# Patient Record
Sex: MALE | Race: BLACK OR AFRICAN AMERICAN | HISPANIC OR LATINO | URBAN - METROPOLITAN AREA
[De-identification: names, ages, dates, MRNs, and addresses within clinical notes are randomized per-mention and may not be internally consistent; named-entity substitution may affect disease eponyms.]

---

## 2023-02-28 ENCOUNTER — INPATIENT (INPATIENT)
Facility: HOSPITAL | Age: 48
LOS: 17 days | Discharge: HOME | DRG: 270 | End: 2023-03-18
Attending: THORACIC SURGERY (CARDIOTHORACIC VASCULAR SURGERY) | Admitting: THORACIC SURGERY (CARDIOTHORACIC VASCULAR SURGERY)
Payer: COMMERCIAL

## 2023-02-28 VITALS
TEMPERATURE: 98 F | RESPIRATION RATE: 18 BRPM | SYSTOLIC BLOOD PRESSURE: 155 MMHG | OXYGEN SATURATION: 96 % | HEIGHT: 64 IN | DIASTOLIC BLOOD PRESSURE: 111 MMHG | WEIGHT: 191.8 LBS | HEART RATE: 83 BPM

## 2023-02-28 DIAGNOSIS — I71.1 THORACIC AORTIC ANEURYSM, RUPTURED: ICD-10-CM

## 2023-02-28 LAB
A1C WITH ESTIMATED AVERAGE GLUCOSE RESULT: 6 % — HIGH (ref 4–5.6)
ABO RH CONFIRMATION: SIGNIFICANT CHANGE UP
ALBUMIN SERPL ELPH-MCNC: 4.1 G/DL — SIGNIFICANT CHANGE UP (ref 3.3–5.2)
ALP SERPL-CCNC: 116 U/L — SIGNIFICANT CHANGE UP (ref 40–120)
ALT FLD-CCNC: 17 U/L — SIGNIFICANT CHANGE UP
ANION GAP SERPL CALC-SCNC: 14 MMOL/L — SIGNIFICANT CHANGE UP (ref 5–17)
APTT BLD: 30 SEC — SIGNIFICANT CHANGE UP (ref 27.5–35.5)
AST SERPL-CCNC: 22 U/L — SIGNIFICANT CHANGE UP
BASOPHILS # BLD AUTO: 0.02 K/UL — SIGNIFICANT CHANGE UP (ref 0–0.2)
BASOPHILS NFR BLD AUTO: 0.4 % — SIGNIFICANT CHANGE UP (ref 0–2)
BILIRUB SERPL-MCNC: 0.4 MG/DL — SIGNIFICANT CHANGE UP (ref 0.4–2)
BLD GP AB SCN SERPL QL: SIGNIFICANT CHANGE UP
BUN SERPL-MCNC: 22.9 MG/DL — HIGH (ref 8–20)
CALCIUM SERPL-MCNC: 9.6 MG/DL — SIGNIFICANT CHANGE UP (ref 8.4–10.5)
CHLORIDE SERPL-SCNC: 101 MMOL/L — SIGNIFICANT CHANGE UP (ref 96–108)
CK SERPL-CCNC: 148 U/L — SIGNIFICANT CHANGE UP (ref 30–200)
CO2 SERPL-SCNC: 22 MMOL/L — SIGNIFICANT CHANGE UP (ref 22–29)
CREAT SERPL-MCNC: 1.36 MG/DL — HIGH (ref 0.5–1.3)
EGFR: 65 ML/MIN/1.73M2 — SIGNIFICANT CHANGE UP
EOSINOPHIL # BLD AUTO: 0.06 K/UL — SIGNIFICANT CHANGE UP (ref 0–0.5)
EOSINOPHIL NFR BLD AUTO: 1.1 % — SIGNIFICANT CHANGE UP (ref 0–6)
ESTIMATED AVERAGE GLUCOSE: 126 MG/DL — HIGH (ref 68–114)
GAS PNL BLDA: SIGNIFICANT CHANGE UP
GLUCOSE SERPL-MCNC: 114 MG/DL — HIGH (ref 70–99)
HCT VFR BLD CALC: 45.3 % — SIGNIFICANT CHANGE UP (ref 39–50)
HGB BLD-MCNC: 13.9 G/DL — SIGNIFICANT CHANGE UP (ref 13–17)
IMM GRANULOCYTES NFR BLD AUTO: 0.4 % — SIGNIFICANT CHANGE UP (ref 0–0.9)
INR BLD: 1.08 RATIO — SIGNIFICANT CHANGE UP (ref 0.88–1.16)
LYMPHOCYTES # BLD AUTO: 1.44 K/UL — SIGNIFICANT CHANGE UP (ref 1–3.3)
LYMPHOCYTES # BLD AUTO: 25.5 % — SIGNIFICANT CHANGE UP (ref 13–44)
MCHC RBC-ENTMCNC: 25.3 PG — LOW (ref 27–34)
MCHC RBC-ENTMCNC: 30.7 GM/DL — LOW (ref 32–36)
MCV RBC AUTO: 82.5 FL — SIGNIFICANT CHANGE UP (ref 80–100)
MONOCYTES # BLD AUTO: 0.6 K/UL — SIGNIFICANT CHANGE UP (ref 0–0.9)
MONOCYTES NFR BLD AUTO: 10.6 % — SIGNIFICANT CHANGE UP (ref 2–14)
NEUTROPHILS # BLD AUTO: 3.51 K/UL — SIGNIFICANT CHANGE UP (ref 1.8–7.4)
NEUTROPHILS NFR BLD AUTO: 62 % — SIGNIFICANT CHANGE UP (ref 43–77)
NT-PROBNP SERPL-SCNC: 625 PG/ML — HIGH (ref 0–300)
PLATELET # BLD AUTO: 216 K/UL — SIGNIFICANT CHANGE UP (ref 150–400)
POTASSIUM SERPL-MCNC: 4.2 MMOL/L — SIGNIFICANT CHANGE UP (ref 3.5–5.3)
POTASSIUM SERPL-SCNC: 4.2 MMOL/L — SIGNIFICANT CHANGE UP (ref 3.5–5.3)
PREALB SERPL-MCNC: 21 MG/DL — SIGNIFICANT CHANGE UP (ref 18–38)
PROT SERPL-MCNC: 8.1 G/DL — SIGNIFICANT CHANGE UP (ref 6.6–8.7)
PROTHROM AB SERPL-ACNC: 12.5 SEC — SIGNIFICANT CHANGE UP (ref 10.5–13.4)
RBC # BLD: 5.49 M/UL — SIGNIFICANT CHANGE UP (ref 4.2–5.8)
RBC # FLD: 14.7 % — HIGH (ref 10.3–14.5)
SARS-COV-2 RNA SPEC QL NAA+PROBE: SIGNIFICANT CHANGE UP
SODIUM SERPL-SCNC: 137 MMOL/L — SIGNIFICANT CHANGE UP (ref 135–145)
TROPONIN T SERPL-MCNC: <0.01 NG/ML — SIGNIFICANT CHANGE UP (ref 0–0.06)
TSH SERPL-MCNC: 1.61 UIU/ML — SIGNIFICANT CHANGE UP (ref 0.27–4.2)
WBC # BLD: 5.65 K/UL — SIGNIFICANT CHANGE UP (ref 3.8–10.5)
WBC # FLD AUTO: 5.65 K/UL — SIGNIFICANT CHANGE UP (ref 3.8–10.5)

## 2023-02-28 PROCEDURE — 93880 EXTRACRANIAL BILAT STUDY: CPT | Mod: 26

## 2023-02-28 PROCEDURE — 93306 TTE W/DOPPLER COMPLETE: CPT | Mod: 26

## 2023-02-28 RX ORDER — ESMOLOL HCL 100MG/10ML
50 VIAL (ML) INTRAVENOUS
Qty: 2500 | Refills: 0 | Status: DISCONTINUED | OUTPATIENT
Start: 2023-02-28 | End: 2023-03-02

## 2023-02-28 RX ORDER — NICARDIPINE HYDROCHLORIDE 30 MG/1
5 CAPSULE, EXTENDED RELEASE ORAL
Qty: 40 | Refills: 0 | Status: DISCONTINUED | OUTPATIENT
Start: 2023-02-28 | End: 2023-03-02

## 2023-02-28 RX ORDER — LABETALOL HCL 100 MG
10 TABLET ORAL ONCE
Refills: 0 | Status: COMPLETED | OUTPATIENT
Start: 2023-02-28 | End: 2023-02-28

## 2023-02-28 RX ORDER — PANTOPRAZOLE SODIUM 20 MG/1
40 TABLET, DELAYED RELEASE ORAL
Refills: 0 | Status: DISCONTINUED | OUTPATIENT
Start: 2023-02-28 | End: 2023-03-09

## 2023-02-28 RX ORDER — SODIUM CHLORIDE 9 MG/ML
3 INJECTION INTRAMUSCULAR; INTRAVENOUS; SUBCUTANEOUS EVERY 8 HOURS
Refills: 0 | Status: DISCONTINUED | OUTPATIENT
Start: 2023-02-28 | End: 2023-03-09

## 2023-02-28 RX ORDER — MUPIROCIN 20 MG/G
1 OINTMENT TOPICAL EVERY 12 HOURS
Refills: 0 | Status: DISCONTINUED | OUTPATIENT
Start: 2023-02-28 | End: 2023-03-01

## 2023-02-28 RX ADMIN — Medication 10 MILLIGRAM(S): at 21:39

## 2023-02-28 RX ADMIN — SODIUM CHLORIDE 3 MILLILITER(S): 9 INJECTION INTRAMUSCULAR; INTRAVENOUS; SUBCUTANEOUS at 21:34

## 2023-02-28 RX ADMIN — Medication 26.1 MICROGRAM(S)/KG/MIN: at 21:40

## 2023-03-01 DIAGNOSIS — I50.21 ACUTE SYSTOLIC (CONGESTIVE) HEART FAILURE: ICD-10-CM

## 2023-03-01 DIAGNOSIS — I71.00 DISSECTION OF UNSPECIFIED SITE OF AORTA: ICD-10-CM

## 2023-03-01 DIAGNOSIS — N18.9 CHRONIC KIDNEY DISEASE, UNSPECIFIED: ICD-10-CM

## 2023-03-01 DIAGNOSIS — Z29.9 ENCOUNTER FOR PROPHYLACTIC MEASURES, UNSPECIFIED: ICD-10-CM

## 2023-03-01 DIAGNOSIS — I16.0 HYPERTENSIVE URGENCY: ICD-10-CM

## 2023-03-01 DIAGNOSIS — I50.42 CHRONIC COMBINED SYSTOLIC (CONGESTIVE) AND DIASTOLIC (CONGESTIVE) HEART FAILURE: ICD-10-CM

## 2023-03-01 DIAGNOSIS — I42.0 DILATED CARDIOMYOPATHY: ICD-10-CM

## 2023-03-01 LAB
ALBUMIN SERPL ELPH-MCNC: 3.8 G/DL — SIGNIFICANT CHANGE UP (ref 3.3–5.2)
ALP SERPL-CCNC: 104 U/L — SIGNIFICANT CHANGE UP (ref 40–120)
ALT FLD-CCNC: 16 U/L — SIGNIFICANT CHANGE UP
AMPHET UR-MCNC: NEGATIVE — SIGNIFICANT CHANGE UP
ANION GAP SERPL CALC-SCNC: 13 MMOL/L — SIGNIFICANT CHANGE UP (ref 5–17)
APPEARANCE UR: CLEAR — SIGNIFICANT CHANGE UP
AST SERPL-CCNC: 15 U/L — SIGNIFICANT CHANGE UP
BARBITURATES UR SCN-MCNC: NEGATIVE — SIGNIFICANT CHANGE UP
BASOPHILS # BLD AUTO: 0.02 K/UL — SIGNIFICANT CHANGE UP (ref 0–0.2)
BASOPHILS NFR BLD AUTO: 0.4 % — SIGNIFICANT CHANGE UP (ref 0–2)
BENZODIAZ UR-MCNC: NEGATIVE — SIGNIFICANT CHANGE UP
BILIRUB SERPL-MCNC: 0.5 MG/DL — SIGNIFICANT CHANGE UP (ref 0.4–2)
BILIRUB UR-MCNC: NEGATIVE — SIGNIFICANT CHANGE UP
BUN SERPL-MCNC: 25.7 MG/DL — HIGH (ref 8–20)
CALCIUM SERPL-MCNC: 9 MG/DL — SIGNIFICANT CHANGE UP (ref 8.4–10.5)
CHLORIDE SERPL-SCNC: 104 MMOL/L — SIGNIFICANT CHANGE UP (ref 96–108)
CO2 SERPL-SCNC: 24 MMOL/L — SIGNIFICANT CHANGE UP (ref 22–29)
COCAINE METAB.OTHER UR-MCNC: NEGATIVE — SIGNIFICANT CHANGE UP
COLOR SPEC: YELLOW — SIGNIFICANT CHANGE UP
CREAT SERPL-MCNC: 1.69 MG/DL — HIGH (ref 0.5–1.3)
DIFF PNL FLD: ABNORMAL
EGFR: 50 ML/MIN/1.73M2 — LOW
EOSINOPHIL # BLD AUTO: 0.06 K/UL — SIGNIFICANT CHANGE UP (ref 0–0.5)
EOSINOPHIL NFR BLD AUTO: 1.2 % — SIGNIFICANT CHANGE UP (ref 0–6)
EPI CELLS # UR: SIGNIFICANT CHANGE UP
GLUCOSE SERPL-MCNC: 106 MG/DL — HIGH (ref 70–99)
GLUCOSE UR QL: NEGATIVE MG/DL — SIGNIFICANT CHANGE UP
HCT VFR BLD CALC: 41.1 % — SIGNIFICANT CHANGE UP (ref 39–50)
HGB BLD-MCNC: 12.7 G/DL — LOW (ref 13–17)
IMM GRANULOCYTES NFR BLD AUTO: 0.2 % — SIGNIFICANT CHANGE UP (ref 0–0.9)
KETONES UR-MCNC: NEGATIVE — SIGNIFICANT CHANGE UP
LEUKOCYTE ESTERASE UR-ACNC: NEGATIVE — SIGNIFICANT CHANGE UP
LYMPHOCYTES # BLD AUTO: 1.52 K/UL — SIGNIFICANT CHANGE UP (ref 1–3.3)
LYMPHOCYTES # BLD AUTO: 30.4 % — SIGNIFICANT CHANGE UP (ref 13–44)
MAGNESIUM SERPL-MCNC: 2 MG/DL — SIGNIFICANT CHANGE UP (ref 1.6–2.6)
MCHC RBC-ENTMCNC: 25.3 PG — LOW (ref 27–34)
MCHC RBC-ENTMCNC: 30.9 GM/DL — LOW (ref 32–36)
MCV RBC AUTO: 82 FL — SIGNIFICANT CHANGE UP (ref 80–100)
METHADONE UR-MCNC: NEGATIVE — SIGNIFICANT CHANGE UP
MONOCYTES # BLD AUTO: 0.68 K/UL — SIGNIFICANT CHANGE UP (ref 0–0.9)
MONOCYTES NFR BLD AUTO: 13.6 % — SIGNIFICANT CHANGE UP (ref 2–14)
MRSA PCR RESULT.: SIGNIFICANT CHANGE UP
NEUTROPHILS # BLD AUTO: 2.71 K/UL — SIGNIFICANT CHANGE UP (ref 1.8–7.4)
NEUTROPHILS NFR BLD AUTO: 54.2 % — SIGNIFICANT CHANGE UP (ref 43–77)
NITRITE UR-MCNC: NEGATIVE — SIGNIFICANT CHANGE UP
OPIATES UR-MCNC: NEGATIVE — SIGNIFICANT CHANGE UP
PCP SPEC-MCNC: SIGNIFICANT CHANGE UP
PCP UR-MCNC: NEGATIVE — SIGNIFICANT CHANGE UP
PH UR: 6 — SIGNIFICANT CHANGE UP (ref 5–8)
PLATELET # BLD AUTO: 202 K/UL — SIGNIFICANT CHANGE UP (ref 150–400)
POTASSIUM SERPL-MCNC: 4 MMOL/L — SIGNIFICANT CHANGE UP (ref 3.5–5.3)
POTASSIUM SERPL-SCNC: 4 MMOL/L — SIGNIFICANT CHANGE UP (ref 3.5–5.3)
PROT SERPL-MCNC: 7 G/DL — SIGNIFICANT CHANGE UP (ref 6.6–8.7)
PROT UR-MCNC: 100 MG/DL
RBC # BLD: 5.01 M/UL — SIGNIFICANT CHANGE UP (ref 4.2–5.8)
RBC # FLD: 14.9 % — HIGH (ref 10.3–14.5)
RBC CASTS # UR COMP ASSIST: SIGNIFICANT CHANGE UP /HPF (ref 0–4)
S AUREUS DNA NOSE QL NAA+PROBE: SIGNIFICANT CHANGE UP
SODIUM SERPL-SCNC: 141 MMOL/L — SIGNIFICANT CHANGE UP (ref 135–145)
SP GR SPEC: 1.02 — SIGNIFICANT CHANGE UP (ref 1.01–1.02)
THC UR QL: NEGATIVE — SIGNIFICANT CHANGE UP
UROBILINOGEN FLD QL: NEGATIVE MG/DL — SIGNIFICANT CHANGE UP
WBC # BLD: 5 K/UL — SIGNIFICANT CHANGE UP (ref 3.8–10.5)
WBC # FLD AUTO: 5 K/UL — SIGNIFICANT CHANGE UP (ref 3.8–10.5)
WBC UR QL: NEGATIVE /HPF — SIGNIFICANT CHANGE UP (ref 0–5)

## 2023-03-01 PROCEDURE — 99223 1ST HOSP IP/OBS HIGH 75: CPT

## 2023-03-01 PROCEDURE — 99291 CRITICAL CARE FIRST HOUR: CPT

## 2023-03-01 PROCEDURE — 93010 ELECTROCARDIOGRAM REPORT: CPT

## 2023-03-01 PROCEDURE — 71045 X-RAY EXAM CHEST 1 VIEW: CPT | Mod: 26

## 2023-03-01 RX ORDER — SENNA PLUS 8.6 MG/1
2 TABLET ORAL AT BEDTIME
Refills: 0 | Status: DISCONTINUED | OUTPATIENT
Start: 2023-03-01 | End: 2023-03-09

## 2023-03-01 RX ORDER — METOPROLOL TARTRATE 50 MG
25 TABLET ORAL EVERY 12 HOURS
Refills: 0 | Status: DISCONTINUED | OUTPATIENT
Start: 2023-03-01 | End: 2023-03-01

## 2023-03-01 RX ORDER — METOPROLOL TARTRATE 50 MG
50 TABLET ORAL EVERY 8 HOURS
Refills: 0 | Status: DISCONTINUED | OUTPATIENT
Start: 2023-03-01 | End: 2023-03-01

## 2023-03-01 RX ORDER — AMLODIPINE BESYLATE 2.5 MG/1
10 TABLET ORAL DAILY
Refills: 0 | Status: DISCONTINUED | OUTPATIENT
Start: 2023-03-01 | End: 2023-03-02

## 2023-03-01 RX ORDER — FUROSEMIDE 40 MG
40 TABLET ORAL ONCE
Refills: 0 | Status: COMPLETED | OUTPATIENT
Start: 2023-03-01 | End: 2023-03-01

## 2023-03-01 RX ORDER — METOPROLOL TARTRATE 50 MG
25 TABLET ORAL ONCE
Refills: 0 | Status: COMPLETED | OUTPATIENT
Start: 2023-03-01 | End: 2023-03-01

## 2023-03-01 RX ORDER — MORPHINE SULFATE 50 MG/1
4 CAPSULE, EXTENDED RELEASE ORAL ONCE
Refills: 0 | Status: DISCONTINUED | OUTPATIENT
Start: 2023-03-01 | End: 2023-03-01

## 2023-03-01 RX ORDER — METOPROLOL TARTRATE 50 MG
75 TABLET ORAL EVERY 8 HOURS
Refills: 0 | Status: DISCONTINUED | OUTPATIENT
Start: 2023-03-01 | End: 2023-03-02

## 2023-03-01 RX ORDER — INFLUENZA VIRUS VACCINE 15; 15; 15; 15 UG/.5ML; UG/.5ML; UG/.5ML; UG/.5ML
0.5 SUSPENSION INTRAMUSCULAR ONCE
Refills: 0 | Status: DISCONTINUED | OUTPATIENT
Start: 2023-03-01 | End: 2023-03-06

## 2023-03-01 RX ADMIN — NICARDIPINE HYDROCHLORIDE 25 MG/HR: 30 CAPSULE, EXTENDED RELEASE ORAL at 18:51

## 2023-03-01 RX ADMIN — SODIUM CHLORIDE 3 MILLILITER(S): 9 INJECTION INTRAMUSCULAR; INTRAVENOUS; SUBCUTANEOUS at 21:25

## 2023-03-01 RX ADMIN — SODIUM CHLORIDE 3 MILLILITER(S): 9 INJECTION INTRAMUSCULAR; INTRAVENOUS; SUBCUTANEOUS at 14:09

## 2023-03-01 RX ADMIN — Medication 40 MILLIGRAM(S): at 04:57

## 2023-03-01 RX ADMIN — Medication 25 MILLIGRAM(S): at 18:51

## 2023-03-01 RX ADMIN — Medication 26.1 MICROGRAM(S)/KG/MIN: at 18:51

## 2023-03-01 RX ADMIN — Medication 25 MILLIGRAM(S): at 10:27

## 2023-03-01 RX ADMIN — SENNA PLUS 2 TABLET(S): 8.6 TABLET ORAL at 21:28

## 2023-03-01 RX ADMIN — SODIUM CHLORIDE 3 MILLILITER(S): 9 INJECTION INTRAMUSCULAR; INTRAVENOUS; SUBCUTANEOUS at 05:01

## 2023-03-01 RX ADMIN — PANTOPRAZOLE SODIUM 40 MILLIGRAM(S): 20 TABLET, DELAYED RELEASE ORAL at 09:01

## 2023-03-01 RX ADMIN — AMLODIPINE BESYLATE 10 MILLIGRAM(S): 2.5 TABLET ORAL at 10:28

## 2023-03-01 RX ADMIN — MORPHINE SULFATE 4 MILLIGRAM(S): 50 CAPSULE, EXTENDED RELEASE ORAL at 12:45

## 2023-03-01 RX ADMIN — Medication 50 MILLIGRAM(S): at 13:39

## 2023-03-01 RX ADMIN — MORPHINE SULFATE 4 MILLIGRAM(S): 50 CAPSULE, EXTENDED RELEASE ORAL at 12:30

## 2023-03-01 RX ADMIN — NICARDIPINE HYDROCHLORIDE 25 MG/HR: 30 CAPSULE, EXTENDED RELEASE ORAL at 13:36

## 2023-03-01 RX ADMIN — Medication 75 MILLIGRAM(S): at 21:29

## 2023-03-01 NOTE — CONSULT NOTE ADULT - ASSESSMENT
41 yo M, originally from the  em[place] ~ [place], with a history of HTN (uncontrolled diagnosed 5 years ago), interstitial lung disease, CAD w/ stents (? for a leaking blood vessel/dissection), CKD (unknown baseline) initially presented to [facility] 2/28/23 from [facility] where he had gone for a routine physical exam and to get his medications filled.  He had previously not seen a physician for over 3 years.  He was last seen in [facility] ED in 2018 for hypertensive urgency requiring IV labetalol and transition to lisinopril/ HCTZ/ amlodipine.  However, he has been continuously filling his home meds from the  including losartan 320mg daily, nifedipine 60mg ER daily, and rosuvastatin 40mg daily.  Due to inability to obtain all his medications, he has been unable to obtain recent doses so he has been taking half his medication daily.  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  On presentation to [facility] he presented with SBP > 200 and subsequent CTA C/A/P showed Type B dissection. He was started on labetalol and nifedipine drips and transferred to [facility] CTICU for further management. A line is now in place with all drips off this AM with -150s.     Cardiac Testing:  Echo 2/28/23: LVEF 20-25%, LVEDD 6./23cm, LVIDs 5.59cm, TAPSE 2.54cm, mild to mod MR, trivial TR, RV normal function.

## 2023-03-01 NOTE — PROGRESS NOTE ADULT - SUBJECTIVE AND OBJECTIVE BOX
FABIANA CALIXTO  MRN#: [id number]  Subjective: The patient was in the CTICU in critical condition at risk for imminent decompensation and was seen and evaluate on AM rounds with the entire multidisciplinary team.     OBJECTIVE:  ICU Vital Signs Last 24 Hrs  T(C): 36.8 (01 Mar 2023 04:51), Max: 36.8 (01 Mar 2023 04:51)  T(F): 98.3 (01 Mar 2023 04:51), Max: 98.3 (01 Mar 2023 04:51)  HR: 67 (01 Mar 2023 08:30) (60 - 90)  BP: 114/71 (01 Mar 2023 08:30) (98/76 - 160/110)  BP(mean): 86 (01 Mar 2023 08:30) (80 - 113)  ABP: 88/76 (01 Mar 2023 08:30) (85/72 - 149/91)  ABP(mean): 82 (01 Mar 2023 08:30) (79 - 114)  RR: 26 (01 Mar 2023 08:30) (14 - 30)  SpO2: 100% (01 Mar 2023 08:30) (93% - 100%)    O2 Parameters below as of 01 Mar 2023 08:00  Patient On (Oxygen Delivery Method): nasal cannula  O2 Flow (L/min): 2    02-28 @ 07:01  -  03-01 @ 07:00  --------------------------------------------------------  IN: 533 mL / OUT: 500 mL / NET: 33 mL      PHYSICAL EXAM:Daily Height in cm: 162.56 (28 Feb 2023 21:17)    Daily   General: WN/WD NAD    HEENT:     + NCAT  + EOMI  - Conjuctival edema   - Icterus   - Thrush   - ETT  - NGT/OGT  Neck:         + FROM    + JVD     - Nodes     - Masses    + Mid-line trachea   - Tracheostomy  Chest:         - Sternal click  - Sternal drainage  - Pacing wires  - Chest tubes  - SubQ emphysema  Lungs:          + CTA   - Rhonchi    - Rales    - Wheezing     - Decreased BS   - Dullness R L  Cardiac:       + S1 + S2    + RRR   - Irregular   - S3  - S4    - Murmurs   - Rub   - Hamman’s sign   Abdomen:    + BS     + Soft    + Non-tender     - Distended    - Organomegaly  - PEG  Extremities:   - Cyanosis U/L   - Clubbing  U/L  - LE/UE Edema   + Capillary refill    + Pulses   Neuro:        + Awake   +  Alert   - Confused   - Lethargic   - Sedated   - Generalized Weakness  Skin:        - Rashes    - Erythema   + Normal incisions   + IV sites intact  - Sacral decubitus    HOSPITAL MEDICATIONS: All mediciations reviewed and analyzed  MEDICATIONS  (STANDING):  esmolol  Infusion 50 MICROgram(s)/kG/Min (26.1 mL/Hr) IV Continuous <Continuous>  influenza   Vaccine 0.5 milliLiter(s) IntraMuscular once  niCARdipine Infusion 5 mG/Hr (25 mL/Hr) IV Continuous <Continuous>  pantoprazole    Tablet 40 milliGRAM(s) Oral before breakfast  senna 2 Tablet(s) Oral at bedtime  sodium chloride 0.9% lock flush 3 milliLiter(s) IV Push every 8 hours    LABS: All Lab data reviewed and analyzed                        12.7   5.00  )-----------( 202      ( 01 Mar 2023 02:15 )             41.1    03-01    141  |  104  |  25.7<H>  ----------------------------<  106<H>  4.0   |  24.0  |  1.69<H>    Ca    9.0      01 Mar 2023 02:15  Mg     2.0     03-01    TPro  7.0  /  Alb  3.8  /  TBili  0.5  /  DBili  x   /  AST  15  /  ALT  16  /  AlkPhos  104  03-01    PT/INR - ( 28 Feb 2023 21:25 )   PT: 12.5 sec;   INR: 1.08 ratio         PTT - ( 28 Feb 2023 21:25 )  PTT:30.0 sec   ABG - ( 28 Feb 2023 21:19 )  pH, Arterial: 7.490 pH, Blood: x     /  pCO2: 34    /  pO2: 88    / HCO3: 26    / Base Excess: 2.6   /  SaO2: 98.5      RADIOLOGY: - Reviewed and analyzed     Assessment: Acute aortic syndrome-Type B dissection    Plan:   - Respiratory status required the following of continuous pulse oximetry for support & to prevent decompensation  - Continue anti-impulse therapy pending cardiac workup for probable TEVAR  - VTE prophylaxis with Venodyne boots  - Protonix maintained for GI bleeding prophylaxis  - Heart failure consult for depressed EF    Patient required critical care management and is at risk for life threatening decompensation  I provided 40 minutes of non-continuous care to the patient.

## 2023-03-01 NOTE — CONSULT NOTE ADULT - PROBLEM SELECTOR RECOMMENDATION 3
- etiology: unclear.  Maybe 2/2 ischemic disease given history of stents (although patient reports this was a leaking blood vessel ? dissection) but also a component of hypertensive heart disease with increased wall thickness on echo.  [facility] request by CTICU team for pre op assessment.   - GDTM to be introduced once out of a hypertensive urgency state.  Avoiding ACEi/ARB in the setting of possible surgery.  Would add on carvedilol if needed to transition off of hypertensive drips.   - Appears euvolemic on exam. No diuretics required at this time.  [facility] will evaluate this further.  - overall, he has poor outpatient care.  He identifies that he has no insurance/financial barrier for this.  Will need to establish care with local cardiology and HF teams.

## 2023-03-01 NOTE — H&P ADULT - NSHPLABSRESULTS_GEN_ALL_CORE
CTA C/A/P at  [facility] 2/28/2023:    IMPRESSION:  Descending thoracic aortic aneurysm originating measuring up to 6.7 cm  greatest diameter.    Type B dissection flap originating at the distal aortic arch with the false  lumen supplying the patent celiac artery. The true lumen gives rise to  patent superior mesenteric artery.    Findings were discussed with, and acknowledged by, Dr. Paz at  2/28/2023  17:22  by Dr. Mishra.

## 2023-03-01 NOTE — CONSULT NOTE ADULT - SUBJECTIVE AND OBJECTIVE BOX
[facility] Advanced Heart Failure  Office: [address] [place] [address]  Telephone: [phone]/Fax: [phone]    Advanced Heart Failure - INITIAL CONSULT NOTE    43 [place], [place], with a history of HTN (uncontrolled diagnosed 5 years ago), interstitial lung disease, CAD w/ stents (? for a leaking blood vessel), CKD (unknown baseline) initially presented to [facility] 2/28/23 from [facility] where he had gone for a routine physical exam and to get his medications filled.  He had previously not seen a physician for over 3 years.  He was last seen in [facility] ED in 2018 for hypertensive urgency requiring IV labetalol and transition to lisinopril/ HCTZ/ amlodipine.  However, he has been continuously [place] losartan 320mg daily, nifedipine 60mg ER daily, and rosuvastatin 40mg daily.  Due to inability to obtain all his medications, he has been unable to obtain recent doses so he has been taking half his medication daily.  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  On presentation to [facility] he presented with SBP > 200 and subsequent CTA C/A/P showed Type B dissection. He was started on labetalol and nifedipine drips and transferred to [facility] CTICU for further management. A line is now in place with all drips off this AM with -150s.     PAST MEDICAL & SURGICAL HISTORY:  Uncontrolled hypertension  CKD (chronic kidney disease)  Interstitial lung disease    FAMILY HISTORY:  HTN - mother and father    Home Medications:  NIFEdipine 60 mg oral tablet, extended release: 1 tab(s) orally 2 times a day (01 Mar 2023 02:57)      Medications:  amLODIPine   Tablet 10 milliGRAM(s) Oral daily  esmolol  Infusion 50 MICROgram(s)/kG/Min IV Continuous <Continuous>  influenza   Vaccine 0.5 milliLiter(s) IntraMuscular once  metoprolol tartrate 25 milliGRAM(s) Oral every 12 hours  niCARdipine Infusion 5 mG/Hr IV Continuous <Continuous>  pantoprazole    Tablet 40 milliGRAM(s) Oral before breakfast  senna 2 Tablet(s) Oral at bedtime  sodium chloride 0.9% lock flush 3 milliLiter(s) IV Push every 8 hours      Review of systems:  10 point review of systems completed and are negative unless noted in HPI    Vitals:  T(C): 36.9 (03-01-23 @ 08:00), Max: 36.9 (03-01-23 @ 08:00)  HR: 69 (03-01-23 @ 11:00) (60 - 90)  BP: 126/80 (03-01-23 @ 11:00) (98/76 - 160/110)  BP(mean): 98 (03-01-23 @ 11:00) (80 - 113)  RR: 18 (03-01-23 @ 11:00) (14 - 30)  SpO2: 97% (03-01-23 @ 11:00) (93% - 100%)    Daily Height in cm: 162.56 (28 Feb 2023 21:17)    Daily     Weight (kg): 87 (02-28 @ 21:20)    I&O's Summary    28 Feb 2023 07:01  -  01 Mar 2023 07:00  --------------------------------------------------------  IN: 533 mL / OUT: 500 mL / NET: 33 mL      Physical Exam:  Appearance: No Acute Distress  Neck: JVP around 10-12cm.  Cardiovascular: Normal S1 S2, No murmurs/rubs/gallops  Respiratory: Clear to auscultation bilaterally  Gastrointestinal: Soft, Non-tender	  Skin: No cyanosis	  Neurologic: Non-focal  Extremities: No LE edema  Psychiatry: alert      Labs:                        12.7   5.00  )-----------( 202      ( 01 Mar 2023 02:15 )             41.1     03-01    141  |  104  |  25.7<H>  ----------------------------<  106<H>  4.0   |  24.0  |  1.69<H>    Ca    9.0      01 Mar 2023 02:15  Mg     2.0     03-01    TPro  7.0  /  Alb  3.8  /  TBili  0.5  /  DBili  x   /  AST  15  /  ALT  16  /  AlkPhos  104  03-01    PT/INR - ( 28 Feb 2023 21:25 )   PT: 12.5 sec;   INR: 1.08 ratio         PTT - ( 28 Feb 2023 21:25 )  PTT:30.0 sec  CARDIAC MARKERS ( 28 Feb 2023 21:25 )  x     / <0.01 ng/mL / 148 U/L / x     / x          TELEMETRY: no events.

## 2023-03-01 NOTE — H&P ADULT - PROBLEM SELECTOR PLAN 1
Type B dissection of CTA C/A/P  Strict blood pressure control for SBP < 140  Currently on Esmolol and Cardene gtt   Images to be reviewed with Dr. Heaton for surgical evaluation  Remains asymptomatic  Preop work up: carotid US, PFTs, lab work up  Urine tox negative

## 2023-03-01 NOTE — H&P ADULT - NSICDXPASTMEDICALHX_GEN_ALL_CORE_FT
PAST MEDICAL HISTORY:  CKD (chronic kidney disease)     Interstitial lung disease     Prophylactic measure     Uncontrolled hypertension

## 2023-03-01 NOTE — PATIENT PROFILE ADULT - FALL HARM RISK - HARM RISK INTERVENTIONS

## 2023-03-01 NOTE — H&P ADULT - PROBLEM SELECTOR PLAN 4
Unclear creatinine baseline  Currently ROSALINO on CKD  Likely 2/2 hypoperfusion from dissection vs STEVAN  Continue to trend BMP  Will discuss need for IV hydration

## 2023-03-01 NOTE — PATIENT PROFILE ADULT - FUNCTIONAL ASSESSMENT - BASIC MOBILITY 6.
3-calculated by average/Not able to assess (calculate score using [facility] averaging method)

## 2023-03-01 NOTE — H&P ADULT - PROBLEM SELECTOR PLAN 2
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

## 2023-03-01 NOTE — H&P ADULT - NSHPPHYSICALEXAM_GEN_ALL_CORE
General: Well appearing, NAD  Neuro: AxO x3, non-focal, MORRIS  Cardiac: S1S2, no murmurs  Pulm: CTA b/l, no wheezing or rales  Abdomen: Soft, NT, ND,  Peripheral: equal radial, femoral, and DP pulses. No peripheral edema

## 2023-03-01 NOTE — CONSULT NOTE ADULT - PROBLEM SELECTOR RECOMMENDATION 2
- aim to target normotension with BP < 120/80.  - drips for CTICU team.  Eventually will transition to oral anti-hypertensives.  If going for surgery, would keep drips in place until then.  Otherwise could transition to carvedilol.

## 2023-03-01 NOTE — H&P ADULT - HISTORY OF PRESENT ILLNESS
42M, Maori speaking poor historian, with PMH HTN (uncontrolled), Pulmonary Interstitial Disease, CAD w/ stents (2021 in [place]), CKD (unknown baseline), was sent to [facility] 2/28/23 from [facility] where he had gone for a physical exam and to get his medications filled. He obtains his Nifedipine from his home country of [place], however has been unable to obtain recent doses so he has been taking half his medication daily. Of note, HIE shows recent admission to [facility] for photophobia where he was also found to have . 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 He was transferred to [facility] CTICU for further management.     At time of admission, his SBP was 180s and he was started on Esmolol and Nicardipine. Patient denies acute pain with radiating or aggravating factors. He does admit to occasional headaches and dizziness.

## 2023-03-01 NOTE — H&P ADULT - ASSESSMENT
42M, Japanese speaking poor historian, with PMH HTN (uncontrolled), Pulmonary Interstitial Disease (per chart review), CAD w/ stents (2021 in [place]), CKD (unknown baseline), with recent admission for hypertensive urgency, was sent to [place] [facility] 2/28/23 from [facility] 2/2 HTN (). CTA C/A/P showed Type B dissection and he was transferred to [facility] CTICU for further management.

## 2023-03-02 LAB
ANION GAP SERPL CALC-SCNC: 13 MMOL/L — SIGNIFICANT CHANGE UP (ref 5–17)
BUN SERPL-MCNC: 40.2 MG/DL — HIGH (ref 8–20)
CALCIUM SERPL-MCNC: 8.9 MG/DL — SIGNIFICANT CHANGE UP (ref 8.4–10.5)
CHLORIDE SERPL-SCNC: 102 MMOL/L — SIGNIFICANT CHANGE UP (ref 96–108)
CO2 SERPL-SCNC: 22 MMOL/L — SIGNIFICANT CHANGE UP (ref 22–29)
CREAT ?TM UR-MCNC: 212 MG/DL — SIGNIFICANT CHANGE UP
CREAT SERPL-MCNC: 3.01 MG/DL — HIGH (ref 0.5–1.3)
EGFR: 25 ML/MIN/1.73M2 — LOW
GLUCOSE BLDC GLUCOMTR-MCNC: 100 MG/DL — HIGH (ref 70–99)
GLUCOSE BLDC GLUCOMTR-MCNC: 87 MG/DL — SIGNIFICANT CHANGE UP (ref 70–99)
GLUCOSE SERPL-MCNC: 94 MG/DL — SIGNIFICANT CHANGE UP (ref 70–99)
HCT VFR BLD CALC: 39.7 % — SIGNIFICANT CHANGE UP (ref 39–50)
HGB BLD-MCNC: 12.3 G/DL — LOW (ref 13–17)
MAGNESIUM SERPL-MCNC: 1.9 MG/DL — SIGNIFICANT CHANGE UP (ref 1.6–2.6)
MCHC RBC-ENTMCNC: 25.7 PG — LOW (ref 27–34)
MCHC RBC-ENTMCNC: 31 GM/DL — LOW (ref 32–36)
MCV RBC AUTO: 82.9 FL — SIGNIFICANT CHANGE UP (ref 80–100)
PLATELET # BLD AUTO: 185 K/UL — SIGNIFICANT CHANGE UP (ref 150–400)
POTASSIUM SERPL-MCNC: 4.2 MMOL/L — SIGNIFICANT CHANGE UP (ref 3.5–5.3)
POTASSIUM SERPL-SCNC: 4.2 MMOL/L — SIGNIFICANT CHANGE UP (ref 3.5–5.3)
PROT ?TM UR-MCNC: 39 MG/DL — HIGH (ref 0–12)
PROT/CREAT UR-RTO: 0.2 RATIO — SIGNIFICANT CHANGE UP
RBC # BLD: 4.79 M/UL — SIGNIFICANT CHANGE UP (ref 4.2–5.8)
RBC # FLD: 14.9 % — HIGH (ref 10.3–14.5)
SODIUM SERPL-SCNC: 137 MMOL/L — SIGNIFICANT CHANGE UP (ref 135–145)
WBC # BLD: 6.66 K/UL — SIGNIFICANT CHANGE UP (ref 3.8–10.5)
WBC # FLD AUTO: 6.66 K/UL — SIGNIFICANT CHANGE UP (ref 3.8–10.5)

## 2023-03-02 PROCEDURE — 99291 CRITICAL CARE FIRST HOUR: CPT

## 2023-03-02 PROCEDURE — 99255 IP/OBS CONSLTJ NEW/EST HI 80: CPT

## 2023-03-02 PROCEDURE — 99231 SBSQ HOSP IP/OBS SF/LOW 25: CPT

## 2023-03-02 PROCEDURE — 71045 X-RAY EXAM CHEST 1 VIEW: CPT | Mod: 26

## 2023-03-02 PROCEDURE — 99233 SBSQ HOSP IP/OBS HIGH 50: CPT

## 2023-03-02 RX ORDER — DOXAZOSIN MESYLATE 4 MG
2 TABLET ORAL
Refills: 0 | Status: DISCONTINUED | OUTPATIENT
Start: 2023-03-02 | End: 2023-03-05

## 2023-03-02 RX ORDER — METOPROLOL TARTRATE 50 MG
100 TABLET ORAL EVERY 12 HOURS
Refills: 0 | Status: DISCONTINUED | OUTPATIENT
Start: 2023-03-02 | End: 2023-03-06

## 2023-03-02 RX ORDER — ALBUMIN HUMAN 25 %
250 VIAL (ML) INTRAVENOUS ONCE
Refills: 0 | Status: COMPLETED | OUTPATIENT
Start: 2023-03-02 | End: 2023-03-02

## 2023-03-02 RX ORDER — AMLODIPINE BESYLATE 2.5 MG/1
5 TABLET ORAL DAILY
Refills: 0 | Status: DISCONTINUED | OUTPATIENT
Start: 2023-03-02 | End: 2023-03-03

## 2023-03-02 RX ORDER — DOXAZOSIN MESYLATE 4 MG
2 TABLET ORAL DAILY
Refills: 0 | Status: DISCONTINUED | OUTPATIENT
Start: 2023-03-02 | End: 2023-03-02

## 2023-03-02 RX ADMIN — Medication 100 MILLIGRAM(S): at 09:54

## 2023-03-02 RX ADMIN — Medication 125 MILLILITER(S): at 03:50

## 2023-03-02 RX ADMIN — PANTOPRAZOLE SODIUM 40 MILLIGRAM(S): 20 TABLET, DELAYED RELEASE ORAL at 08:48

## 2023-03-02 RX ADMIN — Medication 75 MILLIGRAM(S): at 05:42

## 2023-03-02 RX ADMIN — Medication 2 MILLIGRAM(S): at 09:03

## 2023-03-02 RX ADMIN — SENNA PLUS 2 TABLET(S): 8.6 TABLET ORAL at 21:52

## 2023-03-02 RX ADMIN — SODIUM CHLORIDE 3 MILLILITER(S): 9 INJECTION INTRAMUSCULAR; INTRAVENOUS; SUBCUTANEOUS at 14:00

## 2023-03-02 RX ADMIN — Medication 100 MILLIGRAM(S): at 18:24

## 2023-03-02 RX ADMIN — Medication 2 MILLIGRAM(S): at 21:52

## 2023-03-02 RX ADMIN — AMLODIPINE BESYLATE 10 MILLIGRAM(S): 2.5 TABLET ORAL at 05:42

## 2023-03-02 RX ADMIN — SODIUM CHLORIDE 3 MILLILITER(S): 9 INJECTION INTRAMUSCULAR; INTRAVENOUS; SUBCUTANEOUS at 21:06

## 2023-03-02 RX ADMIN — SODIUM CHLORIDE 3 MILLILITER(S): 9 INJECTION INTRAMUSCULAR; INTRAVENOUS; SUBCUTANEOUS at 07:09

## 2023-03-02 NOTE — DIETITIAN INITIAL EVALUATION ADULT - OTHER INFO
42 year old male PMH HTN (uncontrolled), pulmonary interstitial disease (per chart review), CAD w/ stents (2021 in [place]), CKD (unknown baseline), with recent admission for hypertensive urgency, was sent to [place] ED 2/28/23 from [facility] 2/2 HTN (). CTA C/A/P showed Type B dissection and he was transferred to [facility] CTICU for further management.     Spoke with pt via  [facility] ID #[id number]. Pt reports good appetite/po intake prior to admission. States he follows a regular diet- typical daily intake is as follows: breakfast- green juice; lunch- beef, rice, vegetables; dinner- tacos and fruit (pt states dinner is usually a light meal). Pt reports his UBW ~2 months ago was 205 lbs and has lost some weight intentionally by eating more balanced meals. Pt currently NPO for R/LHC today. Verbal diet education deferred at this time, provided with written literature on heart healthy diet. RD to follow up.

## 2023-03-02 NOTE — PROGRESS NOTE ADULT - SUBJECTIVE AND OBJECTIVE BOX
FABIANA CALIXTO  MRN-[id number]    HPI:  42M, Croatian speaking poor historian, with PMH HTN (uncontrolled), Pulmonary Interstitial Disease, CAD w/ stents ( in [place]), CKD (unknown baseline), was sent to [facility] 23 from [facility] where he had gone for a physical exam and to get his medications filled. He obtains his Nifedipine from his home country of [place], however has been unable to obtain recent doses so he has been taking half his medication daily. Of note, HIE shows recent admission to [place] for photophobia where he was also found to have . 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 He was transferred to [facility] CTICU for further management.   At time of admission, his SBP was 180s and he was started on Esmolol and Nicardipine. Patient denies acute pain with radiating or aggravating factors. He does admit to occasional headaches and dizziness.   (01 Mar 2023 02:45)      Hospital Course:  - HTN (uncontrolled), Pulmonary Interstitial Disease, CAD w/ stents ( in [place]), CKD (unknown baseline),  -Recent admission to [place] for photophobia where he was also found to have .   -On admission, he was hypertensive with SBP > 200 and subsequent CTA C/A/P showed Type B dissection.   -He was transferred to [facility] CTICU for further management. -  Today:  No acute events   DC A line  heart failure consult today  DC Esmolol  DC cardene  start Cardura  Monitor Creatinine          ICU Vital Signs Last 24 Hrs  T(C): 37.1 (02 Mar 2023 08:00), Max: 37.1 (02 Mar 2023 08:00)  T(F): 98.8 (02 Mar 2023 08:00), Max: 98.8 (02 Mar 2023 08:00)  HR: 68 (02 Mar 2023 08:45) (62 - 70)  BP: 123/69 (02 Mar 2023 08:45) (96/61 - 136/90)  BP(mean): 88 (02 Mar 2023 08:45) (74 - 109)  ABP: 94/57 (02 Mar 2023 08:45) (75/63 - 141/92)  ABP(mean): 67 (02 Mar 2023 08:45) (66 - 109)  RR: 22 (02 Mar 2023 08:45) (12 - 37)  SpO2: 97% (02 Mar 2023 08:45) (93% - 100%)    O2 Parameters below as of 02 Mar 2023 08:00  Patient On (Oxygen Delivery Method): nasal cannula  O2 Flow (L/min): 2          Physical Exam:  Gen: A&O   CNS: non focal 	  Neck: no JVD  RES : clear , no wheezing              CVS: Regular  rhythm. Normal S1/S2  Abd: Soft, non-distended. Bowel sounds present.  Skin: No rash.  Ext:  no edema    ============================I/O===========================   I&O's Detail    01 Mar 2023 07:01  -  02 Mar 2023 07:00  --------------------------------------------------------  IN:    Albumin 5%  - 250 mL: 250 mL    Esmolol: 1040.2 mL    NiCARdipine: 1050 mL    Oral Fluid: 360 mL  Total IN: 2700.2 mL    OUT:    Indwelling Catheter - Urethral (mL): 75 mL    Voided (mL): 500 mL  Total OUT: 575 mL    Total NET: 2125.2 mL      02 Mar 2023 07:01  -  02 Mar 2023 11:13  --------------------------------------------------------  IN:    Esmolol: 52 mL    NiCARdipine: 120 mL  Total IN: 172 mL    OUT:    Indwelling Catheter - Urethral (mL): 110 mL  Total OUT: 110 mL    Total NET: 62 mL        ============================ LABS =========================                        12.3   6.66  )-----------( 185      ( 02 Mar 2023 02:00 )             39.7     03-02    137  |  102  |  40.2<H>  ----------------------------<  94  4.2   |  22.0  |  3.01<H>    Ca    8.9      02 Mar 2023 02:00  Mg     1.9     03-    TPro  7.0  /  Alb  3.8  /  TBili  0.5  /  DBili  x   /  AST  15  /  ALT  16  /  AlkPhos  104  03-01    LIVER FUNCTIONS - ( 01 Mar 2023 02:15 )  Alb: 3.8 g/dL / Pro: 7.0 g/dL / ALK PHOS: 104 U/L / ALT: 16 U/L / AST: 15 U/L / GGT: x           PT/INR - ( 2023 21:25 )   PT: 12.5 sec;   INR: 1.08 ratio         PTT - ( 2023 21:25 )  PTT:30.0 sec  ABG - ( 2023 21:19 )  pH, Arterial: 7.490 pH, Blood: x     /  pCO2: 34    /  pO2: 88    / HCO3: 26    / Base Excess: 2.6   /  SaO2: 98.5              Urinalysis Basic - ( 01 Mar 2023 00:30 )    Color: Yellow / Appearance: Clear / S.020 / pH: x  Gluc: x / Ketone: Negative  / Bili: Negative / Urobili: Negative mg/dL   Blood: x / Protein: 100 mg/dL / Nitrite: Negative   Leuk Esterase: Negative / RBC: 0-2 /HPF / WBC Negative /HPF   Sq Epi: x / Non Sq Epi: Occasional / Bacteria: x      ======================Micro/Rad/Cardio=================  Culture: Reviewed   CXR: Reviewed  Echo:Reviewed  ======================================================  PAST MEDICAL & SURGICAL HISTORY:  Uncontrolled hypertension      CKD (chronic kidney disease)      Interstitial lung disease      Prophylactic measure        ====================ASSESSMENT ==============  42M, Croatian speaking poor historian, with PMH HTN (uncontrolled), Pulmonary Interstitial Disease (per chart review), CAD w/ stents ( in [place]), CKD (unknown baseline), with recent admission for hypertensive urgency, was sent to [place] [facility] 23 from [facility] / HTN (). CTA C/A/P showed Type B dissection and he was transferred to [facility] CTICU for further management.   ---Uncontrolled hypertension  ---CKD (chronic kidney disease)  ---Interstitial lung disease  --- Dissection of aorta.   --- Type B dissection of CTA C/A/P  ---Chronic combined systolic and diastolic heart failure.   --- Hypertensive urgency.   ---Chronic kidney disease (CKD).       Plan:  -Strict blood pressure control for -120  -DC  Esmolol and Cardene gtt  -Diuresis PRN.  -Continue to trend BMP.  - Continue GI ppx with Protonix and Senna  -DVT ppx with SCD boots-  ====================== NEUROLOGY=====================  intact  ==================== RESPIRATORY======================  RA    ====================CARDIOVASCULAR==================  amLODIPine   Tablet 10 milliGRAM(s) Oral daily  doxazosin 2 milliGRAM(s) Oral <User Schedule>  metoprolol tartrate 100 milliGRAM(s) Oral every 12 hours    ===================HEMATOLOGIC/ONC ===================  Monitor H&H/Plts      ===================== RENAL =========================  Continue monitoring urine output, I&OS, BUN/Cr     ==================== GASTROINTESTINAL===================  pantoprazole    Tablet 40 milliGRAM(s) Oral before breakfast  senna 2 Tablet(s) Oral at bedtime  sodium chloride 0.9% lock flush 3 milliLiter(s) IV Push every 8 hours    =======================    ENDOCRINE  =====================    ========================INFECTIOUS DISEASE================      -Monitor Neurologic status ,   -Head of the bed should remain elevated to 45 degrees,  -Monitor for arrhythmias and monitor parameters for organ perfusion,  -Glycemic control is satisfactory,  -Nutritional goals will be met using po eventually , insure adequate caloric intake and monitor the same ,  -Electrolytes have been repleted as necessary , pain control has been achieved  and wound care has been carried out ,  -Stress ulcer and VTE prophylaxis will be achieved,      I have spent 35 minutes providing acute care for this critically ill patient     Patient requires continuous monitoring with bedside rhythm monitoring, pulse ox monitoring, and intermittent blood gas analysis. Care plan discussed with ICU care team. Patient remained critical and at risk for life threatening decompensation.

## 2023-03-02 NOTE — PROGRESS NOTE ADULT - ASSESSMENT
42M, Maltese speaking poor historian, with PMH HTN (uncontrolled), Pulmonary Interstitial Disease (per chart review), CAD w/ stents (2021 in [place]), CKD (unknown baseline), with recent admission for hypertensive urgency, was sent to [place] [facility] 2/28/23 from [facility] 2/2 HTN (). CTA C/A/P showed Type B dissection and he was transferred to [facility] CTICU for further management.

## 2023-03-02 NOTE — PROGRESS NOTE ADULT - PROBLEM SELECTOR PLAN 4
Unclear creatinine baseline  Currently ROSALINO on CKD  Likely 2/2 hypoperfusion from dissection vs STEVAN  Continue to trend BMP

## 2023-03-02 NOTE — PROGRESS NOTE ADULT - SUBJECTIVE AND OBJECTIVE BOX
Brief Summary:   42M, Jordanian speaking poor historian, with PMH HTN (uncontrolled), Pulmonary Interstitial Disease, CAD w/ stents (2021 in [place]), CKD (unknown baseline), was sent to [facility] 2/28/23 from [facility] where he had gone for a physical exam and to get his medications filled. He obtains his Nifedipine from his home country of [place], however has been unable to obtain recent doses so he has been taking half his medication daily. Of note, HIE shows recent admission to [facility] for photophobia where he was also found to have . 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 He was transferred to [facility] CTICU for further management.     SUBJECTIVE:  Pt in bed alert and oriented, pt states, " after I had something to eat today my stomach hurt"  Patient denies acute pain with radiating or aggravating factors.  He denies chest pain, shortness of breath, palpitations, headache, dizziness, nausea, or vomiting.        Overnight events:  none    PAST MEDICAL & SURGICAL HISTORY:  Uncontrolled hypertension      CKD (chronic kidney disease)      Interstitial lung disease      Prophylactic measure          MEDICATIONS  amLODIPine   Tablet 10 milliGRAM(s) Oral daily  esmolol  Infusion 50 MICROgram(s)/kG/Min IV Continuous <Continuous>  influenza   Vaccine 0.5 milliLiter(s) IntraMuscular once  metoprolol tartrate 75 milliGRAM(s) Oral every 8 hours  niCARdipine Infusion 5 mG/Hr IV Continuous <Continuous>  pantoprazole    Tablet 40 milliGRAM(s) Oral before breakfast  senna 2 Tablet(s) Oral at bedtime  sodium chloride 0.9% lock flush 3 milliLiter(s) IV Push every 8 hours  MEDICATIONS  (PRN):      Daily           ABG - ( 28 Feb 2023 21:19 )  pH, Arterial: 7.490 pH, Blood: x     /  pCO2: 34    /  pO2: 88    / HCO3: 26    / Base Excess: 2.6   /  SaO2: 98.5                                    12.7   5.00  )-----------( 202      ( 01 Mar 2023 02:15 )             41.1   03-01    141  |  104  |  25.7<H>  ----------------------------<  106<H>  4.0   |  24.0  |  1.69<H>    Ca    9.0      01 Mar 2023 02:15  Mg     2.0     03-01    TPro  7.0  /  Alb  3.8  /  TBili  0.5  /  DBili  x   /  AST  15  /  ALT  16  /  AlkPhos  104  03-01    CARDIAC MARKERS ( 28 Feb 2023 21:25 )  x     / <0.01 ng/mL / 148 U/L / x     / x        PT/INR - ( 28 Feb 2023 21:25 )   PT: 12.5 sec;   INR: 1.08 ratio         PTT - ( 28 Feb 2023 21:25 )  PTT:30.0 sec      Objective:  T(C): 37 (03-02-23 @ 00:00), Max: 37 (03-02-23 @ 00:00)  HR: 63 (03-01-23 @ 23:15) (60 - 73)  BP: 108/65 (03-01-23 @ 23:15) (96/61 - 139/88)  RR: 17 (03-01-23 @ 23:15) (12 - 31)  SpO2: 96% (03-01-23 @ 23:15) (93% - 100%)  Wt(kg): --CAPILLARY BLOOD GLUCOSE      I&O's Summary    28 Feb 2023 07:01  -  01 Mar 2023 07:00  --------------------------------------------------------  IN: 533 mL / OUT: 500 mL / NET: 33 mL    01 Mar 2023 07:01  -  02 Mar 2023 00:56  --------------------------------------------------------  IN: 1554.2 mL / OUT: 400 mL / NET: 1154.2 mL        Physical Exam  Neuro: A+O x 3, non-focal, speech clear and intact  HEENT:  NCAT, PERRL, EOMI. No conjuctival edema or icterus, no thrush.  Neck: supple, trachea midline  Pulm: CTA, good air entry, equal bilaterally, no rales/rhonchi/wheezing, no accessory muscle use noted  CV: regular rate, regular rhythm, +S1S2, no murmur or rub noted  Abd: soft, NT, ND, + BS  Ext: MORRIS x 4, no edema, no cyanosis or clubbing,  2+ DP b/l, distal motor/neuro/circ intact.   Skin: warm, dry, well perfused  LINES: RRAL     Imaging:  < from: US Duplex Carotid Arteries Complete, Bilateral (02.28.23 @ 23:34) >        INTERPRETATION:  CLINICAL INFORMATION: Atherosclerosis of the carotid   arteries    COMPARISON: None available.    TECHNIQUE: Grayscale, color and spectral Doppler examination of both   carotid arteries was performed.    FINDINGS:    No elevated velocities or abnormal waveforms are encountered. No   significant plaque or stenosis in the areas visualized. No evidence of   dissection in the visualized segments.    Peak systolic velocities are as follows:    RIGHT:  PROX CCA = 73 cm/s  DIST CCA = 71 cm/s  PROX ICA = 64 cm/s  DIST ICA = not well seen  ECA = 55 cm/s    LEFT:  PROX CCA = 69 cm/s  DIST CCA = 57 cm/s  PROX ICA= not well seen  DIST ICA = not well seen  ECA = 56 cm/s    Antegrade flow is noted within both vertebral arteries.    IMPRESSION:  Limited examination.  No significant hemodynamic stenosis of either carotid artery.  No evidence of dissection in the visualized segments of the bilateral   common carotid arteries and right proximal internal carotid artery.    Measurement of carotid stenosis is based on velocity parameters that   correlate the residual internal carotid diameter with that of the more  distal vessel in accordance with a method such as the North American   Symptomatic Carotid Endarterectomy Trial (NASCET).    --- End of Report ---            LEIGHANN CHARLES MD; Attending Radiologist  This document has been electronically signed. Mar  1 2023 12:09AM    < end of copied text >            < from: TTE Echo Complete w/o Contrast w/ Doppler (02.28.23 @ 21:29) >    Summary:   1. Endocardial visualization was enhanced with intravenous echo   contrast. No LV thrombus.   2. Left ventricular ejection fraction, by visual estimation, is 20 to   25%.   3. Severely decreased global left ventricular systolic function.   4. There is moderate eccentric left ventricular hypertrophy.   5. Dilated cardiomyopathy.   6. Spectral Doppler shows pseudonormal pattern of left ventricular   myocardial filling (Grade II diastolic dysfunction).   7. Normal right ventricular size and function, inadequate estimation of   RVSP.   8. Mildly enlarged left atrium.   9. The mitral valve leaflets are tethered which is due to reduced   systolic function and elevated LVDP.  10. Mild to moderate mitral valve regurgitation.  11. Dilated descending aorta with echogenicity within the abdominal   aortic lumen with septation suggestive of known history of Type B aortic   dissection with possible intraluminal thrombi, recommend correlation with   CTA imaging.  12. There is no evidence of pericardial effusion.  13. No prior study is available for comparison. Findings reported to the   CTICU team.    Luan Babb DO Electronically signed on 2/28/2023 at 10:40:35 PM        *** Final ***    < end of copied text >

## 2023-03-02 NOTE — CONSULT NOTE ADULT - ASSESSMENT
The patient is a 42 year old male with PMH of HTN, CAD s/p stents, "big heart", and pulmonary interstitial disease, whom reports that he travels to his home country once annually during the month of July and has routine check up with physicians there as well as refill of prescription medications. He initially presented to [facility] for photophobia. Found to be severely hypertensive. Of note, the patient was not taking his prescribed dose of antihypertensives due to lack of pills / refills. CTA showed at the distal aortic arch, there is a dissection flap with the false lumen extending through the aneurysmal sac and giving rise to a patent celiac artery. The true lumen gives rise to a patent superior mesenteric artery. The bilateral renal arteries are patent. Transferred to [facility] for further surgical intervention. Nephrology is consulted for ROSALINO. Communicated with the patient through assistance of Language Line  # [id number].     -Baseline creatinine is unclear; denied ever being informed of any renal problems by his physicians at the [facility] [place]  -At [facility], on arrival creatinine was 1.6mg/dL   -While here, creatinine appeared to range 1.3-1.6mg/dL (? baseline creatinine); however peaked today to 3mg/dL  -ROSALINO (on suspected CKD) is multifactorial - suspecting to be hemodynamically driven (due to necessary BP management in setting of type B aortic dissection) superimposed by contrast associated nephropathy   -UA 30 protein, trace blood, 0-2 RBC  -Will check UPCR and UACR   -CTA a/p negative for hydronephrosis   -Hemodynamics - management as per primary team  -ECHO showed dilated cardiomyopathy, left ventricular ejection fraction 20-25%  -Awaiting LHR/RHC which is tentatively scheduled for tomorrow    Renata Leigh DO  Nephrology  [facility]  Office Number [phone]    The patient is a 42 year old male with PMH of HTN, CAD s/p stents, "big heart", and pulmonary interstitial disease, whom reports that he travels to his home country once annually during the month of July and has routine check up with physicians there as well as refill of prescription medications. He initially presented to [facility] for photophobia. Found to be severely hypertensive. Of note, the patient was not taking his prescribed dose of antihypertensives due to lack of pills / refills. CTA showed at the distal aortic arch, there is a dissection flap with the false lumen extending through the aneurysmal sac and giving rise to a patent celiac artery. The true lumen gives rise to a patent superior mesenteric artery. The bilateral renal arteries are patent. Transferred to [facility] for further surgical intervention. Nephrology is consulted for ROSALINO. Communicated with the patient through assistance of Language Line  # [id number].     -Baseline creatinine is unclear; denied ever being informed of any renal problems by his physicians at the [facility] [place]  -At [facility], on arrival creatinine was 1.6mg/dL   -While here, creatinine appeared to range 1.3-1.6mg/dL (? baseline creatinine); however peaked today to 3mg/dL  -ROSALINO (on suspected CKD) is multifactorial - suspecting to be hemodynamically driven (due to necessary BP management in setting of type B aortic dissection) superimposed by contrast associated nephropathy   -UA 30 protein, trace blood, 0-2 RBC  -Will check UPCR and UACR   -CTA a/p negative for hydronephrosis   -Hemodynamics / BP management - as per primary team  -Type B aortic dissection - management as per primary team  -ECHO showed dilated cardiomyopathy, left ventricular ejection fraction 20-25%  -Awaiting LHR/RHC which is tentatively scheduled for tomorrow     Renata Leigh DO  Nephkalyani  [facility]  Office Number [phone]

## 2023-03-02 NOTE — DIETITIAN INITIAL EVALUATION ADULT - PERTINENT LABORATORY DATA
03-02    137  |  102  |  40.2<H>  ----------------------------<  94  4.2   |  22.0  |  3.01<H>    Ca    8.9      02 Mar 2023 02:00  Mg     1.9     03-02    TPro  7.0  /  Alb  3.8  /  TBili  0.5  /  DBili  x   /  AST  15  /  ALT  16  /  AlkPhos  104  03-01  POCT Blood Glucose.: 100 mg/dL (03-02-23 @ 08:43)  A1C with Estimated Average Glucose Result: 6.0 % (02-28-23 @ 21:25)

## 2023-03-02 NOTE — DIETITIAN INITIAL EVALUATION ADULT - PERTINENT MEDS FT
MEDICATIONS  (STANDING):  amLODIPine   Tablet 10 milliGRAM(s) Oral daily  doxazosin 2 milliGRAM(s) Oral <User Schedule>  influenza   Vaccine 0.5 milliLiter(s) IntraMuscular once  metoprolol tartrate 100 milliGRAM(s) Oral every 12 hours  niCARdipine Infusion 5 mG/Hr (25 mL/Hr) IV Continuous <Continuous>  pantoprazole    Tablet 40 milliGRAM(s) Oral before breakfast  senna 2 Tablet(s) Oral at bedtime  sodium chloride 0.9% lock flush 3 milliLiter(s) IV Push every 8 hours    MEDICATIONS  (PRN):

## 2023-03-02 NOTE — PROGRESS NOTE ADULT - PROBLEM SELECTOR PLAN 3
- Etiology: unclear.  Maybe 2/2 ischemic disease given history of stents (although patient reports this was a leaking blood vessel ? dissection) but also a component of hypertensive heart disease with increased wall thickness on echo.  [facility] request by CTICU team for pre op assessment.   - GDMT to be introduced once out of a hypertensive urgency state. Avoiding ACEi/ARB in the setting of possible surgery. Would use carvedilol in place of lopressor.   - Appears mildly hypervolemic on exam today-JVP 9-10. Recommend Lasix 20mg IVP x 1.  - Overall, he has poor outpatient care.  He identifies that he has no insurance/financial barrier for this.  Will need to establish care with local cardiology and HF teams.

## 2023-03-02 NOTE — PROGRESS NOTE ADULT - ASSESSMENT
41 yo M, originally from the  emigrated ~ 2008, with a history of HTN (uncontrolled diagnosed 5 years ago), interstitial lung disease, CAD w/ stents (? for a leaking blood vessel/dissection), CKD (unknown baseline) initially presented to [facility] 2/28/23 from [facility] where he had gone for a routine physical exam and to get his medications filled.  He had previously not seen a physician for over 3 years.  He was last seen in [facility] ED in 2018 for hypertensive urgency requiring IV labetalol and transition to lisinopril/ HCTZ/ amlodipine.  However, he has been continuously filling his home meds from the  including losartan 320mg daily, nifedipine 60mg ER daily, and rosuvastatin 40mg daily.  Due to inability to obtain all his medications, he has been unable to obtain recent doses so he has been taking half his medication daily.  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  On presentation to [facility] he presented with SBP > 200 and subsequent CTA C/A/P showed Type B dissection. He was started on labetalol and nifedipine drips and transferred to [facility] CTICU for further management. A line is now in place with all drips off this AM with -150s.     Cardiac Testing:  Echo 2/28/23: LVEF 20-25%, LVEDD 6./23cm, LVIDs 5.59cm, TAPSE 2.54cm, mild to mod MR, trivial TR, RV normal function.

## 2023-03-02 NOTE — CONSULT NOTE ADULT - SUBJECTIVE AND OBJECTIVE BOX
The patient is a 42 year old male with PMH of HTN, CAD s/p stents, "big heart", and pulmonary interstitial disease, whom reports that he travels to his home country once annually during the month of July and has routine check up with physicians there as well as refill of prescription medications. He initially presented to [facility] for photophobia. Found to be severely hypertensive. Of note, the patient was not taking his prescribed dose of antihypertensives due to lack of pills / refills. CTA showed at the distal aortic arch, there is a dissection flap with the false lumen extending through the aneurysmal sac and giving rise to a patent celiac artery. The true lumen gives rise to a patent superior mesenteric artery. The bilateral renal arteries are patent. Transferred to [facility] for further surgical intervention. Nephrology is consulted for ROSALINO. Communicated with the patient through assistance of Language Line  # [id number].     PAST MEDICAL & SURGICAL HISTORY:  Uncontrolled hypertension  CKD (chronic kidney disease)  Interstitial lung disease  Prophylactic measure    Allergies  Allergy Status Unknown  Intolerances    Home Medications:  NIFEdipine 60 mg oral tablet, extended release: 1 tab(s) orally 2 times a day (01 Mar 2023 02:57)    Social History:  Works at a [employer].  Lives with wife and daughter in [place].  Performs own ADLs.  Denies alcohol use, drug use, or smoking history. (01 Mar 2023 02:45)    FAMILY HISTORY: Denied family hx of CKD / ESRD.     ROS: Denied SOB. Complained of intermittent abdominal pain.     Vital Signs Last 24 Hrs  T(C): 36.7 (02 Mar 2023 12:00), Max: 37.1 (02 Mar 2023 08:00)  T(F): 98 (02 Mar 2023 12:00), Max: 98.8 (02 Mar 2023 08:00)  HR: 66 (02 Mar 2023 12:30) (63 - 73)  BP: 127/70 (02 Mar 2023 12:30) (99/61 - 130/73)  BP(mean): 94 (02 Mar 2023 12:30) (74 - 95)  RR: 28 (02 Mar 2023 12:30) (16 - 43)  SpO2: 97% (02 Mar 2023 12:30) (93% - 100%)    Parameters below as of 02 Mar 2023 12:00  Patient On (Oxygen Delivery Method): nasal cannula  O2 Flow (L/min): 2    I&O's Summary    01 Mar 2023 07:01  -  02 Mar 2023 07:00  --------------------------------------------------------  IN: 2700.2 mL / OUT: 575 mL / NET: 2125.2 mL    02 Mar 2023 07:01  -  02 Mar 2023 14:02  --------------------------------------------------------  IN: 412 mL / OUT: 285 mL / NET: 127 mL    Physical Exam  General: WDWN male in NAD  Cardiac: S1S2 RRR  Respiratory: CTAB  Abdomen: Obese, non tender  Extremities: No appreciable edema  Neuro: AAOx3    03-02    137  |  102  |  40.2<H>  ----------------------------<  94  4.2   |  22.0  |  3.01<H>    Ca    8.9      02 Mar 2023 02:00  Mg     1.9     03-02    TPro  7.0  /  Alb  3.8  /  TBili  0.5  /  DBili  x   /  AST  15  /  ALT  16  /  AlkPhos  104  03-01               12.3   6.66  )-----------( 185      ( 02 Mar 2023 02:00 )             39.7     MEDICATIONS  (STANDING):  amLODIPine   Tablet 10 milliGRAM(s) Oral daily  doxazosin 2 milliGRAM(s) Oral <User Schedule>  influenza   Vaccine 0.5 milliLiter(s) IntraMuscular once  metoprolol tartrate 100 milliGRAM(s) Oral every 12 hours  pantoprazole    Tablet 40 milliGRAM(s) Oral before breakfast  senna 2 Tablet(s) Oral at bedtime  sodium chloride 0.9% lock flush 3 milliLiter(s) IV Push every 8 hours    MEDICATIONS  (PRN):

## 2023-03-02 NOTE — PROGRESS NOTE ADULT - SUBJECTIVE AND OBJECTIVE BOX
[facility]  Office: [address] [place] [address]  Service Phone [phone]  Office Phone: [phone]/Fax: [phone]    Subjective/Objective: NAEO. Patient denies overt symptoms, specifically denies CP, SOB, headache, dizziness, abdominal discomfort.     Medications:  amLODIPine   Tablet 10 milliGRAM(s) Oral daily  doxazosin 2 milliGRAM(s) Oral <User Schedule>  influenza   Vaccine 0.5 milliLiter(s) IntraMuscular once  metoprolol tartrate 100 milliGRAM(s) Oral every 12 hours  pantoprazole    Tablet 40 milliGRAM(s) Oral before breakfast  senna 2 Tablet(s) Oral at bedtime  sodium chloride 0.9% lock flush 3 milliLiter(s) IV Push every 8 hours    Vital Signs Last 24 Hours  T(C): 37.1 (23 @ 08:00), Max: 37.1 (23 @ 08:00)  HR: 68 (23 @ 11:45) (62 - 70)  BP: 124/71 (23 @ 11:45) (96/61 - 130/73)  RR: 34 (23 @ 11:45) (12 - 43)  SpO2: 99% (23 @ 11:45) (93% - 100%)    Weight in k.8 ( @ 04:38)    I&O's Summary    01 Mar 2023 07:  -  02 Mar 2023 07:00  --------------------------------------------------------  IN: 2700.2 mL / OUT: 575 mL / NET: 2125.2 mL    02 Mar 2023 07:  -  02 Mar 2023 12:04  --------------------------------------------------------  IN: 412 mL / OUT: 185 mL / NET: 227 mL    Tele: SR    Physical Exam:  General: No distress. Comfortable.  HEENT: EOMs intact.  Neck: Neck supple. JVP 9-10cm H20.  Chest: Slightly diminished at bases   CV: RRR. Normal S1 and S2. No murmurs, rub, or gallops. Warm peripherally.  PV: No edema.  Abdomen: Soft, non-distended, non-tender  Skin: warm, dry  Neurology: Alert and oriented times three. Sensation intact  Psych: Affect normal    Labs:                        12.3   6.66  )-----------( 185      ( 02 Mar 2023 02:00 )             39.7     03-02    137  |  102  |  40.2<H>  ----------------------------<  94  4.2   |  22.0  |  3.01<H>    Ca    8.9      02 Mar 2023 02:00  Mg     1.9     03-02    TPro  7.0  /  Alb  3.8  /  TBili  0.5  /  DBili  x   /  AST  15  /  ALT  16  /  AlkPhos  104  03-01    PT/INR - ( 2023 21:25 )   PT: 12.5 sec;   INR: 1.08 ratio         PTT - ( 2023 21:25 )  PTT:30.0 sec  CARDIAC MARKERS ( 2023 21:25 )  x     / <0.01 ng/mL / 148 U/L / x     / x          Serum Pro-Brain Natriuretic Peptide: 625 pg/mL ( @ 21:25)  Creatine Kinase, Serum: 148 U/L (23 @ 21:25)  Creatine Kinase, Serum: 148 U/L (23 @ 21:25)

## 2023-03-02 NOTE — PROGRESS NOTE ADULT - PROBLEM SELECTOR PLAN 1
Type B dissection of CTA C/A/P  Strict blood pressure control for -120  Currently on Esmolol and Cardene gtt  Images reviewed with Dr. Heaton   Remains asymptomatic  Urine tox negative  Pt NPOMN for R/LHC in AM

## 2023-03-02 NOTE — PROGRESS NOTE ADULT - PROBLEM SELECTOR PLAN 2
- Aim to target normotension with /80.  - Currently off nifedipine and esmolol gtts. as of this am.  - Can consider switching lopressor to carvedilol.

## 2023-03-02 NOTE — PROGRESS NOTE ADULT - PROBLEM SELECTOR PLAN 1
- Type B aortic dissection seen on CTA.  - CT surgery team considering surgical repair.  - In need of preop RHC/LHC. Deferred today due to renal dysfunction. Will aim for tomorrow if renal function improves. Will have interventional team review safety of this given type B dissection, likely will need radial/brachial approaches and not femoral.

## 2023-03-03 LAB
ALBUMIN, RANDOM URINE: 14.2 MG/DL — SIGNIFICANT CHANGE UP
ALBUMIN/CREATININE RATIO (ACR): 71 MG/G — HIGH (ref 0–30)
ANION GAP SERPL CALC-SCNC: 12 MMOL/L — SIGNIFICANT CHANGE UP (ref 5–17)
ANION GAP SERPL CALC-SCNC: 13 MMOL/L — SIGNIFICANT CHANGE UP (ref 5–17)
APTT BLD: 27.9 SEC — SIGNIFICANT CHANGE UP (ref 27.5–35.5)
BUN SERPL-MCNC: 24 MG/DL — HIGH (ref 8–20)
BUN SERPL-MCNC: 33.2 MG/DL — HIGH (ref 8–20)
CALCIUM SERPL-MCNC: 8.9 MG/DL — SIGNIFICANT CHANGE UP (ref 8.4–10.5)
CALCIUM SERPL-MCNC: 8.9 MG/DL — SIGNIFICANT CHANGE UP (ref 8.4–10.5)
CHLORIDE SERPL-SCNC: 102 MMOL/L — SIGNIFICANT CHANGE UP (ref 96–108)
CHLORIDE SERPL-SCNC: 103 MMOL/L — SIGNIFICANT CHANGE UP (ref 96–108)
CO2 SERPL-SCNC: 21 MMOL/L — LOW (ref 22–29)
CO2 SERPL-SCNC: 22 MMOL/L — SIGNIFICANT CHANGE UP (ref 22–29)
CREAT ?TM UR-MCNC: 199 MG/DL — SIGNIFICANT CHANGE UP
CREAT SERPL-MCNC: 1.38 MG/DL — HIGH (ref 0.5–1.3)
CREAT SERPL-MCNC: 1.77 MG/DL — HIGH (ref 0.5–1.3)
EGFR: 48 ML/MIN/1.73M2 — LOW
EGFR: 64 ML/MIN/1.73M2 — SIGNIFICANT CHANGE UP
GLUCOSE SERPL-MCNC: 105 MG/DL — HIGH (ref 70–99)
GLUCOSE SERPL-MCNC: 99 MG/DL — SIGNIFICANT CHANGE UP (ref 70–99)
HCT VFR BLD CALC: 38.1 % — LOW (ref 39–50)
HCT VFR BLD CALC: 39.2 % — SIGNIFICANT CHANGE UP (ref 39–50)
HGB BLD-MCNC: 12 G/DL — LOW (ref 13–17)
HGB BLD-MCNC: 12 G/DL — LOW (ref 13–17)
INR BLD: 1.17 RATIO — HIGH (ref 0.88–1.16)
MAGNESIUM SERPL-MCNC: 2 MG/DL — SIGNIFICANT CHANGE UP (ref 1.6–2.6)
MAGNESIUM SERPL-MCNC: 2.1 MG/DL — SIGNIFICANT CHANGE UP (ref 1.6–2.6)
MCHC RBC-ENTMCNC: 25.5 PG — LOW (ref 27–34)
MCHC RBC-ENTMCNC: 25.5 PG — LOW (ref 27–34)
MCHC RBC-ENTMCNC: 30.6 GM/DL — LOW (ref 32–36)
MCHC RBC-ENTMCNC: 31.5 GM/DL — LOW (ref 32–36)
MCV RBC AUTO: 80.9 FL — SIGNIFICANT CHANGE UP (ref 80–100)
MCV RBC AUTO: 83.4 FL — SIGNIFICANT CHANGE UP (ref 80–100)
PLATELET # BLD AUTO: 143 K/UL — LOW (ref 150–400)
PLATELET # BLD AUTO: 150 K/UL — SIGNIFICANT CHANGE UP (ref 150–400)
POTASSIUM SERPL-MCNC: 3.7 MMOL/L — SIGNIFICANT CHANGE UP (ref 3.5–5.3)
POTASSIUM SERPL-MCNC: 4 MMOL/L — SIGNIFICANT CHANGE UP (ref 3.5–5.3)
POTASSIUM SERPL-SCNC: 3.7 MMOL/L — SIGNIFICANT CHANGE UP (ref 3.5–5.3)
POTASSIUM SERPL-SCNC: 4 MMOL/L — SIGNIFICANT CHANGE UP (ref 3.5–5.3)
PROTHROM AB SERPL-ACNC: 13.6 SEC — HIGH (ref 10.5–13.4)
RBC # BLD: 4.7 M/UL — SIGNIFICANT CHANGE UP (ref 4.2–5.8)
RBC # BLD: 4.71 M/UL — SIGNIFICANT CHANGE UP (ref 4.2–5.8)
RBC # FLD: 14.6 % — HIGH (ref 10.3–14.5)
RBC # FLD: 14.7 % — HIGH (ref 10.3–14.5)
SODIUM SERPL-SCNC: 135 MMOL/L — SIGNIFICANT CHANGE UP (ref 135–145)
SODIUM SERPL-SCNC: 137 MMOL/L — SIGNIFICANT CHANGE UP (ref 135–145)
WBC # BLD: 5.36 K/UL — SIGNIFICANT CHANGE UP (ref 3.8–10.5)
WBC # BLD: 6.15 K/UL — SIGNIFICANT CHANGE UP (ref 3.8–10.5)
WBC # FLD AUTO: 5.36 K/UL — SIGNIFICANT CHANGE UP (ref 3.8–10.5)
WBC # FLD AUTO: 6.15 K/UL — SIGNIFICANT CHANGE UP (ref 3.8–10.5)

## 2023-03-03 PROCEDURE — 93975 VASCULAR STUDY: CPT | Mod: 26

## 2023-03-03 PROCEDURE — 99232 SBSQ HOSP IP/OBS MODERATE 35: CPT

## 2023-03-03 PROCEDURE — 93456 R HRT CORONARY ARTERY ANGIO: CPT | Mod: 26

## 2023-03-03 PROCEDURE — 93931 UPPER EXTREMITY STUDY: CPT | Mod: 26,RT

## 2023-03-03 PROCEDURE — 93971 EXTREMITY STUDY: CPT | Mod: 26,RT

## 2023-03-03 PROCEDURE — 99152 MOD SED SAME PHYS/QHP 5/>YRS: CPT

## 2023-03-03 RX ORDER — LABETALOL HCL 100 MG
10 TABLET ORAL ONCE
Refills: 0 | Status: COMPLETED | OUTPATIENT
Start: 2023-03-03 | End: 2023-03-03

## 2023-03-03 RX ORDER — ACETAMINOPHEN 500 MG
650 TABLET ORAL EVERY 6 HOURS
Refills: 0 | Status: DISCONTINUED | OUTPATIENT
Start: 2023-03-03 | End: 2023-03-09

## 2023-03-03 RX ORDER — CEFAZOLIN SODIUM 1 G
2000 VIAL (EA) INJECTION ONCE
Refills: 0 | Status: DISCONTINUED | OUTPATIENT
Start: 2023-03-03 | End: 2023-03-03

## 2023-03-03 RX ORDER — CEFAZOLIN SODIUM 1 G
1000 VIAL (EA) INJECTION EVERY 8 HOURS
Refills: 0 | Status: DISCONTINUED | OUTPATIENT
Start: 2023-03-03 | End: 2023-03-03

## 2023-03-03 RX ORDER — POTASSIUM CHLORIDE 20 MEQ
40 PACKET (EA) ORAL ONCE
Refills: 0 | Status: COMPLETED | OUTPATIENT
Start: 2023-03-03 | End: 2023-03-03

## 2023-03-03 RX ORDER — OXYCODONE HYDROCHLORIDE 5 MG/1
10 TABLET ORAL EVERY 4 HOURS
Refills: 0 | Status: DISCONTINUED | OUTPATIENT
Start: 2023-03-03 | End: 2023-03-09

## 2023-03-03 RX ORDER — CEFAZOLIN SODIUM 1 G
1000 VIAL (EA) INJECTION EVERY 8 HOURS
Refills: 0 | Status: COMPLETED | OUTPATIENT
Start: 2023-03-03 | End: 2023-03-04

## 2023-03-03 RX ORDER — FENTANYL CITRATE 50 UG/ML
25 INJECTION INTRAVENOUS ONCE
Refills: 0 | Status: DISCONTINUED | OUTPATIENT
Start: 2023-03-03 | End: 2023-03-03

## 2023-03-03 RX ORDER — OXYCODONE HYDROCHLORIDE 5 MG/1
5 TABLET ORAL EVERY 4 HOURS
Refills: 0 | Status: DISCONTINUED | OUTPATIENT
Start: 2023-03-03 | End: 2023-03-09

## 2023-03-03 RX ORDER — CEFAZOLIN SODIUM 1 G
2000 VIAL (EA) INJECTION ONCE
Refills: 0 | Status: DISCONTINUED | OUTPATIENT
Start: 2023-03-03 | End: 2023-03-06

## 2023-03-03 RX ORDER — AMLODIPINE BESYLATE 2.5 MG/1
10 TABLET ORAL DAILY
Refills: 0 | Status: DISCONTINUED | OUTPATIENT
Start: 2023-03-03 | End: 2023-03-09

## 2023-03-03 RX ADMIN — SODIUM CHLORIDE 3 MILLILITER(S): 9 INJECTION INTRAMUSCULAR; INTRAVENOUS; SUBCUTANEOUS at 04:53

## 2023-03-03 RX ADMIN — Medication 1000 MILLIGRAM(S): at 22:15

## 2023-03-03 RX ADMIN — Medication 2 MILLIGRAM(S): at 08:03

## 2023-03-03 RX ADMIN — Medication 2 MILLIGRAM(S): at 21:17

## 2023-03-03 RX ADMIN — Medication 10 MILLIGRAM(S): at 23:45

## 2023-03-03 RX ADMIN — SENNA PLUS 2 TABLET(S): 8.6 TABLET ORAL at 22:15

## 2023-03-03 RX ADMIN — FENTANYL CITRATE 25 MICROGRAM(S): 50 INJECTION INTRAVENOUS at 18:45

## 2023-03-03 RX ADMIN — AMLODIPINE BESYLATE 5 MILLIGRAM(S): 2.5 TABLET ORAL at 04:55

## 2023-03-03 RX ADMIN — Medication 100 MILLIGRAM(S): at 04:55

## 2023-03-03 RX ADMIN — SODIUM CHLORIDE 3 MILLILITER(S): 9 INJECTION INTRAMUSCULAR; INTRAVENOUS; SUBCUTANEOUS at 22:05

## 2023-03-03 RX ADMIN — PANTOPRAZOLE SODIUM 40 MILLIGRAM(S): 20 TABLET, DELAYED RELEASE ORAL at 04:55

## 2023-03-03 RX ADMIN — Medication 100 MILLIGRAM(S): at 21:01

## 2023-03-03 RX ADMIN — FENTANYL CITRATE 25 MICROGRAM(S): 50 INJECTION INTRAVENOUS at 18:34

## 2023-03-03 RX ADMIN — Medication 40 MILLIEQUIVALENT(S): at 10:04

## 2023-03-03 RX ADMIN — Medication 10 MILLIGRAM(S): at 22:38

## 2023-03-03 NOTE — CONSULT NOTE ADULT - ATTENDING COMMENTS
Patient admitted with type B dissection and cardiomyopathy (LVEF 20-25%); right heart cath via femoral vein  Left heart cath through right ulnar artery (after attempt through right radial artery), with pain and swelling in the right forearm and upper arm  Duplex demonstrates thrombosed right radial artery from wrist to mid-forearm and thrombosed cephalic and basilic veins; patent right ulnar artery  Tenderness over medial right forearm and upper arm over basilic vein with palpable cord; no erythema or fluctuance  Denies pain, numbness, tingling in hand or fingers   strength, apposition and opposition intact bilaterally    Rec  Continue compression and elevation  No vascular intervention indicated at this time  Rec anticoagulation for radial artery thrombosis and symptomatic, extensive SVT with large thrombus burden  Follow to monitor symptoms  Consult hand surgery ASAP to rule out and monitor for upper extremity compartment syndrome

## 2023-03-03 NOTE — PROGRESS NOTE ADULT - PROBLEM SELECTOR PLAN 3
Continue Strict blood pressure control for -120.  Continue Lopressor 100BID, Norvasc 5QD, and Cardura 2BID.

## 2023-03-03 NOTE — PROGRESS NOTE ADULT - PROBLEM SELECTOR PLAN 1
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  Continue Strict blood pressure control for -120.  Continue Lopressor 100BID, Norvasc 5QD, and Cardura 2BID.   Images previously reviewed with Dr. Heaton.  Patient remains asymptomatic.  Urine tox negative.  Pending R/LHC once renal function normalizes.   Nephrology following. Likely baseline SCr between 1.3-1.6.   Heart failure team following for chronic systolic heart failure.   Plan to be discussed / reviewed with CT Surgery attending / team during AM rounds.

## 2023-03-03 NOTE — CONSULT NOTE ADULT - ASSESSMENT
48 yo M s/p r and L heart cath complicated by R radial partial thrombosis and patient noted to have also cephalic and basilic vein thrombosis complaining of swollen forearm.    -No vascular intervention required  -Recommend anticoagulation for thrombosis of radial artery, cephalic, and basilic veins  -Recommend elevation and warm compress  -No evidence of ischemia  -If concerned for compartment syndrome, please consult Hand surgery on call.  46 yo M s/p r and L heart cath complicated by R radial partial thrombosis and patient noted to have also cephalic and basilic vein thrombosis complaining of swollen forearm.    -No vascular intervention required  -Recommend anticoagulation for thrombosis of radial artery, cephalic, and basilic veins  -Recommend elevation and warm compresses to medial forearm and medial upper arm  -No evidence of ischemia  -If concerned for compartment syndrome, please consult Hand surgery on call.

## 2023-03-03 NOTE — PROGRESS NOTE ADULT - PROBLEM SELECTOR PLAN 4
Currently ROSALINO on CKD  Likely secondary to hypoperfusion from dissection vs STEVAN.  Continue to trend BMP.  Nephrology following. Likely baseline SCr between 1.3-1.6.

## 2023-03-03 NOTE — PROGRESS NOTE ADULT - SUBJECTIVE AND OBJECTIVE BOX
Type B Dissection    HPI:  42M, Swedish speaking poor historian, with PMH HTN (uncontrolled), Pulmonary Interstitial Disease, CAD w/ stents ( in [place]), CKD (unknown baseline), was sent to [place] ED 23 from [facility] where he had gone for a physical exam and to get his medications filled. He obtains his Nifedipine from his home country of [place], however has been unable to obtain recent doses so he has been taking half his medication daily. Of note, HIE shows recent admission to [place] for photophobia where he was also found to have . 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 He was transferred to [facility] CTICU for further management.   At time of admission, his SBP was 180s and he was started on Esmolol and Nicardipine. Patient denies acute pain with radiating or aggravating factors. He does admit to occasional headaches and dizziness. (01 Mar 2023 02:45)    PAST MEDICAL & SURGICAL HISTORY:  Uncontrolled hypertension  CKD (chronic kidney disease)  Interstitial lung disease    Significant recent/past 24 hr events: No overnight events reported. Pending cardiac catheterization once renal function normalizes.     Subjective: Patient lying in bed in NAD. +Passing gas. No pain elicited at this time. Denies fevers, chills, lightheadedness, dizziness, HA, CP, palpitations, SOB, cough, abdominal pain, N/V, diarrhea, numbness/tingling in extremities, or any other acute complaints. ROS negative x 10 systems except as noted above.    MEDICATIONS  (STANDING):  amLODIPine   Tablet 5 milliGRAM(s) Oral daily  doxazosin 2 milliGRAM(s) Oral <User Schedule>  influenza   Vaccine 0.5 milliLiter(s) IntraMuscular once  metoprolol tartrate 100 milliGRAM(s) Oral every 12 hours  pantoprazole    Tablet 40 milliGRAM(s) Oral before breakfast  senna 2 Tablet(s) Oral at bedtime  sodium chloride 0.9% lock flush 3 milliLiter(s) IV Push every 8 hours    Allergies: Allergy Status Unknown    Vitals   T(C): 37.1 (03 Mar 2023 00:00), Max: 37.1 (02 Mar 2023 08:00)  T(F): 98.8 (03 Mar 2023 00:00), Max: 98.8 (02 Mar 2023 08:00)  HR: 82 (03 Mar 2023 00:43) (63 - 82)  BP: 123/77 (03 Mar 2023 00:43) (106/62 - 150/90)  BP(mean): 97 (02 Mar 2023 18:00) (79 - 103)  ABP: 75/67 (02 Mar 2023 13:45) (74/68 - 117/64)  ABP(mean): 72 (02 Mar 2023 13:45) (66 - 87)  RR: 20 (03 Mar 2023 00:43) (16 - 43)  SpO2: 95% (03 Mar 2023 00:43) (95% - 100%)  O2 Parameters below as of 03 Mar 2023 00:43  Patient On (Oxygen Delivery Method): room air    I&O's Detail    01 Mar 2023 07:01  -  02 Mar 2023 07:00  --------------------------------------------------------  IN:    Albumin 5%  - 250 mL: 250 mL    Esmolol: 1040.2 mL    NiCARdipine: 1050 mL    Oral Fluid: 360 mL  Total IN: 2700.2 mL    OUT:    Indwelling Catheter - Urethral (mL): 75 mL    Voided (mL): 500 mL  Total OUT: 575 mL    Total NET: 2125.2 mL      02 Mar 2023 07:01  -  03 Mar 2023 03:46  --------------------------------------------------------  IN:    Esmolol: 52 mL    NiCARdipine: 120 mL    Oral Fluid: 600 mL  Total IN: 772 mL    OUT:    Indwelling Catheter - Urethral (mL): 1045 mL  Total OUT: 1045 mL    Total NET: -273 mL    Physical Exam  Neuro: A+O x 3, non-focal, speech clear and intact  HEENT:  NCAT, No conjuctival edema or icterus, no thrush.    Neck:  Supple, trachea midline  Pulm: CTA bilaterally, no accessory muscle use noted  CV: regular rate, regular rhythm, +S1S2   Abd: soft, NT, ND, + BS  : thakkar in situ to bedside drainage  Ext: MORRIS x 4, no edema, no cyanosis, distal motor/neuro/circ intact  Skin: warm, dry, perfused    LABS                        12.3   6.66  )-----------( 185      ( 02 Mar 2023 02:00 )             39.7         137  |  102  |  40.2<H>  ----------------------------<  94  4.2   |  22.0  |  3.01<H>    Ca    8.9      02 Mar 2023 02:00  Mg     1.9         POCT Blood Glucose.: 87 mg/dL (23 @ 11:48)  POCT Blood Glucose.: 100 mg/dL (23 @ 08:43)      Last CXR:  < from: Xray Chest 1 View- PORTABLE-Routine (Xray Chest 1 View- PORTABLE-Routine in AM.) (23 @ 06:11) >  Frontal expiratory view of the chest shows the heart to be similarly enlarged in size. Aortic enlargement is similar.  The lungs are clear and there is no evidence of pneumothorax nor pleural effusion.  IMPRESSION:  Similar aortic enlargement.  < end of copied text >    Prior Imagin/28 TTE: EF 20-25%, grade II DD< mild-mod MR, intramural thrombi descending aorta   CTA Abd: Descending thoracic aorta aneurysm up to 6.7 cm; type B dissection flap originating at the distal aortic arch – false lumen supplying patent celiac artery, true lumen to superior mesenteric artery, b/l renal arteries are patent

## 2023-03-03 NOTE — PROGRESS NOTE ADULT - ASSESSMENT
42 year old male Samoan speaking patient, poor historian, with a medical history of HTN (uncontrolled), Pulmonary Interstitial Disease (per chart review), CAD w/ stents (2021 in [place]), CKD (unknown baseline SCr), with recent admission for hypertensive urgency, was sent to [place] ED 2/28/23 from [facility] secondary to HTN urgency (). CTA C/A/P showed Type B dissection and he was transferred to [facility] CTICU for further management. Patient pending RHC/LHC, cancelled 3/2 due to worsening renal function.

## 2023-03-03 NOTE — PROGRESS NOTE ADULT - PROBLEM SELECTOR PLAN 2
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  TTE on admission showing an EF of 20-25% with diastolic dysfunction, normal RV.  Continue to monitor need for Diuresis.

## 2023-03-03 NOTE — PROGRESS NOTE ADULT - SUBJECTIVE AND OBJECTIVE BOX
Department of Cardiology                                                  [facility]/[place] [id number]                                           Telephone: [phone]. Fax:[phone]                                                  Post Procedure  Cardiac Cath NP Note       Narrative:    42M, Slovak speaking poor historian, with PMH HTN (uncontrolled), Pulmonary Interstitial Disease, CAD w/ stents ( in [place]), CKD (unknown baseline), was sent to [place] ED 23 from [place] [facility] where he had gone for a physical exam and to get his medications filled. He obtains his Nifedipine from his home country of [place], however has been unable to obtain recent doses so he has been taking half his medication daily. Of note, HIE shows recent admission to [place] for photophobia where he was also found to have . 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 He was transferred to [facility] CTICU for further management.     left Heart Catheterization:   Patient now s/p LHC which showed normal coronaries; no evidence of previous stents  Via Right Ulnar artery, occlusive band to right wrist  Right femoral vein with AngioSeal closure   Total Heparin 4000 units   Total Omnipaque 36 ml    Right Heart Catheterization:     PA 40//31  PCW 18/16   Sat PA 72.1% / Sat AO 94.3%      PAST MEDICAL & SURGICAL HISTORY:  Uncontrolled hypertension  CKD (chronic kidney disease)  Interstitial lung disease  Prophylactic measure        FAMILY HISTORY:    Home Medications:  NIFEdipine 60 mg oral tablet, extended release: 1 tab(s) orally 2 times a day (01 Mar 2023 02:57)  Patient is a 47y old  Male who presents with a chief complaint of Type B Dissection (03 Mar 2023 12:45)    HEALTH ISSUES - PROBLEM Dx:  Dissection of aorta  Hypertensive urgency  Chronic kidney disease (CKD)  Prophylactic measure  Chronic combined systolic and diastolic heart failure  Dilated cardiomyopathy        General: No fatigue, no fevers/chills  Respiratory: No dyspnea, no cough, no wheeze  CV: No chest pain, no palpitations  Abd: No nausea  Neuro: No headache, no dizziness  Allergy Status Unknown      Objective:  Vital Signs Last 24 Hrs  T(C): 36.6 (03 Mar 2023 08:00), Max: 37.1 (03 Mar 2023 00:00)  T(F): 97.9 (03 Mar 2023 08:00), Max: 98.8 (03 Mar 2023 00:00)  HR: 84 (03 Mar 2023 15:15) (68 - 86)  BP: 166/92 (03 Mar 2023 15:15) (110/76 - 166/92)  BP(mean): 110 (03 Mar 2023 11:33) (97 - 110)  RR: 16 (03 Mar 2023 15:15) (13 - 22)  SpO2: 94% (03 Mar 2023 15:15) (93% - 100%)    Parameters below as of 03 Mar 2023 15:15  Patient On (Oxygen Delivery Method): room air        CM: SR  Neuro: A&OX3, CN 2-12 intact  HEENT: NC, AT  Lungs: CTA B/L  CV: S1, S2, no murmur, RRR  Abd: Soft  Right Groin: Soft, no bleeding, no hematoma  Extremity: + distal pulses  EK.0   5.36  )-----------( 150      ( 03 Mar 2023 05:35 )             39.2         137  |  103  |  33.2<H>  ----------------------------<  99  3.7   |  21.0<L>  |  1.77<H>    Ca    8.9      03 Mar 2023 05:35  Mg     2.0             ASSESSMENT AND PLAN:     43 yo male with PMHX of uncontrolled HTN, PID, stated history of CAD w/stents ( [place]), found with Type B dissection on CTA chest, transferred from [facility] for further management. Now s/p [facility].       Plan:  -Formal cath report pending  -Post procedure management/monitoring per protocol  -Access site precautions  -R Ulnar artery with palpable pulses, no ecchymosis or hematoma, no tingling or numbness  -R femoral vein with AngioSeal closure, palpable LE pulses  -Ulnar band to be removed is site stable; no evidence of bleeding or hematoma at 1830   -noted to have RUE swelling (pt states present for two days, previous IV sites x2)  -US Arterial/Venous ordered; r/o DVT, discussed with Angelique MACK CTS   -Bedrest x 3 hours post procedure  -Labs and EKG in am  -NO NS 0.9% 250ml/hr x 1 bolus: post procedure STEVAN ppx 2/2 low EF 20%  -Repeat ECG if any clinical indication or change on tele  -Continue current medical therapy  -Educated regarding post procedure management and care  -Discussed the importance of Risk Factor modification  -patient not a candidate for cardiac rehab secondary to:  found with normal coronaries   -F/U outpt in 1-2 weeks with Cardiologist  Dr. Ochoa  -rest of care per CTS, f/u US                                                                Department of Cardiology                                                  [facility]/[place] [address]                                           Telephone: [phone]. Fax:[phone]                                                  Post Procedure  Cardiac Cath NP Note       Narrative:    42M, Bengali speaking poor historian, with PMH HTN (uncontrolled), Pulmonary Interstitial Disease, CAD w/ stents ( in [place]), CKD (unknown baseline), was sent to [place] ED 23 from [facility] where he had gone for a physical exam and to get his medications filled. He obtains his Nifedipine from his home country of [place], however has been unable to obtain recent doses so he has been taking half his medication daily. Of note, HIE shows recent admission to [place] for photophobia where he was also found to have . 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 He was transferred to [facility] CTICU for further management.     left Heart Catheterization:   Patient now s/p LHC which showed normal coronaries; no evidence of previous stents  Via Right Ulnar artery, occlusive band to right wrist  Right femoral vein with AngioSeal closure   Total Heparin 4000 units   Total Omnipaque 36 ml    Right Heart Catheterization:     PA 40//31  PCW 18/16   Sat PA 72.1% / Sat AO 94.3%      PAST MEDICAL & SURGICAL HISTORY:  Uncontrolled hypertension  CKD (chronic kidney disease)  Interstitial lung disease  Prophylactic measure        FAMILY HISTORY:    Home Medications:  NIFEdipine 60 mg oral tablet, extended release: 1 tab(s) orally 2 times a day (01 Mar 2023 02:57)  Patient is a 47y old  Male who presents with a chief complaint of Type B Dissection (03 Mar 2023 12:45)    HEALTH ISSUES - PROBLEM Dx:  Dissection of aorta  Hypertensive urgency  Chronic kidney disease (CKD)  Prophylactic measure  Chronic combined systolic and diastolic heart failure  Dilated cardiomyopathy        General: No fatigue, no fevers/chills  Respiratory: No dyspnea, no cough, no wheeze  CV: No chest pain, no palpitations  Abd: No nausea  Neuro: No headache, no dizziness  Allergy Status Unknown      Objective:  Vital Signs Last 24 Hrs  T(C): 36.6 (03 Mar 2023 08:00), Max: 37.1 (03 Mar 2023 00:00)  T(F): 97.9 (03 Mar 2023 08:00), Max: 98.8 (03 Mar 2023 00:00)  HR: 84 (03 Mar 2023 15:15) (68 - 86)  BP: 166/92 (03 Mar 2023 15:15) (110/76 - 166/92)  BP(mean): 110 (03 Mar 2023 11:33) (97 - 110)  RR: 16 (03 Mar 2023 15:15) (13 - 22)  SpO2: 94% (03 Mar 2023 15:15) (93% - 100%)    Parameters below as of 03 Mar 2023 15:15  Patient On (Oxygen Delivery Method): room air        CM: SR  Neuro: A&OX3, CN 2-12 intact  HEENT: NC, AT  Lungs: CTA B/L  CV: S1, S2, no murmur, RRR  Abd: Soft  Right Groin: Soft, no bleeding, no hematoma  Extremity: + distal pulses bl  Right arm swollen (pt with previous IV access x2)   EK.0   5.36  )-----------( 150      ( 03 Mar 2023 05:35 )             39.2     03    137  |  103  |  33.2<H>  ----------------------------<  99  3.7   |  21.0<L>  |  1.77<H>    Ca    8.9      03 Mar 2023 05:35  Mg     2.0     03-03        ASSESSMENT AND PLAN:     41 yo male with PMHX of uncontrolled HTN, PID, stated history of CAD w/stents ( [place]), found with Type B dissection on CTA chest, transferred from [facility] for further management. Now s/p [facility].       Plan:  -Formal cath report pending  -Post procedure management/monitoring per protocol  -Access site precautions  -R Ulnar artery with palpable pulses, no ecchymosis or hematoma, no tingling or numbness  -R femoral vein with AngioSeal closure, palpable LE pulses  -Ulnar band to be removed is site stable; no evidence of bleeding or hematoma at 1830   -noted to have RUE swelling (pt states present for two days, previous IV sites x2)  -US Arterial/Venous ordered; r/o DVT, discussed with Angelique MACK CTS   -Bedrest x 3 hours post procedure  -Labs and EKG in am  -NO NS 0.9% 250ml/hr x 1 bolus: post procedure STEVAN ppx 2/2 low EF 20%  -Repeat ECG if any clinical indication or change on tele  -Continue current medical therapy  -Educated regarding post procedure management and care  -Discussed the importance of Risk Factor modification  -patient not a candidate for cardiac rehab secondary to:  found with normal coronaries   -F/U outpt in 1-2 weeks with Cardiologist  Dr. Ochoa  -rest of care per CTS, f/u US

## 2023-03-03 NOTE — CONSULT NOTE ADULT - SUBJECTIVE AND OBJECTIVE BOX
VASCULAR SURGERY CONSULT  HPI reviewed as below.  Patient is no s/p R and L heart cath that was negative. During intervention there was a failed access to the R radial artery and then access was changed to R ulnar artery. After the procedure patient complained of more R forearm pain and ants sensation in the hand that improved after the ulnar band was removed.   ==============================================================================================================  HPI: 47y Male  HPI:  42M, Slovak speaking poor historian, with PMH HTN (uncontrolled), Pulmonary Interstitial Disease, CAD w/ stents (2021 in [place]), CKD (unknown baseline), was sent to [place] ED 2/28/23 from [facility] where he had gone for a physical exam and to get his medications filled. He obtains his Nifedipine from his home country of [place], however has been unable to obtain recent doses so he has been taking half his medication daily. Of note, SALVADOR shows recent admission to [place] for photophobia where he was also found to have . 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 He was transferred to [facility] CTICU for further management.     At time of admission, his SBP was 180s and he was started on Esmolol and Nicardipine. Patient denies acute pain with radiating or aggravating factors. He does admit to occasional headaches and dizziness.    (01 Mar 2023 02:45)      PAST MEDICAL & SURGICAL HISTORY:  Uncontrolled hypertension      CKD (chronic kidney disease)      Interstitial lung disease      Prophylactic measure        Home Meds: Home Medications:  NIFEdipine 60 mg oral tablet, extended release: 1 tab(s) orally 2 times a day (01 Mar 2023 02:57)    Allergies: Allergies    Allergy Status Unknown    Intolerances      Soc:   Advanced Directives: Presumed Full Code     CURRENT MEDICATIONS:   --------------------------------------------------------------------------------------  Neurologic Medications    Respiratory Medications    Cardiovascular Medications  amLODIPine   Tablet 5 milliGRAM(s) Oral daily  doxazosin 2 milliGRAM(s) Oral <User Schedule>  metoprolol tartrate 100 milliGRAM(s) Oral every 12 hours    Gastrointestinal Medications  pantoprazole    Tablet 40 milliGRAM(s) Oral before breakfast  senna 2 Tablet(s) Oral at bedtime  sodium chloride 0.9% lock flush 3 milliLiter(s) IV Push every 8 hours    Genitourinary Medications    Hematologic/Oncologic Medications  influenza   Vaccine 0.5 milliLiter(s) IntraMuscular once    Antimicrobial/Immunologic Medications  ceFAZolin  Injectable. 2000 milliGRAM(s) IV Push once  ceFAZolin  Injectable. 1000 milliGRAM(s) IV Push every 8 hours    Endocrine/Metabolic Medications    Topical/Other Medications    --------------------------------------------------------------------------------------    VITAL SIGNS, INS/OUTS (last 24 hours):  --------------------------------------------------------------------------------------  ICU Vital Signs Last 24 Hrs  T(C): 36.6 (03 Mar 2023 08:00), Max: 37.1 (03 Mar 2023 00:00)  T(F): 97.9 (03 Mar 2023 08:00), Max: 98.8 (03 Mar 2023 00:00)  HR: 85 (03 Mar 2023 18:45) (69 - 90)  BP: 156/97 (03 Mar 2023 18:45) (110/76 - 168/96)  BP(mean): 110 (03 Mar 2023 11:33) (110 - 110)  ABP: --  ABP(mean): --  RR: 16 (03 Mar 2023 18:45) (13 - 20)  SpO2: 94% (03 Mar 2023 18:45) (93% - 100%)    O2 Parameters below as of 03 Mar 2023 18:45  Patient On (Oxygen Delivery Method): room air          I&O's Summary    02 Mar 2023 07:01  -  03 Mar 2023 07:00  --------------------------------------------------------  IN: 772 mL / OUT: 1470 mL / NET: -698 mL    03 Mar 2023 07:01  -  03 Mar 2023 20:34  --------------------------------------------------------  IN: 0 mL / OUT: 200 mL / NET: -200 mL      --------------------------------------------------------------------------------------    PHYSICAL EXAM:  GENERAL: NAD, well-groomed, well-developed  HEAD:  Atraumatic, Normocephalic  EYES: EOMI, PERRLA, conjunctiva and sclera clear  NECK: Supple, No JVD  CHEST/LUNG: non-labored breathing on room air.   HEART: Regular rate and rhythm; S1/S2  ABDOMEN: Soft, Nontender, Nondistended  VASCULAR: RUE: Swollen forearm with soft compartments, unable to palpate ulnar/radial pulses, doppler signals to radial, ulnar, and palmar arch noted.     LABS  --------------------------------------------------------------------------------------  Labs:  CAPILLARY BLOOD GLUCOSE                              12.0   5.36  )-----------( 150      ( 03 Mar 2023 05:35 )             39.2         03-03    137  |  103  |  33.2<H>  ----------------------------<  99  3.7   |  21.0<L>  |  1.77<H>      Magnesium, Serum: 2.0 mg/dL (03-03-23 @ 05:35)      LFTs:     Blood Gas Arterial, Lactate: 1.10 mmol/L (02-28-23 @ 21:19)    ABG - ( 28 Feb 2023 21:19 )  pH: 7.490 /  pCO2: 34    /  pO2: 88    / HCO3: 26    / Base Excess: 2.6   /  SaO2: 98.5              Coags:        Serum Pro-Brain Natriuretic Peptide: 625 pg/mL (02-28-23 @ 21:25)      --------------------------------------------------------------------------------------

## 2023-03-03 NOTE — CHART NOTE - NSCHARTNOTEFT_GEN_A_CORE
Late Entry:     42M, Mohawk speaking poor historian, with PMH HTN (uncontrolled), Pulmonary Interstitial Disease, CAD w/ stents (2021 in [place]), CKD (unknown baseline), was sent to [place] ED 2/28/23 from [facility] where he had gone for a physical exam and to get his medications filled. He obtains his Nifedipine from his home country of [place], however has been unable to obtain recent doses so he has been taking half his medication daily. Of note, HIE shows recent admission to [place] for photophobia where he was also found to have . 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 He was transferred to [facility] CTICU for further management.     left Heart Catheterization:   Patient now s/p LHC which showed normal coronaries; no evidence of previous stents  Via Right Ulnar artery, occlusive band to right wrist  Right femoral vein with AngioSeal closure   Total Heparin 4000 units   Total Omnipaque 36 ml    Right Heart Catheterization:     PA 40/24/31  PCW 18/19/16   Sat PA 72.1% / Sat AO 94.3%    Patient c/o right arm pain 8/10, swollen, warm to touch. Endorsed right arm pain for 2 days, tingling and numbness. Right arm assessed, tender to touch, warm, progressive swelling of right upper extremity. CTS called, US arterial and venous doppler ordered.       < from: US Duplex Arterial Upper Ext Ltd, Right (03.03.23 @ 19:48) >    IMPRESSION:    Partially occlusive thrombus in the mid and distal portions of the right   radial artery.    --- End of Report ---    IMPRESSION:  No evidence of right upper extremity deep venous thrombosis.    Superficial thrombophlebitis involving the right basilic and cephalic   veins.    --- End of Report ---      MACY SHAVER MD; Attending Radiologist  This document has been electronically signed. Mar  3 2023  8:14PM    < end of copied text >      PLAN:  -Ulnar band removed; site stable   -results of US arterial/venous as above  -Ancef 2g IVPB x1  -Ancef 1 g IVPB q8 x2  -results discussed with CTS and Vascular services  -f/u CTS reccs; pt transferred to CTICU bed ([id number])   -vascular recommendations noted   -course of events discussed with pt and family                    MACY SHAVER MD; Attending Radiologist  This document has been electronically signed. Mar  3 2023  8:21PM    < end of copied text >

## 2023-03-03 NOTE — PROGRESS NOTE ADULT - SUBJECTIVE AND OBJECTIVE BOX
[facility]                                              INTERVENTIONAL CARDIOLOGY AT [place] [address]                                             Telephone: [phone]. Fax:[phone]                                                       INTERVENTIONAL CARDIOLOGY CONSULTATION NOTE                                                                                             History obtained by: Patient and medical record  Reason for Consultation: Evaluation for cardiac catheterization  Available pt records reviewed: Yes [ x ] No [  ]    Chief complaint:    Patient is a 47y old  Male who presents with a chief complaint of Type B Dissection (03 Mar 2023 03:46)      HPI:  42M , Tanzanian speaking poor historian, works Maintenance at [employer] with PMH HTN (uncontrolled), Pulmonary Interstitial Disease, CAD w/ stents ( in [place]), CKD (unknown baseline), was sent to [facility] 23 from [facility] where he had gone for a physical exam and to get his medications filled. He obtains his Nifedipine from his home country of [place], however has been unable to obtain recent doses so he has been taking half his medication daily. Of note, HIE shows recent admission to [facility] for photophobia where he was also found to have . 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 He was transferred to [facility] CTICU for further management.  At time of admission, his SBP was 180s and he was started on Esmolol and Nicardipine. Patient denies acute pain with radiating or aggravating factors. He does admit to occasional headaches and dizziness. TTE performed revealing: No LV thrombus; EF is 20 to 25%;  Severely decreased global left ventricular systolic function; moderate eccentric left ventricular hypertrophy; Dilated cardiomyopathy. pseudomonal pattern of left ventricular myocardial filling (Grade II diastolic dysfunction).Normal right ventricular size and function, Mild to moderate mitral valve regurgitation; Dilated descending aorta with echogenicity within the abdominal aortic lumen with septation suggestive of known history of Type B aortic dissection with possible intraluminal thrombi. Heart Failure team was consulted for dilated cardiomyopathy. Nephrology following, improvement of Scr 3.0 -> 1.77 today. Patient presents to [facility] CCL for elective R/LHC.  (language line solutions [place] ID [id number] used for translation.       PAST MEDICAL HISTORY  Uncontrolled hypertension    CKD (chronic kidney disease)    Interstitial lung disease    Prophylactic measure      Associated Risk Factors:        Frailty Assessment: (none/mild/mod/severe): none       Cerebrovascular Disease: N/A       Chronic Lung Disease: N/A       Peripheral Arterial Disease: N/A       Chronic Kidney Disease (if yes, what is GFR): N/A       Uncontrolled Diabetes (if yes, what is HgbA1C or FBS): N/A       Poorly Controlled Hypertension (if yes, what is SBP): N/A       Morbid Obesity (if yes, what is BMI): N/A       History of Recent Ventricular Arrhythmia: N/A       Inability to Ambulate Safely: N/A       Need for Therapeutic Anticoagulation: N/A       Antiplatelet or Contrast Allergy: N/A      PAST SURGICAL HISTORY      SOCIAL HISTORY: Lives in a house with wife and 8 year old daughter, denies former/active smoking history, etoh or illicit drug use    FAMILY HISTORY:    Family History of Premature Cardiovascular Disease:  Yes [ X ] No [  ] - mother  from cardiac arrest    HOME MEDICATIONS:  NIFEdipine 60 mg oral tablet, extended release: 1 tab(s) orally 2 times a day (01 Mar 2023 02:57)      CURRENT CARDIAC MEDICATIONS:  amLODIPine   Tablet 5 milliGRAM(s) Oral daily  doxazosin 2 milliGRAM(s) Oral <User Schedule>  metoprolol tartrate 100 milliGRAM(s) Oral every 12 hours    ALLERGIES:   Allergy Status Unknown      REVIEW OF SYMPTOMS:   CONSTITUTIONAL: no fever, no chills, no weight loss, no weight gain, no fatigue   CARDIOVASCULAR: no active chest pain; chest pressure; shortness of breath; palpitations; dizziness  RESPIRATORY: no Shortness of breath, no cough, no wheezing  : No dysuria, no hematuria   GI: No dark color stool, no nausea, no diarrhea, no constipation, no abdominal pain   NEURO: No headache, no slurred speech   ALL OTHER REVIEW OF SYSTEMS ARE NEGATIVE.    VITAL SIGNS:  T(C): 36.6 (23 @ 08:00), Max: 37.1 (23 @ 00:00)  T(F): 97.9 (23 @ 08:00), Max: 98.8 (23 @ 00:00)  HR: 86 (23 @ 11:33) (63 - 86)  BP: 142/86 (23 @ 11:33) (110/76 - 150/90)  RR: 13 (23 @ 11:33) (13 - 35)  SpO2: 96% (23 @ 11:33) (95% - 100%)    INTAKE AND OUTPUT:      @ 07:  -   @ 07:00  --------------------------------------------------------  IN: 772 mL / OUT: 1470 mL / NET: -698 mL     @ 07:  -   @ 12:45  --------------------------------------------------------  IN: 0 mL / OUT: 200 mL / NET: -200 mL        PHYSICAL EXAM:  Constitutional: Comfortable . No acute distress.   HEENT: Atraumatic and normocephalic , neck is supple . no JVD. No carotid bruit.  CNS: A&Ox3. No focal deficits.   Respiratory: CTAB, unlabored   Cardiovascular: RRR normal s1 s2. No murmur. No rubs or gallop.  Gastrointestinal: Soft, non-tender. +Bowel sounds.   Extremities: 2+ Peripheral Pulses, No clubbing, cyanosis, or edema  Psychiatric: Calm . no agitation.   Skin: Warm and dry, no ulcers on extremities     LABS:  ( 2023 21:25 )  Troponin T  <0.01,  CPK  148  , CKMB  X    , <H>                              12.0   5.36  )-----------( 150      ( 03 Mar 2023 05:35 )             39.2         137  |  103  |  33.2<H>  ----------------------------<  99  3.7   |  21.0<L>  |  1.77<H>    Ca    8.9      03 Mar 2023 05:35  Mg     2.0     03-03                    ECG: SR with possible LVH    CARDIAC TESTING   ECHO: < from: TTE Echo Complete w/o Contrast w/ Doppler (23 @ 21:29) >  Summary:   1. Endocardial visualization was enhanced with intravenous echo   contrast. No LV thrombus.   2. Left ventricular ejection fraction, by visual estimation, is 20 to   25%.   3. Severely decreased global left ventricular systolic function.   4. There is moderate eccentric left ventricular hypertrophy.   5. Dilated cardiomyopathy.   6. Spectral Doppler shows pseudonormal pattern of left ventricular   myocardial filling (Grade II diastolic dysfunction).   7. Normal right ventricular size and function, inadequate estimation of   RVSP.   8. Mildly enlarged left atrium.   9. The mitral valve leaflets are tethered which is due to reduced   systolic function and elevated LVDP.  10. Mild to moderate mitral valve regurgitation.  11. Dilated descending aorta with echogenicity within the abdominal   aortic lumen with septation suggestive of known history of Type B aortic   dissection with possible intraluminal thrombi, recommend correlation with   CTA imaging.  12. There is no evidence of pericardial effusion.  13. No prior study is available for comparison. Findings reported to the   CTICU team.    < end of copied text >      Cardiac Cath Risk Assessments:  ASA: 3  Mallampati: 3  Bleeding Risk: 1.5%  Creatinine: 1.77  GFR: 48    PLAN:  -Consent obtained by IC  -NPO status confirmed with patient  -EKG and labs reviewed  -0.9% NS bolus held for EF 20-25%

## 2023-03-04 DIAGNOSIS — I80.8 PHLEBITIS AND THROMBOPHLEBITIS OF OTHER SITES: ICD-10-CM

## 2023-03-04 DIAGNOSIS — I74.2 EMBOLISM AND THROMBOSIS OF ARTERIES OF THE UPPER EXTREMITIES: ICD-10-CM

## 2023-03-04 LAB
ANION GAP SERPL CALC-SCNC: 14 MMOL/L — SIGNIFICANT CHANGE UP (ref 5–17)
APTT BLD: 25.4 SEC — LOW (ref 27.5–35.5)
APTT BLD: 28 SEC — SIGNIFICANT CHANGE UP (ref 27.5–35.5)
APTT BLD: 31.4 SEC — SIGNIFICANT CHANGE UP (ref 27.5–35.5)
BUN SERPL-MCNC: 22.5 MG/DL — HIGH (ref 8–20)
CALCIUM SERPL-MCNC: 9 MG/DL — SIGNIFICANT CHANGE UP (ref 8.4–10.5)
CHLORIDE SERPL-SCNC: 99 MMOL/L — SIGNIFICANT CHANGE UP (ref 96–108)
CO2 SERPL-SCNC: 19 MMOL/L — LOW (ref 22–29)
CREAT SERPL-MCNC: 1.31 MG/DL — HIGH (ref 0.5–1.3)
EGFR: 68 ML/MIN/1.73M2 — SIGNIFICANT CHANGE UP
GLUCOSE SERPL-MCNC: 93 MG/DL — SIGNIFICANT CHANGE UP (ref 70–99)
HCT VFR BLD CALC: 37.5 % — LOW (ref 39–50)
HCT VFR BLD CALC: 38.7 % — LOW (ref 39–50)
HGB BLD-MCNC: 11.6 G/DL — LOW (ref 13–17)
HGB BLD-MCNC: 12.2 G/DL — LOW (ref 13–17)
INR BLD: 1.2 RATIO — HIGH (ref 0.88–1.16)
MAGNESIUM SERPL-MCNC: 2.1 MG/DL — SIGNIFICANT CHANGE UP (ref 1.6–2.6)
MCHC RBC-ENTMCNC: 25.2 PG — LOW (ref 27–34)
MCHC RBC-ENTMCNC: 25.5 PG — LOW (ref 27–34)
MCHC RBC-ENTMCNC: 30.9 GM/DL — LOW (ref 32–36)
MCHC RBC-ENTMCNC: 31.5 GM/DL — LOW (ref 32–36)
MCV RBC AUTO: 80.8 FL — SIGNIFICANT CHANGE UP (ref 80–100)
MCV RBC AUTO: 81.5 FL — SIGNIFICANT CHANGE UP (ref 80–100)
PLATELET # BLD AUTO: 125 K/UL — LOW (ref 150–400)
PLATELET # BLD AUTO: 147 K/UL — LOW (ref 150–400)
POTASSIUM SERPL-MCNC: 4.3 MMOL/L — SIGNIFICANT CHANGE UP (ref 3.5–5.3)
POTASSIUM SERPL-SCNC: 4.3 MMOL/L — SIGNIFICANT CHANGE UP (ref 3.5–5.3)
PROTHROM AB SERPL-ACNC: 13.9 SEC — HIGH (ref 10.5–13.4)
RBC # BLD: 4.6 M/UL — SIGNIFICANT CHANGE UP (ref 4.2–5.8)
RBC # BLD: 4.79 M/UL — SIGNIFICANT CHANGE UP (ref 4.2–5.8)
RBC # FLD: 14.8 % — HIGH (ref 10.3–14.5)
RBC # FLD: 15 % — HIGH (ref 10.3–14.5)
SODIUM SERPL-SCNC: 132 MMOL/L — LOW (ref 135–145)
UFH PPP CHRO-ACNC: 0.06 IU/ML — LOW (ref 0.3–0.7)
WBC # BLD: 5.28 K/UL — SIGNIFICANT CHANGE UP (ref 3.8–10.5)
WBC # BLD: 5.56 K/UL — SIGNIFICANT CHANGE UP (ref 3.8–10.5)
WBC # FLD AUTO: 5.28 K/UL — SIGNIFICANT CHANGE UP (ref 3.8–10.5)
WBC # FLD AUTO: 5.56 K/UL — SIGNIFICANT CHANGE UP (ref 3.8–10.5)

## 2023-03-04 PROCEDURE — 99291 CRITICAL CARE FIRST HOUR: CPT

## 2023-03-04 PROCEDURE — 99223 1ST HOSP IP/OBS HIGH 75: CPT | Mod: GC

## 2023-03-04 PROCEDURE — 71045 X-RAY EXAM CHEST 1 VIEW: CPT | Mod: 26

## 2023-03-04 RX ORDER — HYDRALAZINE HCL 50 MG
10 TABLET ORAL EVERY 6 HOURS
Refills: 0 | Status: DISCONTINUED | OUTPATIENT
Start: 2023-03-04 | End: 2023-03-04

## 2023-03-04 RX ORDER — HEPARIN SODIUM 5000 [USP'U]/ML
700 INJECTION INTRAVENOUS; SUBCUTANEOUS
Qty: 25000 | Refills: 0 | Status: DISCONTINUED | OUTPATIENT
Start: 2023-03-04 | End: 2023-03-09

## 2023-03-04 RX ORDER — HYDRALAZINE HCL 50 MG
10 TABLET ORAL ONCE
Refills: 0 | Status: COMPLETED | OUTPATIENT
Start: 2023-03-04 | End: 2023-03-04

## 2023-03-04 RX ORDER — LABETALOL HCL 100 MG
10 TABLET ORAL ONCE
Refills: 0 | Status: COMPLETED | OUTPATIENT
Start: 2023-03-04 | End: 2023-03-04

## 2023-03-04 RX ADMIN — Medication 2 MILLIGRAM(S): at 08:12

## 2023-03-04 RX ADMIN — Medication 650 MILLIGRAM(S): at 20:16

## 2023-03-04 RX ADMIN — Medication 100 MILLIGRAM(S): at 05:37

## 2023-03-04 RX ADMIN — Medication 10 MILLIGRAM(S): at 04:21

## 2023-03-04 RX ADMIN — Medication 1000 MILLIGRAM(S): at 06:18

## 2023-03-04 RX ADMIN — Medication 10 MILLIGRAM(S): at 00:15

## 2023-03-04 RX ADMIN — Medication 10 MILLIGRAM(S): at 22:30

## 2023-03-04 RX ADMIN — AMLODIPINE BESYLATE 10 MILLIGRAM(S): 2.5 TABLET ORAL at 05:37

## 2023-03-04 RX ADMIN — SODIUM CHLORIDE 3 MILLILITER(S): 9 INJECTION INTRAMUSCULAR; INTRAVENOUS; SUBCUTANEOUS at 05:36

## 2023-03-04 RX ADMIN — PANTOPRAZOLE SODIUM 40 MILLIGRAM(S): 20 TABLET, DELAYED RELEASE ORAL at 06:54

## 2023-03-04 RX ADMIN — HEPARIN SODIUM 7 UNIT(S)/HR: 5000 INJECTION INTRAVENOUS; SUBCUTANEOUS at 03:23

## 2023-03-04 RX ADMIN — Medication 100 MILLIGRAM(S): at 17:57

## 2023-03-04 RX ADMIN — SODIUM CHLORIDE 3 MILLILITER(S): 9 INJECTION INTRAMUSCULAR; INTRAVENOUS; SUBCUTANEOUS at 14:00

## 2023-03-04 RX ADMIN — Medication 650 MILLIGRAM(S): at 21:16

## 2023-03-04 RX ADMIN — Medication 2 MILLIGRAM(S): at 20:16

## 2023-03-04 RX ADMIN — SODIUM CHLORIDE 3 MILLILITER(S): 9 INJECTION INTRAMUSCULAR; INTRAVENOUS; SUBCUTANEOUS at 22:00

## 2023-03-04 NOTE — PROGRESS NOTE ADULT - ASSESSMENT
41 yo M, originally from the  emigrated ~ [place], with a history of HTN (uncontrolled diagnosed 5 years ago), interstitial lung disease, CAD w/ stents (? for a leaking blood vessel/dissection), CKD (unknown baseline) initially presented to [facility] ED 2/28/23 from [facility] where he had gone for a routine physical exam and to get his medications filled.  He had previously not seen a physician for over 3 years.  He was last seen in [place] ED in 2018 for hypertensive urgency requiring IV labetalol and transition to lisinopril/ HCTZ/ amlodipine.  However, he has been continuously filling his home meds from the  including losartan 320mg daily, nifedipine 60mg ER daily, and rosuvastatin 40mg daily.  Due to inability to obtain all his medications, he has been unable to obtain recent doses so he has been taking half his medication daily.  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  On presentation to [facility] he presented with SBP > 200 and subsequent CTA C/A/P showed Type B dissection. He was started on labetalol and nifedipine drips and transferred to [facility] CTICU for further management. A line is now in place with all drips off this AM with -150s.

## 2023-03-04 NOTE — CHART NOTE - NSCHARTNOTEFT_GEN_A_CORE
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 He obtains his Nifedipine from his home country of [place], however has been unable to obtain recent doses so he has been taking half his medication daily. Of note, HIE shows recent admission to [facility] for photophobia where he was also found to have . 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 He was transferred to [facility] CTICU for further management.  At time of admission, his SBP was 180s and he was started on Esmolol and Nicardipine. Patient denies acute pain with radiating or aggravating factors. He does admit to occasional headaches and dizziness. TTE performed revealing: No LV thrombus; EF is 20 to 25%;  Severely decreased global left ventricular systolic function; moderate eccentric left ventricular hypertrophy; Dilated cardiomyopathy. pseudomonal pattern of left ventricular myocardial filling (Grade II diastolic dysfunction).Normal right ventricular size and function, Mild to moderate mitral valve regurgitation; Dilated descending aorta with echogenicity within the abdominal aortic lumen with septation suggestive of known history of Type B aortic dissection with possible intraluminal thrombi. Heart Failure team was consulted for dilated cardiomyopathy. Nephrology following, improvement of Scr 3.0 -> 1.31.     POD 1 R/LHC:  left Heart Catheterization via right ulnar artery (attempted and failed RRA); right fem vein with angioseal closure  Patient now s/p LHC which showed normal coronaries; no evidence of previous stents    Right Heart Catheterization:     PA 40/24/31  PCW 18/19/16   Sat PA 72.1% / Sat AO 94.3%    Post cath yesterday 3/3 patient with increase in right forearm pain and swelling. Venous duplex of right arm performed revealing superficial thrombophelibitis of right basilic and cephalic veins. Arterial Duplex revealed Partially occlusive thrombus in the mid and distal portions of the right radial artery. Vascular surgery was called and determined no vascular intervention and recommended AC for radial artery, basilic vein and cephalic vein thrombosis. Ortho surgery was also called for potential compartment syndrome and also determined no surgical intervention at this time. Patient was initiated on IV heparin gtt overnight and transferred to CTICU for further monitoring.     Patient seen and examined sitting oob to chair.  Nicole used ID [id number] for interpretation. Patient reports a lot of improvement of his right forearm pain compared to yesterday. He still reports it is tender and sore to touch and he believes that the swelling has improved as well. The swelling is mostly located to right forearm. The right forearm feels mildly tense.  The right arm; hand and fingertips remains warm. Motor/sensory function is intact. Patient denies paresthesias to right hand and fingers. Patient is able to close fist; lift arm; bend and extend elbow. Strong Doppler Radial/ Ulnar pulses doppler. Arm is ace wrapped from hand to below the shoulder and remains elevated on pillows.     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 He obtains his Nifedipine from his home country of [place], however has been unable to obtain recent doses so he has been taking half his medication daily. Of note, HIE shows recent admission to [facility] for photophobia where he was also found to have . 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 He was transferred to [facility] CTICU for further management.  At time of admission, his SBP was 180s and he was started on Esmolol and Nicardipine. Patient denies acute pain with radiating or aggravating factors. He does admit to occasional headaches and dizziness. TTE performed revealing: No LV thrombus; EF is 20 to 25%;  Severely decreased global left ventricular systolic function; moderate eccentric left ventricular hypertrophy; Dilated cardiomyopathy. pseudomonal pattern of left ventricular myocardial filling (Grade II diastolic dysfunction).Normal right ventricular size and function, Mild to moderate mitral valve regurgitation; Dilated descending aorta with echogenicity within the abdominal aortic lumen with septation suggestive of known history of Type B aortic dissection with possible intraluminal thrombi. Heart Failure team was consulted for dilated cardiomyopathy. Nephrology following, improvement of Scr 3.0 -> 1.31.     POD 1 R/LHC:  left Heart Catheterization via right ulnar artery (attempted and failed RRA); right fem vein with angioseal closure  Patient now s/p LHC which showed normal coronaries; no evidence of previous stents    Right Heart Catheterization:     PA 40/24/31  PCW 18/19/16   Sat PA 72.1% / Sat AO 94.3%    Post cath yesterday 3/3 patient with increase in right forearm pain and swelling. Venous duplex of right arm performed revealing superficial thrombophelibitis of right basilic and cephalic veins. Arterial Duplex revealed Partially occlusive thrombus in the mid and distal portions of the right radial artery. Vascular surgery was called and determined no vascular intervention and recommended AC for radial artery, basilic vein and cephalic vein thrombosis. Ortho surgery was also called for potential compartment syndrome and also determined no surgical intervention at this time. Patient was initiated on IV heparin gtt overnight and transferred to CTICU for further monitoring.     Patient seen and examined sitting oob to chair.  Nicole used ID [id number] for interpretation. Patient reports a lot of improvement of his right forearm pain compared to yesterday. He still reports it is tender and sore to touch and he believes that the swelling has improved as well. The swelling is mostly located to right forearm. The right forearm feels mildly tense.  The right arm; hand and fingertips remains warm. Motor/sensory function is intact. Patient denies paresthesias to right hand and fingers. Patient is able to close fist; lift arm; bend and extend elbow. Strong Doppler Radial/ Ulnar pulses doppler. Arm is ace wrapped from hand to below the shoulder and remains elevated on pillows. R groin site dressing removed. Groin site c/d/i without active bleeding or hematoma. Site soft. patient denies groin pain. RLE remains acyanotic; warm to touch; palpable 2+ DP pulse noted.     PLAN:  -Patient remains in CTICU for close neurovascular monitoring  -Patient remains on IV AC per Vascular surgery  -No vascular surgery/ ortho interventions at this time  -Ace wrap/ Elevation per vascular team  -PRN pain control  -Further care per CTICU team

## 2023-03-04 NOTE — CONSULT NOTE ADULT - SUBJECTIVE AND OBJECTIVE BOX
Pt Name: FABIANA CALIXTO    MRN: [id number]      Patient is a 47y Male admitted to CT ICU s/p cardiac catheterization on 3/3 for type B dissection. Patient ultrasound shows thrombus to right radial artery after cardiac cath, with subsequent forearm swelling and pain. Vascular team consulted for radial artery injury, recommended anticoagulation  with elevation and warm compress. Patient denies loss of motor to digits, or parasthesias.     PAST MEDICAL & SURGICAL HISTORY:  PAST MEDICAL & SURGICAL HISTORY:  Uncontrolled hypertension    CKD (chronic kidney disease)    Interstitial lung disease    Prophylactic measure    Allergies: Allergy Status Unknown    Medications: acetaminophen     Tablet .. 650 milliGRAM(s) Oral every 6 hours PRN  amLODIPine   Tablet 10 milliGRAM(s) Oral daily  ceFAZolin  Injectable. 2000 milliGRAM(s) IV Push once  doxazosin 2 milliGRAM(s) Oral <User Schedule>  heparin  Infusion 700 Unit(s)/Hr IV Continuous <Continuous>  influenza   Vaccine 0.5 milliLiter(s) IntraMuscular once  metoprolol tartrate 100 milliGRAM(s) Oral every 12 hours  oxyCODONE    IR 5 milliGRAM(s) Oral every 4 hours PRN  oxyCODONE    IR 10 milliGRAM(s) Oral every 4 hours PRN  pantoprazole    Tablet 40 milliGRAM(s) Oral before breakfast  senna 2 Tablet(s) Oral at bedtime  sodium chloride 0.9% lock flush 3 milliLiter(s) IV Push every 8 hours    FAMILY HISTORY:  : non-contributory    Social History:              12.2   5.28  )-----------( 125      ( 04 Mar 2023 09:45 )             38.7       03-04    132<L>  |  99  |  22.5<H>  ----------------------------<  93  4.3   |  19.0<L>  |  1.31<H>    Ca    9.0      04 Mar 2023 03:00  Mg     2.1     03-04        Vital Signs Last 24 Hrs  T(C): 36.7 (04 Mar 2023 07:00), Max: 36.7 (04 Mar 2023 00:00)  T(F): 98 (04 Mar 2023 07:00), Max: 98 (04 Mar 2023 00:00)  HR: 80 (04 Mar 2023 13:00) (70 - 94)  BP: 136/82 (04 Mar 2023 13:00) (112/65 - 168/96)  BP(mean): 103 (04 Mar 2023 13:00) (78 - 120)  RR: 29 (04 Mar 2023 13:00) (12 - 29)  SpO2: 96% (04 Mar 2023 13:00) (93% - 99%)    Parameters below as of 04 Mar 2023 05:00  Patient On (Oxygen Delivery Method): room air    Daily     Daily Weight in k.7 (04 Mar 2023 04:51)    PHYSICAL EXAM:    Appearance: Alert, responsive, in no acute distress.  RUE: ACE removed, right forearm swelling noted with most swelling to proximal forearm, mildly tense forearm but compressible, mildly tender to palpation to proximal forearm, skin intact, cath site to right radial wrist noted intact without any bleeding, RP 2+, ROM digits full and intact, ROM wrist full and intact, SILT, BCR, no cyanosis    Imaging Studies:    < from: US Duplex Arterial Upper Ext Ltd, Right (23 @ 19:48) >    ACC: [id number] EXAM:  US DPLX UPR EXT ARTS LTD RT   ORDERED BY: JEROMY RUBIN     PROCEDURE DATE:  2023        INTERPRETATION:  CLINICAL INFORMATION: Swollen right arm.    TECHNIQUE: Duplex of the right upper extremity arterial system was   performed.    COMPARISON: None    FINDINGS:  The right subclavian artery demonstrates a triphasic waveform with   velocity is 95 cm/s.    The right axillary artery demonstrates a triphasic waveform with velocity   of 76 cm/s.    The right brachialartery demonstrates a triphasic waveform with velocity   of 124 cm/s.    The right radial artery in the proximal portion demonstrates a triphasic   waveform with velocity of 32 cm/s. There is echogenic thrombus in the mid   and distal right radial artery demonstrating partial occlusion with   partial flow detected within these segments with a normal triphasic   waveform and velocity of 94 cm/s and 49 cm/s in the mid and distal   segments respectively.    The right ulnar artery demonstrates a normal triphasic waveform with   velocity of up to 122 cm/s.    IMPRESSION:    Partially occlusive thrombus in the mid and distal portions of the right   radial artery.    --- End of Report ---      MACY SHAVER MD; Attending Radiologist  This document has been electronically signed. Mar  3 2023  8:21PM    < end of copied text >    A/P:  Pt is a  47y Male with  found to have right forearm swelling s/p cardiac cath with right radial artery thrombus    PLAN:   * Vasc reccs noted  * cont. ACE, elevation  * Pain Control  * no orthopedic surgical intervention at this time  * ortho stable - cont. care per CT ICU Pt Name: FABIANA CALIXTO    MRN: [id number]      Patient is a 47y Male admitted to CT ICU s/p cardiac catheterization on 3/3 for type B dissection. Patient ultrasound shows thrombus to right radial artery after cardiac cath, with subsequent forearm swelling and pain. Vascular team consulted for radial artery injury, recommended anticoagulation with elevation and warm compress. Patient denies loss of motor to digits, or parasthesias. Pt reports pain was much worse yesterday and has been improving.     PAST MEDICAL & SURGICAL HISTORY:  PAST MEDICAL & SURGICAL HISTORY:  Uncontrolled hypertension    CKD (chronic kidney disease)    Interstitial lung disease    Prophylactic measure    Allergies: Allergy Status Unknown    Medications: acetaminophen     Tablet .. 650 milliGRAM(s) Oral every 6 hours PRN  amLODIPine   Tablet 10 milliGRAM(s) Oral daily  ceFAZolin  Injectable. 2000 milliGRAM(s) IV Push once  doxazosin 2 milliGRAM(s) Oral <User Schedule>  heparin  Infusion 700 Unit(s)/Hr IV Continuous <Continuous>  influenza   Vaccine 0.5 milliLiter(s) IntraMuscular once  metoprolol tartrate 100 milliGRAM(s) Oral every 12 hours  oxyCODONE    IR 5 milliGRAM(s) Oral every 4 hours PRN  oxyCODONE    IR 10 milliGRAM(s) Oral every 4 hours PRN  pantoprazole    Tablet 40 milliGRAM(s) Oral before breakfast  senna 2 Tablet(s) Oral at bedtime  sodium chloride 0.9% lock flush 3 milliLiter(s) IV Push every 8 hours    FAMILY HISTORY:  : non-contributory    Social History:              12.2   5.28  )-----------( 125      ( 04 Mar 2023 09:45 )             38.7       -    132<L>  |  99  |  22.5<H>  ----------------------------<  93  4.3   |  19.0<L>  |  1.31<H>    Ca    9.0      04 Mar 2023 03:00  Mg     2.1     03-04        Vital Signs Last 24 Hrs  T(C): 36.7 (04 Mar 2023 07:00), Max: 36.7 (04 Mar 2023 00:00)  T(F): 98 (04 Mar 2023 07:00), Max: 98 (04 Mar 2023 00:00)  HR: 80 (04 Mar 2023 13:00) (70 - 94)  BP: 136/82 (04 Mar 2023 13:00) (112/65 - 168/96)  BP(mean): 103 (04 Mar 2023 13:00) (78 - 120)  RR: 29 (04 Mar 2023 13:00) (12 - 29)  SpO2: 96% (04 Mar 2023 13:00) (93% - 99%)    Parameters below as of 04 Mar 2023 05:00  Patient On (Oxygen Delivery Method): room air    Daily     Daily Weight in k.7 (04 Mar 2023 04:51)    PHYSICAL EXAM:    Appearance: Alert, responsive, in no acute distress.  RUE: ACE removed, right forearm swelling noted with most swelling to proximal forearm, mildly tense forearm but compressible, mildly tender to palpation to proximal forearm, skin intact, cath site to right radial wrist noted intact without any bleeding, RP 2+, ROM digits full and intact, ROM wrist full and intact, SILT, BCR, no cyanosis    Imaging Studies:    < from: US Duplex Arterial Upper Ext Ltd, Right (23 @ 19:48) >    ACC: [id number] EXAM:  US DPLX UPR EXT ARTS LTD RT   ORDERED BY: JEROMY RUBIN     PROCEDURE DATE:  2023        INTERPRETATION:  CLINICAL INFORMATION: Swollen right arm.    TECHNIQUE: Duplex of the right upper extremity arterial system was   performed.    COMPARISON: None    FINDINGS:  The right subclavian artery demonstrates a triphasic waveform with   velocity is 95 cm/s.    The right axillary artery demonstrates a triphasic waveform with velocity   of 76 cm/s.    The right brachialartery demonstrates a triphasic waveform with velocity   of 124 cm/s.    The right radial artery in the proximal portion demonstrates a triphasic   waveform with velocity of 32 cm/s. There is echogenic thrombus in the mid   and distal right radial artery demonstrating partial occlusion with   partial flow detected within these segments with a normal triphasic   waveform and velocity of 94 cm/s and 49 cm/s in the mid and distal   segments respectively.    The right ulnar artery demonstrates a normal triphasic waveform with   velocity of up to 122 cm/s.    IMPRESSION:    Partially occlusive thrombus in the mid and distal portions of the right   radial artery.    --- End of Report ---      MACY SHAVER MD; Attending Radiologist  This document has been electronically signed. Mar  3 2023  8:21PM    < end of copied text >    A/P:  Pt is a  47y Male with  found to have right forearm swelling s/p cardiac cath with right radial artery thrombus    PLAN:   * Vasc reccs noted  * cont. ACE, elevation  * Pain Control  * no orthopedic surgical intervention at this time  * ortho stable - cont. care per CT ICU

## 2023-03-04 NOTE — PROGRESS NOTE ADULT - PROBLEM SELECTOR PLAN 1
Non ischemic cardiomyopathy unknown etiology likely due to hypertensive heart disease   S/P LHC which shows no evidence of CAD and no evidence of stents   - Patient appears euvolemic, based on LVEDP approx. 20 but overall appears euvolemic   -  on Lopressor 100mg po q 12hrs, in the setting of HF and systolic dysfunction, should be transitioned to Toprol XL   - on Norvasc, due to renal dysfunction not on ACEiARB or ARNI   - likely will hold on Lasix at this time or consider Lasix PRN    s/p cath R radial partial thrombosis and patient noted to have also cephalic and basilic vein thrombosis + pain to palpation with no numbness or tingling and able to move phalanges without any pain, Vascular surgery consulted

## 2023-03-04 NOTE — PROGRESS NOTE ADULT - SUBJECTIVE AND OBJECTIVE BOX
Subjective: no c/o incisional pain at this time. Denies CP, SOB, palpitations, N/V, other c/o.    all other ROS negative x10 except as noted above     T(C): 36.7 (03-04-23 @ 00:00), Max: 36.7 (03-03-23 @ 04:52)  HR: 76 (03-04-23 @ 01:00) (69 - 94)  BP: 126/79 (03-04-23 @ 01:00) (110/76 - 168/96)  ABP: --  ABP(mean): --  RR: 18 (03-04-23 @ 01:00) (12 - 29)  SpO2: 99% (03-04-23 @ 01:00) (93% - 99%)  Wt(kg): --  CVP(mm Hg): --  CO: --  CI: --  PA: --       I&O's Detail    02 Mar 2023 07:01  -  03 Mar 2023 07:00  --------------------------------------------------------  IN:    Esmolol: 52 mL    NiCARdipine: 120 mL    Oral Fluid: 600 mL  Total IN: 772 mL    OUT:    Indwelling Catheter - Urethral (mL): 1470 mL  Total OUT: 1470 mL    Total NET: -698 mL      03 Mar 2023 07:01  -  04 Mar 2023 02:38  --------------------------------------------------------  IN:  Total IN: 0 mL    OUT:    Indwelling Catheter - Urethral (mL): 500 mL  Total OUT: 500 mL    Total NET: -500 mL          LABS: All Lab data reviewed and analyzed                        12.0   6.15  )-----------( 143      ( 03 Mar 2023 22:26 )             38.1     03-03    135  |  102  |  24.0<H>  ----------------------------<  105<H>  4.0   |  22.0  |  1.38<H>    Ca    8.9      03 Mar 2023 22:26  Mg     2.1     03-03      PT/INR - ( 03 Mar 2023 22:26 )   PT: 13.6 sec;   INR: 1.17 ratio         PTT - ( 03 Mar 2023 22:26 )  PTT:27.9 sec        CAPILLARY BLOOD GLUCOSE               RADIOLOGY: - Reviewed and analyzed   CXR: pending    HOSPITAL MEDICATIONS: All medications reviewed and analyzed  MEDICATIONS  (STANDING):  amLODIPine   Tablet 10 milliGRAM(s) Oral daily  ceFAZolin  Injectable. 2000 milliGRAM(s) IV Push once  ceFAZolin  Injectable. 1000 milliGRAM(s) IV Push every 8 hours  doxazosin 2 milliGRAM(s) Oral <User Schedule>  heparin  Infusion 700 Unit(s)/Hr (7 mL/Hr) IV Continuous <Continuous>  influenza   Vaccine 0.5 milliLiter(s) IntraMuscular once  metoprolol tartrate 100 milliGRAM(s) Oral every 12 hours  pantoprazole    Tablet 40 milliGRAM(s) Oral before breakfast  senna 2 Tablet(s) Oral at bedtime  sodium chloride 0.9% lock flush 3 milliLiter(s) IV Push every 8 hours    MEDICATIONS  (PRN):  acetaminophen     Tablet .. 650 milliGRAM(s) Oral every 6 hours PRN Mild Pain (1 - 3)  oxyCODONE    IR 5 milliGRAM(s) Oral every 4 hours PRN Moderate Pain (4 - 6)  oxyCODONE    IR 10 milliGRAM(s) Oral every 4 hours PRN Severe Pain (7 - 10)          General: NAD  Neuro: A+O x 3, non-focal, speech clear and intact  HEENT: PERRL, EOMI, oral mucosa pink and moist  Neck: supple, no JVD  CV: regular rate, regular rhythm, +S1S2, no murmurs or rub  Pulm/chest: lung sounds CTA and equal bilaterally, no accessory muscle use noted  Abd: soft, NT, ND, +BS  : normal and skin intact   Ext: MORRIS x 4, no C/C/E  Skin: warm, well perfused                47yMale with PMH         PAST MEDICAL & SURGICAL HISTORY:  Uncontrolled hypertension      CKD (chronic kidney disease)      Interstitial lung disease      Prophylactic measure

## 2023-03-04 NOTE — PROGRESS NOTE ADULT - PROBLEM SELECTOR PLAN 2
: - Type B aortic dissection seen on CTA.  - CT surgery team considering surgical repair.  - blood pressure at goal

## 2023-03-04 NOTE — PROGRESS NOTE ADULT - PROBLEM SELECTOR PLAN 3
blood pressure controlled   - on Lopressor 100mg po q 12hrs Cardura 2mg po BID and Norvasc 10mg po q daily

## 2023-03-04 NOTE — PROGRESS NOTE ADULT - SUBJECTIVE AND OBJECTIVE BOX
FABIANA CALIXTO  MRN#: [id number]  Subjective: The patient was in the CTICU in critical condition at risk for imminent decompensation and was seen and evaluate on AM rounds with the entire multidisciplinary team.     OBJECTIVE:  ICU Vital Signs Last 24 Hrs  T(C): 36.7 (04 Mar 2023 07:00), Max: 36.7 (04 Mar 2023 00:00)  T(F): 98 (04 Mar 2023 07:00), Max: 98 (04 Mar 2023 00:00)  HR: 75 (04 Mar 2023 09:00) (70 - 94)  BP: 113/67 (04 Mar 2023 09:00) (112/69 - 168/96)  BP(mean): 84 (04 Mar 2023 09:00) (84 - 120)  ABP: --  ABP(mean): --  RR: 19 (04 Mar 2023 09:00) (12 - 29)  SpO2: 95% (04 Mar 2023 09:00) (93% - 99%)    O2 Parameters below as of 04 Mar 2023 05:00  Patient On (Oxygen Delivery Method): room air    I&O's Summary    03 Mar 2023 07:01  -  04 Mar 2023 07:00  --------------------------------------------------------  IN: 0 mL / OUT: 1100 mL / NET: -1100 mL    04 Mar 2023 07:01  -  04 Mar 2023 10:45  --------------------------------------------------------  IN: 0 mL / OUT: 175 mL / NET: -175 mL      PHYSICAL EXAM:Daily Height in cm: 162.56 (28 Feb 2023 21:17)    Daily   General: WN/WD NAD    HEENT:     + NCAT  + EOMI  - Conjuctival edema   - Icterus   - Thrush   - ETT  - NGT/OGT  Neck:         + FROM    + JVD     - Nodes     - Masses    + Mid-line trachea   - Tracheostomy  Chest:         - Sternal click  - Sternal drainage  - Pacing wires  - Chest tubes  - SubQ emphysema  Lungs:          + CTA   - Rhonchi    - Rales    - Wheezing     - Decreased BS   - Dullness R L  Cardiac:       + S1 + S2    + RRR   - Irregular   - S3  - S4    - Murmurs   - Rub   - Hamman’s sign   Abdomen:    + BS     + Soft    + Non-tender     - Distended    - Organomegaly  - PEG  Extremities:   - Cyanosis U/L   - Clubbing  U/L  - LE/UE Edema   + Capillary refill    + Pulses   Neuro:        + Awake   +  Alert   - Confused   - Lethargic   - Sedated   - Generalized Weakness  Skin:        - Rashes    - Erythema   + Normal incisions   + IV sites intact  - Sacral decubitus    HOSPITAL MEDICATIONS: All mediciations reviewed and analyzed  MEDICATIONS  (STANDING):  amLODIPine   Tablet 10 milliGRAM(s) Oral daily  ceFAZolin  Injectable. 2000 milliGRAM(s) IV Push once  doxazosin 2 milliGRAM(s) Oral <User Schedule>  heparin  Infusion 700 Unit(s)/Hr (7 mL/Hr) IV Continuous <Continuous>  influenza   Vaccine 0.5 milliLiter(s) IntraMuscular once  metoprolol tartrate 100 milliGRAM(s) Oral every 12 hours  pantoprazole    Tablet 40 milliGRAM(s) Oral before breakfast  senna 2 Tablet(s) Oral at bedtime  sodium chloride 0.9% lock flush 3 milliLiter(s) IV Push every 8 hours    MEDICATIONS  (PRN):  acetaminophen     Tablet .. 650 milliGRAM(s) Oral every 6 hours PRN Mild Pain (1 - 3)  oxyCODONE    IR 5 milliGRAM(s) Oral every 4 hours PRN Moderate Pain (4 - 6)  oxyCODONE    IR 10 milliGRAM(s) Oral every 4 hours PRN Severe Pain (7 - 10)      LABS: All Lab data reviewed and analyzed                        12.2   5.28  )-----------( 125      ( 04 Mar 2023 09:45 )             38.7   03-04    132<L>  |  99  |  22.5<H>  ----------------------------<  93  4.3   |  19.0<L>  |  1.31<H>    Ca    9.0      04 Mar 2023 03:00  Mg     2.1     03-04    PT/INR - ( 04 Mar 2023 03:00 )   PT: 13.9 sec;   INR: 1.20 ratio  PTT - ( 04 Mar 2023 09:45 )  PTT:28.0 sec    RADIOLOGY: - Reviewed and analyzed     Assessment: Acute aortic syndrome-Type B dissection    Plan:   - Respiratory status required the following of continuous pulse oximetry for support & to prevent decompensation  - Continue anti-impulse therapy pending TEVAR  - IV Heparin drip for right radial partial artery thrombosis   - Protonix maintained for GI bleeding prophylaxis  - Vascular surgery follow up for thrombosis     Patient required critical care management and is at risk for life threatening decompensation  I provided 40 minutes of non-continuous care to the patient.

## 2023-03-04 NOTE — PROGRESS NOTE ADULT - ASSESSMENT
48 yo M s/p r and L heart cath complicated by R radial partial thrombosis and patient noted to have also cephalic and basilic vein thrombosis complaining of swollen forearm.    -No vascular intervention required  -Recommend anticoagulation for thrombosis of radial artery, cephalic, and basilic veins  -Recommend elevation and warm compress  -No evidence of ischemia  -If concerned for compartment syndrome, please consult Hand surgery on call.

## 2023-03-04 NOTE — PROGRESS NOTE ADULT - SUBJECTIVE AND OBJECTIVE BOX
INTERVAL HPI/OVERNIGHT EVENTS:    Patient evaluated at bedside. Vascular surgery consulted in setting of possible hand syndrome, though patient subsequently found to have thrombus of R radial artery and    MEDICATIONS  (STANDING):  amLODIPine   Tablet 10 milliGRAM(s) Oral daily  ceFAZolin  Injectable. 2000 milliGRAM(s) IV Push once  ceFAZolin  Injectable. 1000 milliGRAM(s) IV Push every 8 hours  doxazosin 2 milliGRAM(s) Oral <User Schedule>  heparin  Infusion 700 Unit(s)/Hr (7 mL/Hr) IV Continuous <Continuous>  hydrALAZINE Injectable 10 milliGRAM(s) IV Push every 6 hours  influenza   Vaccine 0.5 milliLiter(s) IntraMuscular once  metoprolol tartrate 100 milliGRAM(s) Oral every 12 hours  pantoprazole    Tablet 40 milliGRAM(s) Oral before breakfast  senna 2 Tablet(s) Oral at bedtime  sodium chloride 0.9% lock flush 3 milliLiter(s) IV Push every 8 hours    MEDICATIONS  (PRN):  acetaminophen     Tablet .. 650 milliGRAM(s) Oral every 6 hours PRN Mild Pain (1 - 3)  oxyCODONE    IR 5 milliGRAM(s) Oral every 4 hours PRN Moderate Pain (4 - 6)  oxyCODONE    IR 10 milliGRAM(s) Oral every 4 hours PRN Severe Pain (7 - 10)      Vital Signs Last 24 Hrs  T(C): 36.7 (04 Mar 2023 00:00), Max: 36.7 (03 Mar 2023 04:52)  T(F): 98 (04 Mar 2023 00:00), Max: 98.1 (03 Mar 2023 04:52)  HR: 76 (04 Mar 2023 01:00) (69 - 94)  BP: 126/79 (04 Mar 2023 01:00) (110/76 - 168/96)  BP(mean): 99 (04 Mar 2023 01:00) (99 - 120)  RR: 18 (04 Mar 2023 01:00) (12 - 29)  SpO2: 99% (04 Mar 2023 01:00) (93% - 99%)    Parameters below as of 04 Mar 2023 01:00  Patient On (Oxygen Delivery Method): room air        PHYSICAL EXAM:  GENERAL: NAD, well-groomed, well-developed  HEAD:  Atraumatic, Normocephalic  EYES: EOMI, PERRLA, conjunctiva and sclera clear  NECK: Supple, No JVD  CHEST/LUNG: non-labored breathing on room air.   HEART: Regular rate and rhythm; S1/S2  ABDOMEN: Soft, Nontender, Nondistended  VASCULAR: RUE: Swollen forearm with soft compartments, unable to palpate ulnar/radial pulses, doppler signals to radial, ulnar, and palmar arch noted.      I&O's Detail    02 Mar 2023 07:01  -  03 Mar 2023 07:00  --------------------------------------------------------  IN:    Esmolol: 52 mL    NiCARdipine: 120 mL    Oral Fluid: 600 mL  Total IN: 772 mL    OUT:    Indwelling Catheter - Urethral (mL): 1470 mL  Total OUT: 1470 mL    Total NET: -698 mL      03 Mar 2023 07:01  -  04 Mar 2023 03:11  --------------------------------------------------------  IN:  Total IN: 0 mL    OUT:    Indwelling Catheter - Urethral (mL): 500 mL  Total OUT: 500 mL    Total NET: -500 mL          LABS:                        12.0   6.15  )-----------( 143      ( 03 Mar 2023 22:26 )             38.1     03-03    135  |  102  |  24.0<H>  ----------------------------<  105<H>  4.0   |  22.0  |  1.38<H>    Ca    8.9      03 Mar 2023 22:26  Mg     2.1     03-03      PT/INR - ( 03 Mar 2023 22:26 )   PT: 13.6 sec;   INR: 1.17 ratio         PTT - ( 03 Mar 2023 22:26 )  PTT:27.9 sec

## 2023-03-05 LAB
ANION GAP SERPL CALC-SCNC: 13 MMOL/L — SIGNIFICANT CHANGE UP (ref 5–17)
APTT BLD: 33.6 SEC — SIGNIFICANT CHANGE UP (ref 27.5–35.5)
APTT BLD: 36.2 SEC — HIGH (ref 27.5–35.5)
APTT BLD: 50.6 SEC — HIGH (ref 27.5–35.5)
BUN SERPL-MCNC: 23.2 MG/DL — HIGH (ref 8–20)
CALCIUM SERPL-MCNC: 9.3 MG/DL — SIGNIFICANT CHANGE UP (ref 8.4–10.5)
CHLORIDE SERPL-SCNC: 101 MMOL/L — SIGNIFICANT CHANGE UP (ref 96–108)
CO2 SERPL-SCNC: 21 MMOL/L — LOW (ref 22–29)
CREAT SERPL-MCNC: 1.49 MG/DL — HIGH (ref 0.5–1.3)
EGFR: 59 ML/MIN/1.73M2 — LOW
GLUCOSE SERPL-MCNC: 90 MG/DL — SIGNIFICANT CHANGE UP (ref 70–99)
HCT VFR BLD CALC: 37.6 % — LOW (ref 39–50)
HGB BLD-MCNC: 11.8 G/DL — LOW (ref 13–17)
MAGNESIUM SERPL-MCNC: 2 MG/DL — SIGNIFICANT CHANGE UP (ref 1.6–2.6)
MCHC RBC-ENTMCNC: 25.6 PG — LOW (ref 27–34)
MCHC RBC-ENTMCNC: 31.4 GM/DL — LOW (ref 32–36)
MCV RBC AUTO: 81.6 FL — SIGNIFICANT CHANGE UP (ref 80–100)
PLATELET # BLD AUTO: 135 K/UL — LOW (ref 150–400)
POTASSIUM SERPL-MCNC: 3.6 MMOL/L — SIGNIFICANT CHANGE UP (ref 3.5–5.3)
POTASSIUM SERPL-SCNC: 3.6 MMOL/L — SIGNIFICANT CHANGE UP (ref 3.5–5.3)
RBC # BLD: 4.61 M/UL — SIGNIFICANT CHANGE UP (ref 4.2–5.8)
RBC # FLD: 15.1 % — HIGH (ref 10.3–14.5)
SODIUM SERPL-SCNC: 135 MMOL/L — SIGNIFICANT CHANGE UP (ref 135–145)
WBC # BLD: 5.82 K/UL — SIGNIFICANT CHANGE UP (ref 3.8–10.5)
WBC # FLD AUTO: 5.82 K/UL — SIGNIFICANT CHANGE UP (ref 3.8–10.5)

## 2023-03-05 PROCEDURE — 99232 SBSQ HOSP IP/OBS MODERATE 35: CPT

## 2023-03-05 PROCEDURE — 93010 ELECTROCARDIOGRAM REPORT: CPT

## 2023-03-05 PROCEDURE — 71045 X-RAY EXAM CHEST 1 VIEW: CPT | Mod: 26

## 2023-03-05 RX ORDER — NICARDIPINE HYDROCHLORIDE 30 MG/1
10 CAPSULE, EXTENDED RELEASE ORAL
Qty: 40 | Refills: 0 | Status: DISCONTINUED | OUTPATIENT
Start: 2023-03-05 | End: 2023-03-06

## 2023-03-05 RX ORDER — DOXAZOSIN MESYLATE 4 MG
4 TABLET ORAL
Refills: 0 | Status: DISCONTINUED | OUTPATIENT
Start: 2023-03-05 | End: 2023-03-09

## 2023-03-05 RX ORDER — POTASSIUM CHLORIDE 20 MEQ
40 PACKET (EA) ORAL EVERY 4 HOURS
Refills: 0 | Status: COMPLETED | OUTPATIENT
Start: 2023-03-05 | End: 2023-03-05

## 2023-03-05 RX ORDER — HYDRALAZINE HCL 50 MG
10 TABLET ORAL ONCE
Refills: 0 | Status: COMPLETED | OUTPATIENT
Start: 2023-03-05 | End: 2023-03-05

## 2023-03-05 RX ORDER — NICARDIPINE HYDROCHLORIDE 30 MG/1
5 CAPSULE, EXTENDED RELEASE ORAL
Qty: 40 | Refills: 0 | Status: DISCONTINUED | OUTPATIENT
Start: 2023-03-05 | End: 2023-03-05

## 2023-03-05 RX ADMIN — SENNA PLUS 2 TABLET(S): 8.6 TABLET ORAL at 22:24

## 2023-03-05 RX ADMIN — Medication 650 MILLIGRAM(S): at 03:41

## 2023-03-05 RX ADMIN — Medication 2 MILLIGRAM(S): at 09:31

## 2023-03-05 RX ADMIN — Medication 40 MILLIEQUIVALENT(S): at 04:31

## 2023-03-05 RX ADMIN — SODIUM CHLORIDE 3 MILLILITER(S): 9 INJECTION INTRAMUSCULAR; INTRAVENOUS; SUBCUTANEOUS at 06:00

## 2023-03-05 RX ADMIN — SODIUM CHLORIDE 3 MILLILITER(S): 9 INJECTION INTRAMUSCULAR; INTRAVENOUS; SUBCUTANEOUS at 14:09

## 2023-03-05 RX ADMIN — Medication 4 MILLIGRAM(S): at 20:58

## 2023-03-05 RX ADMIN — HEPARIN SODIUM 12 UNIT(S)/HR: 5000 INJECTION INTRAVENOUS; SUBCUTANEOUS at 02:45

## 2023-03-05 RX ADMIN — Medication 100 MILLIGRAM(S): at 05:31

## 2023-03-05 RX ADMIN — PANTOPRAZOLE SODIUM 40 MILLIGRAM(S): 20 TABLET, DELAYED RELEASE ORAL at 05:31

## 2023-03-05 RX ADMIN — Medication 10 MILLIGRAM(S): at 01:05

## 2023-03-05 RX ADMIN — NICARDIPINE HYDROCHLORIDE 25 MG/HR: 30 CAPSULE, EXTENDED RELEASE ORAL at 03:41

## 2023-03-05 RX ADMIN — Medication 650 MILLIGRAM(S): at 04:41

## 2023-03-05 RX ADMIN — AMLODIPINE BESYLATE 10 MILLIGRAM(S): 2.5 TABLET ORAL at 05:31

## 2023-03-05 RX ADMIN — Medication 100 MILLIGRAM(S): at 17:27

## 2023-03-05 RX ADMIN — Medication 40 MILLIEQUIVALENT(S): at 11:46

## 2023-03-05 RX ADMIN — NICARDIPINE HYDROCHLORIDE 50 MG/HR: 30 CAPSULE, EXTENDED RELEASE ORAL at 22:55

## 2023-03-05 NOTE — PROGRESS NOTE ADULT - ASSESSMENT
Assessment: 46 yo M s/p r and L heart cath complicated by R radial partial thrombosis and patient noted to have also cephalic and basilic vein thrombosis complaining of swollen forearm.    Plan  -No vascular intervention required  -Recommend anticoagulation for thrombosis of radial artery, cephalic, and basilic veins  -Recommend elevation and warm compress  -No evidence of ischemia  -Cardiology on board for dilated cardiomyopathy. Transitioned patient to Toprol   -Ortho consulted. Recommended NV check be changed to q4.

## 2023-03-05 NOTE — PROGRESS NOTE ADULT - SUBJECTIVE AND OBJECTIVE BOX
INTERVAL HPI/OVERNIGHT EVENTS:    Patient evaluated at bedside. He is tender in the RUE in the medial aspect of the RUE. RUE was dressed with Kerlix and ACE wrap for compression. Otherwise tolerating oral. He was evaluated by ortho who r/o compartment syndrome.     MEDICATIONS  (STANDING):  amLODIPine   Tablet 10 milliGRAM(s) Oral daily  ceFAZolin  Injectable. 2000 milliGRAM(s) IV Push once  doxazosin 2 milliGRAM(s) Oral <User Schedule>  heparin  Infusion 700 Unit(s)/Hr (10 mL/Hr) IV Continuous <Continuous>  influenza   Vaccine 0.5 milliLiter(s) IntraMuscular once  metoprolol tartrate 100 milliGRAM(s) Oral every 12 hours  pantoprazole    Tablet 40 milliGRAM(s) Oral before breakfast  senna 2 Tablet(s) Oral at bedtime  sodium chloride 0.9% lock flush 3 milliLiter(s) IV Push every 8 hours    MEDICATIONS  (PRN):  acetaminophen     Tablet .. 650 milliGRAM(s) Oral every 6 hours PRN Mild Pain (1 - 3)  oxyCODONE    IR 5 milliGRAM(s) Oral every 4 hours PRN Moderate Pain (4 - 6)  oxyCODONE    IR 10 milliGRAM(s) Oral every 4 hours PRN Severe Pain (7 - 10)      Vital Signs Last 24 Hrs  T(C): 36.9 (05 Mar 2023 00:00), Max: 36.9 (05 Mar 2023 00:00)  T(F): 98.4 (05 Mar 2023 00:00), Max: 98.4 (05 Mar 2023 00:00)  HR: 70 (04 Mar 2023 23:00) (70 - 90)  BP: 124/73 (04 Mar 2023 23:00) (112/65 - 144/72)  BP(mean): 93 (04 Mar 2023 23:00) (78 - 115)  RR: 23 (04 Mar 2023 23:00) (18 - 39)  SpO2: 95% (04 Mar 2023 23:00) (94% - 98%)    Parameters below as of 04 Mar 2023 23:00  Patient On (Oxygen Delivery Method): room air          PE  GENERAL: NAD, well-groomed, well-developed  HEAD:  Atraumatic, Normocephalic  EYES: EOMI, PERRLA, conjunctiva and sclera clear  NECK: Supple, No JVD  CHEST/LUNG: non-labored breathing on room air.  HEART: Regular rate and rhythm; S1/S2  ABDOMEN: Soft, Nontender, Nondistended  VASCULAR: RUE: Swollen forearm with soft compartments, unable to palpate ulnar/radial pulses, doppler signals to radial, ulnar, and palmar arch noted.      I&O's Detail    03 Mar 2023 07:01  -  04 Mar 2023 07:00  --------------------------------------------------------  IN:  Total IN: 0 mL    OUT:    Indwelling Catheter - Urethral (mL): 1100 mL  Total OUT: 1100 mL    Total NET: -1100 mL      04 Mar 2023 07:01  -  05 Mar 2023 01:34  --------------------------------------------------------  IN:    Heparin: 129.5 mL    Oral Fluid: 120 mL  Total IN: 249.5 mL    OUT:    Indwelling Catheter - Urethral (mL): 250 mL    Voided (mL): 450 mL  Total OUT: 700 mL    Total NET: -450.5 mL          LABS:                        11.8   5.82  )-----------( 135      ( 05 Mar 2023 01:15 )             37.6     03-04    132<L>  |  99  |  22.5<H>  ----------------------------<  93  4.3   |  19.0<L>  |  1.31<H>    Ca    9.0      04 Mar 2023 03:00  Mg     2.1     03-04      PT/INR - ( 04 Mar 2023 03:00 )   PT: 13.9 sec;   INR: 1.20 ratio         PTT - ( 04 Mar 2023 16:30 )  PTT:31.4 sec      RADIOLOGY & ADDITIONAL STUDIES:

## 2023-03-05 NOTE — PROGRESS NOTE ADULT - SUBJECTIVE AND OBJECTIVE BOX
Patient seen and examined.  Denies CP, SOB, N/V.    T(C): 36.9 (03-05-23 @ 00:00)  T(F): 98.4 (03-05-23 @ 00:00)  HR: 70 (03-04-23 @ 23:00)  BP: 124/73 (03-04-23 @ 23:00)  BP(mean): 93 (03-04-23 @ 23:00)  ABP: --  ABP(mean): --  RR: 23 (03-04-23 @ 23:00)  SpO2: 95% (03-04-23 @ 23:00)  Wt(kg): --  CVP(mm Hg): --  CI: --  PA: --  PA(mean): --  PA(direct): --  SVRI: --      Physical Exam:  Gen: A&Ox3  Pulm:  CTA b/l, no r/r/w  CV:  S1S2, RRR, no m/r/g  Abd: +BS, soft, NT, ND  Ext:  +DP b/l, no c/c/e ace R with dopplerable pulses       I&O's Detail    03 Mar 2023 07:01  -  04 Mar 2023 07:00  --------------------------------------------------------  IN:  Total IN: 0 mL    OUT:    Indwelling Catheter - Urethral (mL): 1100 mL  Total OUT: 1100 mL    Total NET: -1100 mL      04 Mar 2023 07:01  -  05 Mar 2023 02:40  --------------------------------------------------------  IN:    Heparin: 129.5 mL    Oral Fluid: 120 mL  Total IN: 249.5 mL    OUT:    Indwelling Catheter - Urethral (mL): 250 mL    Voided (mL): 450 mL  Total OUT: 700 mL    Total NET: -450.5 mL                              11.8   5.82  )-----------( 135      ( 05 Mar 2023 01:15 )             37.6   03-05    135  |  101  |  23.2<H>  ----------------------------<  90  3.6   |  21.0<L>  |  1.49<H>    Ca    9.3      05 Mar 2023 01:15  Mg     2.0     03-05    aPTT: 36.2 sec; PT: x    ; INR: x      03-05-23 @ 01:15         CAPILLARY BLOOD GLUCOSE            Medications:  acetaminophen     Tablet .. 650 milliGRAM(s) Oral every 6 hours PRN  amLODIPine   Tablet 10 milliGRAM(s) Oral daily  ceFAZolin  Injectable. 2000 milliGRAM(s) IV Push once  doxazosin 2 milliGRAM(s) Oral <User Schedule>  heparin  Infusion 700 Unit(s)/Hr IV Continuous <Continuous>  influenza   Vaccine 0.5 milliLiter(s) IntraMuscular once  metoprolol tartrate 100 milliGRAM(s) Oral every 12 hours  oxyCODONE    IR 5 milliGRAM(s) Oral every 4 hours PRN  oxyCODONE    IR 10 milliGRAM(s) Oral every 4 hours PRN  pantoprazole    Tablet 40 milliGRAM(s) Oral before breakfast  senna 2 Tablet(s) Oral at bedtime  sodium chloride 0.9% lock flush 3 milliLiter(s) IV Push every 8 hours      CXR: pending

## 2023-03-05 NOTE — PROGRESS NOTE ADULT - PROBLEM SELECTOR PLAN 4
Currently ROSALINO on CKD  Likely secondary to hypoperfusion from dissection vs STEVAN.  Continue to trend BMP.  Nephrology following. Likely baseline SCr between 1.3-1.6. dane resolving   Likely secondary to hypoperfusion from dissection vs STEVAN.  Continue to trend BMP.  Nephrology following. Likely baseline SCr between 1.3-1.6.

## 2023-03-06 PROBLEM — I10 ESSENTIAL (PRIMARY) HYPERTENSION: Chronic | Status: ACTIVE | Noted: 2023-03-01

## 2023-03-06 PROBLEM — N18.9 CHRONIC KIDNEY DISEASE, UNSPECIFIED: Chronic | Status: ACTIVE | Noted: 2023-03-01

## 2023-03-06 PROBLEM — Z00.00 ENCOUNTER FOR PREVENTIVE HEALTH EXAMINATION: Status: ACTIVE | Noted: 2023-03-06

## 2023-03-06 LAB
ANION GAP SERPL CALC-SCNC: 13 MMOL/L — SIGNIFICANT CHANGE UP (ref 5–17)
APTT BLD: 51.2 SEC — HIGH (ref 27.5–35.5)
APTT BLD: 76.6 SEC — HIGH (ref 27.5–35.5)
BUN SERPL-MCNC: 21 MG/DL — HIGH (ref 8–20)
CALCIUM SERPL-MCNC: 9 MG/DL — SIGNIFICANT CHANGE UP (ref 8.4–10.5)
CHLORIDE SERPL-SCNC: 106 MMOL/L — SIGNIFICANT CHANGE UP (ref 96–108)
CO2 SERPL-SCNC: 21 MMOL/L — LOW (ref 22–29)
CREAT SERPL-MCNC: 1.38 MG/DL — HIGH (ref 0.5–1.3)
EGFR: 64 ML/MIN/1.73M2 — SIGNIFICANT CHANGE UP
GLUCOSE SERPL-MCNC: 102 MG/DL — HIGH (ref 70–99)
HCT VFR BLD CALC: 35.9 % — LOW (ref 39–50)
HGB BLD-MCNC: 11.3 G/DL — LOW (ref 13–17)
INR BLD: 1.11 RATIO — SIGNIFICANT CHANGE UP (ref 0.88–1.16)
MAGNESIUM SERPL-MCNC: 2 MG/DL — SIGNIFICANT CHANGE UP (ref 1.6–2.6)
MCHC RBC-ENTMCNC: 25.5 PG — LOW (ref 27–34)
MCHC RBC-ENTMCNC: 31.5 GM/DL — LOW (ref 32–36)
MCV RBC AUTO: 81 FL — SIGNIFICANT CHANGE UP (ref 80–100)
PLATELET # BLD AUTO: 153 K/UL — SIGNIFICANT CHANGE UP (ref 150–400)
POTASSIUM SERPL-MCNC: 3.6 MMOL/L — SIGNIFICANT CHANGE UP (ref 3.5–5.3)
POTASSIUM SERPL-SCNC: 3.6 MMOL/L — SIGNIFICANT CHANGE UP (ref 3.5–5.3)
PROTHROM AB SERPL-ACNC: 12.9 SEC — SIGNIFICANT CHANGE UP (ref 10.5–13.4)
RBC # BLD: 4.43 M/UL — SIGNIFICANT CHANGE UP (ref 4.2–5.8)
RBC # FLD: 14.7 % — HIGH (ref 10.3–14.5)
SODIUM SERPL-SCNC: 140 MMOL/L — SIGNIFICANT CHANGE UP (ref 135–145)
WBC # BLD: 5.57 K/UL — SIGNIFICANT CHANGE UP (ref 3.8–10.5)
WBC # FLD AUTO: 5.57 K/UL — SIGNIFICANT CHANGE UP (ref 3.8–10.5)

## 2023-03-06 PROCEDURE — 99024 POSTOP FOLLOW-UP VISIT: CPT

## 2023-03-06 PROCEDURE — 99232 SBSQ HOSP IP/OBS MODERATE 35: CPT

## 2023-03-06 PROCEDURE — 99291 CRITICAL CARE FIRST HOUR: CPT

## 2023-03-06 PROCEDURE — 71045 X-RAY EXAM CHEST 1 VIEW: CPT | Mod: 26

## 2023-03-06 PROCEDURE — 99232 SBSQ HOSP IP/OBS MODERATE 35: CPT | Mod: GC

## 2023-03-06 PROCEDURE — 99233 SBSQ HOSP IP/OBS HIGH 50: CPT

## 2023-03-06 RX ORDER — MAGNESIUM SULFATE 500 MG/ML
2 VIAL (ML) INJECTION ONCE
Refills: 0 | Status: COMPLETED | OUTPATIENT
Start: 2023-03-06 | End: 2023-03-06

## 2023-03-06 RX ORDER — METOPROLOL TARTRATE 50 MG
150 TABLET ORAL EVERY 12 HOURS
Refills: 0 | Status: DISCONTINUED | OUTPATIENT
Start: 2023-03-06 | End: 2023-03-06

## 2023-03-06 RX ORDER — FUROSEMIDE 40 MG
20 TABLET ORAL ONCE
Refills: 0 | Status: COMPLETED | OUTPATIENT
Start: 2023-03-06 | End: 2023-03-06

## 2023-03-06 RX ORDER — CARVEDILOL PHOSPHATE 80 MG/1
6.25 CAPSULE, EXTENDED RELEASE ORAL EVERY 12 HOURS
Refills: 0 | Status: DISCONTINUED | OUTPATIENT
Start: 2023-03-06 | End: 2023-03-07

## 2023-03-06 RX ORDER — POTASSIUM CHLORIDE 20 MEQ
20 PACKET (EA) ORAL ONCE
Refills: 0 | Status: COMPLETED | OUTPATIENT
Start: 2023-03-06 | End: 2023-03-06

## 2023-03-06 RX ORDER — METOPROLOL TARTRATE 50 MG
25 TABLET ORAL ONCE
Refills: 0 | Status: COMPLETED | OUTPATIENT
Start: 2023-03-06 | End: 2023-03-06

## 2023-03-06 RX ADMIN — AMLODIPINE BESYLATE 10 MILLIGRAM(S): 2.5 TABLET ORAL at 06:04

## 2023-03-06 RX ADMIN — HEPARIN SODIUM 11 UNIT(S)/HR: 5000 INJECTION INTRAVENOUS; SUBCUTANEOUS at 18:33

## 2023-03-06 RX ADMIN — Medication 650 MILLIGRAM(S): at 19:39

## 2023-03-06 RX ADMIN — Medication 20 MILLIGRAM(S): at 13:15

## 2023-03-06 RX ADMIN — Medication 20 MILLIEQUIVALENT(S): at 06:04

## 2023-03-06 RX ADMIN — OXYCODONE HYDROCHLORIDE 5 MILLIGRAM(S): 5 TABLET ORAL at 23:28

## 2023-03-06 RX ADMIN — NICARDIPINE HYDROCHLORIDE 50 MG/HR: 30 CAPSULE, EXTENDED RELEASE ORAL at 03:03

## 2023-03-06 RX ADMIN — OXYCODONE HYDROCHLORIDE 5 MILLIGRAM(S): 5 TABLET ORAL at 22:28

## 2023-03-06 RX ADMIN — Medication 100 MILLIGRAM(S): at 06:04

## 2023-03-06 RX ADMIN — OXYCODONE HYDROCHLORIDE 5 MILLIGRAM(S): 5 TABLET ORAL at 06:53

## 2023-03-06 RX ADMIN — Medication 25 MILLIGRAM(S): at 10:40

## 2023-03-06 RX ADMIN — SODIUM CHLORIDE 3 MILLILITER(S): 9 INJECTION INTRAMUSCULAR; INTRAVENOUS; SUBCUTANEOUS at 13:25

## 2023-03-06 RX ADMIN — PANTOPRAZOLE SODIUM 40 MILLIGRAM(S): 20 TABLET, DELAYED RELEASE ORAL at 06:05

## 2023-03-06 RX ADMIN — SODIUM CHLORIDE 3 MILLILITER(S): 9 INJECTION INTRAMUSCULAR; INTRAVENOUS; SUBCUTANEOUS at 22:14

## 2023-03-06 RX ADMIN — Medication 4 MILLIGRAM(S): at 22:14

## 2023-03-06 RX ADMIN — Medication 4 MILLIGRAM(S): at 07:54

## 2023-03-06 RX ADMIN — Medication 650 MILLIGRAM(S): at 20:39

## 2023-03-06 RX ADMIN — HEPARIN SODIUM 12 UNIT(S)/HR: 5000 INJECTION INTRAVENOUS; SUBCUTANEOUS at 03:03

## 2023-03-06 RX ADMIN — OXYCODONE HYDROCHLORIDE 5 MILLIGRAM(S): 5 TABLET ORAL at 06:07

## 2023-03-06 RX ADMIN — CARVEDILOL PHOSPHATE 6.25 MILLIGRAM(S): 80 CAPSULE, EXTENDED RELEASE ORAL at 17:16

## 2023-03-06 RX ADMIN — HEPARIN SODIUM 11 UNIT(S)/HR: 5000 INJECTION INTRAVENOUS; SUBCUTANEOUS at 19:23

## 2023-03-06 RX ADMIN — NICARDIPINE HYDROCHLORIDE 50 MG/HR: 30 CAPSULE, EXTENDED RELEASE ORAL at 06:07

## 2023-03-06 RX ADMIN — Medication 25 GRAM(S): at 13:15

## 2023-03-06 RX ADMIN — SENNA PLUS 2 TABLET(S): 8.6 TABLET ORAL at 22:15

## 2023-03-06 NOTE — PROGRESS NOTE ADULT - PROBLEM SELECTOR PLAN 2
- Aim to target normotension with /80.  - On Cardene gtt @ 4mg/hr.  - Recommend switching lopressor to carvedilol.  - Managed per CTS.

## 2023-03-06 NOTE — PROGRESS NOTE ADULT - PROBLEM SELECTOR PLAN 1
- Type B aortic dissection seen on CTA.  - CT surgery team considering surgical repair tentatively Thursday.  - Consider central line pre-op to assess RA and optimize to maintain <10. Recommend PA catheter intra-op and cardiac anesthesia.

## 2023-03-06 NOTE — PROGRESS NOTE ADULT - SUBJECTIVE AND OBJECTIVE BOX
FABIANA CALIXTO  MRN-[id number]    HPI:  42M, Dutch speaking poor historian, with PMH HTN (uncontrolled), Pulmonary Interstitial Disease, CAD w/ stents (2021 in [place]), CKD (unknown baseline), was sent to [place] ED 2/28/23 from [facility] where he had gone for a physical exam and to get his medications filled. He obtains his Nifedipine from his home country of [place], however has been unable to obtain recent doses so he has been taking half his medication daily. Of note, HIE shows recent admission to [place] for photophobia where he was also found to have . 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 He was transferred to [facility]ICU for further management.   At time of admission, his SBP was 180s and he was started on Esmolol and Nicardipine. Patient denies acute pain with radiating or aggravating factors. He does admit to occasional headaches and dizziness.    (01 Mar 2023 02:45)      Hospital Course:  - HTN (uncontrolled), Pulmonary Interstitial Disease, CAD w/ stents (2021 in [place]), CKD (unknown baseline),  -Recent admission to [place] for photophobia where he was also found to have .   -On admission, he was hypertensive with SBP > 200 and subsequent CTA C/A/P showed Type B dissection.   -He was transferred to [facility]ICU for further management. -  Today:  No acute events   heart failure consult appreciated  DC cardene  continue Cardura  Monitor Creatinine-  continue heparin gtt  SR , RA  had BM today    ICU Vital Signs Last 24 Hrs  T(C): 36.7 (06 Mar 2023 12:00), Max: 37.2 (05 Mar 2023 16:00)  T(F): 98 (06 Mar 2023 12:00), Max: 99 (05 Mar 2023 16:00)  HR: 61 (06 Mar 2023 12:00) (61 - 86)  BP: 119/73 (06 Mar 2023 12:00) (103/63 - 153/67)  BP(mean): 90 (06 Mar 2023 12:00) (77 - 109)  ABP: --  ABP(mean): --  RR: 18 (06 Mar 2023 12:00) (14 - 38)  SpO2: 98% (06 Mar 2023 12:00) (88% - 99%)    O2 Parameters below as of 06 Mar 2023 12:00  Patient On (Oxygen Delivery Method): room air            Physical Exam:  Gen: A&O   CNS: non focal 	  Neck: + JVD  RES : clear , no wheezing              CVS: Regular  rhythm. Normal S1/S2  Abd: Soft, non-distended. Bowel sounds present.  Skin: No rash.  Ext:  no edema    ============================I/O===========================   I&O's Detail    05 Mar 2023 07:01  -  06 Mar 2023 07:00  --------------------------------------------------------  IN:    Heparin: 276 mL    NiCARdipine: 611 mL    NiCARdipine: 461 mL    Oral Fluid: 500 mL  Total IN: 1848 mL    OUT:    Voided (mL): 1750 mL  Total OUT: 1750 mL    Total NET: 98 mL        ============================ LABS =========================                        11.3   5.57  )-----------( 153      ( 06 Mar 2023 02:58 )             35.9     03-06    140  |  106  |  21.0<H>  ----------------------------<  102<H>  3.6   |  21.0<L>  |  1.38<H>    Ca    9.0      06 Mar 2023 02:58  Mg     2.0     03-06        PTT - ( 06 Mar 2023 00:08 )  PTT:51.2 sec      ======================Micro/Rad/Cardio=================  Culture: Reviewed   CXR: Reviewed  Echo:Reviewed  ======================================================  PAST MEDICAL & SURGICAL HISTORY:  Uncontrolled hypertension      CKD (chronic kidney disease)      Interstitial lung disease      Prophylactic measure        ====================ASSESSMENT ==============  42M, Dutch speaking poor historian, with PMH HTN (uncontrolled), Pulmonary Interstitial Disease (per chart review), CAD w/ stents (2021 in [place]), CKD (unknown baseline), with recent admission for hypertensive urgency, was sent to [place] ED 2/28/23 from [facility] MD 2/2 HTN (). CTA C/A/P showed Type B dissection and he was transferred to [facility]ICU for further management.   ---Uncontrolled hypertension  ---CKD (chronic kidney disease)  ---Interstitial lung disease  --- Dissection of aorta.   --- Type B dissection of CTA C/A/P  ---Chronic combined systolic and diastolic heart failure.   --- Hypertensive urgency.   ---Chronic kidney disease (CKD).       Plan:  -Strict blood pressure control for -120  -DC  Cardene gtt  -Diuresis PRN.  -Continue to trend BMP.  - Continue GI ppx with Protonix and Senna  -DVT ppx with SCD boots--  ====================== NEUROLOGY=====================  acetaminophen     Tablet .. 650 milliGRAM(s) Oral every 6 hours PRN Mild Pain (1 - 3)  oxyCODONE    IR 5 milliGRAM(s) Oral every 4 hours PRN Moderate Pain (4 - 6)  oxyCODONE    IR 10 milliGRAM(s) Oral every 4 hours PRN Severe Pain (7 - 10)    ==================== RESPIRATORY======================  RA  ====================CARDIOVASCULAR==================  amLODIPine   Tablet 10 milliGRAM(s) Oral daily  doxazosin 4 milliGRAM(s) Oral <User Schedule>  metoprolol tartrate 150 milliGRAM(s) Oral every 12 hours  niCARdipine Infusion 10 mG/Hr (50 mL/Hr) IV Continuous <Continuous>    ===================HEMATOLOGIC/ONC ===================  Monitor H&H/Plts    heparin  Infusion 700 Unit(s)/Hr (12 mL/Hr) IV Continuous <Continuous>    ===================== RENAL =========================  Continue monitoring urine output, I&OS, BUN/Cr     ==================== GASTROINTESTINAL===================  magnesium sulfate  IVPB 2 Gram(s) IV Intermittent once  pantoprazole    Tablet 40 milliGRAM(s) Oral before breakfast  senna 2 Tablet(s) Oral at bedtime  sodium chloride 0.9% lock flush 3 milliLiter(s) IV Push every 8 hours    =======================    ENDOCRINE  =====================    ========================INFECTIOUS DISEASE================      -Monitor Neurologic status ,   -Head of the bed should remain elevated to 45 degrees,  -Monitor for arrhythmias and monitor parameters for organ perfusion,  -Glycemic control is satisfactory,  -Nutritional goals will be met using po eventually , insure adequate caloric intake and monitor the same ,  -Electrolytes have been repleted as necessary , pain control has been achieved  and wound care has been carried out ,  -Stress ulcer and VTE prophylaxis will be achieved,    I have spent 35 minutes providing acute care for this critically ill patient     Patient requires continuous monitoring with bedside rhythm monitoring, pulse ox monitoring, and intermittent blood gas analysis. Care plan discussed with ICU care team. Patient remained critical and at risk for life threatening decompensation.

## 2023-03-06 NOTE — PROGRESS NOTE ADULT - PROBLEM SELECTOR PLAN 1
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  Continue Strict blood pressure control for -120.  Continue Lopressor 100BID, Norvasc 10QD, and Cardura 4BID. on Cardene, Increase PO meds as tolerated  Images previously reviewed with Dr. Heaton.  Patient remains asymptomatic.  Urine tox negative.  Nephrology following. Likely baseline SCr between 1.3-1.6.   Heart failure team following for chronic systolic heart failure.   Plan to be discussed / reviewed with CT Surgery attending / team during AM rounds.

## 2023-03-06 NOTE — PROGRESS NOTE ADULT - ASSESSMENT
43 yo M, originally from the  emigrated ~ 2008, with a history of HTN (uncontrolled diagnosed 5 years ago), interstitial lung disease, CAD w/ stents (? for a leaking blood vessel/dissection), CKD (unknown baseline) initially presented to [place] ED 2/28/23 from [facility] where he had gone for a routine physical exam and to get his medications filled.  He had previously not seen a physician for over 3 years.  He was last seen in [place] ED in 2018 for hypertensive urgency requiring IV labetalol and transition to lisinopril/ HCTZ/ amlodipine.  However, he has been continuously filling his home meds from the  including losartan 320mg daily, nifedipine 60mg ER daily, and rosuvastatin 40mg daily.  Due to inability to obtain all his medications, he has been unable to obtain recent doses so he has been taking half his medication daily.  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  On presentation to [facility] he presented with SBP > 200 and subsequent CTA C/A/P showed Type B dissection. He was started on labetalol and nifedipine drips and transferred to [facility] CTICU for further management. He was initially treated with esmolol and nicardipine gtts. Underwent R/LHC 3/3 which revealed normal coronaries and elevated LVEDP 19. Subsequently developed right radial, cephalic and basilic vein thrombi for which he is on heparin gtt. Vascular consulted and no intervention required-clinically improving.     Cardiac Testing:  Echo 2/28/23: LVEF 20-25%, LVEDD 6./23cm, LVIDs 5.59cm, TAPSE 2.54cm, mild to mod MR, trivial TR, RV normal function.

## 2023-03-06 NOTE — PROGRESS NOTE ADULT - PROBLEM SELECTOR PLAN 3
- Etiology: NICMP (C 3/3 revealed normal coronaries and no prior stents). Likely due to hypertensive heart disease with increased wall thickness on echo.  - RHC 3/3 showed LVEDP 19, PAP 40/24/31.   - Appears mildly hypervolemic based on JVP ~10 today. Recommend Lasix 20mg IVP x 1.  - GDMT: ACEi/ARB deferred for now in view of possible upcoming surgery. Would use carvedilol in place of lopressor.   - Overall, he has poor outpatient care and unsure if he has insurance.  Will need to establish care with local cardiology and HF teams.

## 2023-03-06 NOTE — PROGRESS NOTE ADULT - PROBLEM SELECTOR PLAN 3
Continue Strict blood pressure control for -120.  Continue Lopressor 100BID, Norvasc 10QD, and Cardura 4BID. Cardene drip

## 2023-03-06 NOTE — PROGRESS NOTE ADULT - ASSESSMENT
42 year old male Serbian speaking patient, poor historian, with a medical history of HTN (uncontrolled), Pulmonary Interstitial Disease (per chart review), CAD w/ stents (2021 in [place]), CKD (unknown baseline SCr), with recent admission for hypertensive urgency, was sent to [place] ED 2/28/23 from [facility] secondary to HTN urgency (). CTA C/A/P showed Type B dissection and he was transferred to [facility] CTICU for further management. Patient pending RHC/LHC, cancelled 3/2 due to worsening renal function.   3/3  r and L heart cath complicated by R radial partial thrombosis and patient noted to have also cephalic and basilic vein thrombosis complaining of swollen forearm.   LHC: Nml coronaries   RHC: PA 40/24/31, PCW 18/19/16 , Sat PA 72.1% / Sat AO 94.3%  - ON pt HTN requiring IVP cardene, cardura, lopressor, norvasc        < from: US Duplex Arterial Upper Ext Ltd, Right (03.03.23 @ 19:48) >  IMPRESSION:    Partially occlusive thrombus in the mid and distal portions of the right   radial artery.    --- End of Report ---            MACY SHAVER MD; Attending Radiologist  This document has been electronically signed. Mar  3 2023  8:21PM    < end of copied text >  < from: US Duplex Venous Upper Ext Ltd, Right (03.03.23 @ 19:41) >          IMPRESSION:  No evidence of right upper extremity deep venous thrombosis.    Superficial thrombophlebitis involving the right basilic and cephalic   veins.    --- End of Report ---            MACY SHAVER MD; Attending Radiologist  This document has been electronically signed. Mar  3 2023  8:14PM    < end of copied text >

## 2023-03-06 NOTE — PROGRESS NOTE ADULT - SUBJECTIVE AND OBJECTIVE BOX
Patient seen and examined.  Denies CP, SOB, N/V. Right arm swelling "improving"     T(C): 36.9 (03-06-23 @ 00:00)  T(F): 98.4 (03-06-23 @ 00:00)  HR: 72 (03-06-23 @ 01:00)  BP: 135/73 (03-06-23 @ 01:00)  BP(mean): 98 (03-06-23 @ 01:00)    RR: 23 (03-06-23 @ 01:00)  SpO2: 94% (03-06-23 @ 01:00)        Physical Exam:  Gen: A&Ox3  Pulm:  CTA b/l, no r/r/w  CV:  S1S2, RRR, no m/r/g  Abd: +BS, soft, NT, ND  Ext:  +DP b/l, no c/c/e Right arm wrapped.  slightly tender to touch      I&O's Detail    04 Mar 2023 07:01  -  05 Mar 2023 07:00  --------------------------------------------------------  IN:    Heparin: 221.5 mL    NiCARdipine: 212.5 mL    Oral Fluid: 360 mL  Total IN: 794 mL    OUT:    Indwelling Catheter - Urethral (mL): 250 mL    Voided (mL): 850 mL  Total OUT: 1100 mL    Total NET: -306 mL      05 Mar 2023 07:01  -  06 Mar 2023 01:21  --------------------------------------------------------  IN:    Heparin: 156 mL    NiCARdipine: 611 mL  Total IN: 767 mL    OUT:    Voided (mL): 1500 mL  Total OUT: 1500 mL    Total NET: -733 mL                              11.8   5.82  )-----------( 135      ( 05 Mar 2023 01:15 )             37.6   03-05    135  |  101  |  23.2<H>  ----------------------------<  90  3.6   |  21.0<L>  |  1.49<H>    Ca    9.3      05 Mar 2023 01:15  Mg     2.0     03-05    aPTT: 51.2 sec; PT: x    ; INR: x      03-06-23 @ 00:08         CAPILLARY BLOOD GLUCOSE            Medications:  acetaminophen     Tablet .. 650 milliGRAM(s) Oral every 6 hours PRN  amLODIPine   Tablet 10 milliGRAM(s) Oral daily  ceFAZolin  Injectable. 2000 milliGRAM(s) IV Push once  doxazosin 4 milliGRAM(s) Oral <User Schedule>  heparin  Infusion 700 Unit(s)/Hr IV Continuous <Continuous>  influenza   Vaccine 0.5 milliLiter(s) IntraMuscular once  metoprolol tartrate 100 milliGRAM(s) Oral every 12 hours  niCARdipine Infusion 10 mG/Hr IV Continuous <Continuous>  oxyCODONE    IR 5 milliGRAM(s) Oral every 4 hours PRN  oxyCODONE    IR 10 milliGRAM(s) Oral every 4 hours PRN  pantoprazole    Tablet 40 milliGRAM(s) Oral before breakfast  senna 2 Tablet(s) Oral at bedtime  sodium chloride 0.9% lock flush 3 milliLiter(s) IV Push every 8 hours

## 2023-03-06 NOTE — PROGRESS NOTE ADULT - PROBLEM SELECTOR PLAN 4
dane resolving   Likely secondary to hypoperfusion from dissection vs STEVAN.  Continue to trend BMP.  Nephrology following. Likely baseline SCr between 1.3-1.6.

## 2023-03-06 NOTE — PROGRESS NOTE ADULT - ASSESSMENT
The patient is a 42 year old male with PMH of HTN, CAD s/p stents, "big heart", and pulmonary interstitial disease, whom reports that he travels to his home country once annually during the month of July and has routine check up with physicians there as well as refill of prescription medications. He initially presented to [facility] for photophobia. Found to be severely hypertensive. Of note, the patient was not taking his prescribed dose of antihypertensives due to lack of pills / refills. CTA showed at the distal aortic arch, there is a dissection flap with the false lumen extending through the aneurysmal sac and giving rise to a patent celiac artery. The true lumen gives rise to a patent superior mesenteric artery. The bilateral renal arteries are patent. Transferred to [facility] for further surgical intervention. Nephrology is consulted for ROSALINO. Communicated with the patient through assistance of Language Line  # [id number].     -Baseline creatinine is unclear; denied ever being informed of any renal problems by his physicians at the [facility] [place]  -At [facility], on arrival creatinine was 1.6mg/dL   -While here, creatinine appeared to range 1.3-1.6mg/dL (? baseline creatinine); SCr peaked today to 3mg/dL now stable at 1.4 (Likely baseline)  -ROSALINO (on suspected CKD) is multifactorial - suspecting to be hemodynamically driven (due to necessary BP management in setting of type B aortic dissection) superimposed by contrast associated nephropathy   -UA 30 protein, trace blood, 0-2 RBC  -CTA a/p negative for hydronephrosis   -ECHO showed dilated cardiomyopathy, left ventricular ejection fraction 20-25%  - LHC with clean coronaries  - High JVP; Lasix 20 mg IV x1 today

## 2023-03-06 NOTE — PROGRESS NOTE ADULT - SUBJECTIVE AND OBJECTIVE BOX
[facility] Advanced Heart Failure  Office: [address] [place] [address]  Service Phone [phone]  Office Phone: [phone]/Fax: [phone]    Subjective/Objective: NAEO. Pt denies overt HF symptoms.     Medications:  acetaminophen     Tablet .. 650 milliGRAM(s) Oral every 6 hours PRN  amLODIPine   Tablet 10 milliGRAM(s) Oral daily  doxazosin 4 milliGRAM(s) Oral <User Schedule>  heparin  Infusion 700 Unit(s)/Hr IV Continuous <Continuous>  influenza   Vaccine 0.5 milliLiter(s) IntraMuscular once  metoprolol tartrate 150 milliGRAM(s) Oral every 12 hours  niCARdipine Infusion 10 mG/Hr IV Continuous <Continuous>  oxyCODONE    IR 5 milliGRAM(s) Oral every 4 hours PRN  oxyCODONE    IR 10 milliGRAM(s) Oral every 4 hours PRN  pantoprazole    Tablet 40 milliGRAM(s) Oral before breakfast  senna 2 Tablet(s) Oral at bedtime  sodium chloride 0.9% lock flush 3 milliLiter(s) IV Push every 8 hours    Vital Signs Last 24 Hours  T(C): 36.3 (23 @ 08:00), Max: 37.2 (23 @ 16:00)  HR: 61 (23 @ 10:30) (61 - 86)  BP: 119/71 (23 @ 10:30) (103/63 - 153/67)  RR: 20 (23 @ 09:45) (14 - 38)  SpO2: 97% (23 @ 10:30) (88% - 98%)    Weight in k.5 ( @ 06:50)    I&O's Summary  05 Mar 2023 07:01  -  06 Mar 2023 07:00  --------------------------------------------------------  IN: 1848 mL / OUT: 1750 mL / NET: 98 mL    Tele: SR    Physical Exam:  General: Sitting up in chair, in no distress.  HEENT: EOMs intact.  Neck: Neck supple. JVP ~10cm H20.  Chest: Clear to auscultation bilaterally  CV: RRR. Normal S1 and S2. No murmurs, rub, or gallops. Warm peripherally.  PV: No LE edema. Pulses full/equal in all four extremities.  Abdomen: Soft, non-distended, non-tender  Skin: warm, dry  Neurology: Alert and oriented times three. Sensation intact  Psych: Affect normal    Labs:                        11.3   5.57  )-----------( 153      ( 06 Mar 2023 02:58 )             35.9     03-06    140  |  106  |  21.0<H>  ----------------------------<  102<H>  3.6   |  21.0<L>  |  1.38<H>    Ca    9.0      06 Mar 2023 02:58  Mg     2.0     03-06      PTT - ( 06 Mar 2023 00:08 )  PTT:51.2 sec    Serum Pro-Brain Natriuretic Peptide: 625 pg/mL ( @ 21:25)

## 2023-03-06 NOTE — PROGRESS NOTE ADULT - SUBJECTIVE AND OBJECTIVE BOX
[facility] DIVISION OF KIDNEY DISEASES AND HYPERTENSION -- FOLLOW UP NOTE  --------------------------------------------------------------------------------  Chief Complaint: Ramiro on CKD    24 hour events/subjective:  No acute event noted  pt bull and examined; feels well        PAST HISTORY  --------------------------------------------------------------------------------  No significant changes to PMH, PSH, FHx, SHx, unless otherwise noted    ALLERGIES & MEDICATIONS  --------------------------------------------------------------------------------  Allergies  No Known Allergies    Standing Inpatient Medications  amLODIPine   Tablet 10 milliGRAM(s) Oral daily  carvedilol 6.25 milliGRAM(s) Oral every 12 hours  doxazosin 4 milliGRAM(s) Oral <User Schedule>  heparin  Infusion 700 Unit(s)/Hr IV Continuous <Continuous>  influenza   Vaccine 0.5 milliLiter(s) IntraMuscular once  pantoprazole    Tablet 40 milliGRAM(s) Oral before breakfast  senna 2 Tablet(s) Oral at bedtime  sodium chloride 0.9% lock flush 3 milliLiter(s) IV Push every 8 hours    PRN Inpatient Medications  acetaminophen     Tablet .. 650 milliGRAM(s) Oral every 6 hours PRN  oxyCODONE    IR 5 milliGRAM(s) Oral every 4 hours PRN  oxyCODONE    IR 10 milliGRAM(s) Oral every 4 hours PRN      REVIEW OF SYSTEMS  --------------------------------------------------------------------------------  Gen: No weight changes, fatigue, fevers/chills, weakness  Skin: No rashes  Head/Eyes/Ears/Mouth: No headache; Normal hearing; Normal vision w/o blurriness; No sinus pain/discomfort, sore throat  Respiratory: No dyspnea, cough, wheezing, hemoptysis  CV: No chest pain, PND, orthopnea  GI: No abdominal pain, diarrhea, constipation, nausea, vomiting, melena, hematochezia  : No increased frequency, dysuria, hematuria, nocturia  MSK: No joint pain/swelling; no back pain; no edema  Neuro: No dizziness/lightheadedness, weakness, seizures, numbness, tingling  Heme: No easy bruising or bleeding  Endo: No heat/cold intolerance  Psych: No significant nervousness, anxiety, stress, depression    All other systems were reviewed and are negative, except as noted.    VITALS/PHYSICAL EXAM  --------------------------------------------------------------------------------  T(C): 36.7 (03-06-23 @ 12:00), Max: 37.2 (03-05-23 @ 16:00)  HR: 72 (03-06-23 @ 13:00) (61 - 86)  BP: 123/69 (03-06-23 @ 13:00) (103/63 - 153/67)  RR: 27 (03-06-23 @ 13:00) (14 - 38)  SpO2: 98% (03-06-23 @ 13:00) (88% - 99%)  Wt(kg): --        03-05-23 @ 07:01  -  03-06-23 @ 07:00  --------------------------------------------------------  IN: 1848 mL / OUT: 1750 mL / NET: 98 mL    03-06-23 @ 07:01  -  03-06-23 @ 13:46  --------------------------------------------------------  IN: 98 mL / OUT: 0 mL / NET: 98 mL      Physical Exam:  	Gen: NAD  	HEENT: supple neck, clear oropharynx  	Pulm: CTA B/L  	CV: RRR, S1S2; no rub  	Back: No spinal or CVA tenderness; no sacral edema  	Abd: +BS, soft, nontender/nondistended  	: No suprapubic tenderness  	UE: Warm,  no edema  	LE: Warm, 1+ edema  	Neuro: No focal deficit  	Psych: Normal affect and mood  	Skin: Warm    LABS/STUDIES  --------------------------------------------------------------------------------              11.3   5.57  >-----------<  153      [03-06-23 @ 02:58]              35.9     140  |  106  |  21.0  ----------------------------<  102      [03-06-23 @ 02:58]  3.6   |  21.0  |  1.38        Ca     9.0     [03-06-23 @ 02:58]      Mg     2.0     [03-06-23 @ 02:58]        PTT: 51.2       [03-06-23 @ 00:08]      Creatinine Trend:  SCr 1.38 [03-06 @ 02:58]  SCr 1.49 [03-05 @ 01:15]  SCr 1.31 [03-04 @ 03:00]  SCr 1.38 [03-03 @ 22:26]  SCr 1.77 [03-03 @ 05:35]    Urinalysis - [03-01-23 @ 00:30]      Color Yellow / Appearance Clear / SG 1.020 / pH 6.0      Gluc Negative / Ketone Negative  / Bili Negative / Urobili Negative       Blood Trace / Protein 100 / Leuk Est Negative / Nitrite Negative      RBC 0-2 / WBC Negative / Hyaline  / Gran  / Sq Epi  / Non Sq Epi Occasional / Bacteria     Urine Creatinine 212      [03-02-23 @ 15:30]  Urine Protein 39.0      [03-02-23 @ 15:30]    TSH 1.61      [02-28-23 @ 21:25]

## 2023-03-07 LAB
ANION GAP SERPL CALC-SCNC: 13 MMOL/L — SIGNIFICANT CHANGE UP (ref 5–17)
ANION GAP SERPL CALC-SCNC: 13 MMOL/L — SIGNIFICANT CHANGE UP (ref 5–17)
APTT BLD: 61.4 SEC — HIGH (ref 27.5–35.5)
APTT BLD: 64.5 SEC — HIGH (ref 27.5–35.5)
APTT BLD: 70.5 SEC — HIGH (ref 27.5–35.5)
BUN SERPL-MCNC: 21 MG/DL — HIGH (ref 8–20)
BUN SERPL-MCNC: 21.6 MG/DL — HIGH (ref 8–20)
CALCIUM SERPL-MCNC: 9.4 MG/DL — SIGNIFICANT CHANGE UP (ref 8.4–10.5)
CALCIUM SERPL-MCNC: 9.7 MG/DL — SIGNIFICANT CHANGE UP (ref 8.4–10.5)
CHLORIDE SERPL-SCNC: 103 MMOL/L — SIGNIFICANT CHANGE UP (ref 96–108)
CHLORIDE SERPL-SCNC: 104 MMOL/L — SIGNIFICANT CHANGE UP (ref 96–108)
CO2 SERPL-SCNC: 19 MMOL/L — LOW (ref 22–29)
CO2 SERPL-SCNC: 23 MMOL/L — SIGNIFICANT CHANGE UP (ref 22–29)
CREAT SERPL-MCNC: 1.47 MG/DL — HIGH (ref 0.5–1.3)
CREAT SERPL-MCNC: 1.58 MG/DL — HIGH (ref 0.5–1.3)
EGFR: 55 ML/MIN/1.73M2 — LOW
EGFR: 60 ML/MIN/1.73M2 — SIGNIFICANT CHANGE UP
GLUCOSE SERPL-MCNC: 105 MG/DL — HIGH (ref 70–99)
GLUCOSE SERPL-MCNC: 85 MG/DL — SIGNIFICANT CHANGE UP (ref 70–99)
HCT VFR BLD CALC: 38.3 % — LOW (ref 39–50)
HGB BLD-MCNC: 11.9 G/DL — LOW (ref 13–17)
MAGNESIUM SERPL-MCNC: 2.2 MG/DL — SIGNIFICANT CHANGE UP (ref 1.6–2.6)
MAGNESIUM SERPL-MCNC: 2.3 MG/DL — SIGNIFICANT CHANGE UP (ref 1.6–2.6)
MCHC RBC-ENTMCNC: 25.6 PG — LOW (ref 27–34)
MCHC RBC-ENTMCNC: 31.1 GM/DL — LOW (ref 32–36)
MCV RBC AUTO: 82.4 FL — SIGNIFICANT CHANGE UP (ref 80–100)
PHOSPHATE SERPL-MCNC: 3.9 MG/DL — SIGNIFICANT CHANGE UP (ref 2.4–4.7)
PLATELET # BLD AUTO: 171 K/UL — SIGNIFICANT CHANGE UP (ref 150–400)
POTASSIUM SERPL-MCNC: 3.7 MMOL/L — SIGNIFICANT CHANGE UP (ref 3.5–5.3)
POTASSIUM SERPL-MCNC: 4.9 MMOL/L — SIGNIFICANT CHANGE UP (ref 3.5–5.3)
POTASSIUM SERPL-SCNC: 3.7 MMOL/L — SIGNIFICANT CHANGE UP (ref 3.5–5.3)
POTASSIUM SERPL-SCNC: 4.9 MMOL/L — SIGNIFICANT CHANGE UP (ref 3.5–5.3)
RBC # BLD: 4.65 M/UL — SIGNIFICANT CHANGE UP (ref 4.2–5.8)
RBC # FLD: 14.6 % — HIGH (ref 10.3–14.5)
SODIUM SERPL-SCNC: 136 MMOL/L — SIGNIFICANT CHANGE UP (ref 135–145)
SODIUM SERPL-SCNC: 139 MMOL/L — SIGNIFICANT CHANGE UP (ref 135–145)
WBC # BLD: 4.44 K/UL — SIGNIFICANT CHANGE UP (ref 3.8–10.5)
WBC # FLD AUTO: 4.44 K/UL — SIGNIFICANT CHANGE UP (ref 3.8–10.5)

## 2023-03-07 PROCEDURE — 71045 X-RAY EXAM CHEST 1 VIEW: CPT | Mod: 26

## 2023-03-07 PROCEDURE — 99232 SBSQ HOSP IP/OBS MODERATE 35: CPT

## 2023-03-07 RX ORDER — CEFUROXIME AXETIL 250 MG
1500 TABLET ORAL ONCE
Refills: 0 | Status: COMPLETED | OUTPATIENT
Start: 2023-03-09 | End: 2023-03-09

## 2023-03-07 RX ORDER — CARVEDILOL PHOSPHATE 80 MG/1
6.25 CAPSULE, EXTENDED RELEASE ORAL ONCE
Refills: 0 | Status: COMPLETED | OUTPATIENT
Start: 2023-03-07 | End: 2023-03-07

## 2023-03-07 RX ORDER — CHLORHEXIDINE GLUCONATE 213 G/1000ML
15 SOLUTION TOPICAL ONCE
Refills: 0 | Status: COMPLETED | OUTPATIENT
Start: 2023-03-07 | End: 2023-03-09

## 2023-03-07 RX ORDER — CARVEDILOL PHOSPHATE 80 MG/1
12.5 CAPSULE, EXTENDED RELEASE ORAL EVERY 12 HOURS
Refills: 0 | Status: DISCONTINUED | OUTPATIENT
Start: 2023-03-07 | End: 2023-03-09

## 2023-03-07 RX ORDER — METOPROLOL TARTRATE 50 MG
5 TABLET ORAL ONCE
Refills: 0 | Status: COMPLETED | OUTPATIENT
Start: 2023-03-07 | End: 2023-03-07

## 2023-03-07 RX ORDER — POTASSIUM CHLORIDE 20 MEQ
40 PACKET (EA) ORAL ONCE
Refills: 0 | Status: COMPLETED | OUTPATIENT
Start: 2023-03-07 | End: 2023-03-07

## 2023-03-07 RX ORDER — CHLORHEXIDINE GLUCONATE 213 G/1000ML
1 SOLUTION TOPICAL ONCE
Refills: 0 | Status: COMPLETED | OUTPATIENT
Start: 2023-03-07 | End: 2023-03-07

## 2023-03-07 RX ADMIN — SODIUM CHLORIDE 3 MILLILITER(S): 9 INJECTION INTRAMUSCULAR; INTRAVENOUS; SUBCUTANEOUS at 22:06

## 2023-03-07 RX ADMIN — SENNA PLUS 2 TABLET(S): 8.6 TABLET ORAL at 21:07

## 2023-03-07 RX ADMIN — SODIUM CHLORIDE 3 MILLILITER(S): 9 INJECTION INTRAMUSCULAR; INTRAVENOUS; SUBCUTANEOUS at 06:40

## 2023-03-07 RX ADMIN — CHLORHEXIDINE GLUCONATE 1 APPLICATION(S): 213 SOLUTION TOPICAL at 22:39

## 2023-03-07 RX ADMIN — Medication 5 MILLIGRAM(S): at 06:20

## 2023-03-07 RX ADMIN — HEPARIN SODIUM 10 UNIT(S)/HR: 5000 INJECTION INTRAVENOUS; SUBCUTANEOUS at 01:49

## 2023-03-07 RX ADMIN — CARVEDILOL PHOSPHATE 6.25 MILLIGRAM(S): 80 CAPSULE, EXTENDED RELEASE ORAL at 13:50

## 2023-03-07 RX ADMIN — PANTOPRAZOLE SODIUM 40 MILLIGRAM(S): 20 TABLET, DELAYED RELEASE ORAL at 05:54

## 2023-03-07 RX ADMIN — HEPARIN SODIUM 10 UNIT(S)/HR: 5000 INJECTION INTRAVENOUS; SUBCUTANEOUS at 07:32

## 2023-03-07 RX ADMIN — Medication 4 MILLIGRAM(S): at 08:23

## 2023-03-07 RX ADMIN — SODIUM CHLORIDE 3 MILLILITER(S): 9 INJECTION INTRAMUSCULAR; INTRAVENOUS; SUBCUTANEOUS at 15:41

## 2023-03-07 RX ADMIN — Medication 4 MILLIGRAM(S): at 21:07

## 2023-03-07 RX ADMIN — Medication 5 MILLIGRAM(S): at 00:20

## 2023-03-07 RX ADMIN — CARVEDILOL PHOSPHATE 6.25 MILLIGRAM(S): 80 CAPSULE, EXTENDED RELEASE ORAL at 05:54

## 2023-03-07 RX ADMIN — Medication 40 MILLIEQUIVALENT(S): at 08:22

## 2023-03-07 RX ADMIN — AMLODIPINE BESYLATE 10 MILLIGRAM(S): 2.5 TABLET ORAL at 05:54

## 2023-03-07 RX ADMIN — CARVEDILOL PHOSPHATE 12.5 MILLIGRAM(S): 80 CAPSULE, EXTENDED RELEASE ORAL at 21:07

## 2023-03-07 RX ADMIN — HEPARIN SODIUM 10 UNIT(S)/HR: 5000 INJECTION INTRAVENOUS; SUBCUTANEOUS at 19:36

## 2023-03-07 NOTE — PROGRESS NOTE ADULT - PROBLEM SELECTOR PLAN 3
Continue Strict blood pressure control for -120.  Continue Coreg 6.25BID, Norvasc 10QD, and Cardura 4BID. Possible increase coreg.

## 2023-03-07 NOTE — PROGRESS NOTE ADULT - ASSESSMENT
42 year old male Tamaz[place] speaking patient, poor historian, with a medical history of HTN (uncontrolled), Pulmonary Interstitial Disease (per chart review), CAD w/ stents (2021 in [place]), CKD (unknown baseline SCr), with recent admission for hypertensive urgency, was sent to [place] ED 2/28/23 from [facility] secondary to HTN urgency (). CTA C/A/P showed Type B dissection and he was transferred to [facility] CTICU for further management. Patient pending RHC/LHC, cancelled 3/2 due to worsening renal function.   3/3  r and L heart cath complicated by R radial partial thrombosis and patient noted to have also cephalic and basilic vein thrombosis complaining of swollen forearm.   LHC: Nml coronaries   RHC: PA 40/24/31, PCW 18/19/16 , Sat PA 72.1% / Sat AO 94.3%  3/6 Downgraded to 4 tower

## 2023-03-07 NOTE — PROGRESS NOTE ADULT - SUBJECTIVE AND OBJECTIVE BOX
Patient seen and examined.  Denies CP, SOB, N/V. Still complains of some pain in Right arm but is improved    T(C): 36.6 (03-06-23 @ 22:10)  T(F): 97.8 (03-06-23 @ 22:10)  HR: 84 (03-06-23 @ 22:10)  BP: 145/87 (03-06-23 @ 22:10)  BP(mean): 93 (03-06-23 @ 14:00)    RR: 18 (03-06-23 @ 22:10)  SpO2: 98% (03-06-23 @ 22:10)        Physical Exam:  Gen: A&Ox3  Pulm:  CTA b/l, no r/r/w  CV:  S1S2, RRR, no m/r/g  Abd: +BS, soft, NT, ND  Ext:  +DP b/l, no c/c/e + B/L radial      I&O's Detail    05 Mar 2023 07:01  -  06 Mar 2023 07:00  --------------------------------------------------------  IN:    Heparin: 276 mL    NiCARdipine: 611 mL    NiCARdipine: 461 mL    Oral Fluid: 500 mL  Total IN: 1848 mL    OUT:    Voided (mL): 1750 mL  Total OUT: 1750 mL    Total NET: 98 mL      06 Mar 2023 07:01  -  07 Mar 2023 00:16  --------------------------------------------------------  IN:    Heparin: 118 mL    IV PiggyBack: 50 mL  Total IN: 168 mL    OUT:  Total OUT: 0 mL    Total NET: 168 mL                              11.3   5.57  )-----------( 153      ( 06 Mar 2023 02:58 )             35.9   03-06    140  |  106  |  21.0<H>  ----------------------------<  102<H>  3.6   |  21.0<L>  |  1.38<H>    Ca    9.0      06 Mar 2023 02:58  Mg     2.0     03-06    aPTT: 76.6 sec; PT: 12.9 sec; INR: 1.11 ratio  03-06-23 @ 18:00         CAPILLARY BLOOD GLUCOSE            Medications:  acetaminophen     Tablet .. 650 milliGRAM(s) Oral every 6 hours PRN  amLODIPine   Tablet 10 milliGRAM(s) Oral daily  carvedilol 6.25 milliGRAM(s) Oral every 12 hours  doxazosin 4 milliGRAM(s) Oral <User Schedule>  heparin  Infusion 700 Unit(s)/Hr IV Continuous <Continuous>  metoprolol tartrate Injectable 5 milliGRAM(s) IV Push once  oxyCODONE    IR 5 milliGRAM(s) Oral every 4 hours PRN  oxyCODONE    IR 10 milliGRAM(s) Oral every 4 hours PRN  pantoprazole    Tablet 40 milliGRAM(s) Oral before breakfast  senna 2 Tablet(s) Oral at bedtime  sodium chloride 0.9% lock flush 3 milliLiter(s) IV Push every 8 hours

## 2023-03-07 NOTE — PROGRESS NOTE ADULT - PROBLEM SELECTOR PLAN 1
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  Continue Strict blood pressure control for -120.  Continue Coreg 6.25, Norvasc 10QD, and Cardura 4BID. Increase PO meds as tolerated  Images previously reviewed with Dr. Heaton.  Patient remains asymptomatic.  Urine tox negative.  Nephrology following. Likely baseline SCr between 1.3-1.6.   Heart failure team following for chronic systolic heart failure.   Plan for OR thursday  Plan to be discussed / reviewed with CT Surgery attending / team during AM rounds.

## 2023-03-08 ENCOUNTER — TRANSCRIPTION ENCOUNTER (OUTPATIENT)
Age: 48
End: 2023-03-08

## 2023-03-08 DIAGNOSIS — I80.8 PHLEBITIS AND THROMBOPHLEBITIS OF OTHER SITES: ICD-10-CM

## 2023-03-08 DIAGNOSIS — I82.409 ACUTE EMBOLISM AND THROMBOSIS OF UNSPECIFIED DEEP VEINS OF UNSPECIFIED LOWER EXTREMITY: ICD-10-CM

## 2023-03-08 LAB
ANION GAP SERPL CALC-SCNC: 12 MMOL/L — SIGNIFICANT CHANGE UP (ref 5–17)
APTT BLD: 43.1 SEC — HIGH (ref 27.5–35.5)
APTT BLD: 56.7 SEC — HIGH (ref 27.5–35.5)
APTT BLD: 65 SEC — HIGH (ref 27.5–35.5)
BLD GP AB SCN SERPL QL: SIGNIFICANT CHANGE UP
BUN SERPL-MCNC: 23.1 MG/DL — HIGH (ref 8–20)
CALCIUM SERPL-MCNC: 8.9 MG/DL — SIGNIFICANT CHANGE UP (ref 8.4–10.5)
CHLORIDE SERPL-SCNC: 104 MMOL/L — SIGNIFICANT CHANGE UP (ref 96–108)
CO2 SERPL-SCNC: 23 MMOL/L — SIGNIFICANT CHANGE UP (ref 22–29)
CREAT SERPL-MCNC: 1.42 MG/DL — HIGH (ref 0.5–1.3)
EGFR: 62 ML/MIN/1.73M2 — SIGNIFICANT CHANGE UP
GLUCOSE SERPL-MCNC: 85 MG/DL — SIGNIFICANT CHANGE UP (ref 70–99)
HCT VFR BLD CALC: 36.3 % — LOW (ref 39–50)
HGB BLD-MCNC: 11.2 G/DL — LOW (ref 13–17)
INR BLD: 1.14 RATIO — SIGNIFICANT CHANGE UP (ref 0.88–1.16)
MAGNESIUM SERPL-MCNC: 1.9 MG/DL — SIGNIFICANT CHANGE UP (ref 1.6–2.6)
MCHC RBC-ENTMCNC: 25.7 PG — LOW (ref 27–34)
MCHC RBC-ENTMCNC: 30.9 GM/DL — LOW (ref 32–36)
MCV RBC AUTO: 83.3 FL — SIGNIFICANT CHANGE UP (ref 80–100)
PLATELET # BLD AUTO: 168 K/UL — SIGNIFICANT CHANGE UP (ref 150–400)
POTASSIUM SERPL-MCNC: 4.1 MMOL/L — SIGNIFICANT CHANGE UP (ref 3.5–5.3)
POTASSIUM SERPL-SCNC: 4.1 MMOL/L — SIGNIFICANT CHANGE UP (ref 3.5–5.3)
PROTHROM AB SERPL-ACNC: 13.3 SEC — SIGNIFICANT CHANGE UP (ref 10.5–13.4)
RBC # BLD: 4.36 M/UL — SIGNIFICANT CHANGE UP (ref 4.2–5.8)
RBC # FLD: 14.5 % — SIGNIFICANT CHANGE UP (ref 10.3–14.5)
SARS-COV-2 RNA SPEC QL NAA+PROBE: SIGNIFICANT CHANGE UP
SODIUM SERPL-SCNC: 139 MMOL/L — SIGNIFICANT CHANGE UP (ref 135–145)
WBC # BLD: 4.26 K/UL — SIGNIFICANT CHANGE UP (ref 3.8–10.5)
WBC # FLD AUTO: 4.26 K/UL — SIGNIFICANT CHANGE UP (ref 3.8–10.5)

## 2023-03-08 PROCEDURE — 71045 X-RAY EXAM CHEST 1 VIEW: CPT | Mod: 26

## 2023-03-08 PROCEDURE — 99233 SBSQ HOSP IP/OBS HIGH 50: CPT

## 2023-03-08 RX ORDER — MAGNESIUM SULFATE 500 MG/ML
2 VIAL (ML) INJECTION ONCE
Refills: 0 | Status: COMPLETED | OUTPATIENT
Start: 2023-03-08 | End: 2023-03-08

## 2023-03-08 RX ADMIN — HEPARIN SODIUM 11 UNIT(S)/HR: 5000 INJECTION INTRAVENOUS; SUBCUTANEOUS at 06:55

## 2023-03-08 RX ADMIN — HEPARIN SODIUM 11 UNIT(S)/HR: 5000 INJECTION INTRAVENOUS; SUBCUTANEOUS at 07:44

## 2023-03-08 RX ADMIN — SENNA PLUS 2 TABLET(S): 8.6 TABLET ORAL at 21:47

## 2023-03-08 RX ADMIN — Medication 4 MILLIGRAM(S): at 21:47

## 2023-03-08 RX ADMIN — CARVEDILOL PHOSPHATE 12.5 MILLIGRAM(S): 80 CAPSULE, EXTENDED RELEASE ORAL at 05:34

## 2023-03-08 RX ADMIN — Medication 25 GRAM(S): at 06:54

## 2023-03-08 RX ADMIN — Medication 4 MILLIGRAM(S): at 08:07

## 2023-03-08 RX ADMIN — SODIUM CHLORIDE 3 MILLILITER(S): 9 INJECTION INTRAMUSCULAR; INTRAVENOUS; SUBCUTANEOUS at 05:35

## 2023-03-08 RX ADMIN — PANTOPRAZOLE SODIUM 40 MILLIGRAM(S): 20 TABLET, DELAYED RELEASE ORAL at 08:06

## 2023-03-08 RX ADMIN — HEPARIN SODIUM 10 UNIT(S)/HR: 5000 INJECTION INTRAVENOUS; SUBCUTANEOUS at 01:58

## 2023-03-08 RX ADMIN — CARVEDILOL PHOSPHATE 12.5 MILLIGRAM(S): 80 CAPSULE, EXTENDED RELEASE ORAL at 17:03

## 2023-03-08 RX ADMIN — SODIUM CHLORIDE 3 MILLILITER(S): 9 INJECTION INTRAMUSCULAR; INTRAVENOUS; SUBCUTANEOUS at 14:08

## 2023-03-08 RX ADMIN — HEPARIN SODIUM 11 UNIT(S)/HR: 5000 INJECTION INTRAVENOUS; SUBCUTANEOUS at 12:27

## 2023-03-08 RX ADMIN — AMLODIPINE BESYLATE 10 MILLIGRAM(S): 2.5 TABLET ORAL at 05:34

## 2023-03-08 RX ADMIN — SODIUM CHLORIDE 3 MILLILITER(S): 9 INJECTION INTRAMUSCULAR; INTRAVENOUS; SUBCUTANEOUS at 21:47

## 2023-03-08 NOTE — PROGRESS NOTE ADULT - PROBLEM SELECTOR PLAN 3
Continue Strict blood pressure control for -120.  Continue Coreg 12.5 BID, Norvasc 10 QD, and Cardura 4mg PO BID.

## 2023-03-08 NOTE — PROGRESS NOTE ADULT - PROBLEM SELECTOR PLAN 2
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  TTE on admission showing an EF of 20-25% with diastolic dysfunction, normal RV.  Continue to monitor need for Diuresis.  Heart Failure following.

## 2023-03-08 NOTE — PROGRESS NOTE ADULT - SUBJECTIVE AND OBJECTIVE BOX
Brief summary:  42M Yemeni speaking patient, poor historian, [facility] HTN (uncontrolled), Pulmonary Interstitial Disease (per chart review), CAD w/ stents (2021 in [place]), CKD (unknown baseline SCr), with recent admission for hypertensive urgency, was sent to [place] ED 2/28/23 from [facility] secondary to HTN urgency (). CTA C/A/P showed Type B dissection and he was transferred to [facility] CTICU for further management.     Overnight events:  HD stable. No acute events overnight.  Patient denies acute pain with radiating or aggravating factors.  He denies chest pain, shortness of breath, palpitations, headache, dizziness, nausea, or vomiting.     PAST MEDICAL & SURGICAL HISTORY:  Uncontrolled hypertension  CKD (chronic kidney disease)  Interstitial lung disease  Prophylactic measure      Medications:  acetaminophen     Tablet .. 650 milliGRAM(s) Oral every 6 hours PRN  amLODIPine   Tablet 10 milliGRAM(s) Oral daily  carvedilol 12.5 milliGRAM(s) Oral every 12 hours  chlorhexidine 0.12% Liquid 15 milliLiter(s) Swish and Spit once  doxazosin 4 milliGRAM(s) Oral <User Schedule>  heparin  Infusion 700 Unit(s)/Hr IV Continuous <Continuous>  oxyCODONE    IR 5 milliGRAM(s) Oral every 4 hours PRN  oxyCODONE    IR 10 milliGRAM(s) Oral every 4 hours PRN  pantoprazole    Tablet 40 milliGRAM(s) Oral before breakfast  senna 2 Tablet(s) Oral at bedtime  sodium chloride 0.9% lock flush 3 milliLiter(s) IV Push every 8 hours      MEDICATIONS  (PRN):  acetaminophen     Tablet .. 650 milliGRAM(s) Oral every 6 hours PRN Mild Pain (1 - 3)  oxyCODONE    IR 5 milliGRAM(s) Oral every 4 hours PRN Moderate Pain (4 - 6)  oxyCODONE    IR 10 milliGRAM(s) Oral every 4 hours PRN Severe Pain (7 - 10)      Daily Review:                        11.9   4.44  )-----------( 171      ( 07 Mar 2023 05:25 )             38.3   03-07    136  |  104  |  21.6<H>  ----------------------------<  105<H>  4.9   |  19.0<L>  |  1.47<H>    Ca    9.7      07 Mar 2023 11:22  Phos  3.9     03-07  Mg     2.3     03-07      PT/INR - ( 06 Mar 2023 18:00 )   PT: 12.9 sec;   INR: 1.11 ratio      PTT - ( 07 Mar 2023 11:22 )  PTT:64.5 sec    T(C): 36.7 (03-08-23 @ 00:24), Max: 36.8 (03-07-23 @ 21:03)  HR: 71 (03-08-23 @ 00:24) (71 - 85)  BP: 144/90 (03-08-23 @ 00:24) (132/84 - 158/83)  RR: 18 (03-08-23 @ 00:24) (17 - 18)  SpO2: 97% (03-08-23 @ 00:24) (97% - 99%)  Wt(kg): --      I&O's Summary    06 Mar 2023 07:01  -  07 Mar 2023 07:00  --------------------------------------------------------  IN: 168 mL / OUT: 950 mL / NET: -782 mL    07 Mar 2023 07:01  -  08 Mar 2023 01:49  --------------------------------------------------------  IN: 850 mL / OUT: 1100 mL / NET: -250 mL        Physical Exam  Neuro: A+O x 3, non-focal, speech clear and intact  Pulm: coarse breath sounds bilaterally, no wheezing or rales  CV: RRR, +S1S2, no murmurs  Abd: soft, NT, ND  Ext: +DP Pulses b/l, +PT pulses, no edema; palpable b/l radial pulses

## 2023-03-08 NOTE — PROGRESS NOTE ADULT - PROBLEM SELECTOR PLAN 4
ROSALINO resolving   Likely secondary to hypoperfusion from dissection vs STEVAN.  Continue to trend BMP.  Nephrology following.   Likely baseline SCr between 1.3-1.6.

## 2023-03-08 NOTE — PROGRESS NOTE ADULT - PROBLEM SELECTOR PLAN 1
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  Images previously reviewed with Dr. Heaton.  Continue Strict blood pressure control: Coreg 12.5, Norvasc 10QD, and Cardura 4BID. Increase PO meds as tolerated.  Patient remains asymptomatic.  Urine tox negative.  Nephrology following. Likely baseline SCr between 1.3-1.6.   Heart failure team following for chronic systolic heart failure.   Plan for OR Thursday - open abdominal visceral debranching with Dr. Heaton on 3/9/23. Preop work up completed. Preop orders in place.

## 2023-03-08 NOTE — PROGRESS NOTE ADULT - ASSESSMENT
42M, poor historian, PMH of HTN (uncontrolled), Pulmonary Interstitial Disease (per chart review), CAD w/ stents (2021 in [place]), CKD (unknown baseline SCr), with recent admission for hypertensive urgency, was sent to [place] ED 2/28/23 from [facility] secondary to HTN urgency (). CTA C/A/P showed Type B dissection and he was transferred to [facility] CTICU for further management. Inpatient course significant for: RHC/LHC initially canceled 2/2 worsening renal function then complicated by right radial partial thrombosis, right cephalic and basilic vein thrombophlebitis, and HTN. Patient has now completed preop work up and is scheduled for open abdominal visceral debranching with Dr. Heaton on 3/9/23.

## 2023-03-09 ENCOUNTER — APPOINTMENT (OUTPATIENT)
Dept: CARDIOTHORACIC SURGERY | Facility: HOSPITAL | Age: 48
End: 2023-03-09
Payer: MEDICAID

## 2023-03-09 ENCOUNTER — TRANSCRIPTION ENCOUNTER (OUTPATIENT)
Age: 48
End: 2023-03-09

## 2023-03-09 ENCOUNTER — RESULT REVIEW (OUTPATIENT)
Age: 48
End: 2023-03-09

## 2023-03-09 LAB
ALBUMIN SERPL ELPH-MCNC: 3.5 G/DL — SIGNIFICANT CHANGE UP (ref 3.3–5.2)
ALP SERPL-CCNC: 70 U/L — SIGNIFICANT CHANGE UP (ref 40–120)
ALT FLD-CCNC: 55 U/L — HIGH
ANION GAP SERPL CALC-SCNC: 12 MMOL/L — SIGNIFICANT CHANGE UP (ref 5–17)
APTT BLD: 25.7 SEC — LOW (ref 27.5–35.5)
AST SERPL-CCNC: 61 U/L — HIGH
BASE EXCESS BLDA CALC-SCNC: -0.2 MMOL/L — SIGNIFICANT CHANGE UP (ref -2–3)
BASE EXCESS BLDA CALC-SCNC: -0.8 MMOL/L — SIGNIFICANT CHANGE UP (ref -2–3)
BASE EXCESS BLDA CALC-SCNC: -1.1 MMOL/L — SIGNIFICANT CHANGE UP (ref -2–3)
BASE EXCESS BLDA CALC-SCNC: -1.3 MMOL/L — SIGNIFICANT CHANGE UP (ref -2–3)
BASE EXCESS BLDA CALC-SCNC: 0 MMOL/L — SIGNIFICANT CHANGE UP (ref -2–3)
BASE EXCESS BLDA CALC-SCNC: 0.9 MMOL/L — SIGNIFICANT CHANGE UP (ref -2–3)
BASE EXCESS BLDMV CALC-SCNC: -2.8 MMOL/L — SIGNIFICANT CHANGE UP (ref -3–3)
BASE EXCESS BLDV CALC-SCNC: -3 MMOL/L — LOW (ref -2–3)
BASOPHILS # BLD AUTO: 0.01 K/UL — SIGNIFICANT CHANGE UP (ref 0–0.2)
BASOPHILS NFR BLD AUTO: 0.1 % — SIGNIFICANT CHANGE UP (ref 0–2)
BILIRUB SERPL-MCNC: 1 MG/DL — SIGNIFICANT CHANGE UP (ref 0.4–2)
BUN SERPL-MCNC: 23.3 MG/DL — HIGH (ref 8–20)
CA-I BLDA-SCNC: 1.19 MMOL/L — SIGNIFICANT CHANGE UP (ref 1.15–1.33)
CA-I BLDA-SCNC: 1.19 MMOL/L — SIGNIFICANT CHANGE UP (ref 1.15–1.33)
CA-I BLDA-SCNC: 1.2 MMOL/L — SIGNIFICANT CHANGE UP (ref 1.15–1.33)
CA-I BLDA-SCNC: 1.23 MMOL/L — SIGNIFICANT CHANGE UP (ref 1.15–1.33)
CA-I BLDA-SCNC: 1.26 MMOL/L — SIGNIFICANT CHANGE UP (ref 1.15–1.33)
CA-I BLDA-SCNC: 1.37 MMOL/L — HIGH (ref 1.15–1.33)
CA-I SERPL-SCNC: 1.17 MMOL/L — SIGNIFICANT CHANGE UP (ref 1.15–1.33)
CALCIUM SERPL-MCNC: 8.6 MG/DL — SIGNIFICANT CHANGE UP (ref 8.4–10.5)
CHLORIDE BLDA-SCNC: 103 MMOL/L — SIGNIFICANT CHANGE UP (ref 96–108)
CHLORIDE BLDA-SCNC: 104 MMOL/L — SIGNIFICANT CHANGE UP (ref 96–108)
CHLORIDE BLDA-SCNC: 104 MMOL/L — SIGNIFICANT CHANGE UP (ref 96–108)
CHLORIDE BLDA-SCNC: 106 MMOL/L — SIGNIFICANT CHANGE UP (ref 96–108)
CHLORIDE BLDA-SCNC: 106 MMOL/L — SIGNIFICANT CHANGE UP (ref 96–108)
CHLORIDE BLDA-SCNC: 109 MMOL/L — HIGH (ref 96–108)
CHLORIDE BLDV-SCNC: 105 MMOL/L — SIGNIFICANT CHANGE UP (ref 96–108)
CHLORIDE SERPL-SCNC: 99 MMOL/L — SIGNIFICANT CHANGE UP (ref 96–108)
CK SERPL-CCNC: 90 U/L — SIGNIFICANT CHANGE UP (ref 30–200)
CO2 SERPL-SCNC: 22 MMOL/L — SIGNIFICANT CHANGE UP (ref 22–29)
COHGB MFR BLDA: 1.1 % — SIGNIFICANT CHANGE UP
COHGB MFR BLDA: 1.5 % — SIGNIFICANT CHANGE UP
COHGB MFR BLDA: 1.6 % — SIGNIFICANT CHANGE UP
COHGB MFR BLDA: 1.6 % — SIGNIFICANT CHANGE UP
COHGB MFR BLDA: 1.8 % — SIGNIFICANT CHANGE UP
COHGB MFR BLDA: 1.8 % — SIGNIFICANT CHANGE UP
COHGB MFR BLDMV: 1.7 % — SIGNIFICANT CHANGE UP
CREAT SERPL-MCNC: 1.45 MG/DL — HIGH (ref 0.5–1.3)
EGFR: 61 ML/MIN/1.73M2 — SIGNIFICANT CHANGE UP
EOSINOPHIL # BLD AUTO: 0.01 K/UL — SIGNIFICANT CHANGE UP (ref 0–0.5)
EOSINOPHIL NFR BLD AUTO: 0.1 % — SIGNIFICANT CHANGE UP (ref 0–6)
GAS PNL BLDA: SIGNIFICANT CHANGE UP
GAS PNL BLDA: SIGNIFICANT CHANGE UP
GAS PNL BLDMV: SIGNIFICANT CHANGE UP
GAS PNL BLDV: 136 MMOL/L — SIGNIFICANT CHANGE UP (ref 136–145)
GAS PNL BLDV: SIGNIFICANT CHANGE UP
GAS PNL BLDV: SIGNIFICANT CHANGE UP
GLUCOSE BLDA-MCNC: 112 MG/DL — HIGH (ref 70–99)
GLUCOSE BLDA-MCNC: 129 MG/DL — HIGH (ref 70–99)
GLUCOSE BLDA-MCNC: 134 MG/DL — HIGH (ref 70–99)
GLUCOSE BLDA-MCNC: 146 MG/DL — HIGH (ref 70–99)
GLUCOSE BLDA-MCNC: 147 MG/DL — HIGH (ref 70–99)
GLUCOSE BLDA-MCNC: 151 MG/DL — HIGH (ref 70–99)
GLUCOSE BLDC GLUCOMTR-MCNC: 122 MG/DL — HIGH (ref 70–99)
GLUCOSE BLDV-MCNC: 120 MG/DL — HIGH (ref 70–99)
GLUCOSE SERPL-MCNC: 120 MG/DL — HIGH (ref 70–99)
HCO3 BLDA-SCNC: 24 MMOL/L — SIGNIFICANT CHANGE UP (ref 21–28)
HCO3 BLDA-SCNC: 25 MMOL/L — SIGNIFICANT CHANGE UP (ref 21–28)
HCO3 BLDA-SCNC: 25 MMOL/L — SIGNIFICANT CHANGE UP (ref 21–28)
HCO3 BLDA-SCNC: 26 MMOL/L — SIGNIFICANT CHANGE UP (ref 21–28)
HCO3 BLDMV-SCNC: 23 MMOL/L — SIGNIFICANT CHANGE UP (ref 20–28)
HCO3 BLDV-SCNC: 23 MMOL/L — SIGNIFICANT CHANGE UP (ref 22–29)
HCT VFR BLD CALC: 32.3 % — LOW (ref 39–50)
HCT VFR BLDA CALC: 31 % — SIGNIFICANT CHANGE UP
HCT VFR BLDA CALC: 32 % — SIGNIFICANT CHANGE UP
HCT VFR BLDA CALC: 33 % — SIGNIFICANT CHANGE UP
HCT VFR BLDA CALC: 33 % — SIGNIFICANT CHANGE UP
HCT VFR BLDA CALC: 34 % — SIGNIFICANT CHANGE UP
HCT VFR BLDA CALC: 35 % — SIGNIFICANT CHANGE UP
HCT VFR BLDA CALC: 39 % — SIGNIFICANT CHANGE UP
HGB BLD CALC-MCNC: 11.2 G/DL — LOW (ref 12.6–17.4)
HGB BLD-MCNC: 10.4 G/DL — LOW (ref 13–17)
HGB BLDA-MCNC: 10.2 G/DL — LOW (ref 12.6–17.4)
HGB BLDA-MCNC: 10.8 G/DL — LOW (ref 12.6–17.4)
HGB BLDA-MCNC: 11 G/DL — LOW (ref 12.6–17.4)
HGB BLDA-MCNC: 11.1 G/DL — LOW (ref 12.6–17.4)
HGB BLDA-MCNC: 11.6 G/DL — LOW (ref 12.6–17.4)
HGB BLDA-MCNC: 13 G/DL — SIGNIFICANT CHANGE UP (ref 12.6–17.4)
HGB FLD-MCNC: 10.2 G/DL — LOW (ref 12.6–17.4)
HOROWITZ INDEX BLDV+IHG-RTO: SIGNIFICANT CHANGE UP
IMM GRANULOCYTES NFR BLD AUTO: 0.7 % — SIGNIFICANT CHANGE UP (ref 0–0.9)
INR BLD: 1.32 RATIO — HIGH (ref 0.88–1.16)
LACTATE BLDA-MCNC: 0.6 MMOL/L — SIGNIFICANT CHANGE UP (ref 0.5–2)
LACTATE BLDA-MCNC: 0.9 MMOL/L — SIGNIFICANT CHANGE UP (ref 0.5–2)
LACTATE BLDA-MCNC: 0.9 MMOL/L — SIGNIFICANT CHANGE UP (ref 0.5–2)
LACTATE BLDA-MCNC: 1 MMOL/L — SIGNIFICANT CHANGE UP (ref 0.5–2)
LACTATE BLDA-MCNC: 1.1 MMOL/L — SIGNIFICANT CHANGE UP (ref 0.5–2)
LACTATE BLDA-MCNC: 1.2 MMOL/L — SIGNIFICANT CHANGE UP (ref 0.5–2)
LACTATE BLDV-MCNC: 0.8 MMOL/L — SIGNIFICANT CHANGE UP (ref 0.5–2)
LYMPHOCYTES # BLD AUTO: 0.62 K/UL — LOW (ref 1–3.3)
LYMPHOCYTES # BLD AUTO: 5.2 % — LOW (ref 13–44)
MAGNESIUM SERPL-MCNC: 1.5 MG/DL — LOW (ref 1.6–2.6)
MCHC RBC-ENTMCNC: 26.2 PG — LOW (ref 27–34)
MCHC RBC-ENTMCNC: 32.2 GM/DL — SIGNIFICANT CHANGE UP (ref 32–36)
MCV RBC AUTO: 81.4 FL — SIGNIFICANT CHANGE UP (ref 80–100)
METHGB MFR BLDA: 0.6 % — SIGNIFICANT CHANGE UP
METHGB MFR BLDA: 0.7 % — SIGNIFICANT CHANGE UP
METHGB MFR BLDA: 0.8 % — SIGNIFICANT CHANGE UP
METHGB MFR BLDA: 0.8 % — SIGNIFICANT CHANGE UP
METHGB MFR BLDA: 0.9 % — SIGNIFICANT CHANGE UP
METHGB MFR BLDA: 1.1 % — SIGNIFICANT CHANGE UP
METHGB MFR BLDMV: 0.7 % — SIGNIFICANT CHANGE UP
MONOCYTES # BLD AUTO: 0.69 K/UL — SIGNIFICANT CHANGE UP (ref 0–0.9)
MONOCYTES NFR BLD AUTO: 5.8 % — SIGNIFICANT CHANGE UP (ref 2–14)
NEUTROPHILS # BLD AUTO: 10.57 K/UL — HIGH (ref 1.8–7.4)
NEUTROPHILS NFR BLD AUTO: 88.1 % — HIGH (ref 43–77)
O2 CT VFR BLD CALC: 52 MMHG — SIGNIFICANT CHANGE UP (ref 30–65)
OXYHGB MFR BLDA: 97 % — HIGH (ref 90–95)
OXYHGB MFR BLDA: 98 % — HIGH (ref 90–95)
PCO2 BLDA: 38 MMHG — SIGNIFICANT CHANGE UP (ref 35–48)
PCO2 BLDA: 40 MMHG — SIGNIFICANT CHANGE UP (ref 35–48)
PCO2 BLDA: 40 MMHG — SIGNIFICANT CHANGE UP (ref 35–48)
PCO2 BLDA: 41 MMHG — SIGNIFICANT CHANGE UP (ref 35–48)
PCO2 BLDA: 43 MMHG — SIGNIFICANT CHANGE UP (ref 35–48)
PCO2 BLDA: 44 MMHG — SIGNIFICANT CHANGE UP (ref 35–48)
PCO2 BLDMV: 40 MMHG — SIGNIFICANT CHANGE UP (ref 30–65)
PCO2 BLDV: 41 MMHG — LOW (ref 42–55)
PH BLDA: 7.36 — SIGNIFICANT CHANGE UP (ref 7.35–7.45)
PH BLDA: 7.38 — SIGNIFICANT CHANGE UP (ref 7.35–7.45)
PH BLDA: 7.39 — SIGNIFICANT CHANGE UP (ref 7.35–7.45)
PH BLDA: 7.39 — SIGNIFICANT CHANGE UP (ref 7.35–7.45)
PH BLDA: 7.4 — SIGNIFICANT CHANGE UP (ref 7.35–7.45)
PH BLDA: 7.4 — SIGNIFICANT CHANGE UP (ref 7.35–7.45)
PH BLDMV: 7.36 — SIGNIFICANT CHANGE UP (ref 7.32–7.45)
PH BLDV: 7.35 — SIGNIFICANT CHANGE UP (ref 7.32–7.43)
PLATELET # BLD AUTO: 132 K/UL — LOW (ref 150–400)
PO2 BLDA: 113 MMHG — HIGH (ref 83–108)
PO2 BLDA: 117 MMHG — HIGH (ref 83–108)
PO2 BLDA: 155 MMHG — HIGH (ref 83–108)
PO2 BLDA: 187 MMHG — HIGH (ref 83–108)
PO2 BLDA: 234 MMHG — HIGH (ref 83–108)
PO2 BLDA: 362 MMHG — HIGH (ref 83–108)
PO2 BLDV: 52 MMHG — HIGH (ref 25–45)
POTASSIUM BLDA-SCNC: 4.1 MMOL/L — SIGNIFICANT CHANGE UP (ref 3.5–5.1)
POTASSIUM BLDA-SCNC: 4.2 MMOL/L — SIGNIFICANT CHANGE UP (ref 3.5–5.1)
POTASSIUM BLDA-SCNC: 4.4 MMOL/L — SIGNIFICANT CHANGE UP (ref 3.5–5.1)
POTASSIUM BLDA-SCNC: 4.6 MMOL/L — SIGNIFICANT CHANGE UP (ref 3.5–5.1)
POTASSIUM BLDA-SCNC: 4.6 MMOL/L — SIGNIFICANT CHANGE UP (ref 3.5–5.1)
POTASSIUM BLDA-SCNC: 4.7 MMOL/L — SIGNIFICANT CHANGE UP (ref 3.5–5.1)
POTASSIUM BLDV-SCNC: 4.1 MMOL/L — SIGNIFICANT CHANGE UP (ref 3.5–5.1)
POTASSIUM SERPL-MCNC: 4.2 MMOL/L — SIGNIFICANT CHANGE UP (ref 3.5–5.3)
POTASSIUM SERPL-SCNC: 4.2 MMOL/L — SIGNIFICANT CHANGE UP (ref 3.5–5.3)
PROT SERPL-MCNC: 6.1 G/DL — LOW (ref 6.6–8.7)
PROTHROM AB SERPL-ACNC: 15.3 SEC — HIGH (ref 10.5–13.4)
RBC # BLD: 3.97 M/UL — LOW (ref 4.2–5.8)
RBC # FLD: 14.1 % — SIGNIFICANT CHANGE UP (ref 10.3–14.5)
SAO2 % BLDA: 100 % — HIGH (ref 94–98)
SAO2 % BLDA: 100 % — HIGH (ref 94–98)
SAO2 % BLDA: 99.2 % — HIGH (ref 94–98)
SAO2 % BLDA: 99.3 % — HIGH (ref 94–98)
SAO2 % BLDA: 99.6 % — HIGH (ref 94–98)
SAO2 % BLDA: 99.8 % — HIGH (ref 94–98)
SAO2 % BLDV: 82.8 % — SIGNIFICANT CHANGE UP
SODIUM BLDA-SCNC: 133 MMOL/L — LOW (ref 136–145)
SODIUM BLDA-SCNC: 133 MMOL/L — LOW (ref 136–145)
SODIUM BLDA-SCNC: 135 MMOL/L — LOW (ref 136–145)
SODIUM BLDA-SCNC: 135 MMOL/L — LOW (ref 136–145)
SODIUM BLDA-SCNC: 136 MMOL/L — SIGNIFICANT CHANGE UP (ref 136–145)
SODIUM BLDA-SCNC: 137 MMOL/L — SIGNIFICANT CHANGE UP (ref 136–145)
SODIUM SERPL-SCNC: 133 MMOL/L — LOW (ref 135–145)
TROPONIN T SERPL-MCNC: <0.01 NG/ML — SIGNIFICANT CHANGE UP (ref 0–0.06)
WBC # BLD: 11.98 K/UL — HIGH (ref 3.8–10.5)
WBC # FLD AUTO: 11.98 K/UL — HIGH (ref 3.8–10.5)

## 2023-03-09 PROCEDURE — 93010 ELECTROCARDIOGRAM REPORT: CPT

## 2023-03-09 PROCEDURE — 35632 BPG ILIO-CELIAC: CPT | Mod: RT

## 2023-03-09 PROCEDURE — ZZZZZ: CPT

## 2023-03-09 PROCEDURE — 71045 X-RAY EXAM CHEST 1 VIEW: CPT | Mod: 26

## 2023-03-09 PROCEDURE — 88305 TISSUE EXAM BY PATHOLOGIST: CPT | Mod: 26

## 2023-03-09 PROCEDURE — 99291 CRITICAL CARE FIRST HOUR: CPT

## 2023-03-09 PROCEDURE — 35633 BPG ILIO-MESENTERIC: CPT

## 2023-03-09 DEVICE — FLOSEAL FAST PREP 10ML: Type: IMPLANTABLE DEVICE | Status: FUNCTIONAL

## 2023-03-09 DEVICE — IMPLANTABLE DEVICE: Type: IMPLANTABLE DEVICE | Status: FUNCTIONAL

## 2023-03-09 DEVICE — LIGATING CLIPS WECK HORIZON LARGE (ORANGE) 6: Type: IMPLANTABLE DEVICE | Status: FUNCTIONAL

## 2023-03-09 DEVICE — STAPLER COVIDIEN TRI-STAPLE CURVED 45MM TAN RELOAD: Type: IMPLANTABLE DEVICE | Status: FUNCTIONAL

## 2023-03-09 DEVICE — KIT A-LINE 1LUM 20G X 12CM SAFE KIT: Type: IMPLANTABLE DEVICE | Status: FUNCTIONAL

## 2023-03-09 DEVICE — CATH THERMAL OXI SWAN GANZ DILUTION 7.5FR: Type: IMPLANTABLE DEVICE | Status: FUNCTIONAL

## 2023-03-09 DEVICE — SURGICEL FIBRILLAR 4 X 4": Type: IMPLANTABLE DEVICE | Status: FUNCTIONAL

## 2023-03-09 DEVICE — KIT CVC 2LUM MAC 9FR CHG: Type: IMPLANTABLE DEVICE | Status: FUNCTIONAL

## 2023-03-09 DEVICE — LIGATING CLIPS WECK HORIZON MEDIUM (BLUE) 24: Type: IMPLANTABLE DEVICE | Status: FUNCTIONAL

## 2023-03-09 DEVICE — TISSEEL 10ML: Type: IMPLANTABLE DEVICE | Status: FUNCTIONAL

## 2023-03-09 RX ORDER — SODIUM CHLORIDE 9 MG/ML
1000 INJECTION INTRAMUSCULAR; INTRAVENOUS; SUBCUTANEOUS
Refills: 0 | Status: DISCONTINUED | OUTPATIENT
Start: 2023-03-09 | End: 2023-03-11

## 2023-03-09 RX ORDER — NALOXONE HYDROCHLORIDE 4 MG/.1ML
0.1 SPRAY NASAL
Refills: 0 | Status: DISCONTINUED | OUTPATIENT
Start: 2023-03-09 | End: 2023-03-12

## 2023-03-09 RX ORDER — PROPOFOL 10 MG/ML
25 INJECTION, EMULSION INTRAVENOUS
Qty: 1000 | Refills: 0 | Status: DISCONTINUED | OUTPATIENT
Start: 2023-03-09 | End: 2023-03-09

## 2023-03-09 RX ORDER — PANTOPRAZOLE SODIUM 20 MG/1
40 TABLET, DELAYED RELEASE ORAL DAILY
Refills: 0 | Status: DISCONTINUED | OUTPATIENT
Start: 2023-03-09 | End: 2023-03-15

## 2023-03-09 RX ORDER — INSULIN LISPRO 100/ML
VIAL (ML) SUBCUTANEOUS EVERY 6 HOURS
Refills: 0 | Status: DISCONTINUED | OUTPATIENT
Start: 2023-03-09 | End: 2023-03-15

## 2023-03-09 RX ORDER — SODIUM CHLORIDE 9 MG/ML
250 INJECTION, SOLUTION INTRAVENOUS ONCE
Refills: 0 | Status: COMPLETED | OUTPATIENT
Start: 2023-03-09 | End: 2023-03-09

## 2023-03-09 RX ORDER — NOREPINEPHRINE BITARTRATE/D5W 8 MG/250ML
0.05 PLASTIC BAG, INJECTION (ML) INTRAVENOUS
Qty: 8 | Refills: 0 | Status: DISCONTINUED | OUTPATIENT
Start: 2023-03-09 | End: 2023-03-10

## 2023-03-09 RX ORDER — DEXTROSE 50 % IN WATER 50 %
15 SYRINGE (ML) INTRAVENOUS ONCE
Refills: 0 | Status: DISCONTINUED | OUTPATIENT
Start: 2023-03-09 | End: 2023-03-12

## 2023-03-09 RX ORDER — DEXTROSE 50 % IN WATER 50 %
12.5 SYRINGE (ML) INTRAVENOUS ONCE
Refills: 0 | Status: DISCONTINUED | OUTPATIENT
Start: 2023-03-09 | End: 2023-03-12

## 2023-03-09 RX ORDER — HYDROMORPHONE HYDROCHLORIDE 2 MG/ML
0.25 INJECTION INTRAMUSCULAR; INTRAVENOUS; SUBCUTANEOUS ONCE
Refills: 0 | Status: DISCONTINUED | OUTPATIENT
Start: 2023-03-09 | End: 2023-03-09

## 2023-03-09 RX ORDER — DOBUTAMINE HCL 250MG/20ML
2 VIAL (ML) INTRAVENOUS
Qty: 500 | Refills: 0 | Status: DISCONTINUED | OUTPATIENT
Start: 2023-03-09 | End: 2023-03-10

## 2023-03-09 RX ORDER — GLUCAGON INJECTION, SOLUTION 0.5 MG/.1ML
1 INJECTION, SOLUTION SUBCUTANEOUS ONCE
Refills: 0 | Status: DISCONTINUED | OUTPATIENT
Start: 2023-03-09 | End: 2023-03-13

## 2023-03-09 RX ORDER — DEXTROSE 50 % IN WATER 50 %
25 SYRINGE (ML) INTRAVENOUS ONCE
Refills: 0 | Status: DISCONTINUED | OUTPATIENT
Start: 2023-03-09 | End: 2023-03-12

## 2023-03-09 RX ORDER — SODIUM CHLORIDE 9 MG/ML
1000 INJECTION, SOLUTION INTRAVENOUS
Refills: 0 | Status: DISCONTINUED | OUTPATIENT
Start: 2023-03-09 | End: 2023-03-12

## 2023-03-09 RX ORDER — HYDROMORPHONE HYDROCHLORIDE 2 MG/ML
30 INJECTION INTRAMUSCULAR; INTRAVENOUS; SUBCUTANEOUS
Refills: 0 | Status: DISCONTINUED | OUTPATIENT
Start: 2023-03-09 | End: 2023-03-11

## 2023-03-09 RX ORDER — ONDANSETRON 8 MG/1
4 TABLET, FILM COATED ORAL EVERY 6 HOURS
Refills: 0 | Status: DISCONTINUED | OUTPATIENT
Start: 2023-03-09 | End: 2023-03-12

## 2023-03-09 RX ORDER — CEFUROXIME AXETIL 250 MG
1500 TABLET ORAL EVERY 8 HOURS
Refills: 0 | Status: COMPLETED | OUTPATIENT
Start: 2023-03-09 | End: 2023-03-11

## 2023-03-09 RX ORDER — CHLORHEXIDINE GLUCONATE 213 G/1000ML
15 SOLUTION TOPICAL EVERY 12 HOURS
Refills: 0 | Status: DISCONTINUED | OUTPATIENT
Start: 2023-03-09 | End: 2023-03-09

## 2023-03-09 RX ORDER — NICARDIPINE HYDROCHLORIDE 30 MG/1
5 CAPSULE, EXTENDED RELEASE ORAL
Qty: 40 | Refills: 0 | Status: DISCONTINUED | OUTPATIENT
Start: 2023-03-09 | End: 2023-03-11

## 2023-03-09 RX ORDER — ALBUMIN HUMAN 25 %
250 VIAL (ML) INTRAVENOUS
Refills: 0 | Status: DISCONTINUED | OUTPATIENT
Start: 2023-03-09 | End: 2023-03-10

## 2023-03-09 RX ORDER — DEXMEDETOMIDINE HYDROCHLORIDE IN 0.9% SODIUM CHLORIDE 4 UG/ML
0.2 INJECTION INTRAVENOUS
Qty: 200 | Refills: 0 | Status: DISCONTINUED | OUTPATIENT
Start: 2023-03-09 | End: 2023-03-10

## 2023-03-09 RX ADMIN — CHLORHEXIDINE GLUCONATE 15 MILLILITER(S): 213 SOLUTION TOPICAL at 05:05

## 2023-03-09 RX ADMIN — Medication 100 MILLIGRAM(S): at 16:33

## 2023-03-09 RX ADMIN — HEPARIN SODIUM 11 UNIT(S)/HR: 5000 INJECTION INTRAVENOUS; SUBCUTANEOUS at 00:00

## 2023-03-09 RX ADMIN — SODIUM CHLORIDE 250 MILLILITER(S): 9 INJECTION, SOLUTION INTRAVENOUS at 14:40

## 2023-03-09 RX ADMIN — AMLODIPINE BESYLATE 10 MILLIGRAM(S): 2.5 TABLET ORAL at 05:05

## 2023-03-09 RX ADMIN — HYDROMORPHONE HYDROCHLORIDE 0.25 MILLIGRAM(S): 2 INJECTION INTRAMUSCULAR; INTRAVENOUS; SUBCUTANEOUS at 18:28

## 2023-03-09 RX ADMIN — CHLORHEXIDINE GLUCONATE 15 MILLILITER(S): 213 SOLUTION TOPICAL at 18:27

## 2023-03-09 RX ADMIN — NICARDIPINE HYDROCHLORIDE 25 MG/HR: 30 CAPSULE, EXTENDED RELEASE ORAL at 21:59

## 2023-03-09 RX ADMIN — SODIUM CHLORIDE 250 MILLILITER(S): 9 INJECTION, SOLUTION INTRAVENOUS at 15:30

## 2023-03-09 RX ADMIN — HYDROMORPHONE HYDROCHLORIDE 30 MILLILITER(S): 2 INJECTION INTRAMUSCULAR; INTRAVENOUS; SUBCUTANEOUS at 19:48

## 2023-03-09 RX ADMIN — CARVEDILOL PHOSPHATE 12.5 MILLIGRAM(S): 80 CAPSULE, EXTENDED RELEASE ORAL at 05:05

## 2023-03-09 RX ADMIN — SODIUM CHLORIDE 3 MILLILITER(S): 9 INJECTION INTRAMUSCULAR; INTRAVENOUS; SUBCUTANEOUS at 05:06

## 2023-03-09 RX ADMIN — PANTOPRAZOLE SODIUM 40 MILLIGRAM(S): 20 TABLET, DELAYED RELEASE ORAL at 05:05

## 2023-03-09 RX ADMIN — PANTOPRAZOLE SODIUM 40 MILLIGRAM(S): 20 TABLET, DELAYED RELEASE ORAL at 16:52

## 2023-03-09 RX ADMIN — HYDROMORPHONE HYDROCHLORIDE 0.25 MILLIGRAM(S): 2 INJECTION INTRAMUSCULAR; INTRAVENOUS; SUBCUTANEOUS at 18:43

## 2023-03-09 NOTE — PROGRESS NOTE ADULT - SUBJECTIVE AND OBJECTIVE BOX
FABIANA CALIXTO  MRN-[id number]    HPI:  42M, Japanese speaking poor historian, with PMH HTN (uncontrolled), Pulmonary Interstitial Disease, CAD w/ stents (2021 in [place]), CKD (unknown baseline), was sent to [place] ED 2/28/23 from [facility] where he had gone for a physical exam and to get his medications filled. He obtains his Nifedipine from his home country of [place], however has been unable to obtain recent doses so he has been taking half his medication daily. Of note, HIE shows recent admission to [place] for photophobia where he was also found to have . 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 He was transferred to [facility] CTICU for further management.   At time of admission, his SBP was 180s and he was started on Esmolol and Nicardipine. Patient denies acute pain with radiating or aggravating factors. He does admit to occasional headaches and dizziness.   (01 Mar 2023 02:45)      Surgery/Hospital Course:  ·  PRE-OP DIAGNOSIS:  Dissection of aorta   ·  POST-OP DIAGNOSIS:  Dissection of aorta   ·  PROCEDURES:  Bypass graft, mesenteric artery 09-Mar-2023  Today:  No acute events     ICU Vital Signs Last 24 Hrs  T(C): 36.6 (09 Mar 2023 06:30), Max: 36.7 (08 Mar 2023 20:35)  T(F): 97.9 (09 Mar 2023 06:30), Max: 98.1 (09 Mar 2023 00:00)  HR: 66 (09 Mar 2023 14:32) (66 - 86)  BP: 162/107 (09 Mar 2023 06:30) (144/95 - 162/107)  BP(mean): --  ABP: --  ABP(mean): --  RR: 17 (09 Mar 2023 06:30) (17 - 18)  SpO2: 100% (09 Mar 2023 14:32) (96% - 100%)    O2 Parameters below as of 09 Mar 2023 00:00  Patient On (Oxygen Delivery Method): room air            Physical Exam:  Gen: sedated  CNS: non focal 	  Neck: no JVD  RES : clear , no wheezing              CVS: Regular  rhythm. Normal S1/S2  Abd: Soft, non-distended. Bowel sounds present.  Skin: No rash.  Ext:  no edema    ============================I/O===========================   I&O's Detail    08 Mar 2023 07:01  -  09 Mar 2023 07:00  --------------------------------------------------------  IN:  Total IN: 0 mL    OUT:    Voided (mL): 950 mL  Total OUT: 950 mL    Total NET: -950 mL        ============================ LABS =========================                        11.2   4.26  )-----------( 168      ( 08 Mar 2023 04:51 )             36.3     03-08    139  |  104  |  23.1<H>  ----------------------------<  85  4.1   |  23.0  |  1.42<H>    Ca    8.9      08 Mar 2023 04:51  Mg     1.9     03-08        PT/INR - ( 08 Mar 2023 04:51 )   PT: 13.3 sec;   INR: 1.14 ratio         PTT - ( 08 Mar 2023 21:15 )  PTT:56.7 sec  ABG - ( 09 Mar 2023 14:57 )  pH, Arterial: 7.400 pH, Blood: x     /  pCO2: 39    /  pO2: 118   / HCO3: 24    / Base Excess: -0.6  /  SaO2: 99.1                ======================Micro/Rad/Cardio=================  Culture: Reviewed   CXR: Reviewed  Echo:Reviewed  ======================================================  PAST MEDICAL & SURGICAL HISTORY:  Uncontrolled hypertension      CKD (chronic kidney disease)      Interstitial lung disease      Prophylactic measure        ====================ASSESSMENT ==============  - HTN (uncontrolled), Pulmonary Interstitial Disease, CAD w/ stents (2021 in [place]), CKD (unknown baseline),  -Recent admission to [place] for photophobia where he was also found to have .   -On admission, he was hypertensive with SBP > 200 and subsequent CTA C/A/P showed Type B dissection.   -He was transferred to [facility] CTICU for further management. --      ---Uncontrolled hypertension        ---CKD (chronic kidney disease)  ---Interstitial lung disease  --- Dissection of aorta.   --- Type B dissection of CTA C/A/P  ---Chronic combined systolic and diastolic heart failure.   --- Hypertensive urgency.   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  -Continue to trend BMP.  - Continue GI ppx with Protonix and Senna  -DVT ppx with SCD boots-  ====================== NEUROLOGY=====================  HYDROmorphone PCA (1 mG/mL) 30 milliLiter(s) PCA Continuous PCA Continuous  ondansetron Injectable 4 milliGRAM(s) IV Push every 6 hours PRN Nausea  propofol Infusion 25 MICROgram(s)/kG/Min (13.1 mL/Hr) IV Continuous <Continuous>    ==================== RESPIRATORY======================  Post op respiratory insufficiency  Mechanical Ventilation:  Mode: AC/ CMV (Assist Control/ Continuous Mandatory Ventilation)  RR (machine): 12  TV (machine): 650  FiO2: 60  PEEP: 5  MAP: 11  PIP: 35      ====================CARDIOVASCULAR==================  Post op Hypovolemia  DOBUTamine Infusion 2 MICROgram(s)/kG/Min (5.22 mL/Hr) IV Continuous <Continuous>  norepinephrine Infusion 0.05 MICROgram(s)/kG/Min (8.16 mL/Hr) IV Continuous <Continuous>    ===================HEMATOLOGIC/ONC ===================  Monitor H&H/Plts      ===================== RENAL =========================  Continue monitoring urine output, I&OS, BUN/Cr     ==================== GASTROINTESTINAL===================  albumin human  5% IVPB 250 milliLiter(s) IV Intermittent every 3 hours  dextrose 5%. 1000 milliLiter(s) (50 mL/Hr) IV Continuous <Continuous>  dextrose 5%. 1000 milliLiter(s) (100 mL/Hr) IV Continuous <Continuous>  lactated ringers Bolus 250 milliLiter(s) IV Bolus once  pantoprazole  Injectable 40 milliGRAM(s) IV Push daily  sodium chloride 0.9%. 1000 milliLiter(s) (10 mL/Hr) IV Continuous <Continuous>    =======================    ENDOCRINE  =====================  dextrose 50% Injectable 25 Gram(s) IV Push once  dextrose 50% Injectable 12.5 Gram(s) IV Push once  dextrose 50% Injectable 25 Gram(s) IV Push once  dextrose Oral Gel 15 Gram(s) Oral once PRN Blood Glucose LESS THAN 70 milliGRAM(s)/deciliter  glucagon  Injectable 1 milliGRAM(s) IntraMuscular once  insulin lispro (ADMELOG) corrective regimen sliding scale   SubCutaneous every 6 hours    ========================INFECTIOUS DISEASE================  cefuroxime  IVPB 1500 milliGRAM(s) IV Intermittent every 8 hours  cefuroxime  IVPB 1500 milliGRAM(s) IV Intermittent once      -Close hemodynamic , ventilatory and drain monitoring and management per post op routine .  -Monitor Neurologic status ,   -Head of the bed should remain elevated to 45 degrees,  -Monitor adequacy of oxygenation and ventilation and attempt to wean oxygen ,  -Monitor for arrhythmias and monitor parameters for organ perfusion,  -Glycemic control is satisfactory,  -Nutritional goals will be met using po eventually , insure adequate caloric intake and monitor the same ,  -Electrolytes have been repleted as necessary , pain control has been achieved  and wound care has been carried out ,  -Stress ulcer and VTE prophylaxis will be achieved,  -Agressive PT and early mobility and ambulation goals will be met,  -The family was updated about the course and plan .      I have spent 35 minutes providing acute care for this critically ill patient     Patient requires continuous monitoring with bedside rhythm monitoring, pulse ox monitoring, and intermittent blood gas analysis. Care plan discussed with ICU care team. Patient remained critical and at risk for life threatening decompensation.

## 2023-03-09 NOTE — AIRWAY REMOVAL NOTE  ADULT & PEDS - POSITIVE LEAK TEST
History and Physical  Obstetrics      SUBJECTIVE:     Nolvia Ray is a 25 y.o.  female  at 39w6d weeks gestation with an Estimated Date of Delivery: 3/20/17 who is admitted secondary to preeclampsia who had originally presented complaining of contractions every 3 minutes. She denies Leakage of Fluid and Vaginal Bleeding. She also denies HA/vision changes/RUQ pain. Fetal Movement: normal.    She has been followed prenatally with the following prenatal complications: Patient Active Problem List   Diagnosis    MAL (generalized anxiety disorder)    Social anxiety disorder    Excessive caffeine intake    Encounter for supervision of normal pregnancy in third trimester    Abnormal glucose tolerance test (GTT) during pregnancy, antepartum    Diet controlled gestational diabetes mellitus (GDM) in third trimester    Pre-eclampsia in third trimester    Threatened labor at term         HISTORY:     Prior to Admission medications    Medication Sig Start Date End Date Taking? Authorizing Provider   PNV62/FA/OM3/DHA/EPA/FISH OIL (PRENATAL GUMMY ORAL) Take by mouth.   Yes Historical Provider, MD     Outpatient Prescriptions Marked as Taking for the 3/19/17 encounter (Hospital Encounter)   Medication Sig Dispense Refill    PNV62/FA/OM3/DHA/EPA/FISH OIL (PRENATAL GUMMY ORAL) Take by mouth.        Past Medical History:   Diagnosis Date    Anxiety     Miscarriage     Psychiatric problem     anxiety    Therapy      History reviewed. No pertinent surgical history.  Family History   Problem Relation Age of Onset    Hypertension Mother     Hypertension Father     Cancer Paternal Grandmother      lung    Cancer Paternal Grandfather      leukemia    Breast cancer Neg Hx     Colon cancer Neg Hx     Ovarian cancer Neg Hx     Bipolar disorder Neg Hx     Schizophrenia Neg Hx     Suicide Neg Hx      Social History   Substance Use Topics    Smoking status: Former Smoker    Smokeless tobacco: Former User      Quit date: 2016    Alcohol use No     OB History    Para Term  AB SAB TAB Ectopic Multiple Living   2    1 1    0      # Outcome Date GA Lbr Dorian/2nd Weight Sex Delivery Anes PTL Lv   2 Current            1 SAB                     Allergy:   Review of patient's allergies indicates:  No Known Allergies       ROS:   Review of Systems   Constitutional: Negative for chills, diaphoresis, fatigue, fever and unexpected weight change.   HENT: Negative for congestion, hearing loss, rhinorrhea and sore throat.    Eyes: Negative for pain, discharge and visual disturbance.   Respiratory: Negative for apnea, cough, shortness of breath and wheezing.    Cardiovascular: Negative for chest pain, palpitations and leg swelling.   Gastrointestinal: Positive for abdominal pain (contractions). Negative for constipation, diarrhea, nausea and vomiting.   Endocrine: Negative for cold intolerance and heat intolerance.   Genitourinary: Positive for pelvic pain (pressure). Negative for difficulty urinating, dyspareunia, dysuria, flank pain, frequency, genital sores, hematuria, menstrual problem, vaginal bleeding, vaginal discharge and vaginal pain.   Musculoskeletal: Negative for arthralgias, back pain and joint swelling.   Skin: Negative for rash.   Neurological: Negative for dizziness, weakness, light-headedness, numbness and headaches.   Psychiatric/Behavioral: Negative for agitation and confusion. The patient is not nervous/anxious.           OBJECTIVE:   Initial Vitals   BP Pulse Resp Temp SpO2   17 1630 17 1630 17 1630 17 1630 17 1634   154/106 99 18 98.8 °F (37.1 °C) 98 %         Physical Exam:  General:  alert,normal appearing gravid female   Skin:  Skin color, texture, turgor normal. No rashes or lesions   HEENT:  extra ocular movement intact, sclera clear, anicteric   Lungs:  normal respiratory effort   Heart:  regular rate and rhythm   Breasts:  no discharge, erythema, or  tenderness   Abdomen:  soft, nontender, no RUQ tenderness   Uterine Size:  consistent with dates   Presentations:  cephalic   FHT:  145 BPM   Pelvis: External genitalia: normal external genitalia without lesions  Vaginal: without lesions or discharge   Cervix:     Dilation: 1.5 cm    Effacement: 70 %    Station:  -3       OB Lab History:     Group & Rh   Date Value Ref Range Status   08/09/2016 O POS  Final     Indirect Mabel   Date Value Ref Range Status   08/09/2016 NEG  Final     Lab Results   Component Value Date    WBC 9.77 03/19/2017    HGB 10.9 (L) 03/19/2017    HCT 33.3 (L) 03/19/2017    MCV 80 (L) 03/19/2017     03/19/2017     Lab Results   Component Value Date    TSH 3.492 12/20/2016     Lab Results   Component Value Date    RUBELLAIGGAN <5.0 (A) 08/09/2016     Lab Results   Component Value Date    RPR Non-reactive 08/09/2016     HIV 1/2 Ag/Ab   Date Value Ref Range Status   08/09/2016 Negative Negative Final     Hepatitis B Surface Ag   Date Value Ref Range Status   08/09/2016 Negative  Final     Results for orders placed or performed in visit on 12/20/16   OB Glucose Screen   Result Value Ref Range    OB Glucose Screen 154 (H) 70 - 140 mg/dL     Results for orders placed or performed in visit on 02/16/17   Strep B Screen, Vaginal / Rectal   Result Value Ref Range    Strep B Culture No Group B Streptococcus isolated      No results found for this or any previous visit.  Results for orders placed or performed in visit on 08/09/16   C. trachomatis/N. gonorrhoeae by AMP DNA Cervix   Result Value Ref Range    Chlamydia, Amplified DNA Negative     N gonorrhoeae, amplified DNA Negative        ASSESSMENT/PLAN:     IUP 39w6d gestation  Patient Active Problem List   Diagnosis    MAL (generalized anxiety disorder)    Social anxiety disorder    Excessive caffeine intake    Encounter for supervision of normal pregnancy in third trimester    Abnormal glucose tolerance test (GTT) during pregnancy,  antepartum    Diet controlled gestational diabetes mellitus (GDM) in third trimester    Pre-eclampsia in third trimester    Threatened labor at term         PLAN:     Admit to MD: Ana Rosa Lynch MD    Admit to: admitted to the hospital    IUP @ 39 6/7 WGA  Preeclampsia - preE labs notable for P/C ratio 1.3 and Uric acid 9.5; other pree labs wnl.  Will monitor BP closely and treat with IV antihypertensives and/or magnesium sulfate if severe.    GDMA1 - POCT glucose testing q 2 hours  Recheck cervix in 2 hours, if no change, augment with Pitocin.  GBS negative    Patient cleared for Anesthesia: Epidural     Anesthesia/Surgery risks, benefits, and alternative options discussed and understood by patient/family    Code Status: Full Code       Diagnostic Plan/Orders:  Orders Placed This Encounter   Procedures    Urinalysis    Urinalysis Microscopic    CBC with Auto Differential    RPR    Comprehensive metabolic panel    Lactate dehydrogenase    Uric acid    Protein / creatinine ratio, urine    Diet NPO Except for: Ice Chips, Other (Comment)    Continuous tocometry    Monitor fetal heart tones    Vital signs    Vital Signs (Specify Frequency)    Bed rest with bathroom privileges    Fetal monitoring    Continuous tocometry    Discharge Criteria    Notify clinical provider    Notify Physician    Vital signs- Early Labor(0-4 cm)    Vital Signs- Active Labor (4-10 cm)    Check Temperature, Membranes Intact    Check Temperature, Membranes Ruptured    Ambulate    Cervical Exam    Fetal / Uterine Monitoring    Fetal monitoring - scalp electrode    Straight Cath    [place]    Watch for excessive vaginal bleeding    Assess fundal height/uterine firmness, lochia, episiotomy    Discontinue oxytocin    Administer oxygen at 10 L/min per non rebreather face mask    Change position, roll left    Change position, roll right    Vital signs every 15 minutes    IP OB Labor Induction     done Notify Physician    Notify physician (specify)    Notify physician     External fetal monitoring    Discontinue Ringers Lactate with Oxytocin infusion    Early ambulation    Full code    Inpatient consult to Anesthesiology    POCT glucose    Type & Screen    Obtain Fetal nonstress test (NST)    Place in Observation    Admit to Inpatient        Scheduled Meds/IV Therapy:           lactated ringers 125 mL/hr at 03/19/17 1740    lactated ringers      oxytocin in lactated ringers         PRN Meds:    acetaminophen 500 mg Q6H PRN   butorphanol 1 mg Q4H PRN   lactated ringers 1,000 mL PRN   ondansetron 8 mg Q8H PRN   promethazine (PHENERGAN) IVPB 12.5 mg Q6H PRN

## 2023-03-10 LAB
ANION GAP SERPL CALC-SCNC: 10 MMOL/L — SIGNIFICANT CHANGE UP (ref 5–17)
APTT BLD: 24 SEC — LOW (ref 27.5–35.5)
BUN SERPL-MCNC: 24.2 MG/DL — HIGH (ref 8–20)
CALCIUM SERPL-MCNC: 8.9 MG/DL — SIGNIFICANT CHANGE UP (ref 8.4–10.5)
CHLORIDE SERPL-SCNC: 104 MMOL/L — SIGNIFICANT CHANGE UP (ref 96–108)
CO2 SERPL-SCNC: 23 MMOL/L — SIGNIFICANT CHANGE UP (ref 22–29)
CREAT SERPL-MCNC: 2 MG/DL — HIGH (ref 0.5–1.3)
EGFR: 41 ML/MIN/1.73M2 — LOW
GLUCOSE BLDC GLUCOMTR-MCNC: 104 MG/DL — HIGH (ref 70–99)
GLUCOSE BLDC GLUCOMTR-MCNC: 105 MG/DL — HIGH (ref 70–99)
GLUCOSE BLDC GLUCOMTR-MCNC: 88 MG/DL — SIGNIFICANT CHANGE UP (ref 70–99)
GLUCOSE BLDC GLUCOMTR-MCNC: 96 MG/DL — SIGNIFICANT CHANGE UP (ref 70–99)
GLUCOSE BLDC GLUCOMTR-MCNC: 97 MG/DL — SIGNIFICANT CHANGE UP (ref 70–99)
GLUCOSE SERPL-MCNC: 109 MG/DL — HIGH (ref 70–99)
HCT VFR BLD CALC: 34.5 % — LOW (ref 39–50)
HGB BLD-MCNC: 10.9 G/DL — LOW (ref 13–17)
MAGNESIUM SERPL-MCNC: 1.8 MG/DL — SIGNIFICANT CHANGE UP (ref 1.6–2.6)
MCHC RBC-ENTMCNC: 26.2 PG — LOW (ref 27–34)
MCHC RBC-ENTMCNC: 31.6 GM/DL — LOW (ref 32–36)
MCV RBC AUTO: 82.9 FL — SIGNIFICANT CHANGE UP (ref 80–100)
PLATELET # BLD AUTO: 155 K/UL — SIGNIFICANT CHANGE UP (ref 150–400)
POTASSIUM SERPL-MCNC: 4.9 MMOL/L — SIGNIFICANT CHANGE UP (ref 3.5–5.3)
POTASSIUM SERPL-SCNC: 4.9 MMOL/L — SIGNIFICANT CHANGE UP (ref 3.5–5.3)
RBC # BLD: 4.16 M/UL — LOW (ref 4.2–5.8)
RBC # FLD: 14.4 % — SIGNIFICANT CHANGE UP (ref 10.3–14.5)
SODIUM SERPL-SCNC: 137 MMOL/L — SIGNIFICANT CHANGE UP (ref 135–145)
WBC # BLD: 14.05 K/UL — HIGH (ref 3.8–10.5)
WBC # FLD AUTO: 14.05 K/UL — HIGH (ref 3.8–10.5)

## 2023-03-10 PROCEDURE — 93306 TTE W/DOPPLER COMPLETE: CPT | Mod: 26

## 2023-03-10 PROCEDURE — 99292 CRITICAL CARE ADDL 30 MIN: CPT

## 2023-03-10 PROCEDURE — 99024 POSTOP FOLLOW-UP VISIT: CPT

## 2023-03-10 PROCEDURE — 93010 ELECTROCARDIOGRAM REPORT: CPT

## 2023-03-10 PROCEDURE — 99291 CRITICAL CARE FIRST HOUR: CPT

## 2023-03-10 PROCEDURE — 99232 SBSQ HOSP IP/OBS MODERATE 35: CPT

## 2023-03-10 PROCEDURE — 71045 X-RAY EXAM CHEST 1 VIEW: CPT | Mod: 26

## 2023-03-10 RX ORDER — CARVEDILOL PHOSPHATE 80 MG/1
12.5 CAPSULE, EXTENDED RELEASE ORAL EVERY 12 HOURS
Refills: 0 | Status: DISCONTINUED | OUTPATIENT
Start: 2023-03-10 | End: 2023-03-11

## 2023-03-10 RX ORDER — ACETAMINOPHEN 500 MG
975 TABLET ORAL EVERY 8 HOURS
Refills: 0 | Status: DISCONTINUED | OUTPATIENT
Start: 2023-03-10 | End: 2023-03-11

## 2023-03-10 RX ORDER — ALBUMIN HUMAN 25 %
250 VIAL (ML) INTRAVENOUS ONCE
Refills: 0 | Status: COMPLETED | OUTPATIENT
Start: 2023-03-10 | End: 2023-03-10

## 2023-03-10 RX ORDER — SODIUM CHLORIDE 9 MG/ML
1000 INJECTION, SOLUTION INTRAVENOUS
Refills: 0 | Status: DISCONTINUED | OUTPATIENT
Start: 2023-03-10 | End: 2023-03-13

## 2023-03-10 RX ORDER — MAGNESIUM SULFATE 500 MG/ML
2 VIAL (ML) INJECTION ONCE
Refills: 0 | Status: COMPLETED | OUTPATIENT
Start: 2023-03-10 | End: 2023-03-10

## 2023-03-10 RX ORDER — SENNA PLUS 8.6 MG/1
2 TABLET ORAL AT BEDTIME
Refills: 0 | Status: DISCONTINUED | OUTPATIENT
Start: 2023-03-10 | End: 2023-03-18

## 2023-03-10 RX ORDER — CARVEDILOL PHOSPHATE 80 MG/1
12.5 CAPSULE, EXTENDED RELEASE ORAL EVERY 12 HOURS
Refills: 0 | Status: DISCONTINUED | OUTPATIENT
Start: 2023-03-10 | End: 2023-03-10

## 2023-03-10 RX ORDER — HEPARIN SODIUM 5000 [USP'U]/ML
5000 INJECTION INTRAVENOUS; SUBCUTANEOUS EVERY 8 HOURS
Refills: 0 | Status: DISCONTINUED | OUTPATIENT
Start: 2023-03-10 | End: 2023-03-18

## 2023-03-10 RX ORDER — ACETAMINOPHEN 500 MG
1000 TABLET ORAL ONCE
Refills: 0 | Status: COMPLETED | OUTPATIENT
Start: 2023-03-10 | End: 2023-03-10

## 2023-03-10 RX ADMIN — NICARDIPINE HYDROCHLORIDE 25 MG/HR: 30 CAPSULE, EXTENDED RELEASE ORAL at 14:22

## 2023-03-10 RX ADMIN — Medication 100 MILLIGRAM(S): at 09:28

## 2023-03-10 RX ADMIN — HYDROMORPHONE HYDROCHLORIDE 30 MILLILITER(S): 2 INJECTION INTRAMUSCULAR; INTRAVENOUS; SUBCUTANEOUS at 19:22

## 2023-03-10 RX ADMIN — Medication 975 MILLIGRAM(S): at 13:14

## 2023-03-10 RX ADMIN — Medication 975 MILLIGRAM(S): at 22:07

## 2023-03-10 RX ADMIN — Medication 400 MILLIGRAM(S): at 17:45

## 2023-03-10 RX ADMIN — Medication 100 MILLIGRAM(S): at 17:31

## 2023-03-10 RX ADMIN — NICARDIPINE HYDROCHLORIDE 25 MG/HR: 30 CAPSULE, EXTENDED RELEASE ORAL at 07:47

## 2023-03-10 RX ADMIN — Medication 25 GRAM(S): at 05:11

## 2023-03-10 RX ADMIN — SENNA PLUS 2 TABLET(S): 8.6 TABLET ORAL at 22:06

## 2023-03-10 RX ADMIN — Medication 125 MILLILITER(S): at 07:49

## 2023-03-10 RX ADMIN — HEPARIN SODIUM 5000 UNIT(S): 5000 INJECTION INTRAVENOUS; SUBCUTANEOUS at 12:44

## 2023-03-10 RX ADMIN — Medication 975 MILLIGRAM(S): at 23:07

## 2023-03-10 RX ADMIN — Medication 125 MILLILITER(S): at 05:12

## 2023-03-10 RX ADMIN — PANTOPRAZOLE SODIUM 40 MILLIGRAM(S): 20 TABLET, DELAYED RELEASE ORAL at 12:44

## 2023-03-10 RX ADMIN — HEPARIN SODIUM 5000 UNIT(S): 5000 INJECTION INTRAVENOUS; SUBCUTANEOUS at 22:07

## 2023-03-10 RX ADMIN — CARVEDILOL PHOSPHATE 12.5 MILLIGRAM(S): 80 CAPSULE, EXTENDED RELEASE ORAL at 18:32

## 2023-03-10 RX ADMIN — Medication 975 MILLIGRAM(S): at 12:44

## 2023-03-10 RX ADMIN — Medication 100 MILLIGRAM(S): at 00:07

## 2023-03-10 RX ADMIN — HYDROMORPHONE HYDROCHLORIDE 30 MILLILITER(S): 2 INJECTION INTRAMUSCULAR; INTRAVENOUS; SUBCUTANEOUS at 07:48

## 2023-03-10 RX ADMIN — Medication 1000 MILLIGRAM(S): at 18:15

## 2023-03-10 RX ADMIN — CARVEDILOL PHOSPHATE 12.5 MILLIGRAM(S): 80 CAPSULE, EXTENDED RELEASE ORAL at 12:42

## 2023-03-10 NOTE — PROGRESS NOTE ADULT - ASSESSMENT
42M w/ PMH of HTN (uncontrolled), Pulmonary Interstitial Disease (per chart review), CAD w/ stents (2021 in [place]), CKD (unknown baseline SCr), with recent admission for hypertensive urgency, was sent to [place] ED 2/28/23 from [facility] secondary to HTN urgency (). CTA C/A/P showed Type B dissection and he was transferred to [facility] CTICU for further management. Inpatient course significant for: RHC/LHC initially canceled 2/2 worsening renal function then complicated by right radial partial thrombosis, right cephalic and basilic vein thrombophlebitis, and HTN. On 3/9 underwent celiac and SMA Bypass via midline laparotomy, joint case with vascular/CT surgery, as part of staged procedure for TEVAR.    Plan:  -c/w BP control, wean IV meds as able  -monitor LE pulses  -monitor for bowel function  -will plan for subsequent endovascular intervention  -remainder of care per primary

## 2023-03-10 NOTE — CHART NOTE - NSCHARTNOTEFT_GEN_A_CORE
Source: Patient [ ]  Family [ ]   other [x ] EMR and staff     Current Diet: Diet, NPO (03-09-23 @ 13:40)    Current Weight:   3/10: 87 kg   3/5: 92.9 kg   3/2: 90.8 kg   2/28: 87 kg   (Generalized edema)     % Weight Change: Slight fluctuations in weight noted; will continue to monitor weights for trends.     Pertinent Medications: MEDICATIONS  (STANDING):  albumin human  5% IVPB 250 milliLiter(s) IV Intermittent every 3 hours  cefuroxime  IVPB 1500 milliGRAM(s) IV Intermittent every 8 hours  dextrose 5%. 1000 milliLiter(s) (50 mL/Hr) IV Continuous <Continuous>  dextrose 5%. 1000 milliLiter(s) (100 mL/Hr) IV Continuous <Continuous>  dextrose 50% Injectable 25 Gram(s) IV Push once  dextrose 50% Injectable 12.5 Gram(s) IV Push once  dextrose 50% Injectable 25 Gram(s) IV Push once  DOBUTamine Infusion 2 MICROgram(s)/kG/Min (5.22 mL/Hr) IV Continuous <Continuous>  glucagon  Injectable 1 milliGRAM(s) IntraMuscular once  HYDROmorphone PCA (1 mG/mL) 30 milliLiter(s) PCA Continuous PCA Continuous  insulin lispro (ADMELOG) corrective regimen sliding scale   SubCutaneous every 6 hours  niCARdipine Infusion 5 mG/Hr (25 mL/Hr) IV Continuous <Continuous>  pantoprazole  Injectable 40 milliGRAM(s) IV Push daily  sodium chloride 0.9%. 1000 milliLiter(s) (10 mL/Hr) IV Continuous <Continuous>  sodium chloride 0.9%. 1000 milliLiter(s) (10 mL/Hr) IV Continuous <Continuous>    MEDICATIONS  (PRN):  dextrose Oral Gel 15 Gram(s) Oral once PRN Blood Glucose LESS THAN 70 milliGRAM(s)/deciliter  naloxone Injectable 0.1 milliGRAM(s) IV Push every 3 minutes PRN For ANY of the following changes in patient status:  A. RR LESS THAN 10 breaths per minute, B. Oxygen saturation LESS THAN 90%, C. Sedation score of 6  ondansetron Injectable 4 milliGRAM(s) IV Push every 6 hours PRN Nausea    Pertinent Labs: CBC Full  -  ( 10 Mar 2023 03:20 )  WBC Count : 14.05 K/uL  RBC Count : 4.16 M/uL  Hemoglobin : 10.9 g/dL  Hematocrit : 34.5 %  Platelet Count - Automated : 155 K/uL  Mean Cell Volume : 82.9 fl  Mean Cell Hemoglobin : 26.2 pg  Mean Cell Hemoglobin Concentration : 31.6 gm/dL  Auto Neutrophil # : x  Auto Lymphocyte # : x  Auto Monocyte # : x  Auto Eosinophil # : x  Auto Basophil # : x  Auto Neutrophil % : x  Auto Lymphocyte % : x  Auto Monocyte % : x  Auto Eosinophil % : x  Auto Basophil % : x    Skin: Surgical site to midline abd     Nutrition focused physical exam conducted - found signs of malnutrition [x ]absent [ ]present    Subcutaneous fat loss: [ ] Orbital fat pads region, [ ]Buccal fat region, [ ]Triceps region,  [ ]Ribs region    Muscle wasting: [ ]Temples region, [ ]Clavicle region, [ ]Shoulder region, [ ]Scapula region, [ ]Interosseous region,  [ ]thigh region, [ ]Calf region    Estimated Needs:   [x ] no change since previous assessment  [ ] recalculated:     Hospital Course:   Pt is a 42M, poor historian, PMH of HTN (uncontrolled), Pulmonary Interstitial Disease (per chart review), CAD w/ stents (2021 in [place]), CKD (unknown baseline SCr), with recent admission for hypertensive urgency, was sent to [place] ED 2/28/23 from [facility] secondary to HTN urgency (). CTA C/A/P showed Type B dissection and he was transferred to [facility] CTICU for further management. Inpatient course significant for: RHC/LHC initially canceled 2/2 worsening renal function then complicated by right radial partial thrombosis, right cephalic and basilic vein thrombophlebitis, and HTN. On 3/9 underwent celiac and SMA Bypass.       Current Nutrition Diagnosis:  Pt remains at high nutrition risk secondary to increased nutrient needs related to increased physiologic demand for nutrient as evidenced by admission with Type B dissection and now s/p  celiac and SMA Bypass; pt extubated this morning. NPO status at this time. Recommendations below:      Recommendations:   1. RX: MVI and Vit. C daily (500mg).   2. Check weight daily to monitor trends   3. Clear liquid diet as/when medically feasible.     Monitoring and Evaluation:   [ ] PO intake [x ] Tolerance to diet prescription [X] Weights  [X] Follow up per protocol [X] Labs:

## 2023-03-10 NOTE — PROGRESS NOTE ADULT - PROBLEM SELECTOR PLAN 1
- Type B aortic dissection seen on CTA.  - CT surgery and vascular surgery team performed s/p iliac to celiac/sma bypass. He will undergo a staged TEVAR and carotid bypass in the next weeks but he will be discharged and brought back

## 2023-03-10 NOTE — PROGRESS NOTE ADULT - PROBLEM SELECTOR PLAN 4
ROSALINO resolved   Likely secondary to hypoperfusion from dissection vs STEVAN.  Continue to trend BMP.  Nephrology following.   Likely baseline SCr between 1.3-1.6.

## 2023-03-10 NOTE — PROGRESS NOTE ADULT - SUBJECTIVE AND OBJECTIVE BOX
Interval History: Patient underwent surgery and is doing well     Medications:  acetaminophen     Tablet .. 975 milliGRAM(s) Oral every 8 hours  carvedilol 12.5 milliGRAM(s) Oral every 12 hours  cefuroxime  IVPB 1500 milliGRAM(s) IV Intermittent every 8 hours  dextrose 5% + sodium chloride 0.45%. 1000 milliLiter(s) IV Continuous <Continuous>  dextrose 5%. 1000 milliLiter(s) IV Continuous <Continuous>  dextrose 5%. 1000 milliLiter(s) IV Continuous <Continuous>  dextrose 50% Injectable 25 Gram(s) IV Push once  dextrose 50% Injectable 12.5 Gram(s) IV Push once  dextrose 50% Injectable 25 Gram(s) IV Push once  dextrose Oral Gel 15 Gram(s) Oral once PRN  glucagon  Injectable 1 milliGRAM(s) IntraMuscular once  heparin   Injectable 5000 Unit(s) SubCutaneous every 8 hours  HYDROmorphone PCA (1 mG/mL) 30 milliLiter(s) PCA Continuous PCA Continuous  insulin lispro (ADMELOG) corrective regimen sliding scale   SubCutaneous every 6 hours  naloxone Injectable 0.1 milliGRAM(s) IV Push every 3 minutes PRN  niCARdipine Infusion 5 mG/Hr IV Continuous <Continuous>  ondansetron Injectable 4 milliGRAM(s) IV Push every 6 hours PRN  pantoprazole  Injectable 40 milliGRAM(s) IV Push daily  senna 2 Tablet(s) Oral at bedtime  sodium chloride 0.9%. 1000 milliLiter(s) IV Continuous <Continuous>  sodium chloride 0.9%. 1000 milliLiter(s) IV Continuous <Continuous>      Vitals:  T(C): 36.6 (03-10-23 @ 16:04), Max: 37.1 (03-10-23 @ 00:00)  HR: 82 (03-10-23 @ 17:00) (62 - 88)  BP: 132/95 (03-10-23 @ 17:00) (122/78 - 160/85)  BP(mean): 110 (03-10-23 @ 17:00) (92 - 124)  RR: 17 (03-10-23 @ 17:00) (12 - 26)  SpO2: 95% (03-10-23 @ 17:00) (90% - 99%)    Daily Height in cm: 162.6 (10 Mar 2023 02:17)    Daily     Weight (kg): 87 (03-10 @ 02:17)    I&O's Summary    09 Mar 2023 07:01  -  10 Mar 2023 07:00  --------------------------------------------------------  IN: 1672.7 mL / OUT: 1245 mL / NET: 427.7 mL    10 Mar 2023 07:01  -  10 Mar 2023 17:43  --------------------------------------------------------  IN: 920.6 mL / OUT: 370 mL / NET: 550.6 mL        Physical Exam:  Appearance: No Acute Distress  HEENT: PERRL  Neck: No JVD  Cardiovascular: Normal S1 S2, No murmurs/rubs/gallops  Respiratory: Clear to auscultation bilaterally  Gastrointestinal: Soft, Non-tender	  Skin: No cyanosis	  Neurologic: Non-focal  Extremities: No LE edema  Psychiatry: A & O x 3, Mood & affect appropriate    Labs:                        10.9   14.05 )-----------( 155      ( 10 Mar 2023 03:20 )             34.5     03-10    137  |  104  |  24.2<H>  ----------------------------<  109<H>  4.9   |  23.0  |  2.00<H>    Ca    8.9      10 Mar 2023 03:20  Mg     1.8     03-10    TPro  6.1<L>  /  Alb  3.5  /  TBili  1.0  /  DBili  x   /  AST  61<H>  /  ALT  55<H>  /  AlkPhos  70  03-09    PT/INR - ( 09 Mar 2023 15:07 )   PT: 15.3 sec;   INR: 1.32 ratio         PTT - ( 10 Mar 2023 03:20 )  PTT:24.0 sec  CARDIAC MARKERS ( 09 Mar 2023 15:07 )  x     / <0.01 ng/mL / 90 U/L / x     / x

## 2023-03-10 NOTE — PROGRESS NOTE ADULT - SUBJECTIVE AND OBJECTIVE BOX
INTERVAL HPI/OVERNIGHT EVENTS:    Patient evaluated at bedside. Now s/p iliac to celiac/sma bypass. Extubated overnight, remains on 2 dobutamine and 5 nicardipine to maintain BP goal. NAOE. Patient denies N/V/CP/SOB, no subjective fevers or chills. Pain well controlled.     MEDICATIONS  (STANDING):  albumin human  5% IVPB 250 milliLiter(s) IV Intermittent every 3 hours  cefuroxime  IVPB 1500 milliGRAM(s) IV Intermittent once  cefuroxime  IVPB 1500 milliGRAM(s) IV Intermittent every 8 hours  dextrose 5%. 1000 milliLiter(s) (50 mL/Hr) IV Continuous <Continuous>  dextrose 5%. 1000 milliLiter(s) (100 mL/Hr) IV Continuous <Continuous>  dextrose 50% Injectable 25 Gram(s) IV Push once  dextrose 50% Injectable 12.5 Gram(s) IV Push once  dextrose 50% Injectable 25 Gram(s) IV Push once  DOBUTamine Infusion 2 MICROgram(s)/kG/Min (5.22 mL/Hr) IV Continuous <Continuous>  glucagon  Injectable 1 milliGRAM(s) IntraMuscular once  HYDROmorphone PCA (1 mG/mL) 30 milliLiter(s) PCA Continuous PCA Continuous  insulin lispro (ADMELOG) corrective regimen sliding scale   SubCutaneous every 6 hours  niCARdipine Infusion 5 mG/Hr (25 mL/Hr) IV Continuous <Continuous>  pantoprazole  Injectable 40 milliGRAM(s) IV Push daily  sodium chloride 0.9%. 1000 milliLiter(s) (10 mL/Hr) IV Continuous <Continuous>  sodium chloride 0.9%. 1000 milliLiter(s) (10 mL/Hr) IV Continuous <Continuous>    MEDICATIONS  (PRN):  dextrose Oral Gel 15 Gram(s) Oral once PRN Blood Glucose LESS THAN 70 milliGRAM(s)/deciliter  naloxone Injectable 0.1 milliGRAM(s) IV Push every 3 minutes PRN For ANY of the following changes in patient status:  A. RR LESS THAN 10 breaths per minute, B. Oxygen saturation LESS THAN 90%, C. Sedation score of 6  ondansetron Injectable 4 milliGRAM(s) IV Push every 6 hours PRN Nausea      Vital Signs Last 24 Hrs  T(C): 36.6 (10 Mar 2023 08:00), Max: 37.1 (10 Mar 2023 00:00)  T(F): 97.9 (10 Mar 2023 08:00), Max: 98.8 (10 Mar 2023 00:00)  HR: 74 (10 Mar 2023 08:00) (62 - 88)  BP: 125/73 (10 Mar 2023 08:00) (122/78 - 158/76)  BP(mean): 92 (10 Mar 2023 08:00) (92 - 124)  RR: 15 (10 Mar 2023 08:00) (6 - 23)  SpO2: 94% (10 Mar 2023 08:00) (90% - 100%)    Parameters below as of 10 Mar 2023 04:00  Patient On (Oxygen Delivery Method): nasal cannula  O2 Flow (L/min): 4      Constitutional: NAD  HEENT: PERRLA, EOMI, no drainage or redness  Respiratory: respirations even, unlabored on 4L NC  Cardiovascular: RRR, normotensive on dobutaminenicardipine gtts  Gastrointestinal: distended, appropriate TTP, mindline wound with silver mepilex in place w/o strikethrough  Vascular: palpable dp/pt b/l  Neurological: A&O x 3; no sensory, motor or coordination deficits, normal reflexes  Psychiatric: Normal mood, normal affect  Musculoskeletal: No joint pain, swelling or deformity; no limitation of movement  Skin: No rashes      I&O's Detail    09 Mar 2023 07:01  -  10 Mar 2023 07:00  --------------------------------------------------------  IN:    Albumin 5%  - 250 mL: 250 mL    Dexmedetomidine: 8.8 mL    DOBUTamine: 88.9 mL    IV PiggyBack: 50 mL    IV PiggyBack: 100 mL    Lactated Ringers Bolus: 500 mL    NiCARdipine: 325 mL    Propofol: 60 mL    sodium chloride 0.9%: 120 mL    sodium chloride 0.9%: 170 mL  Total IN: 1672.7 mL    OUT:    Indwelling Catheter - Urethral (mL): 1245 mL    Norepinephrine: 0 mL  Total OUT: 1245 mL    Total NET: 427.7 mL      10 Mar 2023 07:01  -  10 Mar 2023 09:13  --------------------------------------------------------  IN:    Albumin 5%  - 250 mL: 250 mL    DOBUTamine: 10.4 mL    NiCARdipine: 25 mL    sodium chloride 0.9%: 30 mL    sodium chloride 0.9%: 20 mL  Total IN: 335.4 mL    OUT:    Indwelling Catheter - Urethral (mL): 60 mL  Total OUT: 60 mL    Total NET: 275.4 mL          LABS:                        10.9   14.05 )-----------( 155      ( 10 Mar 2023 03:20 )             34.5     03-10    137  |  104  |  24.2<H>  ----------------------------<  109<H>  4.9   |  23.0  |  2.00<H>    Ca    8.9      10 Mar 2023 03:20  Mg     1.8     03-10    TPro  6.1<L>  /  Alb  3.5  /  TBili  1.0  /  DBili  x   /  AST  61<H>  /  ALT  55<H>  /  AlkPhos  70  03-09    PT/INR - ( 09 Mar 2023 15:07 )   PT: 15.3 sec;   INR: 1.32 ratio         PTT - ( 10 Mar 2023 03:20 )  PTT:24.0 sec

## 2023-03-10 NOTE — PROGRESS NOTE ADULT - PROBLEM SELECTOR PLAN 1
S/p Celiac and SMA bypass. Plan for staged procedure with TEVAR  Wean IV anti-hypertensive agents as tolerated  Continue dobutamine for inotropic support.  Encourage PO intake  Encourage OOB to chair and ambulation with PT  Encourage deep breathing exercised and coughing  Chest PT and IS use with bedside nurse   Nephrology following. Likely baseline SCr between 1.3-1.6.   Heart failure team following for chronic systolic heart failure.

## 2023-03-10 NOTE — PROGRESS NOTE ADULT - ASSESSMENT
42M, poor historian, PMH of HTN (uncontrolled), Pulmonary Interstitial Disease (per chart review), CAD w/ stents (2021 in [place]), CKD (unknown baseline SCr), with recent admission for hypertensive urgency, was sent to [place] [facility] 2/28/23 from [facility] secondary to HTN urgency (). CTA C/A/P showed Type B dissection and he was transferred to [facility] CTICU for further management. Inpatient course significant for: RHC/LHC initially canceled 2/2 worsening renal function then complicated by right radial partial thrombosis, right cephalic and basilic vein thrombophlebitis, and HTN. On 3/9 underwent celiac and SMA Bypass.

## 2023-03-10 NOTE — PROGRESS NOTE ADULT - SUBJECTIVE AND OBJECTIVE BOX
Subjective - patient seen and evaluated bedside. Sitting comfortably in bed. Admits to midline abdominal incisional pain. Denies CP, SOB, HA, dizziness, n/v/d    Review of Systems: negative x 10 systems except as noted above    Brief summary:  47yMale POD# 1 Celiac/SMA bypass via midline laparotomy    Significant/[id number] events: extubated. no acute events.      PAST MEDICAL & SURGICAL HISTORY:  Uncontrolled hypertension      CKD (chronic kidney disease)      Interstitial lung disease      Prophylactic measure            albumin human  5% IVPB 250 milliLiter(s) IV Intermittent every 3 hours  cefuroxime  IVPB 1500 milliGRAM(s) IV Intermittent once  cefuroxime  IVPB 1500 milliGRAM(s) IV Intermittent every 8 hours  dexMEDEtomidine Infusion 0.2 MICROgram(s)/kG/Hr IV Continuous <Continuous>  dextrose 5%. 1000 milliLiter(s) IV Continuous <Continuous>  dextrose 5%. 1000 milliLiter(s) IV Continuous <Continuous>  dextrose 50% Injectable 25 Gram(s) IV Push once  dextrose 50% Injectable 12.5 Gram(s) IV Push once  dextrose 50% Injectable 25 Gram(s) IV Push once  dextrose Oral Gel 15 Gram(s) Oral once PRN  DOBUTamine Infusion 2 MICROgram(s)/kG/Min IV Continuous <Continuous>  glucagon  Injectable 1 milliGRAM(s) IntraMuscular once  HYDROmorphone PCA (1 mG/mL) 30 milliLiter(s) PCA Continuous PCA Continuous  insulin lispro (ADMELOG) corrective regimen sliding scale   SubCutaneous every 6 hours  naloxone Injectable 0.1 milliGRAM(s) IV Push every 3 minutes PRN  niCARdipine Infusion 5 mG/Hr IV Continuous <Continuous>  norepinephrine Infusion 0.05 MICROgram(s)/kG/Min IV Continuous <Continuous>  ondansetron Injectable 4 milliGRAM(s) IV Push every 6 hours PRN  pantoprazole  Injectable 40 milliGRAM(s) IV Push daily  sodium chloride 0.9%. 1000 milliLiter(s) IV Continuous <Continuous>  sodium chloride 0.9%. 1000 milliLiter(s) IV Continuous <Continuous>  MEDICATIONS  (PRN):  dextrose Oral Gel 15 Gram(s) Oral once PRN Blood Glucose LESS THAN 70 milliGRAM(s)/deciliter  naloxone Injectable 0.1 milliGRAM(s) IV Push every 3 minutes PRN For ANY of the following changes in patient status:  A. RR LESS THAN 10 breaths per minute, B. Oxygen saturation LESS THAN 90%, C. Sedation score of 6  ondansetron Injectable 4 milliGRAM(s) IV Push every 6 hours PRN Nausea    Height (cm): 162.6 ( @ 06:30)  Weight (kg): 87 ( @ 06:30)  BMI (kg/m2): 32.9 ( @ 06:30)  BSA (m2): 1.92 ( @ 06:30)Mode: CPAP with PS, FiO2: 30, PEEP: 5, PS: 5, MAP: 8  Daily Height in cm: 162.6 (09 Mar 2023 06:30)    Daily Weight in k.5 (09 Mar 2023 05:00)      ABG - ( 09 Mar 2023 18:49 )  pH, Arterial: 7.340 pH, Blood: x     /  pCO2: 44    /  pO2: 71    / HCO3: 24    / Base Excess: -2.1  /  SaO2: 95.1                                    10.4   11.98 )-----------( 132      ( 09 Mar 2023 15:07 )             32.3   03    133<L>  |  99  |  23.3<H>  ----------------------------<  120<H>  4.2   |  22.0  |  1.45<H>    Ca    8.6      09 Mar 2023 15:07  Mg     1.5     03-    TPro  6.1<L>  /  Alb  3.5  /  TBili  1.0  /  DBili  x   /  AST  61<H>  /  ALT  55<H>  /  AlkPhos  70  03-09    CARDIAC MARKERS ( 09 Mar 2023 15:07 )  x     / <0.01 ng/mL / 90 U/L / x     / x        PT/INR - ( 09 Mar 2023 15:07 )   PT: 15.3 sec;   INR: 1.32 ratio         PTT - ( 09 Mar 2023 15:07 )  PTT:25.7 sec      Objective:  T(C): 37.1 (03-10-23 @ 00:00), Max: 37.1 (03-10-23 @ 00:00)  HR: 78 (03-10-23 @ 02:00) (62 - 86)  BP: 154/80 (03-10-23 @ 02:00) (122/78 - 162/107)  RR: 15 (03-10-23 @ 02:00) (6 - 23)  SpO2: 91% (03-10-23 @ 02:00) (90% - 100%)  Wt(kg): --CAPILLARY BLOOD GLUCOSE      POCT Blood Glucose.: 104 mg/dL (10 Mar 2023 00:12)  POCT Blood Glucose.: 122 mg/dL (09 Mar 2023 18:19)  I&O's Summary    08 Mar 2023 07:01  -  09 Mar 2023 07:00  --------------------------------------------------------  IN: 0 mL / OUT: 950 mL / NET: -950 mL    09 Mar 2023 07:01  -  10 Mar 2023 02:17  --------------------------------------------------------  IN: 1071.5 mL / OUT: 1095 mL / NET: -23.5 mL        Physical Exam  General: NAD  Neuro: A+O x 3, non-focal, speech clear and intact  Psych: Appropriate affect  HEENT:  NCAT, No conjuctival edema or icterus, no thrush.  Pulm: CTA, equal bilaterally  CV: RRR, +S1S2  Abd: softly distended, NT, hypoactive bowel sounds   Ext: +DP Pulses b/l, no edema  Skin: Warm, dry, intact  Inc: abdominal incision C/D/I/stable w/ dressing,         Imaging:      < from: Xray Chest 1 View- PORTABLE-Routine (Xray Chest 1 View- PORTABLE-Routine .) (23 @ 15:32) >    IMPRESSION:  1. Endotracheal tube tip resides within the right mainstem bronchus. MARTINA Sales notified of this finding by [employer] Teams.  2. [place] catheter in the pulmonary outflow tract.  3. Cardiomegaly including the left ventricle, but without pulmonary edema.    --- End of Report ---    < end of copied text >

## 2023-03-10 NOTE — PROGRESS NOTE ADULT - ASSESSMENT
43 yo M, originally from the  emigrated ~ 2008, with a history of HTN (uncontrolled diagnosed 5 years ago), interstitial lung disease, CAD w/ stents (? for a leaking blood vessel/dissection), CKD (unknown baseline) initially presented to [facility] 2/28/23 from [facility] where he had gone for a routine physical exam and to get his medications filled.  He had previously not seen a physician for over 3 years.  He was last seen in [facility] ED in 2018 for hypertensive urgency requiring IV labetalol and transition to lisinopril/ HCTZ/ amlodipine.  However, he has been continuously filling his home meds from the  including losartan 320mg daily, nifedipine 60mg ER daily, and rosuvastatin 40mg daily.  Due to inability to obtain all his medications, he has been unable to obtain recent doses so he has been taking half his medication daily.  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 He was started on labetalol and nifedipine drips and transferred to [facility]ICU for further management. He was initially treated with esmolol and nicardipine gtts. Underwent R/LHC 3/3 which revealed normal coronaries and elevated LVEDP 19. Subsequently developed right radial, cephalic and basilic vein thrombi for which he is on heparin gtt. Vascular consulted and no intervention required-clinically improving.     Cardiac Testing:  Echo 2/28/23: LVEF 20-25%, LVEDD 6./23cm, LVIDs 5.59cm, TAPSE 2.54cm, mild to mod MR, trivial TR, RV normal function.

## 2023-03-10 NOTE — PROGRESS NOTE ADULT - PROBLEM SELECTOR PLAN 2
- Etiology: NICMP (LHC 3/3 revealed normal coronaries and no prior stents). Likely due to hypertensive heart disease with increased wall thickness on echo.  - RHC 3/3 showed LVEDP 19, PAP 40/24/31.   - Appears euvolemic today  - Currently on coreg 12.5mg BID, start losartan 25mg daily and the day after would start spironolactone 25mg daily (he   - Overall, he has poor outpatient care and unsure if he has insurance.  Will need to establish care with local cardiology and HF teams but he travels between [place] and the

## 2023-03-10 NOTE — PROGRESS NOTE ADULT - SUBJECTIVE AND OBJECTIVE BOX
FABIANA CALIXTO   MRN#: [id number]     The patient is a 47y Male who was seen, evaluated, & examined with the CTICU staff on rounds and later in the day with a multidisciplinary care plan formulated & implemented.  All available clinical, laboratory, radiographic, pharmacologic, electrocardiographic, and intraoperative  data were reviewed & analyzed.      The patient was in the CTICU in critical condition at risk for imminent decompensation secondary to persistent cardiopulmonary dysfunction, cardiovascular dysfunction, hemodynamically significant hypovolemia-shock, and stress hyperglycemia.      Respiratory status required supplemental oxygen, the following of ABG’s with A-line monitoring, and continuous pulse oximetry monitoring for support & to evaluate for & prevent further decompensation secondary to persistent cardiopulmonary dysfunction and cardiovascular dysfunction.       Invasive hemodynamic monitoring with a PA catheter & an A-line were required for the following of serial CI’s/MVO2’s & continuous MAP/BP monitoring to ensure adequate cardiovascular support and to evaluate for & help prevent decompensation while receiving intermittent volume expansion, an IV Cardene drip, and an IV Dobutamine drip secondary to cardiovascular dysfunction and hemodynamically significant hypovolemia-shock.    Metabolic stability, stress hyperglycemia, & infection prophylaxis required an IV regular Insulin drip & the following of serial glucose levels to help achieve & maintain euglycemia.  Based upon my evaluation and review I maintained the current metabolic therapy.    Patient required critical care management and is at risk for life threatening decompensation  I provided 75 minutes of non-continuous care to the patient.   FABIANA CALIXTO   MRN#: [id number]     The patient is a 47y Male who was seen, evaluated, & examined with the CTICU staff on rounds and later in the day with a multidisciplinary care plan formulated & implemented.  All available clinical, laboratory, radiographic, pharmacologic, electrocardiographic, and intraoperative  data were reviewed & analyzed.      The patient was in the CTICU in critical condition at risk for imminent decompensation secondary to persistent cardiopulmonary dysfunction, cardiovascular dysfunction, hemodynamically significant hypovolemia-shock, and stress hyperglycemia.      Respiratory status required supplemental oxygen, the following of ABG’s with A-line monitoring, and continuous pulse oximetry monitoring for support & to evaluate for & prevent further decompensation secondary to persistent cardiopulmonary dysfunction and cardiovascular dysfunction.       Invasive hemodynamic monitoring with a PA catheter & an A-line were required for the following of serial CI’s/MVO2’s & continuous MAP/BP monitoring to ensure adequate cardiovascular support and to evaluate for & help prevent decompensation while receiving intermittent volume expansion, an IV Cardene drip, and an IV Dobutamine drip secondary to cardiovascular dysfunction and hemodynamically significant hypovolemia-shock.    Patient required critical care management and is at risk for life threatening decompensation  I provided 75 minutes of non-continuous care to the patient.

## 2023-03-11 LAB
ANION GAP SERPL CALC-SCNC: 13 MMOL/L — SIGNIFICANT CHANGE UP (ref 5–17)
ANION GAP SERPL CALC-SCNC: 14 MMOL/L — SIGNIFICANT CHANGE UP (ref 5–17)
BUN SERPL-MCNC: 21.2 MG/DL — HIGH (ref 8–20)
BUN SERPL-MCNC: 31.7 MG/DL — HIGH (ref 8–20)
CALCIUM SERPL-MCNC: 9.1 MG/DL — SIGNIFICANT CHANGE UP (ref 8.4–10.5)
CALCIUM SERPL-MCNC: 9.2 MG/DL — SIGNIFICANT CHANGE UP (ref 8.4–10.5)
CHLORIDE SERPL-SCNC: 102 MMOL/L — SIGNIFICANT CHANGE UP (ref 96–108)
CHLORIDE SERPL-SCNC: 102 MMOL/L — SIGNIFICANT CHANGE UP (ref 96–108)
CO2 SERPL-SCNC: 22 MMOL/L — SIGNIFICANT CHANGE UP (ref 22–29)
CO2 SERPL-SCNC: 23 MMOL/L — SIGNIFICANT CHANGE UP (ref 22–29)
CREAT SERPL-MCNC: 1.28 MG/DL — SIGNIFICANT CHANGE UP (ref 0.5–1.3)
CREAT SERPL-MCNC: 2.05 MG/DL — HIGH (ref 0.5–1.3)
EGFR: 40 ML/MIN/1.73M2 — LOW
EGFR: 70 ML/MIN/1.73M2 — SIGNIFICANT CHANGE UP
GLUCOSE BLDC GLUCOMTR-MCNC: 113 MG/DL — HIGH (ref 70–99)
GLUCOSE BLDC GLUCOMTR-MCNC: 119 MG/DL — HIGH (ref 70–99)
GLUCOSE SERPL-MCNC: 108 MG/DL — HIGH (ref 70–99)
GLUCOSE SERPL-MCNC: 117 MG/DL — HIGH (ref 70–99)
HCT VFR BLD CALC: 33 % — LOW (ref 39–50)
HGB BLD-MCNC: 9.9 G/DL — LOW (ref 13–17)
LACTATE SERPL-SCNC: 0.6 MMOL/L — SIGNIFICANT CHANGE UP (ref 0.5–2)
MAGNESIUM SERPL-MCNC: 2.5 MG/DL — SIGNIFICANT CHANGE UP (ref 1.6–2.6)
MCHC RBC-ENTMCNC: 25.6 PG — LOW (ref 27–34)
MCHC RBC-ENTMCNC: 30 GM/DL — LOW (ref 32–36)
MCV RBC AUTO: 85.3 FL — SIGNIFICANT CHANGE UP (ref 80–100)
PLATELET # BLD AUTO: 140 K/UL — LOW (ref 150–400)
POTASSIUM SERPL-MCNC: 4.5 MMOL/L — SIGNIFICANT CHANGE UP (ref 3.5–5.3)
POTASSIUM SERPL-MCNC: 4.6 MMOL/L — SIGNIFICANT CHANGE UP (ref 3.5–5.3)
POTASSIUM SERPL-SCNC: 4.5 MMOL/L — SIGNIFICANT CHANGE UP (ref 3.5–5.3)
POTASSIUM SERPL-SCNC: 4.6 MMOL/L — SIGNIFICANT CHANGE UP (ref 3.5–5.3)
RBC # BLD: 3.87 M/UL — LOW (ref 4.2–5.8)
RBC # FLD: 14.7 % — HIGH (ref 10.3–14.5)
SODIUM SERPL-SCNC: 138 MMOL/L — SIGNIFICANT CHANGE UP (ref 135–145)
SODIUM SERPL-SCNC: 138 MMOL/L — SIGNIFICANT CHANGE UP (ref 135–145)
WBC # BLD: 10.51 K/UL — HIGH (ref 3.8–10.5)
WBC # FLD AUTO: 10.51 K/UL — HIGH (ref 3.8–10.5)

## 2023-03-11 PROCEDURE — 71045 X-RAY EXAM CHEST 1 VIEW: CPT | Mod: 26

## 2023-03-11 PROCEDURE — 74018 RADEX ABDOMEN 1 VIEW: CPT | Mod: 26

## 2023-03-11 PROCEDURE — 99291 CRITICAL CARE FIRST HOUR: CPT

## 2023-03-11 RX ORDER — METOPROLOL TARTRATE 50 MG
5 TABLET ORAL ONCE
Refills: 0 | Status: COMPLETED | OUTPATIENT
Start: 2023-03-11 | End: 2023-03-11

## 2023-03-11 RX ORDER — ACETAMINOPHEN 500 MG
1000 TABLET ORAL ONCE
Refills: 0 | Status: COMPLETED | OUTPATIENT
Start: 2023-03-11 | End: 2023-03-11

## 2023-03-11 RX ORDER — AMLODIPINE BESYLATE 2.5 MG/1
10 TABLET ORAL DAILY
Refills: 0 | Status: DISCONTINUED | OUTPATIENT
Start: 2023-03-11 | End: 2023-03-13

## 2023-03-11 RX ORDER — LIDOCAINE 4 G/100G
1 CREAM TOPICAL ONCE
Refills: 0 | Status: DISCONTINUED | OUTPATIENT
Start: 2023-03-11 | End: 2023-03-11

## 2023-03-11 RX ORDER — BENZOCAINE 10 %
1 GEL (GRAM) MUCOUS MEMBRANE EVERY 4 HOURS
Refills: 0 | Status: DISCONTINUED | OUTPATIENT
Start: 2023-03-11 | End: 2023-03-18

## 2023-03-11 RX ORDER — DOXAZOSIN MESYLATE 4 MG
4 TABLET ORAL
Refills: 0 | Status: DISCONTINUED | OUTPATIENT
Start: 2023-03-11 | End: 2023-03-13

## 2023-03-11 RX ORDER — ACETAMINOPHEN 500 MG
1000 TABLET ORAL ONCE
Refills: 0 | Status: COMPLETED | OUTPATIENT
Start: 2023-03-12 | End: 2023-03-12

## 2023-03-11 RX ORDER — CARVEDILOL PHOSPHATE 80 MG/1
25 CAPSULE, EXTENDED RELEASE ORAL EVERY 12 HOURS
Refills: 0 | Status: DISCONTINUED | OUTPATIENT
Start: 2023-03-11 | End: 2023-03-13

## 2023-03-11 RX ORDER — METOCLOPRAMIDE HCL 10 MG
5 TABLET ORAL EVERY 8 HOURS
Refills: 0 | Status: DISCONTINUED | OUTPATIENT
Start: 2023-03-11 | End: 2023-03-12

## 2023-03-11 RX ORDER — ACETAMINOPHEN 500 MG
1000 TABLET ORAL ONCE
Refills: 0 | Status: COMPLETED | OUTPATIENT
Start: 2023-03-12 | End: 2023-03-11

## 2023-03-11 RX ORDER — HYDROMORPHONE HYDROCHLORIDE 2 MG/ML
0.5 INJECTION INTRAMUSCULAR; INTRAVENOUS; SUBCUTANEOUS
Refills: 0 | Status: DISCONTINUED | OUTPATIENT
Start: 2023-03-11 | End: 2023-03-16

## 2023-03-11 RX ORDER — HYDROMORPHONE HYDROCHLORIDE 2 MG/ML
1 INJECTION INTRAMUSCULAR; INTRAVENOUS; SUBCUTANEOUS ONCE
Refills: 0 | Status: DISCONTINUED | OUTPATIENT
Start: 2023-03-11 | End: 2023-03-11

## 2023-03-11 RX ORDER — CARVEDILOL PHOSPHATE 80 MG/1
12.5 CAPSULE, EXTENDED RELEASE ORAL ONCE
Refills: 0 | Status: COMPLETED | OUTPATIENT
Start: 2023-03-11 | End: 2023-03-11

## 2023-03-11 RX ADMIN — Medication 400 MILLIGRAM(S): at 23:13

## 2023-03-11 RX ADMIN — NICARDIPINE HYDROCHLORIDE 25 MG/HR: 30 CAPSULE, EXTENDED RELEASE ORAL at 06:07

## 2023-03-11 RX ADMIN — CARVEDILOL PHOSPHATE 12.5 MILLIGRAM(S): 80 CAPSULE, EXTENDED RELEASE ORAL at 12:07

## 2023-03-11 RX ADMIN — Medication 400 MILLIGRAM(S): at 17:30

## 2023-03-11 RX ADMIN — AMLODIPINE BESYLATE 10 MILLIGRAM(S): 2.5 TABLET ORAL at 09:30

## 2023-03-11 RX ADMIN — Medication 4 MILLIGRAM(S): at 20:26

## 2023-03-11 RX ADMIN — Medication 1000 MILLIGRAM(S): at 18:00

## 2023-03-11 RX ADMIN — Medication 5 MILLIGRAM(S): at 17:31

## 2023-03-11 RX ADMIN — SODIUM CHLORIDE 75 MILLILITER(S): 9 INJECTION, SOLUTION INTRAVENOUS at 22:30

## 2023-03-11 RX ADMIN — CARVEDILOL PHOSPHATE 25 MILLIGRAM(S): 80 CAPSULE, EXTENDED RELEASE ORAL at 17:30

## 2023-03-11 RX ADMIN — CARVEDILOL PHOSPHATE 12.5 MILLIGRAM(S): 80 CAPSULE, EXTENDED RELEASE ORAL at 05:04

## 2023-03-11 RX ADMIN — Medication 100 MILLIGRAM(S): at 00:06

## 2023-03-11 RX ADMIN — HYDROMORPHONE HYDROCHLORIDE 1 MILLIGRAM(S): 2 INJECTION INTRAMUSCULAR; INTRAVENOUS; SUBCUTANEOUS at 18:20

## 2023-03-11 RX ADMIN — Medication 5 MILLIGRAM(S): at 18:56

## 2023-03-11 RX ADMIN — HEPARIN SODIUM 5000 UNIT(S): 5000 INJECTION INTRAVENOUS; SUBCUTANEOUS at 23:13

## 2023-03-11 RX ADMIN — Medication 5 MILLIGRAM(S): at 22:12

## 2023-03-11 RX ADMIN — Medication 4 MILLIGRAM(S): at 09:30

## 2023-03-11 RX ADMIN — ONDANSETRON 4 MILLIGRAM(S): 8 TABLET, FILM COATED ORAL at 05:03

## 2023-03-11 RX ADMIN — SENNA PLUS 2 TABLET(S): 8.6 TABLET ORAL at 22:12

## 2023-03-11 RX ADMIN — HEPARIN SODIUM 5000 UNIT(S): 5000 INJECTION INTRAVENOUS; SUBCUTANEOUS at 05:04

## 2023-03-11 RX ADMIN — HYDROMORPHONE HYDROCHLORIDE 0.5 MILLIGRAM(S): 2 INJECTION INTRAMUSCULAR; INTRAVENOUS; SUBCUTANEOUS at 22:12

## 2023-03-11 RX ADMIN — HYDROMORPHONE HYDROCHLORIDE 0.5 MILLIGRAM(S): 2 INJECTION INTRAMUSCULAR; INTRAVENOUS; SUBCUTANEOUS at 22:56

## 2023-03-11 RX ADMIN — HYDROMORPHONE HYDROCHLORIDE 1 MILLIGRAM(S): 2 INJECTION INTRAMUSCULAR; INTRAVENOUS; SUBCUTANEOUS at 17:56

## 2023-03-11 RX ADMIN — Medication 1 SPRAY(S): at 22:08

## 2023-03-11 RX ADMIN — PANTOPRAZOLE SODIUM 40 MILLIGRAM(S): 20 TABLET, DELAYED RELEASE ORAL at 17:31

## 2023-03-11 RX ADMIN — HYDROMORPHONE HYDROCHLORIDE 30 MILLILITER(S): 2 INJECTION INTRAMUSCULAR; INTRAVENOUS; SUBCUTANEOUS at 07:16

## 2023-03-11 RX ADMIN — HEPARIN SODIUM 5000 UNIT(S): 5000 INJECTION INTRAVENOUS; SUBCUTANEOUS at 17:31

## 2023-03-11 RX ADMIN — Medication 975 MILLIGRAM(S): at 05:03

## 2023-03-11 RX ADMIN — Medication 975 MILLIGRAM(S): at 06:03

## 2023-03-11 NOTE — PROGRESS NOTE ADULT - ASSESSMENT
42M w/ PMH of HTN (uncontrolled), Pulmonary Interstitial Disease (per chart review), CAD w/ stents (2021 in [place]), CKD (unknown baseline SCr), with recent admission for hypertensive urgency, was sent to [facility] 2/28/23 from [facility] secondary to HTN urgency (). CTA C/A/P showed Type B dissection and he was transferred to [facility] for further management. Inpatient course significant for: RHC/LHC initially canceled 2/2 worsening renal function then complicated by right radial partial thrombosis, right cephalic and basilic vein thrombophlebitis, and HTN. On 3/9 underwent celiac and SMA Bypass via midline laparotomy, joint case with vascular/CT surgery. Recovering slowly.    Plan:   - Awaiting TEVAR  -c/w BP control, wean IV meds as able  -monitor LE pulses  -monitor for bowel function

## 2023-03-11 NOTE — PROGRESS NOTE ADULT - PROBLEM SELECTOR PLAN 3
Continue BP control  Currently on nicardipine gtt  pt started on po correg 12.5 bid  Will discuss transition to PO meds on AM rounds

## 2023-03-11 NOTE — PROGRESS NOTE ADULT - SUBJECTIVE AND OBJECTIVE BOX
INTERVAL HPI: No acute events overnight.   Pain is well controlled.  Tolerating diet. Denies F/C/N/V/CP/SOB.     MEDICATIONS  (STANDING):  acetaminophen     Tablet .. 975 milliGRAM(s) Oral every 8 hours  carvedilol 12.5 milliGRAM(s) Oral every 12 hours  dextrose 5% + sodium chloride 0.45%. 1000 milliLiter(s) (75 mL/Hr) IV Continuous <Continuous>  dextrose 5%. 1000 milliLiter(s) (100 mL/Hr) IV Continuous <Continuous>  dextrose 5%. 1000 milliLiter(s) (50 mL/Hr) IV Continuous <Continuous>  dextrose 50% Injectable 25 Gram(s) IV Push once  dextrose 50% Injectable 12.5 Gram(s) IV Push once  dextrose 50% Injectable 25 Gram(s) IV Push once  glucagon  Injectable 1 milliGRAM(s) IntraMuscular once  heparin   Injectable 5000 Unit(s) SubCutaneous every 8 hours  HYDROmorphone PCA (1 mG/mL) 30 milliLiter(s) PCA Continuous PCA Continuous  insulin lispro (ADMELOG) corrective regimen sliding scale   SubCutaneous every 6 hours  niCARdipine Infusion 5 mG/Hr (25 mL/Hr) IV Continuous <Continuous>  pantoprazole  Injectable 40 milliGRAM(s) IV Push daily  senna 2 Tablet(s) Oral at bedtime  sodium chloride 0.9%. 1000 milliLiter(s) (20 mL/Hr) IV Continuous <Continuous>  sodium chloride 0.9%. 1000 milliLiter(s) (10 mL/Hr) IV Continuous <Continuous>      MEDICATIONS  (PRN):  dextrose Oral Gel 15 Gram(s) Oral once PRN Blood Glucose LESS THAN 70 milliGRAM(s)/deciliter  naloxone Injectable 0.1 milliGRAM(s) IV Push every 3 minutes PRN For ANY of the following changes in patient status:  A. RR LESS THAN 10 breaths per minute, B. Oxygen saturation LESS THAN 90%, C. Sedation score of 6  ondansetron Injectable 4 milliGRAM(s) IV Push every 6 hours PRN Nausea      Allergies:  No Known Allergies      PAST MEDICAL & SURGICAL HISTORY:  Uncontrolled hypertension      CKD (chronic kidney disease)      Interstitial lung disease      Prophylactic measure          FAMILY HISTORY:      Physical Exam:  General: NAD, resting comfortably in bed  Pulmonary: Nonlabored breathing, no respiratory distress, unlabored on 4L NC  Cardiovascular: NSR  Abdominal: soft, distended, appropriate TTP, mindline wound with silver mepilex in place w/o strikethrough    Extremities: WWP  Pulses:   Right:                                                                          Left:  FEM [ ]2+ [ ]1+ [ ]doppler                                             FEM [ ]2+ [ ]1+ [ ]doppler    POP [ ]2+ [ ]1+ [ ]doppler                                             POP [ ]2+ [ ]1+ [ ]doppler    DP [x ]2+ [ ]1+ [ ]doppler                                                DP [x]2+ [ ]1+ [ ]doppler  PT[ x]2+ [ ]1+ [ ]doppler                                                  PT [ x]2+ [ ]1+ [ ]doppler      Vital Signs Last 24 Hrs  T(C): 37.2 (11 Mar 2023 04:00), Max: 37.2 (11 Mar 2023 04:00)  T(F): 98.9 (11 Mar 2023 04:00), Max: 98.9 (11 Mar 2023 04:00)  HR: 82 (11 Mar 2023 07:00) (74 - 92)  BP: 154/90 (11 Mar 2023 06:30) (122/79 - 165/90)  BP(mean): 110 (11 Mar 2023 06:30) (92 - 127)  RR: 10 (11 Mar 2023 07:00) (10 - 26)  SpO2: 94% (11 Mar 2023 07:00) (92% - 97%)    Parameters below as of 11 Mar 2023 04:00  Patient On (Oxygen Delivery Method): nasal cannula  O2 Flow (L/min): 5      I&O's Summary    10 Mar 2023 07:01  -  11 Mar 2023 07:00  --------------------------------------------------------  IN: 2805.6 mL / OUT: 1040 mL / NET: 1765.6 mL        LABS:                        9.9    10.51 )-----------( 140      ( 11 Mar 2023 03:30 )             33.0       03-11    138  |  102  |  31.7<H>  ----------------------------<  108<H>  4.6   |  22.0  |  2.05<H>    Ca    9.1      11 Mar 2023 03:30  Mg     2.5     03-11    TPro  6.1<L>  /  Alb  3.5  /  TBili  1.0  /  DBili  x   /  AST  61<H>  /  ALT  55<H>  /  AlkPhos  70  03-09      CARDIAC MARKERS ( 09 Mar 2023 15:07 )  x     / <0.01 ng/mL / 90 U/L / x     / x

## 2023-03-11 NOTE — PROGRESS NOTE ADULT - SUBJECTIVE AND OBJECTIVE BOX
FABIANA CALIXTO  MRN-[id number]    HPI:  42M, Mongolian speaking poor historian, with PMH HTN (uncontrolled), Pulmonary Interstitial Disease, CAD w/ stents (2021 in [place]), CKD (unknown baseline), was sent to [place] ED 2/28/23 from [facility] where he had gone for a physical exam and to get his medications filled. He obtains his Nifedipine from his home country of [place], however has been unable to obtain recent doses so he has been taking half his medication daily. Of note, HIE shows recent admission to [place] for photophobia where he was also found to have . 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 He was transferred to [facility] CTICU for further management.   At time of admission, his SBP was 180s and he was started on Esmolol and Nicardipine. Patient denies acute pain with radiating or aggravating factors. He does admit to occasional headaches and dizziness.    (01 Mar 2023 02:45)      Surgery/Hospital Course:  ·  PRE-OP DIAGNOSIS:  Dissection of aorta   ·  POST-OP DIAGNOSIS:  Dissection of aorta   ·  PROCEDURES:  Bypass graft, mesenteric artery 09-Mar-2023  Today:  No acute events   SR , 5L NC  DC central line  increase Coreg to 25  clear liquid diet  DC Cardene         ICU Vital Signs Last 24 Hrs  T(C): 36.9 (11 Mar 2023 08:00), Max: 37.2 (11 Mar 2023 04:00)  T(F): 98.4 (11 Mar 2023 08:00), Max: 98.9 (11 Mar 2023 04:00)  HR: 85 (11 Mar 2023 09:00) (80 - 92)  BP: 142/79 (11 Mar 2023 09:00) (122/79 - 165/90)  BP(mean): 105 (11 Mar 2023 09:00) (95 - 127)  ABP: 150/83 (11 Mar 2023 09:00) (115/90 - 169/130)  ABP(mean): 105 (11 Mar 2023 09:00) (97 - 143)  RR: 15 (11 Mar 2023 09:00) (10 - 26)  SpO2: 91% (11 Mar 2023 09:00) (91% - 97%)    O2 Parameters below as of 11 Mar 2023 08:00  Patient On (Oxygen Delivery Method): nasal cannula  O2 Flow (L/min): 5          Physical Exam:  Gen: A&O   CNS: non focal 	  Neck: no JVD  RES : clear , no wheezing              CVS: Regular  rhythm. Normal S1/S2  Abd: Soft, distended. Bowel sounds present.  Skin: No rash.  Ext:  no edema    ============================I/O===========================   I&O's Detail    10 Mar 2023 07:01  -  11 Mar 2023 07:00  --------------------------------------------------------  IN:    Albumin 5%  - 250 mL: 250 mL    dextrose 5% + sodium chloride 0.45%: 1050 mL    DOBUTamine: 15.6 mL    IV PiggyBack: 50 mL    IV PiggyBack: 100 mL    NiCARdipine: 645 mL    Oral Fluid: 40 mL    sodium chloride 0.9%: 380 mL    sodium chloride 0.9%: 275 mL  Total IN: 2805.6 mL    OUT:    Indwelling Catheter - Urethral (mL): 1040 mL  Total OUT: 1040 mL    Total NET: 1765.6 mL      11 Mar 2023 06:01  -  11 Mar 2023 11:44  --------------------------------------------------------  IN:    dextrose 5% + sodium chloride 0.45%: 150 mL    NiCARdipine: 75 mL    sodium chloride 0.9%: 20 mL    sodium chloride 0.9%: 40 mL  Total IN: 285 mL    OUT:    Indwelling Catheter - Urethral (mL): 150 mL  Total OUT: 150 mL    Total NET: 135 mL        ============================ LABS =========================                        9.9    10.51 )-----------( 140      ( 11 Mar 2023 03:30 )             33.0     03-11    138  |  102  |  31.7<H>  ----------------------------<  108<H>  4.6   |  22.0  |  2.05<H>    Ca    9.1      11 Mar 2023 03:30  Mg     2.5     03-11    TPro  6.1<L>  /  Alb  3.5  /  TBili  1.0  /  DBili  x   /  AST  61<H>  /  ALT  55<H>  /  AlkPhos  70  03-09    LIVER FUNCTIONS - ( 09 Mar 2023 15:07 )  Alb: 3.5 g/dL / Pro: 6.1 g/dL / ALK PHOS: 70 U/L / ALT: 55 U/L / AST: 61 U/L / GGT: x           PT/INR - ( 09 Mar 2023 15:07 )   PT: 15.3 sec;   INR: 1.32 ratio         PTT - ( 10 Mar 2023 03:20 )  PTT:24.0 sec  ABG - ( 09 Mar 2023 18:49 )  pH, Arterial: 7.340 pH, Blood: x     /  pCO2: 44    /  pO2: 71    / HCO3: 24    / Base Excess: -2.1  /  SaO2: 95.1                ======================Micro/Rad/Cardio=================  Culture: Reviewed   CXR: Reviewed  Echo:Reviewed  ======================================================  PAST MEDICAL & SURGICAL HISTORY:  Uncontrolled hypertension      CKD (chronic kidney disease)      Interstitial lung disease      Prophylactic measure        ====================ASSESSMENT ==============  42M, poor historian, PMH of HTN (uncontrolled), Pulmonary Interstitial Disease (per chart review), CAD w/ stents (2021 in [place]), CKD (unknown baseline SCr), with recent admission for hypertensive urgency, was sent to [place] ED 2/28/23 from [facility] secondary to HTN urgency (). CTA C/A/P showed Type B dissection and he was transferred to [facility] CTICU for further management. Inpatient course significant for: RHC/LHC initially canceled 2/2 worsening renal function then complicated by right radial partial thrombosis, right cephalic and basilic vein thrombophlebitis, and HTN. On 3/9 underwent celiac and SMA Bypass.     3/11 pt remains on cardene gtt for HTN urgency. Pt has invasive lines in place for monitoring of hemodynamics.        ---Dissection of aorta.   ---S/p Celiac and SMA bypass. Plan for staged procedure with TEVAR  --- Chronic combined systolic and diastolic heart failure.   --- Hypertensive urgency.   ---Chronic kidney disease (CKD).   --- Radial artery thrombosis.   ---Thrombophlebitis arm.   ---Post op Hypovolemia  ---Post op respiratory insufficiency       Plan:  -Wean IV anti-hypertensive agents as tolerated  -Chest PT and IS use with bedside nurse   -Continue to monitor need for Diuresis.  -DC  nicardipine gtt  -continue  po correg  25 bid  -Nephrology following.   -AC on hold in periop setting.  - Continue GI ppx with Protonix and Senna.  -DVT ppx with SCD boots       ====================== NEUROLOGY=====================  acetaminophen     Tablet .. 975 milliGRAM(s) Oral every 8 hours  HYDROmorphone PCA (1 mG/mL) 30 milliLiter(s) PCA Continuous PCA Continuous  ondansetron Injectable 4 milliGRAM(s) IV Push every 6 hours PRN Nausea    ==================== RESPIRATORY======================  Post op respiratory insufficiency    ====================CARDIOVASCULAR==================  Post op Hypovolemia  amLODIPine   Tablet 10 milliGRAM(s) Oral daily  carvedilol 25 milliGRAM(s) Oral every 12 hours  carvedilol 12.5 milliGRAM(s) Oral once  doxazosin 4 milliGRAM(s) Oral <User Schedule>  niCARdipine Infusion 5 mG/Hr (25 mL/Hr) IV Continuous <Continuous>    ===================HEMATOLOGIC/ONC ===================  Monitor H&H/Plts    heparin   Injectable 5000 Unit(s) SubCutaneous every 8 hours    ===================== RENAL =========================  Continue monitoring urine output, I&OS, BUN/Cr     ==================== GASTROINTESTINAL===================  dextrose 5% + sodium chloride 0.45%. 1000 milliLiter(s) (75 mL/Hr) IV Continuous <Continuous>  dextrose 5%. 1000 milliLiter(s) (100 mL/Hr) IV Continuous <Continuous>  dextrose 5%. 1000 milliLiter(s) (50 mL/Hr) IV Continuous <Continuous>  pantoprazole  Injectable 40 milliGRAM(s) IV Push daily  senna 2 Tablet(s) Oral at bedtime  sodium chloride 0.9%. 1000 milliLiter(s) (20 mL/Hr) IV Continuous <Continuous>  sodium chloride 0.9%. 1000 milliLiter(s) (10 mL/Hr) IV Continuous <Continuous>    =======================    ENDOCRINE  =====================  dextrose 50% Injectable 25 Gram(s) IV Push once  dextrose 50% Injectable 12.5 Gram(s) IV Push once  dextrose 50% Injectable 25 Gram(s) IV Push once  dextrose Oral Gel 15 Gram(s) Oral once PRN Blood Glucose LESS THAN 70 milliGRAM(s)/deciliter  glucagon  Injectable 1 milliGRAM(s) IntraMuscular once  insulin lispro (ADMELOG) corrective regimen sliding scale   SubCutaneous every 6 hours    ========================INFECTIOUS DISEASE================      -Monitor Neurologic status ,   -Head of the bed should remain elevated to 45 degrees,  -Monitor for arrhythmias and monitor parameters for organ perfusion,  -Glycemic control is satisfactory,  -Nutritional goals will be met using po eventually , insure adequate caloric intake and monitor the same ,  -Electrolytes have been repleted as necessary , pain control has been achieved  and wound care has been carried out ,  -Stress ulcer and VTE prophylaxis will be achieved,  -Agressive PT and early mobility and ambulation goals will be met,      I have spent 35 minutes providing acute care for this critically ill patient     Patient requires continuous monitoring with bedside rhythm monitoring, pulse ox monitoring, and intermittent blood gas analysis. Care plan discussed with ICU care team. Patient remained critical and at risk for life threatening decompensation.

## 2023-03-11 NOTE — PROGRESS NOTE ADULT - SUBJECTIVE AND OBJECTIVE BOX
Subjective: no c/o incisional pain at this time. Denies CP, SOB, palpitations, N/V, other c/o.    all other ROS negative x10 except as noted above     T(C): 37.1 (03-11-23 @ 00:00), Max: 37.1 (03-10-23 @ 04:00)  HR: 83 (03-11-23 @ 00:00) (74 - 88)  BP: 142/92 (03-11-23 @ 00:00) (124/78 - 165/90)  ABP: 159/89 (03-11-23 @ 00:00) (136/80 - 161/90)  ABP(mean): 106 (03-11-23 @ 00:00) (91 - 115)  RR: 14 (03-11-23 @ 00:00) (11 - 26)  SpO2: 96% (03-11-23 @ 00:00) (91% - 97%)  Wt(kg): --  CVP(mm Hg): 20 (03-11-23 @ 00:00) (11 - 27)  CO: 7.5 (03-10-23 @ 13:00) (5.9 - 8.5)  CI: 3.9 (03-10-23 @ 13:00) (3 - 4.4)  PA: 57/37 (03-10-23 @ 13:00) (40/19 - 74/46)       I&O's Detail    09 Mar 2023 07:01  -  10 Mar 2023 07:00  --------------------------------------------------------  IN:    Albumin 5%  - 250 mL: 250 mL    Dexmedetomidine: 8.8 mL    DOBUTamine: 88.9 mL    IV PiggyBack: 50 mL    IV PiggyBack: 100 mL    Lactated Ringers Bolus: 500 mL    NiCARdipine: 325 mL    Propofol: 60 mL    sodium chloride 0.9%: 120 mL    sodium chloride 0.9%: 170 mL  Total IN: 1672.7 mL    OUT:    Indwelling Catheter - Urethral (mL): 1245 mL    Norepinephrine: 0 mL  Total OUT: 1245 mL    Total NET: 427.7 mL      10 Mar 2023 07:01  -  11 Mar 2023 01:03  --------------------------------------------------------  IN:    Albumin 5%  - 250 mL: 250 mL    dextrose 5% + sodium chloride 0.45%: 450 mL    DOBUTamine: 15.6 mL    IV PiggyBack: 50 mL    IV PiggyBack: 100 mL    NiCARdipine: 395 mL    Oral Fluid: 20 mL    sodium chloride 0.9%: 115 mL    sodium chloride 0.9%: 300 mL  Total IN: 1695.6 mL    OUT:    Indwelling Catheter - Urethral (mL): 660 mL  Total OUT: 660 mL    Total NET: 1035.6 mL          LABS: All Lab data reviewed and analyzed                        10.9   14.05 )-----------( 155      ( 10 Mar 2023 03:20 )             34.5     03-10    137  |  104  |  24.2<H>  ----------------------------<  109<H>  4.9   |  23.0  |  2.00<H>    Ca    8.9      10 Mar 2023 03:20  Mg     1.8     03-10    TPro  6.1<L>  /  Alb  3.5  /  TBili  1.0  /  DBili  x   /  AST  61<H>  /  ALT  55<H>  /  AlkPhos  70  03-09    PT/INR - ( 09 Mar 2023 15:07 )   PT: 15.3 sec;   INR: 1.32 ratio         PTT - ( 10 Mar 2023 03:20 )  PTT:24.0 sec      ABG - ( 09 Mar 2023 18:49 )  pH, Arterial: 7.340 pH, Blood: x     /  pCO2: 44    /  pO2: 71    / HCO3: 24    / Base Excess: -2.1  /  SaO2: 95.1              CAPILLARY BLOOD GLUCOSE      POCT Blood Glucose.: 105 mg/dL (10 Mar 2023 23:33)  POCT Blood Glucose.: 88 mg/dL (10 Mar 2023 18:36)  POCT Blood Glucose.: 97 mg/dL (10 Mar 2023 12:49)  POCT Blood Glucose.: 96 mg/dL (10 Mar 2023 06:49)           RADIOLOGY: - Reviewed and analyzed   CXR: pending    HOSPITAL MEDICATIONS: All medications reviewed and analyzed  MEDICATIONS  (STANDING):  acetaminophen     Tablet .. 975 milliGRAM(s) Oral every 8 hours  carvedilol 12.5 milliGRAM(s) Oral every 12 hours  dextrose 5% + sodium chloride 0.45%. 1000 milliLiter(s) (75 mL/Hr) IV Continuous <Continuous>  dextrose 5%. 1000 milliLiter(s) (50 mL/Hr) IV Continuous <Continuous>  dextrose 5%. 1000 milliLiter(s) (100 mL/Hr) IV Continuous <Continuous>  dextrose 50% Injectable 25 Gram(s) IV Push once  dextrose 50% Injectable 12.5 Gram(s) IV Push once  dextrose 50% Injectable 25 Gram(s) IV Push once  glucagon  Injectable 1 milliGRAM(s) IntraMuscular once  heparin   Injectable 5000 Unit(s) SubCutaneous every 8 hours  HYDROmorphone PCA (1 mG/mL) 30 milliLiter(s) PCA Continuous PCA Continuous  insulin lispro (ADMELOG) corrective regimen sliding scale   SubCutaneous every 6 hours  niCARdipine Infusion 5 mG/Hr (25 mL/Hr) IV Continuous <Continuous>  pantoprazole  Injectable 40 milliGRAM(s) IV Push daily  senna 2 Tablet(s) Oral at bedtime  sodium chloride 0.9%. 1000 milliLiter(s) (20 mL/Hr) IV Continuous <Continuous>  sodium chloride 0.9%. 1000 milliLiter(s) (10 mL/Hr) IV Continuous <Continuous>    MEDICATIONS  (PRN):  dextrose Oral Gel 15 Gram(s) Oral once PRN Blood Glucose LESS THAN 70 milliGRAM(s)/deciliter  naloxone Injectable 0.1 milliGRAM(s) IV Push every 3 minutes PRN For ANY of the following changes in patient status:  A. RR LESS THAN 10 breaths per minute, B. Oxygen saturation LESS THAN 90%, C. Sedation score of 6  ondansetron Injectable 4 milliGRAM(s) IV Push every 6 hours PRN Nausea          Lines:     Physical Exam  Neuro: A+O x 3, non-focal, speech clear and intact  HEENT:  NCAT, PERRL, EOMI. No conjuctival edema or iIcterus, no thrush.  No ETT or NGT/OGT  Neck:  ROM intact, no JVD, no nodes or masses palpable, trachea midline, no tracheostomy  Pulm: CTA, good air entry, equal bilaterally, no rales/rhonchi/wheezing, no accessory muscle use noted  Chest:       all with dressings intact and no air leak, no subQ emphysema      CV: RRR, S1, S2. No murmurs, rubs, or gallops noted.  Abd: +BSx4. Soft, nontender, nondistended.  : thakkar in situ to bedside drainage  Ext: MORRIS x 4, no edema, no cyanosis or clubbing, distal motor/neuro/circ intact  Skin: warm, dry, no rashes                 47yMale with PMH     Bypass graft, mesenteric artery        PAST MEDICAL & SURGICAL HISTORY:  Uncontrolled hypertension      CKD (chronic kidney disease)      Interstitial lung disease      Prophylactic measure

## 2023-03-11 NOTE — PROGRESS NOTE ADULT - ASSESSMENT
42M, poor historian, PMH of HTN (uncontrolled), Pulmonary Interstitial Disease (per chart review), CAD w/ stents (2021 in [place]), CKD (unknown baseline SCr), with recent admission for hypertensive urgency, was sent to [place] [facility] 2/28/23 from [facility] secondary to HTN urgency (). CTA C/A/P showed Type B dissection and he was transferred to [facility] CTICU for further management. Inpatient course significant for: RHC/LHC initially canceled 2/2 worsening renal function then complicated by right radial partial thrombosis, right cephalic and basilic vein thrombophlebitis, and HTN. On 3/9 underwent celiac and SMA Bypass.          42M, poor historian, PMH of HTN (uncontrolled), Pulmonary Interstitial Disease (per chart review), CAD w/ stents (2021 in [place]), CKD (unknown baseline SCr), with recent admission for hypertensive urgency, was sent to [place] ED 2/28/23 from [facility] secondary to HTN urgency (). CTA C/A/P showed Type B dissection and he was transferred to [facility] CTICU for further management. Inpatient course significant for: RHC/LHC initially canceled 2/2 worsening renal function then complicated by right radial partial thrombosis, right cephalic and basilic vein thrombophlebitis, and HTN. On 3/9 underwent celiac and SMA Bypass.     3/11 pt remains on cardene gtt for HTN urgency. Pt has onvasive lines in place for monitoring of hemodynamics.

## 2023-03-12 DIAGNOSIS — K56.7 ILEUS, UNSPECIFIED: ICD-10-CM

## 2023-03-12 LAB
ANION GAP SERPL CALC-SCNC: 10 MMOL/L — SIGNIFICANT CHANGE UP (ref 5–17)
ANION GAP SERPL CALC-SCNC: 11 MMOL/L — SIGNIFICANT CHANGE UP (ref 5–17)
BLD GP AB SCN SERPL QL: SIGNIFICANT CHANGE UP
BUN SERPL-MCNC: 15.5 MG/DL — SIGNIFICANT CHANGE UP (ref 8–20)
BUN SERPL-MCNC: 19.4 MG/DL — SIGNIFICANT CHANGE UP (ref 8–20)
CALCIUM SERPL-MCNC: 8.8 MG/DL — SIGNIFICANT CHANGE UP (ref 8.4–10.5)
CALCIUM SERPL-MCNC: 9.5 MG/DL — SIGNIFICANT CHANGE UP (ref 8.4–10.5)
CHLORIDE SERPL-SCNC: 100 MMOL/L — SIGNIFICANT CHANGE UP (ref 96–108)
CHLORIDE SERPL-SCNC: 103 MMOL/L — SIGNIFICANT CHANGE UP (ref 96–108)
CO2 SERPL-SCNC: 24 MMOL/L — SIGNIFICANT CHANGE UP (ref 22–29)
CO2 SERPL-SCNC: 26 MMOL/L — SIGNIFICANT CHANGE UP (ref 22–29)
CREAT SERPL-MCNC: 1.04 MG/DL — SIGNIFICANT CHANGE UP (ref 0.5–1.3)
CREAT SERPL-MCNC: 1.25 MG/DL — SIGNIFICANT CHANGE UP (ref 0.5–1.3)
EGFR: 72 ML/MIN/1.73M2 — SIGNIFICANT CHANGE UP
EGFR: 90 ML/MIN/1.73M2 — SIGNIFICANT CHANGE UP
GLUCOSE BLDC GLUCOMTR-MCNC: 106 MG/DL — HIGH (ref 70–99)
GLUCOSE SERPL-MCNC: 118 MG/DL — HIGH (ref 70–99)
GLUCOSE SERPL-MCNC: 119 MG/DL — HIGH (ref 70–99)
HCT VFR BLD CALC: 34.5 % — LOW (ref 39–50)
HGB BLD-MCNC: 10 G/DL — LOW (ref 13–17)
LACTATE SERPL-SCNC: 0.7 MMOL/L — SIGNIFICANT CHANGE UP (ref 0.5–2)
MAGNESIUM SERPL-MCNC: 2.1 MG/DL — SIGNIFICANT CHANGE UP (ref 1.6–2.6)
MCHC RBC-ENTMCNC: 25.6 PG — LOW (ref 27–34)
MCHC RBC-ENTMCNC: 29 GM/DL — LOW (ref 32–36)
MCV RBC AUTO: 88.2 FL — SIGNIFICANT CHANGE UP (ref 80–100)
PLATELET # BLD AUTO: 117 K/UL — LOW (ref 150–400)
POTASSIUM SERPL-MCNC: 4.3 MMOL/L — SIGNIFICANT CHANGE UP (ref 3.5–5.3)
POTASSIUM SERPL-MCNC: 4.6 MMOL/L — SIGNIFICANT CHANGE UP (ref 3.5–5.3)
POTASSIUM SERPL-SCNC: 4.3 MMOL/L — SIGNIFICANT CHANGE UP (ref 3.5–5.3)
POTASSIUM SERPL-SCNC: 4.6 MMOL/L — SIGNIFICANT CHANGE UP (ref 3.5–5.3)
RBC # BLD: 3.91 M/UL — LOW (ref 4.2–5.8)
RBC # FLD: 14.9 % — HIGH (ref 10.3–14.5)
SODIUM SERPL-SCNC: 137 MMOL/L — SIGNIFICANT CHANGE UP (ref 135–145)
SODIUM SERPL-SCNC: 137 MMOL/L — SIGNIFICANT CHANGE UP (ref 135–145)
WBC # BLD: 8.33 K/UL — SIGNIFICANT CHANGE UP (ref 3.8–10.5)
WBC # FLD AUTO: 8.33 K/UL — SIGNIFICANT CHANGE UP (ref 3.8–10.5)

## 2023-03-12 PROCEDURE — 99024 POSTOP FOLLOW-UP VISIT: CPT

## 2023-03-12 PROCEDURE — 71045 X-RAY EXAM CHEST 1 VIEW: CPT | Mod: 26

## 2023-03-12 PROCEDURE — 74018 RADEX ABDOMEN 1 VIEW: CPT | Mod: 26

## 2023-03-12 PROCEDURE — 99233 SBSQ HOSP IP/OBS HIGH 50: CPT

## 2023-03-12 RX ORDER — ACETAMINOPHEN 500 MG
1000 TABLET ORAL ONCE
Refills: 0 | Status: COMPLETED | OUTPATIENT
Start: 2023-03-12 | End: 2023-03-12

## 2023-03-12 RX ORDER — METOPROLOL TARTRATE 50 MG
5 TABLET ORAL ONCE
Refills: 0 | Status: COMPLETED | OUTPATIENT
Start: 2023-03-12 | End: 2023-03-12

## 2023-03-12 RX ORDER — ACETAMINOPHEN 500 MG
1000 TABLET ORAL ONCE
Refills: 0 | Status: COMPLETED | OUTPATIENT
Start: 2023-03-13 | End: 2023-03-12

## 2023-03-12 RX ORDER — ACETAMINOPHEN 500 MG
1000 TABLET ORAL ONCE
Refills: 0 | Status: COMPLETED | OUTPATIENT
Start: 2023-03-13 | End: 2023-03-13

## 2023-03-12 RX ADMIN — Medication 1000 MILLIGRAM(S): at 05:50

## 2023-03-12 RX ADMIN — Medication 5 MILLIGRAM(S): at 04:27

## 2023-03-12 RX ADMIN — HYDROMORPHONE HYDROCHLORIDE 0.5 MILLIGRAM(S): 2 INJECTION INTRAMUSCULAR; INTRAVENOUS; SUBCUTANEOUS at 18:30

## 2023-03-12 RX ADMIN — HYDROMORPHONE HYDROCHLORIDE 0.5 MILLIGRAM(S): 2 INJECTION INTRAMUSCULAR; INTRAVENOUS; SUBCUTANEOUS at 22:51

## 2023-03-12 RX ADMIN — CARVEDILOL PHOSPHATE 25 MILLIGRAM(S): 80 CAPSULE, EXTENDED RELEASE ORAL at 18:06

## 2023-03-12 RX ADMIN — Medication 400 MILLIGRAM(S): at 11:17

## 2023-03-12 RX ADMIN — Medication 5 MILLIGRAM(S): at 00:35

## 2023-03-12 RX ADMIN — Medication 4 MILLIGRAM(S): at 18:06

## 2023-03-12 RX ADMIN — HYDROMORPHONE HYDROCHLORIDE 0.5 MILLIGRAM(S): 2 INJECTION INTRAMUSCULAR; INTRAVENOUS; SUBCUTANEOUS at 04:54

## 2023-03-12 RX ADMIN — HEPARIN SODIUM 5000 UNIT(S): 5000 INJECTION INTRAVENOUS; SUBCUTANEOUS at 18:06

## 2023-03-12 RX ADMIN — ONDANSETRON 4 MILLIGRAM(S): 8 TABLET, FILM COATED ORAL at 23:00

## 2023-03-12 RX ADMIN — HYDROMORPHONE HYDROCHLORIDE 0.5 MILLIGRAM(S): 2 INJECTION INTRAMUSCULAR; INTRAVENOUS; SUBCUTANEOUS at 04:28

## 2023-03-12 RX ADMIN — PANTOPRAZOLE SODIUM 40 MILLIGRAM(S): 20 TABLET, DELAYED RELEASE ORAL at 11:16

## 2023-03-12 RX ADMIN — Medication 1000 MILLIGRAM(S): at 00:45

## 2023-03-12 RX ADMIN — Medication 400 MILLIGRAM(S): at 23:16

## 2023-03-12 RX ADMIN — HEPARIN SODIUM 5000 UNIT(S): 5000 INJECTION INTRAVENOUS; SUBCUTANEOUS at 04:27

## 2023-03-12 RX ADMIN — SENNA PLUS 2 TABLET(S): 8.6 TABLET ORAL at 21:21

## 2023-03-12 RX ADMIN — Medication 1000 MILLIGRAM(S): at 18:30

## 2023-03-12 RX ADMIN — HYDROMORPHONE HYDROCHLORIDE 0.5 MILLIGRAM(S): 2 INJECTION INTRAMUSCULAR; INTRAVENOUS; SUBCUTANEOUS at 11:45

## 2023-03-12 RX ADMIN — Medication 4 MILLIGRAM(S): at 11:16

## 2023-03-12 RX ADMIN — AMLODIPINE BESYLATE 10 MILLIGRAM(S): 2.5 TABLET ORAL at 04:28

## 2023-03-12 RX ADMIN — Medication 400 MILLIGRAM(S): at 05:08

## 2023-03-12 RX ADMIN — HYDROMORPHONE HYDROCHLORIDE 0.5 MILLIGRAM(S): 2 INJECTION INTRAMUSCULAR; INTRAVENOUS; SUBCUTANEOUS at 04:58

## 2023-03-12 RX ADMIN — Medication 1000 MILLIGRAM(S): at 23:45

## 2023-03-12 RX ADMIN — Medication 10 MILLIGRAM(S): at 12:30

## 2023-03-12 RX ADMIN — CARVEDILOL PHOSPHATE 25 MILLIGRAM(S): 80 CAPSULE, EXTENDED RELEASE ORAL at 04:28

## 2023-03-12 RX ADMIN — HYDROMORPHONE HYDROCHLORIDE 0.5 MILLIGRAM(S): 2 INJECTION INTRAMUSCULAR; INTRAVENOUS; SUBCUTANEOUS at 23:05

## 2023-03-12 RX ADMIN — Medication 400 MILLIGRAM(S): at 18:06

## 2023-03-12 RX ADMIN — HYDROMORPHONE HYDROCHLORIDE 0.5 MILLIGRAM(S): 2 INJECTION INTRAMUSCULAR; INTRAVENOUS; SUBCUTANEOUS at 11:16

## 2023-03-12 RX ADMIN — Medication 1000 MILLIGRAM(S): at 11:45

## 2023-03-12 RX ADMIN — HEPARIN SODIUM 5000 UNIT(S): 5000 INJECTION INTRAVENOUS; SUBCUTANEOUS at 21:21

## 2023-03-12 RX ADMIN — HYDROMORPHONE HYDROCHLORIDE 0.5 MILLIGRAM(S): 2 INJECTION INTRAMUSCULAR; INTRAVENOUS; SUBCUTANEOUS at 01:15

## 2023-03-12 RX ADMIN — HYDROMORPHONE HYDROCHLORIDE 0.5 MILLIGRAM(S): 2 INJECTION INTRAMUSCULAR; INTRAVENOUS; SUBCUTANEOUS at 18:06

## 2023-03-12 NOTE — CONSULT NOTE ADULT - ASSESSMENT
42M, Moh[place] speaking poor historian, with PMH HTN (uncontrolled), Pulmonary Interstitial Disease, CAD w/ stents (2021 in [place]), CKD (unknown baseline), was sent to [place] ED 2/28/23 from [facility] where he had gone for a physical exam and to get his medications filled. He obtains his Nifedipine from his home country of [place], however has been unable to obtain recent doses so he has been taking half his medication daily. Of note, HIE shows recent admission to [place] for photophobia where he was also found to have . 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 He was transferred to [facility]ICU for further management.  Inpatient course significant for: RHC/LHC initially canceled 2/2 worsening renal function then complicated by right radial partial thrombosis, right cephalic and basilic vein thrombophlebitis, and HTN. On 3/9 underwent celiac and SMA Bypass. Hospital course further complicated by post op ileus.   GI consulted for further evaluation. As per chart, Overnight pt appeared more distended, attempted to pass NGT but unable to despite multiple attempts last night and this morning. During failed NGT attempt patient was able to vomit a moderate amount with reported relief. Abd Xray post vomiting showed resolving ileus. As per patient, He passed flatus x3 today. Denies any BMs. At this time, Denies abdominal pain, fever, chills, chest pain, shortness of breath. No EGD/colonoscopy in the past. Denies ETOH use, Denies smoking.

## 2023-03-12 NOTE — CONSULT NOTE ADULT - PROBLEM SELECTOR RECOMMENDATION 9
S/p celiac and SMA Bypass on 3/9. Now with post op ileus. Multiple attempts to pass NGT unsuccessful last night and this morning. During failed NGT attempt patient was able to vomit a moderate amount with reported relief. Abd Xray post vomiting showed resolving ileus. As per patient, He passed flatus x3 today. Denies any BMs.    Plan:  - Passing Flatus, no further vomiting, abd less distended, Pt refusing NGT insertion at this time  - Will reevaluate tomorrow   - Promote ambulation   - Repeat KUB in AM   - Rest of care as per primary
- type B aortic dissection seen on CTA.  - CT surgery team considering surgical repair.  - in need of preop RHC/LHC.  Will plan for tomorrow.  Keep NPO at midnight.  Will have interventional team review safety of this given type B dissection, likely will need radial/brachial approaches and not femoral.

## 2023-03-12 NOTE — PROGRESS NOTE ADULT - ATTENDING COMMENTS
pt with extentsive thrombophlebitis of RUE and signifcant  symptoms , his pain and swelling has improved . would continue heparin , warm compresses to phlebitic area
Abd softly distended. Ileus. Would not advance diet. For TEVAR once clinically improved. Denies back or chest pain.
Patient evaluated at the bedside. Still distended. Unable to place NGT yesterday. On bowel regimen. Recommend suppository therapy. NPO

## 2023-03-12 NOTE — CONSULT NOTE ADULT - NS ATTEND AMEND GEN_ALL_CORE FT
Patient noted to have hand swelling issues after procedure and at this time swelling and mild pain noted.  Patient able to move fingers, hand, forearm rotation, and elbow motion.  Patient with compressible compartments and unless otherwise indicated, may be changed to neurovascular checks q4 hours.  Ortho to follow up as needed.
42M with uncontrolled HTN, initially admitted with concern of hypertensive urgency however diagnosed to have type B dissection. LHC/Right HC were performed with patent coronaries. he did develop right radial partial thrombosis and thrombophlebitis of basilic/cephalic arterities. Subserviently on 3/9 he underwent SMA/celiac bypass with iliac artery graft. GI is asked to evaluate for post-ileus and possible need endoscopic NG placement. Bedside NG placement failed x 4. On eval, patient reported passing gas, abdomen is less distended and has not been vomiting.   BS sluggish on ausculation. Repeat KUB appears much better.   Patient refuses bedside NG placement.   Advise using chew gum to help regain gut motility, encourage ambulation and monitor/replace electrolytes  Repeat KUB tomorrow , keep NPO   GI will follow   D/w primary team.

## 2023-03-12 NOTE — PROGRESS NOTE ADULT - ASSESSMENT
42M w/ PMH of HTN (uncontrolled), Pulmonary Interstitial Disease (per chart review), CAD w/ stents (2021 in [place]), CKD (unknown baseline SCr), with recent admission for hypertensive urgency, was sent to [place] ED 2/28/23 from [facility] secondary to HTN urgency (). CTA C/A/P showed Type B dissection and he was transferred to [facility] CTICU for further management. Inpatient course significant for: RHC/LHC initially canceled 2/2 worsening renal function then complicated by right radial partial thrombosis, right cephalic and basilic vein thrombophlebitis, and HTN. On 3/9 underwent celiac and SMA Bypass via midline laparotomy, joint case with vascular/CT surgery. Recovering slowly.    Plan:   - Awaiting TEVAR  -c/w BP control, wean IV meds as able  -monitor LE pulses  -monitor for bowel function

## 2023-03-12 NOTE — PHYSICAL THERAPY INITIAL EVALUATION ADULT - ADDITIONAL COMMENTS
Pt reports living in private home with wife and no stairs to enter/inside. Pt independent prior to admission. Owns no DME.

## 2023-03-12 NOTE — PHYSICAL THERAPY INITIAL EVALUATION ADULT - GENERAL OBSERVATIONS, REHAB EVAL
Pt received in chair, + IV Loc, +Tele//BP monitoring + thakkar + NC O2 @ 3LPM, in NAD, in 4/10 pain, agreeable to PT evaluation

## 2023-03-12 NOTE — PROGRESS NOTE ADULT - ASSESSMENT
42M, poor historian, PMH of HTN (uncontrolled), Pulmonary Interstitial Disease (per chart review), CAD w/ stents (2021 in [place]), CKD (unknown baseline SCr), with recent admission for hypertensive urgency, was sent to [place] [facility] 2/28/23 from [facility] secondary to HTN urgency (). CTA C/A/P showed Type B dissection and he was transferred to [facility] CTICU for further management. Inpatient course significant for: RHC/LHC initially canceled 2/2 worsening renal function then complicated by right radial partial thrombosis, right cephalic and basilic vein thrombophlebitis, and HTN. On 3/9 underwent celiac and SMA Bypass.     3/11 pt remains on cardene gtt for HTN urgency. Pt has onvasive lines in place for monitoring of hemodynamics.  - overnight pt appeared more distended, attempted to pass NGT but unable to despite multiple attempts. Will trend lactate. Pt not showing any signs of gut ischemia at this time.      Home

## 2023-03-12 NOTE — PHYSICAL THERAPY INITIAL EVALUATION ADULT - PERTINENT HX OF CURRENT PROBLEM, REHAB EVAL
Pt sent to [place] ED 2/28/23 from [facility] secondary to HTN urgency (). CTA C/A/P showed Type B dissection and he was transferred to [facility] CTICU for further management. Inpatient course significant for: RHC/LHC initially canceled 2/2 worsening renal function then complicated by right radial partial thrombosis, right cephalic and basilic vein thrombophlebitis, and HTN. On 3/9 underwent celiac and SMA Bypass.

## 2023-03-12 NOTE — PROGRESS NOTE ADULT - SUBJECTIVE AND OBJECTIVE BOX
Subjective: Patient seen and examined at bedside. CELY Denies n/v/d/cp/sob      MEDICATIONS  (STANDING):  acetaminophen   IVPB .. 1000 milliGRAM(s) IV Intermittent once  acetaminophen   IVPB .. 1000 milliGRAM(s) IV Intermittent once  amLODIPine   Tablet 10 milliGRAM(s) Oral daily  carvedilol 25 milliGRAM(s) Oral every 12 hours  dextrose 5% + sodium chloride 0.45%. 1000 milliLiter(s) (75 mL/Hr) IV Continuous <Continuous>  dextrose 5%. 1000 milliLiter(s) (100 mL/Hr) IV Continuous <Continuous>  dextrose 5%. 1000 milliLiter(s) (50 mL/Hr) IV Continuous <Continuous>  dextrose 50% Injectable 25 Gram(s) IV Push once  dextrose 50% Injectable 12.5 Gram(s) IV Push once  dextrose 50% Injectable 25 Gram(s) IV Push once  doxazosin 4 milliGRAM(s) Oral <User Schedule>  glucagon  Injectable 1 milliGRAM(s) IntraMuscular once  heparin   Injectable 5000 Unit(s) SubCutaneous every 8 hours  insulin lispro (ADMELOG) corrective regimen sliding scale   SubCutaneous every 6 hours  metoclopramide Injectable 5 milliGRAM(s) IV Push every 8 hours  pantoprazole  Injectable 40 milliGRAM(s) IV Push daily  senna 2 Tablet(s) Oral at bedtime    MEDICATIONS  (PRN):  benzocaine 20% Spray 1 Spray(s) Topical every 4 hours PRN Discomfort  dextrose Oral Gel 15 Gram(s) Oral once PRN Blood Glucose LESS THAN 70 milliGRAM(s)/deciliter  HYDROmorphone  Injectable 0.5 milliGRAM(s) IV Push every 3 hours PRN Severe Pain (7 - 10)  naloxone Injectable 0.1 milliGRAM(s) IV Push every 3 minutes PRN For ANY of the following changes in patient status:  A. RR LESS THAN 10 breaths per minute, B. Oxygen saturation LESS THAN 90%, C. Sedation score of 6  ondansetron Injectable 4 milliGRAM(s) IV Push every 6 hours PRN Nausea      No Known Allergies        Objective:     ICU Vital Signs Last 24 Hrs  T(C): 37.2 (12 Mar 2023 00:00), Max: 37.3 (11 Mar 2023 12:00)  T(F): 99 (12 Mar 2023 00:00), Max: 99.1 (11 Mar 2023 12:00)  HR: 77 (12 Mar 2023 03:00) (71 - 92)  BP: 148/93 (12 Mar 2023 03:00) (124/75 - 181/95)  BP(mean): 130 (12 Mar 2023 03:00) (93 - 141)  ABP: 149/110 (11 Mar 2023 16:00) (108/91 - 169/130)  ABP(mean): 122 (11 Mar 2023 16:00) (94 - 143)  RR: 23 (12 Mar 2023 03:00) (10 - 27)  SpO2: 95% (12 Mar 2023 03:00) (91% - 98%)    O2 Parameters below as of 12 Mar 2023 03:00  Patient On (Oxygen Delivery Method): nasal cannula  O2 Flow (L/min): 4          I&O's Detail    10 Mar 2023 07:01  -  11 Mar 2023 07:00  --------------------------------------------------------  IN:    Albumin 5%  - 250 mL: 250 mL    dextrose 5% + sodium chloride 0.45%: 1050 mL    DOBUTamine: 15.6 mL    IV PiggyBack: 50 mL    IV PiggyBack: 100 mL    NiCARdipine: 645 mL    Oral Fluid: 40 mL    sodium chloride 0.9%: 380 mL    sodium chloride 0.9%: 275 mL  Total IN: 2805.6 mL    OUT:    Indwelling Catheter - Urethral (mL): 1040 mL  Total OUT: 1040 mL    Total NET: 1765.6 mL      11 Mar 2023 06:01  -  12 Mar 2023 04:14  --------------------------------------------------------  IN:    dextrose 5% + sodium chloride 0.45%: 1575 mL    NiCARdipine: 100 mL    sodium chloride 0.9%: 180 mL    sodium chloride 0.9%: 50 mL  Total IN: 1905 mL    OUT:    Indwelling Catheter - Urethral (mL): 1570 mL  Total OUT: 1570 mL    Total NET: 335 mL        Physical Exam:  General: NAD, resting comfortably in bed  Pulmonary: Nonlabored breathing, no respiratory distress, unlabored on 4L NC  Cardiovascular: NSR  Abdominal: soft, distended, appropriate TTP, mindline wound with silver mepilex in place w/o strikethrough    Extremities: WWP  Pulses:   Right:                                                                          Left:  FEM [ ]2+ [ ]1+ [ ]doppler                                             FEM [ ]2+ [ ]1+ [ ]doppler    POP [ ]2+ [ ]1+ [ ]doppler                                             POP [ ]2+ [ ]1+ [ ]doppler    DP [x ]2+ [ ]1+ [ ]doppler                                                DP [x]2+ [ ]1+ [ ]doppler  PT[ x]2+ [ ]1+ [ ]doppler                                                  PT [ x]2+ [ ]1+ [ ]doppler                          10.0   8.33  )-----------( 117      ( 12 Mar 2023 03:42 )             34.5       03-11    138  |  102  |  21.2<H>  ----------------------------<  117<H>  4.5   |  23.0  |  1.28    Ca    9.2      11 Mar 2023 22:00  Mg     2.5     03-11

## 2023-03-12 NOTE — CONSULT NOTE ADULT - SUBJECTIVE AND OBJECTIVE BOX
Patient is a 47y old  Male who presents with a chief complaint of Type B Dissection (12 Mar 2023 04:14)      HPI:  42M, Kiswahili speaking poor historian, with PMH HTN (uncontrolled), Pulmonary Interstitial Disease, CAD w/ stents (2021 in [facility] [place]), CKD (unknown baseline), was sent to [place] ED 2/28/23 from [facility] where he had gone for a physical exam and to get his medications filled. He obtains his Nifedipine from his home country of [place], however has been unable to obtain recent doses so he has been taking half his medication daily. Of note, HIE shows recent admission to [place] for photophobia where he was also found to have . 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 He was transferred [facility]ICU for further management.  Inpatient course significant for: RHC/LHC initially canceled 2/2 worsening renal function then complicated by right radial partial thrombosis, right cephalic and basilic vein thrombophlebitis, and HTN. On 3/9 underwent celiac and SMA Bypass. Hospital course further complicated by post op ileus. GI consulted for further evaluation. As per chart, Overnight pt appeared more distended, attempted to pass NGT but unable to despite multiple attempts last night and this morning. During failed NGT attempt patient was able to vomit a moderate amount with reported relief. Abd Xray post vomiting showed resolving ileus. As per patient, He passed flatus x3 today. Denies any BMs. At this time, Denies abdominal pain, fever, chills, chest pain, shortness of breath. No EGD/colonoscopy in the past. Denies ETOH use, Denies smoking.       PAST MEDICAL & SURGICAL HISTORY:  Uncontrolled hypertension      CKD (chronic kidney disease)      Interstitial lung disease      Prophylactic measure          Allergies    No Known Allergies    Intolerances        MEDICATIONS  (STANDING):  acetaminophen   IVPB .. 1000 milliGRAM(s) IV Intermittent once  amLODIPine   Tablet 10 milliGRAM(s) Oral daily  bisacodyl Suppository 10 milliGRAM(s) Rectal daily  carvedilol 25 milliGRAM(s) Oral every 12 hours  dextrose 5% + sodium chloride 0.45%. 1000 milliLiter(s) (40 mL/Hr) IV Continuous <Continuous>  dextrose 5%. 1000 milliLiter(s) (100 mL/Hr) IV Continuous <Continuous>  dextrose 5%. 1000 milliLiter(s) (50 mL/Hr) IV Continuous <Continuous>  dextrose 50% Injectable 25 Gram(s) IV Push once  dextrose 50% Injectable 12.5 Gram(s) IV Push once  dextrose 50% Injectable 25 Gram(s) IV Push once  doxazosin 4 milliGRAM(s) Oral <User Schedule>  glucagon  Injectable 1 milliGRAM(s) IntraMuscular once  heparin   Injectable 5000 Unit(s) SubCutaneous every 8 hours  insulin lispro (ADMELOG) corrective regimen sliding scale   SubCutaneous every 6 hours  metoclopramide Injectable 5 milliGRAM(s) IV Push every 8 hours  pantoprazole  Injectable 40 milliGRAM(s) IV Push daily  senna 2 Tablet(s) Oral at bedtime    MEDICATIONS  (PRN):  benzocaine 20% Spray 1 Spray(s) Topical every 4 hours PRN Discomfort  dextrose Oral Gel 15 Gram(s) Oral once PRN Blood Glucose LESS THAN 70 milliGRAM(s)/deciliter  HYDROmorphone  Injectable 0.5 milliGRAM(s) IV Push every 3 hours PRN Severe Pain (7 - 10)      Social History:    Marital Status:  (   )    (   ) Single    (   )    (  )   Occupation:   Lives with: (  ) alone  (  ) children   (  ) spouse   (  ) parents  (  ) other    Substance Use (street drugs): (  ) never used  (  ) other:  Tobacco Usage:  (  x ) never smoked   (   ) former smoker   (   ) current smoker  (     ) pack year  (        ) last cigarette date  Alcohol Usage: Denies   Sexual History:     Family History   IBD (  ) Yes   (  ) No  GI Malignancy (  )  Yes    (  ) No    Health Management     Last EGD/Colonoscopy -Denies       (     ) Advanced Directives: (     ) None    (      ) DNR    (     ) DNI    (     ) Health Care Proxy:       REVIEW OF SYSTEMS:   General: Negative  HEENT: Negative  CV: Negative  Respiratory: Negative  GI: See HPI  : Negative  MSK: Negative  Hematologic: Negative  Skin: Negative      Vital Signs Last 24 Hrs  T(C): 37 (12 Mar 2023 12:00), Max: 37.2 (11 Mar 2023 16:00)  T(F): 98.6 (12 Mar 2023 12:00), Max: 99 (12 Mar 2023 00:00)  HR: 79 (12 Mar 2023 13:00) (71 - 88)  BP: 155/96 (12 Mar 2023 12:00) (148/93 - 181/95)  BP(mean): 120 (12 Mar 2023 12:00) (113 - 141)  RR: 21 (12 Mar 2023 13:00) (12 - 23)  SpO2: 99% (12 Mar 2023 13:00) (92% - 99%)    Parameters below as of 12 Mar 2023 12:00  Patient On (Oxygen Delivery Method): nasal cannula  O2 Flow (L/min): 4      PHYSICAL EXAM:   GENERAL:  No acute distress  HEENT:  NC/AT, conjunctiva clear, sclera anicteric  CHEST:  No increased effort  HEART:  Regular rhythm  ABDOMEN:  Mid abd drsng noted, non-tender, sluggish bowel sounds  EXTREMITIES: No edema  SKIN:  Warm, dry  NEURO:  Calm, cooperative        LABS:                        10.0   8.33  )-----------( 117      ( 12 Mar 2023 03:42 )             34.5     03-12    137  |  100  |  15.5  ----------------------------<  119<H>  4.3   |  26.0  |  1.04    Ca    9.5      12 Mar 2023 12:20  Mg     2.1     03-12          LIVER FUNCTIONS - ( 09 Mar 2023 15:07 )  Alb: 3.5 g/dL / Pro: 6.1 g/dL / ALK PHOS: 70 U/L / ALT: 55 U/L / AST: 61 U/L / GGT: x             RADIOLOGY & ADDITIONAL TESTS:    < from: Xray Abdomen 1 View PORTABLE -Urgent (Xray Abdomen 1 View PORTABLE -Urgent .) (03.12.23 @ 14:33) >    ACC: [id number] EXAM:  XR ABDOMEN PORTABLE URGENT 1V   ORDERED BY:   ASHLEY GALLEGOS     PROCEDURE DATE:  03/12/2023          INTERPRETATION:  Abdomen one view    HISTORY: Follow-up ileus    COMPARISON: 3/11/2023    Frontal view of the abdomen shows less abdominal bowel gas. Surgical   clips are again noted. No definite free air is identified.    IMPRESSION:  Compatible with resolving ileus.        Thank you for this referral.    --- End of Report ---            MEHRAN GILLETTE MD; Attending Interventional Radiologist  This document has been electronically signed. Mar 12 2023  3:30PM    < end of copied text >

## 2023-03-12 NOTE — PROGRESS NOTE ADULT - SUBJECTIVE AND OBJECTIVE BOX
Subjective: no c/o incisional pain at this time. Denies CP, SOB, palpitations, N/V, other c/o.    all other ROS negative x10 except as noted above     T(C): 37.2 (03-11-23 @ 16:00), Max: 37.3 (03-11-23 @ 12:00)  HR: 82 (03-12-23 @ 00:00) (71 - 92)  BP: 174/95 (03-12-23 @ 00:00) (122/79 - 181/95)  ABP: 149/110 (03-11-23 @ 16:00) (108/91 - 169/130)  ABP(mean): 122 (03-11-23 @ 16:00) (94 - 143)  RR: 16 (03-12-23 @ 00:00) (10 - 27)  SpO2: 97% (03-12-23 @ 00:00) (91% - 98%)  Wt(kg): --  CVP(mm Hg): 18 (03-11-23 @ 13:00) (10 - 24)  CO: --  CI: --  PA: --       I&O's Detail    10 Mar 2023 07:01  -  11 Mar 2023 07:00  --------------------------------------------------------  IN:    Albumin 5%  - 250 mL: 250 mL    dextrose 5% + sodium chloride 0.45%: 1050 mL    DOBUTamine: 15.6 mL    IV PiggyBack: 50 mL    IV PiggyBack: 100 mL    NiCARdipine: 645 mL    Oral Fluid: 40 mL    sodium chloride 0.9%: 380 mL    sodium chloride 0.9%: 275 mL  Total IN: 2805.6 mL    OUT:    Indwelling Catheter - Urethral (mL): 1040 mL  Total OUT: 1040 mL    Total NET: 1765.6 mL      11 Mar 2023 06:01  -  12 Mar 2023 00:26  --------------------------------------------------------  IN:    dextrose 5% + sodium chloride 0.45%: 1350 mL    NiCARdipine: 100 mL    sodium chloride 0.9%: 180 mL    sodium chloride 0.9%: 50 mL  Total IN: 1680 mL    OUT:    Indwelling Catheter - Urethral (mL): 1360 mL  Total OUT: 1360 mL    Total NET: 320 mL          LABS: All Lab data reviewed and analyzed                        9.9    10.51 )-----------( 140      ( 11 Mar 2023 03:30 )             33.0     03-11    138  |  102  |  21.2<H>  ----------------------------<  117<H>  4.5   |  23.0  |  1.28    Ca    9.2      11 Mar 2023 22:00  Mg     2.5     03-11      PTT - ( 10 Mar 2023 03:20 )  PTT:24.0 sec        CAPILLARY BLOOD GLUCOSE      POCT Blood Glucose.: 119 mg/dL (11 Mar 2023 23:20)  POCT Blood Glucose.: 113 mg/dL (11 Mar 2023 05:15)           RADIOLOGY: - Reviewed and analyzed   CXR: pending    HOSPITAL MEDICATIONS: All medications reviewed and analyzed  MEDICATIONS  (STANDING):  acetaminophen   IVPB .. 1000 milliGRAM(s) IV Intermittent once  acetaminophen   IVPB .. 1000 milliGRAM(s) IV Intermittent once  amLODIPine   Tablet 10 milliGRAM(s) Oral daily  carvedilol 25 milliGRAM(s) Oral every 12 hours  dextrose 5% + sodium chloride 0.45%. 1000 milliLiter(s) (75 mL/Hr) IV Continuous <Continuous>  dextrose 5%. 1000 milliLiter(s) (50 mL/Hr) IV Continuous <Continuous>  dextrose 5%. 1000 milliLiter(s) (100 mL/Hr) IV Continuous <Continuous>  dextrose 50% Injectable 25 Gram(s) IV Push once  dextrose 50% Injectable 12.5 Gram(s) IV Push once  dextrose 50% Injectable 25 Gram(s) IV Push once  doxazosin 4 milliGRAM(s) Oral <User Schedule>  glucagon  Injectable 1 milliGRAM(s) IntraMuscular once  heparin   Injectable 5000 Unit(s) SubCutaneous every 8 hours  insulin lispro (ADMELOG) corrective regimen sliding scale   SubCutaneous every 6 hours  metoclopramide Injectable 5 milliGRAM(s) IV Push every 8 hours  metoprolol tartrate Injectable 5 milliGRAM(s) IV Push once  pantoprazole  Injectable 40 milliGRAM(s) IV Push daily  senna 2 Tablet(s) Oral at bedtime    MEDICATIONS  (PRN):  benzocaine 20% Spray 1 Spray(s) Topical every 4 hours PRN Discomfort  dextrose Oral Gel 15 Gram(s) Oral once PRN Blood Glucose LESS THAN 70 milliGRAM(s)/deciliter  HYDROmorphone  Injectable 0.5 milliGRAM(s) IV Push every 3 hours PRN Severe Pain (7 - 10)  naloxone Injectable 0.1 milliGRAM(s) IV Push every 3 minutes PRN For ANY of the following changes in patient status:  A. RR LESS THAN 10 breaths per minute, B. Oxygen saturation LESS THAN 90%, C. Sedation score of 6  ondansetron Injectable 4 milliGRAM(s) IV Push every 6 hours PRN Nausea          Lines:     Physical Exam  Neuro: A+O x 3, non-focal, speech clear and intact  HEENT:  NCAT, PERRL, EOMI. No conjuctival edema or iIcterus, no thrush.  No ETT or NGT/OGT  Neck:  ROM intact, no JVD, no nodes or masses palpable, trachea midline, no tracheostomy  Pulm: CTA, good air entry, equal bilaterally, no rales/rhonchi/wheezing, no accessory muscle use noted  CV: RRR, S1, S2. No murmurs, rubs, or gallops noted.  Abd: +BSx4. Soft, nontender, nondistended. abd dressing cdi   : thakkar in situ to bedside drainage  Ext: MORRIS x 4, no edema, no cyanosis or clubbing, distal motor/neuro/circ intact  Skin: warm, dry, no rashes                 47yMale with PMH     Bypass graft, mesenteric artery        PAST MEDICAL & SURGICAL HISTORY:  Uncontrolled hypertension      CKD (chronic kidney disease)      Interstitial lung disease      Prophylactic measure

## 2023-03-13 LAB
ALBUMIN SERPL ELPH-MCNC: 3.6 G/DL — SIGNIFICANT CHANGE UP (ref 3.3–5.2)
ALP SERPL-CCNC: 69 U/L — SIGNIFICANT CHANGE UP (ref 40–120)
ALT FLD-CCNC: 26 U/L — SIGNIFICANT CHANGE UP
ANION GAP SERPL CALC-SCNC: 9 MMOL/L — SIGNIFICANT CHANGE UP (ref 5–17)
AST SERPL-CCNC: 24 U/L — SIGNIFICANT CHANGE UP
BILIRUB DIRECT SERPL-MCNC: 0.2 MG/DL — SIGNIFICANT CHANGE UP (ref 0–0.3)
BILIRUB INDIRECT FLD-MCNC: 0.3 MG/DL — SIGNIFICANT CHANGE UP (ref 0.2–1)
BILIRUB SERPL-MCNC: 0.5 MG/DL — SIGNIFICANT CHANGE UP (ref 0.4–2)
BUN SERPL-MCNC: 13.3 MG/DL — SIGNIFICANT CHANGE UP (ref 8–20)
CALCIUM SERPL-MCNC: 8.8 MG/DL — SIGNIFICANT CHANGE UP (ref 8.4–10.5)
CHLORIDE SERPL-SCNC: 98 MMOL/L — SIGNIFICANT CHANGE UP (ref 96–108)
CO2 SERPL-SCNC: 28 MMOL/L — SIGNIFICANT CHANGE UP (ref 22–29)
CREAT SERPL-MCNC: 1.03 MG/DL — SIGNIFICANT CHANGE UP (ref 0.5–1.3)
EGFR: 91 ML/MIN/1.73M2 — SIGNIFICANT CHANGE UP
GLUCOSE BLDC GLUCOMTR-MCNC: 85 MG/DL — SIGNIFICANT CHANGE UP (ref 70–99)
GLUCOSE BLDC GLUCOMTR-MCNC: 86 MG/DL — SIGNIFICANT CHANGE UP (ref 70–99)
GLUCOSE SERPL-MCNC: 132 MG/DL — HIGH (ref 70–99)
HCT VFR BLD CALC: 32.2 % — LOW (ref 39–50)
HGB BLD-MCNC: 9.7 G/DL — LOW (ref 13–17)
LACTATE SERPL-SCNC: 0.5 MMOL/L — SIGNIFICANT CHANGE UP (ref 0.5–2)
LDH SERPL L TO P-CCNC: 295 U/L — HIGH (ref 98–192)
MAGNESIUM SERPL-MCNC: 1.8 MG/DL — SIGNIFICANT CHANGE UP (ref 1.6–2.6)
MCHC RBC-ENTMCNC: 25.8 PG — LOW (ref 27–34)
MCHC RBC-ENTMCNC: 30.1 GM/DL — LOW (ref 32–36)
MCV RBC AUTO: 85.6 FL — SIGNIFICANT CHANGE UP (ref 80–100)
PLATELET # BLD AUTO: 163 K/UL — SIGNIFICANT CHANGE UP (ref 150–400)
POTASSIUM SERPL-MCNC: 4 MMOL/L — SIGNIFICANT CHANGE UP (ref 3.5–5.3)
POTASSIUM SERPL-SCNC: 4 MMOL/L — SIGNIFICANT CHANGE UP (ref 3.5–5.3)
PROT SERPL-MCNC: 6.7 G/DL — SIGNIFICANT CHANGE UP (ref 6.6–8.7)
RBC # BLD: 3.76 M/UL — LOW (ref 4.2–5.8)
RBC # FLD: 14.4 % — SIGNIFICANT CHANGE UP (ref 10.3–14.5)
SODIUM SERPL-SCNC: 135 MMOL/L — SIGNIFICANT CHANGE UP (ref 135–145)
WBC # BLD: 6.35 K/UL — SIGNIFICANT CHANGE UP (ref 3.8–10.5)
WBC # FLD AUTO: 6.35 K/UL — SIGNIFICANT CHANGE UP (ref 3.8–10.5)

## 2023-03-13 PROCEDURE — 74018 RADEX ABDOMEN 1 VIEW: CPT | Mod: 26

## 2023-03-13 PROCEDURE — 99291 CRITICAL CARE FIRST HOUR: CPT

## 2023-03-13 PROCEDURE — 99232 SBSQ HOSP IP/OBS MODERATE 35: CPT

## 2023-03-13 PROCEDURE — 99233 SBSQ HOSP IP/OBS HIGH 50: CPT

## 2023-03-13 PROCEDURE — 71045 X-RAY EXAM CHEST 1 VIEW: CPT | Mod: 26

## 2023-03-13 PROCEDURE — 99292 CRITICAL CARE ADDL 30 MIN: CPT

## 2023-03-13 RX ORDER — METOPROLOL TARTRATE 50 MG
7.5 TABLET ORAL EVERY 6 HOURS
Refills: 0 | Status: DISCONTINUED | OUTPATIENT
Start: 2023-03-13 | End: 2023-03-15

## 2023-03-13 RX ORDER — ONDANSETRON 8 MG/1
4 TABLET, FILM COATED ORAL ONCE
Refills: 0 | Status: COMPLETED | OUTPATIENT
Start: 2023-03-13 | End: 2023-03-12

## 2023-03-13 RX ORDER — METOPROLOL TARTRATE 50 MG
5 TABLET ORAL EVERY 6 HOURS
Refills: 0 | Status: DISCONTINUED | OUTPATIENT
Start: 2023-03-13 | End: 2023-03-13

## 2023-03-13 RX ORDER — SODIUM CHLORIDE 9 MG/ML
1000 INJECTION, SOLUTION INTRAVENOUS
Refills: 0 | Status: DISCONTINUED | OUTPATIENT
Start: 2023-03-13 | End: 2023-03-13

## 2023-03-13 RX ORDER — HYDRALAZINE HCL 50 MG
10 TABLET ORAL EVERY 4 HOURS
Refills: 0 | Status: DISCONTINUED | OUTPATIENT
Start: 2023-03-13 | End: 2023-03-17

## 2023-03-13 RX ORDER — SODIUM CHLORIDE 9 MG/ML
1000 INJECTION, SOLUTION INTRAVENOUS
Refills: 0 | Status: DISCONTINUED | OUTPATIENT
Start: 2023-03-13 | End: 2023-03-15

## 2023-03-13 RX ADMIN — Medication 400 MILLIGRAM(S): at 12:00

## 2023-03-13 RX ADMIN — Medication 1000 MILLIGRAM(S): at 06:07

## 2023-03-13 RX ADMIN — HEPARIN SODIUM 5000 UNIT(S): 5000 INJECTION INTRAVENOUS; SUBCUTANEOUS at 05:37

## 2023-03-13 RX ADMIN — HEPARIN SODIUM 5000 UNIT(S): 5000 INJECTION INTRAVENOUS; SUBCUTANEOUS at 21:17

## 2023-03-13 RX ADMIN — Medication 7.5 MILLIGRAM(S): at 23:13

## 2023-03-13 RX ADMIN — HEPARIN SODIUM 5000 UNIT(S): 5000 INJECTION INTRAVENOUS; SUBCUTANEOUS at 13:19

## 2023-03-13 RX ADMIN — Medication 400 MILLIGRAM(S): at 05:36

## 2023-03-13 RX ADMIN — Medication 1 PATCH: at 13:18

## 2023-03-13 RX ADMIN — Medication 7.5 MILLIGRAM(S): at 18:12

## 2023-03-13 RX ADMIN — Medication 5 MILLIGRAM(S): at 06:00

## 2023-03-13 RX ADMIN — PANTOPRAZOLE SODIUM 40 MILLIGRAM(S): 20 TABLET, DELAYED RELEASE ORAL at 13:19

## 2023-03-13 RX ADMIN — Medication 1000 MILLIGRAM(S): at 12:30

## 2023-03-13 RX ADMIN — Medication 1 PATCH: at 19:00

## 2023-03-13 RX ADMIN — Medication 7.5 MILLIGRAM(S): at 13:19

## 2023-03-13 RX ADMIN — SENNA PLUS 2 TABLET(S): 8.6 TABLET ORAL at 21:16

## 2023-03-13 NOTE — PROGRESS NOTE ADULT - ASSESSMENT
42M, poor historian, PMH of HTN (uncontrolled), Pulmonary Interstitial Disease (per chart review), CAD w/ stents (2021 in [place]), CKD (unknown baseline SCr), with recent admission for hypertensive urgency, was sent to [place] ED 2/28/23 from [facility] secondary to HTN urgency (). CTA C/A/P showed Type B dissection and he was transferred to [facility] CTICU for further management. Inpatient course significant for: RHC/LHC initially canceled 2/2 worsening renal function then complicated by right radial partial thrombosis, right cephalic and basilic vein thrombophlebitis, and HTN. On 3/9 underwent celiac and SMA Bypass.     3/11 pt remains on cardene gtt for HTN urgency.   - with post op ileus- remains distended, refusing NGT -Pt not showing any signs of gut ischemia at this time.

## 2023-03-13 NOTE — PROGRESS NOTE ADULT - SUBJECTIVE AND OBJECTIVE BOX
[facility] Advanced Heart Failure  Office: [address] [place] [address]  Service Phone [phone]  Office Phone: [phone]/Fax: [phone]    Subjective/Objective: Pt. denies N/V, endorses BM this am. Denies overt HF symptoms.    Medications:  benzocaine 20% Spray 1 Spray(s) Topical every 4 hours PRN  bisacodyl Suppository 10 milliGRAM(s) Rectal daily  cloNIDine Patch 0.1 mG/24Hr(s) 1 patch Transdermal every 7 days  dextrose 5% + sodium chloride 0.45%. 1000 milliLiter(s) IV Continuous <Continuous>  heparin   Injectable 5000 Unit(s) SubCutaneous every 8 hours  hydrALAZINE Injectable 10 milliGRAM(s) IV Push every 4 hours PRN  HYDROmorphone  Injectable 0.5 milliGRAM(s) IV Push every 3 hours PRN  insulin lispro (ADMELOG) corrective regimen sliding scale   SubCutaneous every 6 hours  metoprolol tartrate Injectable 7.5 milliGRAM(s) IV Push every 6 hours  pantoprazole  Injectable 40 milliGRAM(s) IV Push daily  senna 2 Tablet(s) Oral at bedtime    Vital Signs Last 24 Hours  T(C): 36.9 (03-13-23 @ 07:22), Max: 37.2 (03-12-23 @ 20:00)  HR: 71 (03-13-23 @ 13:00) (61 - 87)  BP: 179/105 (03-13-23 @ 13:00) (131/92 - 179/105)  RR: 17 (03-13-23 @ 13:00) (12 - 28)  SpO2: 99% (03-13-23 @ 13:00) (88% - 100%)    I&O's Summary  12 Mar 2023 07:01  -  13 Mar 2023 07:00  --------------------------------------------------------  IN: 1265 mL / OUT: 3165 mL / NET: -1900 mL    13 Mar 2023 07:01  -  13 Mar 2023 13:44  --------------------------------------------------------  IN: 500 mL / OUT: 450 mL / NET: 50 mL    Tele: SR/SB    Physical Exam:  General: Sitting up in chair in no distress.  HEENT: EOMs intact.  Neck: Neck supple. JVP not elevated.  Chest: Clear to auscultation bilaterally  CV: RRR. Normal S1 and S2. No murmurs, rub, or gallops. Warm peripherally.  PV: No LE edema noted.  Abdomen: Softly distended, +BS  Skin: warm, dry  Neurology: Alert and oriented times three. Sensation intact  Psych: Affect normal    Labs:                        9.7    6.35  )-----------( 163      ( 13 Mar 2023 02:00 )             32.2     03-13    135  |  98  |  13.3  ----------------------------<  132<H>  4.0   |  28.0  |  1.03    Ca    8.8      13 Mar 2023 02:00  Mg     1.8     03-13    TPro  6.7  /  Alb  3.6  /  TBili  0.5  /  DBili  0.2  /  AST  24  /  ALT  26  /  AlkPhos  69  03-13      Lactate Dehydrogenase,Serum: 295 U/L (03-13 @ 02:00)    Lactate, Blood: 0.5 mmol/L (03-13 @ 02:00)  Lactate, Blood: 0.7 mmol/L (03-12 @ 12:20)  Lactate, Blood: 0.6 mmol/L (03-11 @ 22:00)

## 2023-03-13 NOTE — PROGRESS NOTE ADULT - PROBLEM SELECTOR PLAN 1
S/p Celiac and SMA bypass. Plan for staged procedure with TEVAR  Wean IV anti-hypertensive agents as tolerated  offionotropes  currently NPO with ileus   Encourage OOB to chair and ambulation with PT  Encourage deep breathing exercised and coughing  Chest PT and IS use with bedside nurse   Nephrology following. Likely baseline SCr between 1.3-1.6.   Heart failure team following for chronic systolic heart failure.

## 2023-03-13 NOTE — PROGRESS NOTE ADULT - SUBJECTIVE AND OBJECTIVE BOX
Subjective: Overnight the patient notably vomited 1.5L of emesis, declined NG tube placement.  He is still passing flatus however no BMs       MEDICATIONS  (STANDING):  acetaminophen   IVPB .. 1000 milliGRAM(s) IV Intermittent once  acetaminophen   IVPB .. 1000 milliGRAM(s) IV Intermittent once  amLODIPine   Tablet 10 milliGRAM(s) Oral daily  carvedilol 25 milliGRAM(s) Oral every 12 hours  dextrose 5% + sodium chloride 0.45%. 1000 milliLiter(s) (75 mL/Hr) IV Continuous <Continuous>  dextrose 5%. 1000 milliLiter(s) (100 mL/Hr) IV Continuous <Continuous>  dextrose 5%. 1000 milliLiter(s) (50 mL/Hr) IV Continuous <Continuous>  dextrose 50% Injectable 25 Gram(s) IV Push once  dextrose 50% Injectable 12.5 Gram(s) IV Push once  dextrose 50% Injectable 25 Gram(s) IV Push once  doxazosin 4 milliGRAM(s) Oral <User Schedule>  glucagon  Injectable 1 milliGRAM(s) IntraMuscular once  heparin   Injectable 5000 Unit(s) SubCutaneous every 8 hours  insulin lispro (ADMELOG) corrective regimen sliding scale   SubCutaneous every 6 hours  metoclopramide Injectable 5 milliGRAM(s) IV Push every 8 hours  pantoprazole  Injectable 40 milliGRAM(s) IV Push daily  senna 2 Tablet(s) Oral at bedtime    MEDICATIONS  (PRN):  benzocaine 20% Spray 1 Spray(s) Topical every 4 hours PRN Discomfort  dextrose Oral Gel 15 Gram(s) Oral once PRN Blood Glucose LESS THAN 70 milliGRAM(s)/deciliter  HYDROmorphone  Injectable 0.5 milliGRAM(s) IV Push every 3 hours PRN Severe Pain (7 - 10)  naloxone Injectable 0.1 milliGRAM(s) IV Push every 3 minutes PRN For ANY of the following changes in patient status:  A. RR LESS THAN 10 breaths per minute, B. Oxygen saturation LESS THAN 90%, C. Sedation score of 6  ondansetron Injectable 4 milliGRAM(s) IV Push every 6 hours PRN Nausea    No Known Allergies    ICU Vital Signs Last 24 Hrs  T(C): 36.9 (13 Mar 2023 07:22), Max: 37.2 (12 Mar 2023 20:00)  T(F): 98.4 (13 Mar 2023 07:22), Max: 99 (12 Mar 2023 20:00)  HR: 75 (13 Mar 2023 15:00) (61 - 87)  BP: 143/91 (13 Mar 2023 15:00) (131/92 - 179/105)  BP(mean): 112 (13 Mar 2023 15:00) (107 - 132)  ABP: --  ABP(mean): --  RR: 18 (13 Mar 2023 15:00) (12 - 28)  SpO2: 95% (13 Mar 2023 15:00) (88% - 100%)    O2 Parameters below as of 13 Mar 2023 12:00  Patient On (Oxygen Delivery Method): nasal cannula  O2 Flow (L/min): 4      I&O's Detail    12 Mar 2023 07:01  -  13 Mar 2023 07:00  --------------------------------------------------------  IN:    dextrose 5% + sodium chloride 0.45%: 1065 mL    IV PiggyBack: 200 mL  Total IN: 1265 mL    OUT:    Emesis (mL): 1300 mL    Indwelling Catheter - Urethral (mL): 1865 mL  Total OUT: 3165 mL    Total NET: -1900 mL      13 Mar 2023 07:01  -  13 Mar 2023 16:32  --------------------------------------------------------  IN:    dextrose 5% + sodium chloride 0.45%: 800 mL  Total IN: 800 mL    OUT:    Indwelling Catheter - Urethral (mL): 525 mL  Total OUT: 525 mL    Total NET: 275 mL    Physical Exam:  General: NAD, resting comfortably in bed  Pulmonary: Nonlabored breathing, no respiratory distress, unlabored on 4L NC  Cardiovascular: NSR  Abdominal: soft, distended, TTP along midline    Extremities: WWP  Pulses:   Right:                                                                          Left:  FEM [ ]2+ [ ]1+ [ ]doppler                                             FEM [ ]2+ [ ]1+ [ ]doppler    POP [ ]2+ [ ]1+ [ ]doppler                                             POP [ ]2+ [ ]1+ [ ]doppler    DP [x ]2+ [ ]1+ [ ]doppler                                                DP [x]2+ [ ]1+ [ ]doppler  PT[ x]2+ [ ]1+ [ ]doppler                                                  PT [ x]2+ [ ]1+ [ ]doppler    .  LABS:                         9.7    6.35  )-----------( 163      ( 13 Mar 2023 02:00 )             32.2     03-13    135  |  98  |  13.3  ----------------------------<  132<H>  4.0   |  28.0  |  1.03    Ca    8.8      13 Mar 2023 02:00  Mg     1.8     03-13    TPro  6.7  /  Alb  3.6  /  TBili  0.5  /  DBili  0.2  /  AST  24  /  ALT  26  /  AlkPhos  69  03-13              RADIOLOGY, EKG & ADDITIONAL TESTS: Reviewed.

## 2023-03-13 NOTE — PROGRESS NOTE ADULT - PROBLEM SELECTOR PLAN 1
Failed attempts to place NG tube. Reports having difficulty to advance at the nasopharyngeal level.   KUB this morning shows worsening gas pattern. Reports 1 episode of NBNB emesis overnight. Now passing gas, +BM this morning. Abdomen appears distended.   Encourage out of ambulate as tolerated  Keep NPO

## 2023-03-13 NOTE — PROGRESS NOTE ADULT - PROBLEM SELECTOR PLAN 1
- Type B aortic dissection seen on CTA.  - S/p iliac to celiac SMA bypass 3/9 by CTS and vascular surgery teams. He will eventually undergo a staged TEVAR and carotid bypass. - Type B aortic dissection seen on CTA.  - S/p iliac to celiac SMA bypass 3/9 by CTS and vascular surgery teams. He will eventually undergo a staged TEVAR and carotid bypass.  - Recommend to aim for normotensive BP

## 2023-03-13 NOTE — PROGRESS NOTE ADULT - ASSESSMENT
42M w/ PMH of HTN (uncontrolled), Pulmonary Interstitial Disease (per chart review), CAD w/ stents (2021 in [place]), CKD (unknown baseline SCr), with recent admission for hypertensive urgency, was sent to [facility] [facility] 2/28/23 from [facility] secondary to HTN urgency (). CTA C/A/P showed Type B dissection and he was transferred to [facility] CTICU for further management. Inpatient course significant for: RHC/LHC initially canceled 2/2 worsening renal function then complicated by right radial partial thrombosis, right cephalic and basilic vein thrombophlebitis, and HTN. On 3/9 underwent celiac and SMA Bypass via midline laparotomy, joint case with vascular/CT surgery. Recovering slowly. Patient likely has a ileus.     Plan:   -ARBF   -Awaiting TEVAR  -c/w BP control, wean IV meds as able  -Rest of care per primary team

## 2023-03-13 NOTE — PROGRESS NOTE ADULT - SUBJECTIVE AND OBJECTIVE BOX
Chief Complaint: This is a 47y old man patient being seen in follow-up consultation for post op ileus    Interval HPI / 24H events:  Patient is seen and examined at the bedside, 1 episode of bilious emesis overnight. Denies nausea, abdominal pain. Abdomen distended. Passing gas, +BM this morning.     Review of Systems:  . Constitutional: No fever, chills  . HEENT: Negative  · Respiratory and Thorax: No shortness of breath, no cough  · Cardiovascular: No chest pain, palpitation, no dizziness  · Gastrointestinal: see above  · Genitourinary: No hematuria  · Musculoskeletal: Negative  · Neurological: negative  · Psychiatric: no agitation, no anxiety      PAST MEDICAL/SURGICAL HISTORY:  Uncontrolled hypertension    CKD (chronic kidney disease)    Interstitial lung disease    Prophylactic measure      MEDICATIONS  (STANDING):  acetaminophen   IVPB .. 1000 milliGRAM(s) IV Intermittent once  bisacodyl Suppository 10 milliGRAM(s) Rectal daily  cloNIDine Patch 0.1 mG/24Hr(s) 1 patch Transdermal every 7 days  dextrose 5% + sodium chloride 0.45%. 1000 milliLiter(s) (100 mL/Hr) IV Continuous <Continuous>  heparin   Injectable 5000 Unit(s) SubCutaneous every 8 hours  insulin lispro (ADMELOG) corrective regimen sliding scale   SubCutaneous every 6 hours  metoprolol tartrate Injectable 7.5 milliGRAM(s) IV Push every 6 hours  pantoprazole  Injectable 40 milliGRAM(s) IV Push daily  senna 2 Tablet(s) Oral at bedtime    MEDICATIONS  (PRN):  benzocaine 20% Spray 1 Spray(s) Topical every 4 hours PRN Discomfort  HYDROmorphone  Injectable 0.5 milliGRAM(s) IV Push every 3 hours PRN Severe Pain (7 - 10)    No Known Allergies    T(C): 36.9 (03-13-23 @ 07:22), Max: 37.2 (03-12-23 @ 20:00)  HR: 71 (03-13-23 @ 11:00) (64 - 87)  BP: 156/98 (03-13-23 @ 11:00) (131/92 - 168/102)  RR: 16 (03-13-23 @ 11:00) (12 - 28)  SpO2: 97% (03-13-23 @ 11:00) (88% - 100%)    I&O's Summary    12 Mar 2023 07:01  -  13 Mar 2023 07:00  --------------------------------------------------------  IN: 1265 mL / OUT: 3165 mL / NET: -1900 mL    13 Mar 2023 07:01  -  13 Mar 2023 12:52  --------------------------------------------------------  IN: 100 mL / OUT: 75 mL / NET: 25 mL      PHYSICAL EXAM:  Constitutional: No acute distress  Neuro: Awake alert, oriented to person, place   HEENT: anicteric sclerae  Neck: supple, no JVD  CV: regular rate, regular rhythm  Pulm/chest: lung sounds clear bilaterally, no accessory muscle use noted  Abd: soft, distended, nontender, +bowel sounds. No rebound tenderness, or guarding  Ext: no Cyanosis, clubbing or edema  Skin: warm, no jaundice   Psych: calm, cooperative      LABS:               9.7    6.35  )-----------( 163      ( 03-13 @ 02:00 )             32.2                10.0   8.33  )-----------( 117      ( 03-12 @ 03:42 )             34.5                9.9    10.51 )-----------( 140      ( 03-11 @ 03:30 )             33.0       03-13    135  |  98  |  13.3  ----------------------------<  132<H>  4.0   |  28.0  |  1.03    Ca    8.8      13 Mar 2023 02:00  Mg     1.8     03-13    TPro  6.7  /  Alb  3.6  /  TBili  0.5  /  DBili  0.2  /  AST  24  /  ALT  26  /  AlkPhos  69  03-13    LIVER FUNCTIONS - ( 13 Mar 2023 02:00 )  Alb: 3.6 g/dL / Pro: 6.7 g/dL / ALK PHOS: 69 U/L / ALT: 26 U/L / AST: 24 U/L / GGT: x           < from: Xray Abdomen 1 View PORTABLE -Urgent (Xray Abdomen 1 View PORTABLE -Urgent .) (03.12.23 @ 14:33) >    Frontal view of the abdomen shows less abdominal bowel gas. Surgical   clips are again noted. No definite free air is identified.    IMPRESSION:  Compatible with resolving ileus.    < end of copied text >

## 2023-03-13 NOTE — PROGRESS NOTE ADULT - SUBJECTIVE AND OBJECTIVE BOX
FABIANA CALIXTO  MRN-[id number]    HPI:  42M, Khmer speaking poor historian, with PMH HTN (uncontrolled), Pulmonary Interstitial Disease, CAD w/ stents (2021 in [place]), CKD (unknown baseline), was sent to [place] ED 2/28/23 from [facility] where he had gone for a physical exam and to get his medications filled. He obtains his Nifedipine from his home country of [place], however has been unable to obtain recent doses so he has been taking half his medication daily. Of note, HIE shows recent admission to [place] for photophobia where he was also found to have . 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 He was transferred to [facility] CTICU for further management.   At time of admission, his SBP was 180s and he was started on Esmolol and Nicardipine. Patient denies acute pain with radiating or aggravating factors. He does admit to occasional headaches and dizziness.   (01 Mar 2023 02:45)    Surgery/Hospital Course:  ·  PRE-OP DIAGNOSIS:  Dissection of aorta   ·  POST-OP DIAGNOSIS:  Dissection of aorta   ·  PROCEDURES:  Bypass graft, mesenteric artery 09-Mar-2023  Today:  No acute events   SR , 4L NC  NGT insertion today by GI  BP control with Clonidin , Lopressor IV PRN  D5% 1/2  ml / hour      ICU Vital Signs Last 24 Hrs  T(C): 36.9 (13 Mar 2023 07:22), Max: 37.2 (12 Mar 2023 20:00)  T(F): 98.4 (13 Mar 2023 07:22), Max: 99 (12 Mar 2023 20:00)  HR: 71 (13 Mar 2023 11:00) (64 - 87)  BP: 156/98 (13 Mar 2023 11:00) (131/92 - 168/102)  BP(mean): 120 (13 Mar 2023 11:00) (108 - 128)  ABP: --  ABP(mean): --  RR: 16 (13 Mar 2023 11:00) (12 - 28)  SpO2: 97% (13 Mar 2023 11:00) (88% - 100%)    O2 Parameters below as of 13 Mar 2023 08:00  Patient On (Oxygen Delivery Method): nasal cannula  O2 Flow (L/min): 4          Physical Exam:  Gen: A&O   CNS: non focal 	  Neck: no JVD  RES : clear , no wheezing              CVS: Regular  rhythm. Normal S1/S2  Abd: distended. Bowel sounds present.  Skin: No rash.  Ext:  no edema    ============================I/O===========================   I&O's Detail    12 Mar 2023 07:01  -  13 Mar 2023 07:00  --------------------------------------------------------  IN:    dextrose 5% + sodium chloride 0.45%: 1065 mL    IV PiggyBack: 200 mL  Total IN: 1265 mL    OUT:    Emesis (mL): 1300 mL    Indwelling Catheter - Urethral (mL): 1865 mL  Total OUT: 3165 mL    Total NET: -1900 mL      13 Mar 2023 07:01  -  13 Mar 2023 12:09  --------------------------------------------------------  IN:    dextrose 5% + sodium chloride 0.45%: 100 mL  Total IN: 100 mL    OUT:    Indwelling Catheter - Urethral (mL): 75 mL  Total OUT: 75 mL    Total NET: 25 mL        ============================ LABS =========================                        9.7    6.35  )-----------( 163      ( 13 Mar 2023 02:00 )             32.2     03-13    135  |  98  |  13.3  ----------------------------<  132<H>  4.0   |  28.0  |  1.03    Ca    8.8      13 Mar 2023 02:00  Mg     1.8     03-13    TPro  6.7  /  Alb  3.6  /  TBili  0.5  /  DBili  0.2  /  AST  24  /  ALT  26  /  AlkPhos  69  03-13    LIVER FUNCTIONS - ( 13 Mar 2023 02:00 )  Alb: 3.6 g/dL / Pro: 6.7 g/dL / ALK PHOS: 69 U/L / ALT: 26 U/L / AST: 24 U/L / GGT: x                 ======================Micro/Rad/Cardio=================  Culture: Reviewed   CXR: Reviewed  Echo:Reviewed  ======================================================  PAST MEDICAL & SURGICAL HISTORY:  Uncontrolled hypertension      CKD (chronic kidney disease)      Interstitial lung disease      Prophylactic measure        ====================ASSESSMENT ==============  42M, poor historian, PMH of HTN (uncontrolled), Pulmonary Interstitial Disease (per chart review), CAD w/ stents (2021 in [place]), CKD (unknown baseline SCr), with recent admission for hypertensive urgency, was sent to [place] ED 2/28/23 from [facility] secondary to HTN urgency (). CTA C/A/P showed Type B dissection and he was transferred to [facility] CTICU for further management. Inpatient course significant for: RHC/LHC initially canceled 2/2 worsening renal function then complicated by right radial partial thrombosis, right cephalic and basilic vein thrombophlebitis, and HTN. On 3/9 underwent celiac and SMA Bypass.     3/11 pt remains on cardene gtt for HTN urgency. Pt has invasive lines in place for monitoring of hemodynamics.        ---Dissection of aorta.   ---S/p Celiac and SMA bypass. Plan for staged procedure with TEVAR  --- Chronic combined systolic and diastolic heart failure.   --- Hypertensive urgency.   ---Chronic kidney disease (CKD).   --- Radial artery thrombosis.   ---Thrombophlebitis arm.   ---Post op Hypovolemia  ---Post op respiratory insufficiency   ---Post op Ileus    Plan:  -Lopressor  IV as tolerated  -Chest PT and IS use with bedside nurse   -Continue to monitor need for Diuresis.  -Nephrology following.   -AC on hold in periop setting.  - Continue GI ppx with Protonix IV  -DVT ppx with SCD boots -  ====================== NEUROLOGY=====================  acetaminophen   IVPB .. 1000 milliGRAM(s) IV Intermittent once  HYDROmorphone  Injectable 0.5 milliGRAM(s) IV Push every 3 hours PRN Severe Pain (7 - 10)    ==================== RESPIRATORY======================  Post op respiratory insufficiency    ====================CARDIOVASCULAR==================  Post op Hypovolemia  cloNIDine Patch 0.1 mG/24Hr(s) 1 patch Transdermal every 7 days  metoprolol tartrate Injectable 7.5 milliGRAM(s) IV Push every 6 hours    ===================HEMATOLOGIC/ONC ===================  Monitor H&H/Plts    heparin   Injectable 5000 Unit(s) SubCutaneous every 8 hours    ===================== RENAL =========================  Continue monitoring urine output, I&OS, BUN/Cr     ==================== GASTROINTESTINAL===================  bisacodyl Suppository 10 milliGRAM(s) Rectal daily  dextrose 5% + sodium chloride 0.45%. 1000 milliLiter(s) (100 mL/Hr) IV Continuous <Continuous>  pantoprazole  Injectable 40 milliGRAM(s) IV Push daily  senna 2 Tablet(s) Oral at bedtime    =======================    ENDOCRINE  =====================  insulin lispro (ADMELOG) corrective regimen sliding scale   SubCutaneous every 6 hours    ========================INFECTIOUS DISEASE================      -Monitor Neurologic status ,   -Head of the bed should remain elevated to 45 degrees,  -Monitor for arrhythmias and monitor parameters for organ perfusion,  -Glycemic control is satisfactory,  -Nutritional goals will be met using po eventually , insure adequate caloric intake and monitor the same ,  -Electrolytes have been repleted as necessary , pain control has been achieved  and wound care has been carried out ,  -Stress ulcer and VTE prophylaxis will be achieved,  -Agressive PT and early mobility and ambulation goals will be met,      I have spent 35 minutes providing acute care for this critically ill patient     Patient requires continuous monitoring with bedside rhythm monitoring, pulse ox monitoring, and intermittent blood gas analysis. Care plan discussed with ICU care team. Patient remained critical and at risk for life threatening decompensation.

## 2023-03-13 NOTE — PROGRESS NOTE ADULT - PROBLEM SELECTOR PLAN 3
Continue BP control  Currently on nicardipine gtt  currently on lopressor IV - currently remains NPO

## 2023-03-13 NOTE — PROGRESS NOTE ADULT - SUBJECTIVE AND OBJECTIVE BOX
Complained of mild abdominal discomfort.     Vital Signs Last 24 Hrs  T(C): 36.9 (13 Mar 2023 07:22), Max: 37.2 (12 Mar 2023 20:00)  T(F): 98.4 (13 Mar 2023 07:22), Max: 99 (12 Mar 2023 20:00)  HR: 75 (13 Mar 2023 15:00) (61 - 87)  BP: 143/91 (13 Mar 2023 15:00) (131/92 - 179/105)  BP(mean): 112 (13 Mar 2023 15:00) (107 - 132)  RR: 18 (13 Mar 2023 15:00) (12 - 28)  SpO2: 95% (13 Mar 2023 15:00) (88% - 100%)    Parameters below as of 13 Mar 2023 12:00  Patient On (Oxygen Delivery Method): nasal cannula  O2 Flow (L/min): 4    I&O's Summary    12 Mar 2023 07:01  -  13 Mar 2023 07:00  --------------------------------------------------------  IN: 1265 mL / OUT: 3165 mL / NET: -1900 mL    13 Mar 2023 07:01  -  13 Mar 2023 15:57  --------------------------------------------------------  IN: 800 mL / OUT: 525 mL / NET: 275 mL    Physical Exam  General: WDWN male in NAD  Cardiac: S1S2 RRR  Respiratory: CTAB  Abdomen: Obese, non tender  Extremities: No appreciable edema  Neuro: AAOx3    03-13    135  |  98  |  13.3  ----------------------------<  132<H>  4.0   |  28.0  |  1.03    Ca    8.8      13 Mar 2023 02:00  Mg     1.8     03-13    TPro  6.7  /  Alb  3.6  /  TBili  0.5  /  DBili  0.2  /  AST  24  /  ALT  26  /  AlkPhos  69  03-13                        9.7    6.35  )-----------( 163      ( 13 Mar 2023 02:00 )             32.2     MEDICATIONS  (STANDING):  bisacodyl Suppository 10 milliGRAM(s) Rectal daily  cloNIDine Patch 0.1 mG/24Hr(s) 1 patch Transdermal every 7 days  dextrose 5% + sodium chloride 0.45%. 1000 milliLiter(s) (100 mL/Hr) IV Continuous <Continuous>  heparin   Injectable 5000 Unit(s) SubCutaneous every 8 hours  insulin lispro (ADMELOG) corrective regimen sliding scale   SubCutaneous every 6 hours  metoprolol tartrate Injectable 7.5 milliGRAM(s) IV Push every 6 hours  pantoprazole  Injectable 40 milliGRAM(s) IV Push daily  senna 2 Tablet(s) Oral at bedtime    MEDICATIONS  (PRN):  benzocaine 20% Spray 1 Spray(s) Topical every 4 hours PRN Discomfort  hydrALAZINE Injectable 10 milliGRAM(s) IV Push every 4 hours PRN SBP >160  HYDROmorphone  Injectable 0.5 milliGRAM(s) IV Push every 3 hours PRN Severe Pain (7 - 10)

## 2023-03-13 NOTE — PROGRESS NOTE ADULT - SUBJECTIVE AND OBJECTIVE BOX
SUBJECTIVE   "no i do not want the tube" "there was blood last time"    used for eval   currently denying abdominal pain, admits gasseous feeling which is relieved when he vomits.  without tenderness of palpation of side, + tenderness to incision site - hard to illicit if that is soley from large laparotomy site   admits flatus and feeling better than this morning   states that there was multiple tubes attempted by various people and would not like to do i again.  education on the concern for abdominal distention and possible aspiration - at this time patient continues to refuse     INTERIM HISTORY SIGNIFICANT FOR   continues to vomit. denies abdominal pain- continues to refuse NG tube ....   risk/ benefits with teach back - no avail     Patient is a 47y old  Male who presents with a chief complaint of Type B Dissection (12 Mar 2023 15:42)    HPI:  42M, English speaking poor historian, with PMH HTN (uncontrolled), Pulmonary Interstitial Disease, CAD w/ stents (2021 in [place]), CKD (unknown baseline), was sent to [place] ED 2/28/23 from [facility] where he had gone for a physical exam and to get his medications filled. He obtains his Nifedipine from his home country of [place], however has been unable to obtain recent doses so he has been taking half his medication daily. Of note, HIE shows recent admission to [place] for photophobia where he was also found to have . 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 He was transferred to [facility] CTICU for further management.     At time of admission, his SBP was 180s and he was started on Esmolol and Nicardipine. Patient denies acute pain with radiating or aggravating factors. He does admit to occasional headaches and dizziness.    (01 Mar 2023 02:45)    OBJECTIVE  PAST MEDICAL & SURGICAL HISTORY:  Uncontrolled hypertension      CKD (chronic kidney disease)      Interstitial lung disease      Prophylactic measure        No Known Allergies    Home Medications:  NIFEdipine 60 mg oral tablet, extended release: 1 tab(s) orally 2 times a day (01 Mar 2023 02:57)    VITALS  Currently in sinus rhythm with vitals as below  ICU Vital Signs Last 24 Hrs  T(C): 37.2 (12 Mar 2023 20:00), Max: 37.2 (12 Mar 2023 20:00)  T(F): 99 (12 Mar 2023 20:00), Max: 99 (12 Mar 2023 20:00)  HR: 76 (13 Mar 2023 02:00) (72 - 87)  BP: 131/92 (13 Mar 2023 02:00) (131/92 - 177/92)  BP(mean): 108 (13 Mar 2023 02:00) (108 - 128)  ABP: --  ABP(mean): --  RR: 17 (13 Mar 2023 02:00) (12 - 26)  SpO2: 99% (13 Mar 2023 02:00) (88% - 100%)    O2 Parameters below as of 13 Mar 2023 00:00  Patient On (Oxygen Delivery Method): nasal cannula  O2 Flow (L/min): 4                              9.7    6.35  )-----------( 163      ( 13 Mar 2023 02:00 )             32.2   03-13    135  |  98  |  13.3  ----------------------------<  132<H>  4.0   |  28.0  |  1.03    Ca    8.8      13 Mar 2023 02:00  Mg     1.8     03-13    TPro  6.7  /  Alb  3.6  /  TBili  0.5  /  DBili  0.2  /  AST  24  /  ALT  26  /  AlkPhos  69  03-13  CAPILLARY BLOOD GLUCOSE      POCT Blood Glucose.: 106 mg/dL (12 Mar 2023 22:56)          IN/OUT    03-11-23 @ 06:01  -  03-12-23 @ 07:00  --------------------------------------------------------  IN: 2230 mL / OUT: 1810 mL / NET: 420 mL    03-12-23 @ 07:01  -  03-13-23 @ 03:13  --------------------------------------------------------  IN: 965 mL / OUT: 2905 mL / NET: -1940 mL      IMAGING  personally reviewed imaging   Xray Chest 1 View- PORTABLE-Routine:   ACC: [id number] EXAM:  XR CHEST PORTABLE ROUTINE 1V   ORDERED BY: BERTHA CARDOOS     PROCEDURE DATE:  03/12/2023          INTERPRETATION:  Chest one view    HISTORY: Postop    COMPARISON STUDY: 3/11/2023    Frontal expiratory view of the chest shows the heart to be similarly   enlarged in size. The lungs show mild pulmonary congestion with similar   enlargement of the aortic arch and there is no evidence of pneumothorax   nor pleural effusion.    IMPRESSION:  Mild congestion.      Thank you for the courtesy of this referral.    --- End of Report ---            MEHRAN GILLETTE MD; Attending Interventional Radiologist  This document has been electronically signed. Mar 12 2023 11:53AM (03-12-23 @ 05:42)    CURRENT MEDICATIONS  MEDICATIONS  (STANDING):  acetaminophen   IVPB .. 1000 milliGRAM(s) IV Intermittent once  acetaminophen   IVPB .. 1000 milliGRAM(s) IV Intermittent once  amLODIPine   Tablet 10 milliGRAM(s) Oral daily  bisacodyl Suppository 10 milliGRAM(s) Rectal daily  carvedilol 25 milliGRAM(s) Oral every 12 hours  dextrose 5% + sodium chloride 0.45%. 1000 milliLiter(s) (40 mL/Hr) IV Continuous <Continuous>  doxazosin 4 milliGRAM(s) Oral <User Schedule>  glucagon  Injectable 1 milliGRAM(s) IntraMuscular once  heparin   Injectable 5000 Unit(s) SubCutaneous every 8 hours  insulin lispro (ADMELOG) corrective regimen sliding scale   SubCutaneous every 6 hours  pantoprazole  Injectable 40 milliGRAM(s) IV Push daily  senna 2 Tablet(s) Oral at bedtime    MEDICATIONS  (PRN):  benzocaine 20% Spray 1 Spray(s) Topical every 4 hours PRN Discomfort  HYDROmorphone  Injectable 0.5 milliGRAM(s) IV Push every 3 hours PRN Severe Pain (7 - 10)

## 2023-03-13 NOTE — PROGRESS NOTE ADULT - ASSESSMENT
42M, Occitan speaking poor historian, with PMH HTN (uncontrolled), Pulmonary Interstitial Disease, CAD w/ stents (2021 in [place]), CKD (unknown baseline) admitted with radiology finding of Type B aortic dissection now  s/p Celiac and SMA bypass. Plan for staged procedure with TEVAR.  GI following post op ileus.

## 2023-03-13 NOTE — PROGRESS NOTE ADULT - PROBLEM SELECTOR PLAN 2
- Etiology: NICMP (LHC 3/3 revealed normal coronaries and no prior stents). Likely due to hypertensive heart disease with increased wall thickness on echo.  - RHC 3/3 showed LVEDP 19, PAP 40/24/31.   - Appears euvolemic on exam today.  - BP control: Lopressor 7.5mg IVP q6hrs and Clonidine patch 0.1mg weekly.   - Recommend afterload reduction. Can start with low dose hydralazine 10mg IVP TID.  - GDMT will be optimized once his is resuming PO medications.  - Overall, he has poor outpatient care and unsure if he has insurance.  Will need to establish care with local cardiology and HF teams but he travels between [place] and the  - Etiology: NICMP (LHC 3/3 revealed normal coronaries and no prior stents). Likely due to hypertensive heart disease with increased wall thickness on echo.  - RHC 3/3 showed LVEDP 19, PAP 40/24/31.   - Appears euvolemic on exam today.  - BP control: Lopressor 7.5mg IVP q6hrs and Clonidine patch 0.1mg weekly as per CTS. Should avoid clonidine due to risk rebound HTN.   - Recommend afterload reduction. Can start with low dose hydralazine 10mg IVP TID.  - GDMT will be optimized once he is tolerating PO medications.   -  Will need to establish care with local cardiology and HF teams but he travels between [place] and the .  - Can consider genetic testing as outpt.

## 2023-03-13 NOTE — PROGRESS NOTE ADULT - ASSESSMENT
43 yo M, originally from the  emigrated ~ 2008, with a history of HTN (uncontrolled diagnosed 5 years ago), interstitial lung disease, CAD w/ stents (? for a leaking blood vessel/dissection), CKD (unknown baseline) initially presented to [place] ED 2/28/23 from [facility] where he had gone for a routine physical exam and to get his medications filled.  He had previously not seen a physician for over 3 years.  He was last seen in [place] ED in 2018 for hypertensive urgency requiring IV labetalol and transition to lisinopril/ HCTZ/ amlodipine.  However, he has been continuously filling his home meds from the  including losartan 320mg daily, nifedipine 60mg ER daily, and rosuvastatin 40mg daily.  Due to inability to obtain all his medications, he has been unable to obtain recent doses so he has been taking half his medication daily.  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  On presentation to [facility] he presented with SBP > 200 and subsequent CTA C/A/P showed Type B dissection. He was started on labetalol and nifedipine drips and transferred to [facility] CTICU for further management. He was initially treated with esmolol and nicardipine gtts. Underwent R/LHC 3/3 which revealed normal coronaries and elevated LVEDP 19. Subsequently developed right radial, cephalic and basilic vein thrombi for which he is on heparin gtt. Vascular consulted and no intervention required-clinically improving.     Cardiac Testing:  Echo 2/28/23: LVEF 20-25%, LVEDD 6./23cm, LVIDs 5.59cm, TAPSE 2.54cm, mild to mod MR, trivial TR, RV normal function.

## 2023-03-13 NOTE — PROGRESS NOTE ADULT - ASSESSMENT
The patient is a 42 year old male with PMH of HTN, CAD s/p stents, "big heart", and pulmonary interstitial disease, whom reports that he travels to his home country once annually during the month of July and has routine check up with physicians there as well as refill of prescription medications. He initially presented to [facility] for photophobia. Of note, the patient was not taking his prescribed dose of antihypertensives due to lack of pills / refills. Admitted for hypertensive emergency. CTA showed at the distal aortic arch, there is a dissection flap with the false lumen extending through the aneurysmal sac and giving rise to a patent celiac artery. The true lumen gives rise to a patent superior mesenteric artery. The bilateral renal arteries are patent. Transferred to [facility] for further surgical intervention. Underwent iliac to celiac SMA bypass on 3/09/23; TEVAR pending. Nephrology is consulted for ROSALION (resolved).      -ROSALINO is multifactorial - suspecting to be hemodynamically driven (due to necessary / aggressive BP management in setting of type B aortic dissection) superimposed by contrast associated nephropathy   -At [facility], on arrival creatinine was 1.6mg/dL    -While here, creatinine reached a isra of 1mg/dL (which I suspect to be his baseline)  -UA 30 protein, trace blood, 0-2 RBC  -UPCR 0.2mg/g; UACR 71mg/g   -CTA a/p negative for hydronephrosis   -BP management - as per primary team  -Type B aortic dissection - management as per primary team    Given resolution to ROSALINO, Nephrology will sign off at this time. Call if questions. Thank you for the consultation.     Renata Leigh DO  Nephrology  [facility]  Office Number [phone]

## 2023-03-14 ENCOUNTER — TRANSCRIPTION ENCOUNTER (OUTPATIENT)
Age: 48
End: 2023-03-14

## 2023-03-14 LAB
ALBUMIN SERPL ELPH-MCNC: 4 G/DL — SIGNIFICANT CHANGE UP (ref 3.3–5.2)
ALP SERPL-CCNC: 74 U/L — SIGNIFICANT CHANGE UP (ref 40–120)
ALT FLD-CCNC: 21 U/L — SIGNIFICANT CHANGE UP
AMYLASE P1 CFR SERPL: 114 U/L — SIGNIFICANT CHANGE UP (ref 36–128)
ANION GAP SERPL CALC-SCNC: 11 MMOL/L — SIGNIFICANT CHANGE UP (ref 5–17)
ANION GAP SERPL CALC-SCNC: 13 MMOL/L — SIGNIFICANT CHANGE UP (ref 5–17)
AST SERPL-CCNC: 19 U/L — SIGNIFICANT CHANGE UP
BILIRUB DIRECT SERPL-MCNC: 0.2 MG/DL — SIGNIFICANT CHANGE UP (ref 0–0.3)
BILIRUB INDIRECT FLD-MCNC: 0.3 MG/DL — SIGNIFICANT CHANGE UP (ref 0.2–1)
BILIRUB SERPL-MCNC: 0.5 MG/DL — SIGNIFICANT CHANGE UP (ref 0.4–2)
BUN SERPL-MCNC: 11.3 MG/DL — SIGNIFICANT CHANGE UP (ref 8–20)
BUN SERPL-MCNC: 9.8 MG/DL — SIGNIFICANT CHANGE UP (ref 8–20)
CALCIUM SERPL-MCNC: 9.1 MG/DL — SIGNIFICANT CHANGE UP (ref 8.4–10.5)
CALCIUM SERPL-MCNC: 9.2 MG/DL — SIGNIFICANT CHANGE UP (ref 8.4–10.5)
CHLORIDE SERPL-SCNC: 97 MMOL/L — SIGNIFICANT CHANGE UP (ref 96–108)
CHLORIDE SERPL-SCNC: 98 MMOL/L — SIGNIFICANT CHANGE UP (ref 96–108)
CO2 SERPL-SCNC: 24 MMOL/L — SIGNIFICANT CHANGE UP (ref 22–29)
CO2 SERPL-SCNC: 27 MMOL/L — SIGNIFICANT CHANGE UP (ref 22–29)
CREAT SERPL-MCNC: 0.92 MG/DL — SIGNIFICANT CHANGE UP (ref 0.5–1.3)
CREAT SERPL-MCNC: 1.01 MG/DL — SIGNIFICANT CHANGE UP (ref 0.5–1.3)
EGFR: 105 ML/MIN/1.73M2 — SIGNIFICANT CHANGE UP
EGFR: 93 ML/MIN/1.73M2 — SIGNIFICANT CHANGE UP
GLUCOSE BLDC GLUCOMTR-MCNC: 155 MG/DL — HIGH (ref 70–99)
GLUCOSE BLDC GLUCOMTR-MCNC: 76 MG/DL — SIGNIFICANT CHANGE UP (ref 70–99)
GLUCOSE BLDC GLUCOMTR-MCNC: 88 MG/DL — SIGNIFICANT CHANGE UP (ref 70–99)
GLUCOSE BLDC GLUCOMTR-MCNC: 91 MG/DL — SIGNIFICANT CHANGE UP (ref 70–99)
GLUCOSE SERPL-MCNC: 101 MG/DL — HIGH (ref 70–99)
GLUCOSE SERPL-MCNC: 92 MG/DL — SIGNIFICANT CHANGE UP (ref 70–99)
HCT VFR BLD CALC: 33.1 % — LOW (ref 39–50)
HGB BLD-MCNC: 10 G/DL — LOW (ref 13–17)
LACTATE SERPL-SCNC: 0.9 MMOL/L — SIGNIFICANT CHANGE UP (ref 0.5–2)
LIDOCAIN IGE QN: 99 U/L — HIGH (ref 22–51)
MAGNESIUM SERPL-MCNC: 1.6 MG/DL — SIGNIFICANT CHANGE UP (ref 1.6–2.6)
MAGNESIUM SERPL-MCNC: 2.1 MG/DL — SIGNIFICANT CHANGE UP (ref 1.8–2.6)
MCHC RBC-ENTMCNC: 25.6 PG — LOW (ref 27–34)
MCHC RBC-ENTMCNC: 30.2 GM/DL — LOW (ref 32–36)
MCV RBC AUTO: 84.7 FL — SIGNIFICANT CHANGE UP (ref 80–100)
PHOSPHATE SERPL-MCNC: 1.4 MG/DL — LOW (ref 2.4–4.7)
PLATELET # BLD AUTO: 168 K/UL — SIGNIFICANT CHANGE UP (ref 150–400)
POTASSIUM SERPL-MCNC: 3.6 MMOL/L — SIGNIFICANT CHANGE UP (ref 3.5–5.3)
POTASSIUM SERPL-MCNC: 3.9 MMOL/L — SIGNIFICANT CHANGE UP (ref 3.5–5.3)
POTASSIUM SERPL-SCNC: 3.6 MMOL/L — SIGNIFICANT CHANGE UP (ref 3.5–5.3)
POTASSIUM SERPL-SCNC: 3.9 MMOL/L — SIGNIFICANT CHANGE UP (ref 3.5–5.3)
PROT SERPL-MCNC: 7.2 G/DL — SIGNIFICANT CHANGE UP (ref 6.6–8.7)
RBC # BLD: 3.91 M/UL — LOW (ref 4.2–5.8)
RBC # FLD: 14.5 % — SIGNIFICANT CHANGE UP (ref 10.3–14.5)
SODIUM SERPL-SCNC: 135 MMOL/L — SIGNIFICANT CHANGE UP (ref 135–145)
SODIUM SERPL-SCNC: 135 MMOL/L — SIGNIFICANT CHANGE UP (ref 135–145)
WBC # BLD: 5.85 K/UL — SIGNIFICANT CHANGE UP (ref 3.8–10.5)
WBC # FLD AUTO: 5.85 K/UL — SIGNIFICANT CHANGE UP (ref 3.8–10.5)

## 2023-03-14 PROCEDURE — 71045 X-RAY EXAM CHEST 1 VIEW: CPT | Mod: 26

## 2023-03-14 PROCEDURE — 99232 SBSQ HOSP IP/OBS MODERATE 35: CPT

## 2023-03-14 PROCEDURE — 99233 SBSQ HOSP IP/OBS HIGH 50: CPT

## 2023-03-14 RX ORDER — ACETAMINOPHEN 500 MG
1000 TABLET ORAL ONCE
Refills: 0 | Status: COMPLETED | OUTPATIENT
Start: 2023-03-14 | End: 2023-03-14

## 2023-03-14 RX ORDER — POTASSIUM CHLORIDE 20 MEQ
10 PACKET (EA) ORAL ONCE
Refills: 0 | Status: COMPLETED | OUTPATIENT
Start: 2023-03-14 | End: 2023-03-14

## 2023-03-14 RX ORDER — POTASSIUM PHOSPHATE, MONOBASIC POTASSIUM PHOSPHATE, DIBASIC 236; 224 MG/ML; MG/ML
30 INJECTION, SOLUTION INTRAVENOUS ONCE
Refills: 0 | Status: COMPLETED | OUTPATIENT
Start: 2023-03-14 | End: 2023-03-14

## 2023-03-14 RX ORDER — MAGNESIUM SULFATE 500 MG/ML
2 VIAL (ML) INJECTION ONCE
Refills: 0 | Status: COMPLETED | OUTPATIENT
Start: 2023-03-14 | End: 2023-03-14

## 2023-03-14 RX ADMIN — Medication 1 PATCH: at 07:47

## 2023-03-14 RX ADMIN — Medication 7.5 MILLIGRAM(S): at 23:20

## 2023-03-14 RX ADMIN — Medication 1: at 17:11

## 2023-03-14 RX ADMIN — Medication 1 PATCH: at 20:23

## 2023-03-14 RX ADMIN — Medication 7.5 MILLIGRAM(S): at 17:05

## 2023-03-14 RX ADMIN — Medication 7.5 MILLIGRAM(S): at 05:27

## 2023-03-14 RX ADMIN — Medication 10 MILLIGRAM(S): at 21:21

## 2023-03-14 RX ADMIN — POTASSIUM PHOSPHATE, MONOBASIC POTASSIUM PHOSPHATE, DIBASIC 83.33 MILLIMOLE(S): 236; 224 INJECTION, SOLUTION INTRAVENOUS at 14:30

## 2023-03-14 RX ADMIN — PANTOPRAZOLE SODIUM 40 MILLIGRAM(S): 20 TABLET, DELAYED RELEASE ORAL at 12:10

## 2023-03-14 RX ADMIN — SENNA PLUS 2 TABLET(S): 8.6 TABLET ORAL at 21:20

## 2023-03-14 RX ADMIN — HEPARIN SODIUM 5000 UNIT(S): 5000 INJECTION INTRAVENOUS; SUBCUTANEOUS at 21:20

## 2023-03-14 RX ADMIN — Medication 25 GRAM(S): at 05:30

## 2023-03-14 RX ADMIN — Medication 10 MILLIGRAM(S): at 02:00

## 2023-03-14 RX ADMIN — Medication 7.5 MILLIGRAM(S): at 12:11

## 2023-03-14 RX ADMIN — Medication 100 MILLIEQUIVALENT(S): at 12:10

## 2023-03-14 RX ADMIN — HEPARIN SODIUM 5000 UNIT(S): 5000 INJECTION INTRAVENOUS; SUBCUTANEOUS at 05:27

## 2023-03-14 RX ADMIN — HEPARIN SODIUM 5000 UNIT(S): 5000 INJECTION INTRAVENOUS; SUBCUTANEOUS at 13:19

## 2023-03-14 RX ADMIN — Medication 10 MILLIGRAM(S): at 09:23

## 2023-03-14 RX ADMIN — Medication 10 MILLIGRAM(S): at 14:30

## 2023-03-14 NOTE — DISCHARGE NOTE PROVIDER - NSDCFUSCHEDAPPT_GEN_ALL_CORE_FT
Po Heaton  [facility]  CTSURG [address]  Scheduled Appointment: 03/16/2023    Po Heaton  [facility]  Scheduled Appointment: 03/16/2023

## 2023-03-14 NOTE — DISCHARGE NOTE PROVIDER - NSDCCPTREATMENT_GEN_ALL_CORE_FT
PRINCIPAL PROCEDURE  Procedure: Bypass of celiac artery  Findings and Treatment:       SECONDARY PROCEDURE  Procedure: Bypass of superior mesenteric artery  Findings and Treatment:     Procedure: Laparotomy  Findings and Treatment: Midline    Procedure: Rt heart cardiac cath  Findings and Treatment:     Procedure: Left heart cardiac cath  Findings and Treatment:

## 2023-03-14 NOTE — PROGRESS NOTE ADULT - SUBJECTIVE AND OBJECTIVE BOX
Subjective - patient seen and evaluated bedside. Sitting comfortably in bed. Admits to abdominal discomfort, improving. Denies CP, SOB, HA, dizziness, n/v/d    Review of Systems: negative x 10 systems except as noted above    Brief summary:  47yMale POD# POD 5 Celiac/SMA bypass via minilaparotomy. Postop course complicated by ileus    Significant/[id number] events: Now passing flatus with multiple BM.       PAST MEDICAL & SURGICAL HISTORY:  Uncontrolled hypertension      CKD (chronic kidney disease)      Interstitial lung disease      Prophylactic measure            benzocaine 20% Spray 1 Spray(s) Topical every 4 hours PRN  bisacodyl Suppository 10 milliGRAM(s) Rectal daily  cloNIDine Patch 0.1 mG/24Hr(s) 1 patch Transdermal every 7 days  dextrose 5% + sodium chloride 0.45%. 1000 milliLiter(s) IV Continuous <Continuous>  heparin   Injectable 5000 Unit(s) SubCutaneous every 8 hours  hydrALAZINE Injectable 10 milliGRAM(s) IV Push every 4 hours PRN  HYDROmorphone  Injectable 0.5 milliGRAM(s) IV Push every 3 hours PRN  insulin lispro (ADMELOG) corrective regimen sliding scale   SubCutaneous every 6 hours  metoprolol tartrate Injectable 7.5 milliGRAM(s) IV Push every 6 hours  pantoprazole  Injectable 40 milliGRAM(s) IV Push daily  senna 2 Tablet(s) Oral at bedtime  MEDICATIONS  (PRN):  benzocaine 20% Spray 1 Spray(s) Topical every 4 hours PRN Discomfort  hydrALAZINE Injectable 10 milliGRAM(s) IV Push every 4 hours PRN SBP >160  HYDROmorphone  Injectable 0.5 milliGRAM(s) IV Push every 3 hours PRN Severe Pain (7 - 10)      Daily     Daily                               9.7    6.35  )-----------( 163      ( 13 Mar 2023 02:00 )             32.2   03-13    135  |  98  |  13.3  ----------------------------<  132<H>  4.0   |  28.0  |  1.03    Ca    8.8      13 Mar 2023 02:00  Mg     1.8     03-13    TPro  6.7  /  Alb  3.6  /  TBili  0.5  /  DBili  0.2  /  AST  24  /  ALT  26  /  AlkPhos  69  03-13            Objective:  T(C): 37.2 (03-14-23 @ 00:11), Max: 37.2 (03-14-23 @ 00:11)  HR: 73 (03-14-23 @ 00:00) (61 - 83)  BP: 146/94 (03-14-23 @ 00:00) (131/92 - 179/105)  RR: 16 (03-14-23 @ 00:00) (14 - 28)  SpO2: 95% (03-14-23 @ 00:00) (88% - 99%)  Wt(kg): --CAPILLARY BLOOD GLUCOSE      POCT Blood Glucose.: 85 mg/dL (13 Mar 2023 22:59)  POCT Blood Glucose.: 86 mg/dL (13 Mar 2023 05:55)  I&O's Summary    12 Mar 2023 07:01  -  13 Mar 2023 07:00  --------------------------------------------------------  IN: 1265 mL / OUT: 3165 mL / NET: -1900 mL    13 Mar 2023 07:01  -  14 Mar 2023 00:22  --------------------------------------------------------  IN: 1300 mL / OUT: 900 mL / NET: 400 mL        Physical Exam  General: NAD  Neuro: A+O x 3, non-focal, speech clear and intact  Psych: Appropriate affect  HEENT:  NCAT, No conjuctival edema or icterus, no thrush.  Pulm: CTA, equal bilaterally  CV: RRR, , +S1S2  Abd:firm, distended, tympanic to percussion, nontender, hypoactive bowel sounds.  Ext: +DP Pulses b/l, no edema  Skin: Warm, dry, intact  Inc: Abdominal incision site c/d/i w/ dressing.           Imaging:    < from: Xray Chest 1 View- PORTABLE-Routine (Xray Chest 1 View- PORTABLE-Routine in AM.) (03.13.23 @ 06:13) >  IMPRESSION:  Enlarged cardiac silhouette and enlarged thoracic aortic   contour, not significantly changed.    Low lung volumes.    Left midlung platelike atelectasis.    < end of copied text >

## 2023-03-14 NOTE — PROGRESS NOTE ADULT - SUBJECTIVE AND OBJECTIVE BOX
FABIANA CALIXTO  MRN#: [id number]  Subjective: The patient was in the CTICU in critical condition at risk for imminent decompensation and was seen and evaluate on AM rounds with the entire multidisciplinary team.     OBJECTIVE:  ICU Vital Signs Last 24 Hrs  T(C): 37.1 (14 Mar 2023 12:00), Max: 37.5 (14 Mar 2023 04:00)  T(F): 98.8 (14 Mar 2023 12:00), Max: 99.5 (14 Mar 2023 04:00)  HR: 76 (14 Mar 2023 12:00) (70 - 84)  BP: 170/103 (14 Mar 2023 12:00) (136/81 - 179/105)  BP(mean): 128 (14 Mar 2023 12:00) (103 - 132)  ABP: --  ABP(mean): --  RR: 22 (14 Mar 2023 12:00) (9 - 27)  SpO2: 96% (14 Mar 2023 12:00) (88% - 99%)    O2 Parameters below as of 14 Mar 2023 12:00  Patient On (Oxygen Delivery Method): room air    I&O's Detail    13 Mar 2023 07:01  -  14 Mar 2023 07:00  --------------------------------------------------------  IN:    dextrose 5% + sodium chloride 0.45%: 900 mL    dextrose 5% + sodium chloride 0.45%: 700 mL  Total IN: 1600 mL    OUT:    Indwelling Catheter - Urethral (mL): 650 mL    Voided (mL): 700 mL  Total OUT: 1350 mL    Total NET: 250 mL      14 Mar 2023 07:01  -  14 Mar 2023 12:57  --------------------------------------------------------  IN:    dextrose 5% + sodium chloride 0.45%: 250 mL  Total IN: 250 mL    OUT:    Voided (mL): 400 mL  Total OUT: 400 mL    Total NET: -150 mL    PHYSICAL EXAM:Daily Height in cm: 162.56 (28 Feb 2023 21:17)    Daily   General: WN/WD NAD    HEENT:     + NCAT  + EOMI  - Conjuctival edema   - Icterus   - Thrush   - ETT  - NGT/OGT  Neck:         + FROM    + JVD     - Nodes     - Masses    + Mid-line trachea   - Tracheostomy  Chest:         - Sternal click  - Sternal drainage  - Pacing wires  - Chest tubes  - SubQ emphysema  Lungs:          + CTA   - Rhonchi    - Rales    - Wheezing     - Decreased BS   - Dullness R L  Cardiac:       + S1 + S2    + RRR   - Irregular   - S3  - S4    - Murmurs   - Rub   - Hamman’s sign   Abdomen:    + decreased  BS     + Soft    + tender     + Distended    - Organomegaly  - PEG  Extremities:   - Cyanosis U/L   - Clubbing  U/L  - LE/UE Edema   + Capillary refill    + Pulses   Neuro:        + Awake   +  Alert   - Confused   - Lethargic   - Sedated   - Generalized Weakness  Skin:        - Rashes    - Erythema   + Normal incisions   + IV sites intact  - Sacral decubitus    MEDICATIONS  (STANDING):  acetaminophen   IVPB .. 1000 milliGRAM(s) IV Intermittent once  bisacodyl Suppository 10 milliGRAM(s) Rectal daily  cloNIDine Patch 0.1 mG/24Hr(s) 1 patch Transdermal every 7 days  dextrose 5% + sodium chloride 0.45%. 1000 milliLiter(s) (50 mL/Hr) IV Continuous <Continuous>  heparin   Injectable 5000 Unit(s) SubCutaneous every 8 hours  insulin lispro (ADMELOG) corrective regimen sliding scale   SubCutaneous every 6 hours  metoprolol tartrate Injectable 7.5 milliGRAM(s) IV Push every 6 hours  pantoprazole  Injectable 40 milliGRAM(s) IV Push daily  senna 2 Tablet(s) Oral at bedtime    MEDICATIONS  (PRN):  benzocaine 20% Spray 1 Spray(s) Topical every 4 hours PRN Discomfort  hydrALAZINE Injectable 10 milliGRAM(s) IV Push every 4 hours PRN SBP >160  HYDROmorphone  Injectable 0.5 milliGRAM(s) IV Push every 3 hours PRN Severe Pain (7 - 10)      LABS: All Lab data reviewed and analyzed                        10.0   5.85  )-----------( 168      ( 14 Mar 2023 02:10 )             33.1   03-14    135  |  97  |  11.3  ----------------------------<  101<H>  3.9   |  27.0  |  1.01    Ca    9.1      14 Mar 2023 02:10  Mg     1.6     03-14    TPro  6.7  /  Alb  3.6  /  TBili  0.5  /  DBili  0.2  /  AST  24  /  ALT  26  /  AlkPhos  69  03-13  RADIOLOGY: - Reviewed and analyzed     Assessment: Acute aortic syndrome-Type B dissection    Plan:   - Respiratory status required the following of continuous pulse oximetry for support & to prevent decompensation  - Patient requires vascular surgery re-evaluation for large volume emesis, distended abdomin, and decreased bowel sounds; Question CT abd/pelivs   - Protonix maintained for GI bleeding prophylaxis  - Continue anti-impulse therapy     Patient required critical care management and is at risk for life threatening decompensation  I provided 40 minutes of non-continuous care to the patient.

## 2023-03-14 NOTE — DISCHARGE NOTE PROVIDER - HOSPITAL COURSE
Patient is a 41 yo Male, originally from the [place] emigrated ~ [place], with a history of HTN (uncontrolled - diagnosed 5 years ago), interstitial lung disease, CAD w/ stents (2021 in ), CKD (unknown baseline) initially presented to [place] ED 2/28/23 from [place] where he had gone for a routine physical exam and to get his medications filled and was found to be in hypertensive urgency with . He had previously not seen a physician for over 3 years. He was last seen in [place] ED in 2018 for hypertensive urgency. CTA C/A/P at [place] showed Type B dissection and he was transferred to [facility]ICU for further management. Inpatient course significant for RHC/LHC, initally cancelled on 3/2 secondary to worsening renal fxn (RESOLVED). Cardiac cath was performed on 3/3 showing no coronary disease, then complicated by right radial partial thrombosis (IMPROVED), right cephalic and basilic vein thrombophlebitis (IMPROVED). Vascular surgery was consulted recommending no intervention. Pt has been followed by Heart Failure team for depressed LV function (EF 40-45% improved from 20-25%) and difficulty controlling blood pressure which is now optimized. Pt is now s/p celiac and SMA bypass via midline laparotomy on 3/9. Post-op developed ileus which has resolved (passing gas, +BMs, tolerating regular diet). Plan for patient to follow-up outpatient for staged TEVAR procedure. Patient is hemodynamically stable and stable from a respiratory/GI standpoint for discharge home per Dr. Heaton.

## 2023-03-14 NOTE — DISCHARGE NOTE PROVIDER - PROVIDER TOKENS
PROVIDER:[TOKEN:[[id number]],FOLLOWUP:[2 weeks]],PROVIDER:[TOKEN:[[id number]],ESTABLISHEDPATIENT:[T]],PROVIDER:[TOKEN:[[id number]],FOLLOWUP:[2 weeks],ESTABLISHEDPATIENT:[T]]

## 2023-03-14 NOTE — DISCHARGE NOTE PROVIDER - DETAILS OF MALNUTRITION DIAGNOSIS/DIAGNOSES
This patient has been assessed with a concern for Malnutrition and was treated during this hospitalization for the following Nutrition diagnosis/diagnoses:     -  03/15/2023: Moderate protein-calorie malnutrition

## 2023-03-14 NOTE — DISCHARGE NOTE PROVIDER - NSDCPNSUBOBJ_GEN_ALL_CORE
Subjective: Pt. seen & examined, pacific  at the bedside, pt. denies any SOB or chest pain, NAD noted.     VITAL SIGNS  Vital Signs Last 24 Hrs  T(C): 36.9 (23 @ 08:00), Max: 36.9 (23 @ 21:03)  T(F): 98.5 (23 @ 08:00), Max: 98.5 (23 @ 08:00)  HR: 80 (23 @ 08:00) (67 - 87)  BP: 132/62 (23 @ 08:00) (112/71 - 148/80)  RR: 16 (23 @ 08:00) (16 - 18)  SpO2: 96% (23 @ 08:00) (95% - 96%)  on (O2)              Telemetry: Sinus rhythm  LVEF: 40-45%    MEDICATIONS  aspirin 325 milliGRAM(s) Oral daily  benzocaine 20% Spray 1 Spray(s) Topical every 4 hours PRN  carvedilol 25 milliGRAM(s) Oral every 12 hours  cloNIDine Patch 0.1 mG/24Hr(s) 1 patch Transdermal every 7 days  furosemide    Tablet 40 milliGRAM(s) Oral daily  heparin   Injectable 5000 Unit(s) SubCutaneous every 8 hours  NIFEdipine XL 60 milliGRAM(s) Oral daily  pantoprazole    Tablet 40 milliGRAM(s) Oral before breakfast  sacubitril 24 mG/valsartan 26 mG 1 Tablet(s) Oral two times a day  senna 2 Tablet(s) Oral at bedtime  simethicone 80 milliGRAM(s) Chew every 8 hours PRN  sodium chloride 0.9% lock flush 3 milliLiter(s) IV Push every 8 hours      PHYSICAL EXAM  General:  no acute distress  Neurology: alert and oriented x 3, nonfocal, no gross deficits  Respiratory: clear to auscultation bilaterally  CV: regular rate and rhythm, normal S1, S2  Abdomen: soft, nontender, nondistended, positive bowel sounds, last bowel movement 3/17/23  Extremities: warm, well perfused. no edema. + DP pulses  Incisions: abdoimnal incision CDI & ORLIN    I&O's Detail    17 Mar 2023 07:01  -  18 Mar 2023 07:00  --------------------------------------------------------  IN:    Oral Fluid: 800 mL  Total IN: 800 mL    OUT:    Voided (mL): 550 mL  Total OUT: 550 mL    Total NET: 250 mL          Weights:  Daily     Daily Weight in k.1 (18 Mar 2023 06:32)  Admit Wt: Drug Dosing Weight  Height (cm): 162.6 (10 Mar 2023 02:17)  Weight (kg): 87 (10 Mar 2023 02:17)  BMI (kg/m2): 32.9 (10 Mar 2023 02:17)  BSA (m2): 1.92 (10 Mar 2023 02:17)    LABS      137  |  102  |  22.1<H>  ----------------------------<  95  4.3   |  21.0<L>  |  1.56<H>    Ca    9.0      18 Mar 2023 04:28  Mg     2.1                                        11.8   5.17  )-----------( 283      ( 18 Mar 2023 04:28 )             38.0                  CAPILLARY BLOOD GLUCOSE      POCT Blood Glucose.: 105 mg/dL (17 Mar 2023 17:03)  POCT Blood Glucose.: 107 mg/dL (17 Mar 2023 12:15)           Today's CXR:    Today's EKG:    PAST MEDICAL & SURGICAL HISTORY:  Uncontrolled hypertension      CKD (chronic kidney disease)      Interstitial lung disease      Prophylactic measure           Assessment and Plan:   · Assessment    42M, poor historian, PM of HTN (uncontrolled), Pulmonary Interstitial Disease (per chart review), CAD w/ stents ( in [facility] [place]), CKD (unknown baseline SCr), with recent admission for hypertensive urgency, was sent to [place] ED 23 from [place] [facility] secondary to HTN urgency (). CTA C/A/P showed Type B dissection and he was transferred to [facility]ICU for further management. Inpatient course significant for: RHC/LHC initially canceled 2/2 worsening renal function then complicated by right radial partial thrombosis, right cephalic and basilic vein thrombophlebitis, and HTN. On 3/9 underwent celiac and SMA Bypass.    Problem/Plan - 1:  ·  Problem: Dissection of aorta.   ·  Plan: S/p Celiac and SMA bypass. Plan for staged procedure with TEVAR and carotid bypass  Continue ASA  Continue Coreg & Nifedipine  Dialy weights  Continue Lasix & Entresto   Encourage the use of incentive spirometry, CDBE, OOB to chair, daily PT, ambulate as tolerated  Continue senna for bowel regimen  Continue Protonix GI prophylaxis  Continue Heparin subcutaneous & SCD's for DVT prophylaxis  Discharge to home today  Discussed plan with Dr. Quintero & CT Surgery in AM rounds.    Problem/Plan - 2:  ·  Problem: Chronic combined systolic and diastolic heart failure.   ·  Plan: Heart failure team following  Continue Coreg & Nifedipine  Daily weights  Strict I/O's  Continue Lasix & Entresto   As per heart failure team upon discharge will not resume clonidine patch    Problem/Plan - 3:  ·  Problem: Hypertensive urgency.   ·  Plan: Continue Coreg, Nifedipine & Entresto  Continue DASH diet    Problem/Plan - 4:  ·  Problem: Chronic kidney disease (CKD).   ·  Plan: Nephrology following  Trend renal function  Likely baseline SCr between 1.3-1.6.    Problem/Plan - 5:  ·  Problem: Radial artery thrombosis.   ·  Plan: Vascular following  AC on hold in periop setting.    Problem/Plan - 6:  ·  Problem: Thrombophlebitis arm.   ·  Plan: Right cephalic and basilic vein thrombophlebitis  Vascular surgery recommends warm compresses.    Problem/Plan - 7:  ·  Problem: Prophylactic measure.   ·  Plan: Continue Protonix for GI prophylaxis  Continue Heparin SQ & SCD's for DVT prophylaxis           Subjective: Pt. seen & examined, pacific  Tamra [id number] utilized at the bedside, pt. denies any SOB or chest pain, NAD noted.     VITAL SIGNS  Vital Signs Last 24 Hrs  T(C): 36.9 (23 @ 08:00), Max: 36.9 (23 @ 21:03)  T(F): 98.5 (23 @ 08:00), Max: 98.5 (23 @ 08:00)  HR: 80 (23 @ 08:00) (67 - 87)  BP: 132/62 (23 @ 08:00) (112/71 - 148/80)  RR: 16 (23 @ 08:00) (16 - 18)  SpO2: 96% (23 @ 08:00) (95% - 96%)  on (O2)              Telemetry: Sinus rhythm  LVEF: 40-45%    MEDICATIONS  aspirin 325 milliGRAM(s) Oral daily  benzocaine 20% Spray 1 Spray(s) Topical every 4 hours PRN  carvedilol 25 milliGRAM(s) Oral every 12 hours  cloNIDine Patch 0.1 mG/24Hr(s) 1 patch Transdermal every 7 days  furosemide    Tablet 40 milliGRAM(s) Oral daily  heparin   Injectable 5000 Unit(s) SubCutaneous every 8 hours  NIFEdipine XL 60 milliGRAM(s) Oral daily  pantoprazole    Tablet 40 milliGRAM(s) Oral before breakfast  sacubitril 24 mG/valsartan 26 mG 1 Tablet(s) Oral two times a day  senna 2 Tablet(s) Oral at bedtime  simethicone 80 milliGRAM(s) Chew every 8 hours PRN  sodium chloride 0.9% lock flush 3 milliLiter(s) IV Push every 8 hours      PHYSICAL EXAM  General:  no acute distress  Neurology: alert and oriented x 3, nonfocal, no gross deficits  Respiratory: clear to auscultation bilaterally  CV: regular rate and rhythm, normal S1, S2  Abdomen: soft, nontender, distended, positive bowel sounds, last bowel movement 3/18/23  Extremities: warm, well perfused. no edema. + DP pulses  Incisions: abdominal incision CDI & ORLIN, +staples    I&O's Detail    17 Mar 2023 07:01  -  18 Mar 2023 07:00  --------------------------------------------------------  IN:    Oral Fluid: 800 mL  Total IN: 800 mL    OUT:    Voided (mL): 550 mL  Total OUT: 550 mL    Total NET: 250 mL          Weights:  Daily     Daily Weight in k.1 (18 Mar 2023 06:32)  Admit Wt: Drug Dosing Weight  Height (cm): 162.6 (10 Mar 2023 02:17)  Weight (kg): 87 (10 Mar 2023 02:17)  BMI (kg/m2): 32.9 (10 Mar 2023 02:17)  BSA (m2): 1.92 (10 Mar 2023 02:17)    LABS      137  |  102  |  22.1<H>  ----------------------------<  95  4.3   |  21.0<L>  |  1.56<H>    Ca    9.0      18 Mar 2023 04:28  Mg     2.1                                        11.8   5.17  )-----------( 283      ( 18 Mar 2023 04:28 )             38.0                  CAPILLARY BLOOD GLUCOSE      POCT Blood Glucose.: 105 mg/dL (17 Mar 2023 17:03)  POCT Blood Glucose.: 107 mg/dL (17 Mar 2023 12:15)           Today's CXR:    Today's EKG:    PAST MEDICAL & SURGICAL HISTORY:  Uncontrolled hypertension      CKD (chronic kidney disease)      Interstitial lung disease      Prophylactic measure           Assessment and Plan:   · Assessment    42M, poor historian, [facility] of HTN (uncontrolled), Pulmonary Interstitial Disease (per chart review), CAD w/ stents ( in [place]), CKD (unknown baseline SCr), with recent admission for hypertensive urgency, was sent to [place] ED 23 from [place] secondary to HTN urgency (). CTA C/A/P showed Type B dissection and he was transferred to [facility] CTICU for further management. Inpatient course significant for: RHC/LHC initially canceled 2/2 worsening renal function then complicated by right radial partial thrombosis, right cephalic and basilic vein thrombophlebitis, and HTN. On 3/9 underwent celiac and SMA Bypass.    Problem/Plan - 1:  ·  Problem: Dissection of aorta.   ·  Plan: S/p Celiac and SMA bypass. Plan for staged procedure with TEVAR and carotid bypass  Continue ASA  Continue Coreg & Nifedipine  Dialy weights  Continue Lasix & Entresto   Encourage the use of incentive spirometry, CDBE, OOB to chair, daily PT, ambulate as tolerated  Continue senna for bowel regimen  Continue Protonix GI prophylaxis  Continue Heparin subcutaneous & SCD's for DVT prophylaxis  D/C staples to abdomen  Discharge to home today  Discussed plan with Dr. Quintero & CT Surgery in AM rounds.    Problem/Plan - 2:  ·  Problem: Chronic combined systolic and diastolic heart failure.   ·  Plan: Heart failure team following  Continue Coreg & Nifedipine  Daily weights  Strict I/O's  Continue Lasix & Entresto   As per heart failure team upon discharge will not resume clonidine patch    Problem/Plan - 3:  ·  Problem: Hypertensive urgency.   ·  Plan: Continue Coreg, Nifedipine & Entresto  Continue DASH diet    Problem/Plan - 4:  ·  Problem: Chronic kidney disease (CKD).   ·  Plan: Nephrology following  Trend renal function  Likely baseline SCr between 1.3-1.6.    Problem/Plan - 5:  ·  Problem: Radial artery thrombosis.   ·  Plan: Vascular following  AC on hold in periop setting.    Problem/Plan - 6:  ·  Problem: Thrombophlebitis arm.   ·  Plan: Right cephalic and basilic vein thrombophlebitis  Vascular surgery recommends warm compresses.    Problem/Plan - 7:  ·  Problem: Prophylactic measure.   ·  Plan: Continue Protonix for GI prophylaxis  Continue Heparin SQ & SCD's for DVT prophylaxis

## 2023-03-14 NOTE — PROGRESS NOTE ADULT - PROBLEM SELECTOR PLAN 1
S/p Celiac and SMA bypass. Plan for staged procedure with TEVAR  off inotropes  currently NPO with ileus - improving   Encourage OOB to chair and ambulation with PT  Encourage deep breathing exercised and coughing  Chest PT and IS use with bedside nurse   Nephrology following. Likely baseline SCr between 1.3-1.6.   Heart failure team following for chronic systolic heart failure.

## 2023-03-14 NOTE — DISCHARGE NOTE PROVIDER - NSDCCPCAREPLAN_GEN_ALL_CORE_FT
PRINCIPAL DISCHARGE DIAGNOSIS  Diagnosis: Dissection of aorta  Assessment and Plan of Treatment: s/p Celiac and SMA bypass via midline lapartomy  POR FAVOR, CONSULTE TODAS LAS INSTRUCCIONES DE DESCARGA.  EMPERZO A MICHELLE NUEVOS MEDICAMENTOS QUE SON MUY IMPORTANTES PARA QUE CONTINUE DESPUES DEL REYNA.  Limon todos los medicamentos según lo ordenado. Llene phillip recetas el día en que sea dado de reyna y michelle de acuerdo con el calendario que se les laverne. Si usted tiene alguna pregunta o no es capaz de llenar las recetas, por favor llame a la oficina de inmediato al [phone].  Ducha diaria. Limpie todas las incisiones diariamente mientras se ducha con agua tibia y jabón suave, seque con kathleen toalla limpia y no la cubra con cualquier apósito a menos que se. No bañarse, nadar en kathleen piscina o en el mar hasta que se indique por MD.  Le recomendamos que no conduzca hasta que se indique por MD.  Usted no puede volver a trabajar hasta que se indique por MD.  Por favor, comer kathleen baja en grasas, baja en colesterol, dieta baja en sal. (No ha añadido sal / extra)  Continuar ejercicios de respiración varias veces al día.  No muy pesadas nada más de 5 libras hasta despejado por MD.  Call / Indicarme MD fiebre superior a 101,[phone], cualquier drenaje de incisiones o si se convierten en pearson, caliente o muy sensible al tacto.  Aumentar la actividad tolerada. Caminar en interiores y / o exteriores por lo menos 3 veces al día.        SECONDARY DISCHARGE DIAGNOSES  Diagnosis: Chronic kidney disease (CKD)  Assessment and Plan of Treatment: Continúe con phillip medicamentos según lo prescrito.  Latanya un seguimiento con jeffery cardiólogo y médico de atención primaria de 2 a 4 semanas después del reyna.      Diagnosis: Chronic combined systolic and diastolic heart failure  Assessment and Plan of Treatment: Continúe con phillip medicamentos según lo prescrito.  Latanya un seguimiento con jeffery cardiólogo y médico de atención primaria de 2 a 4 semanas después del reyna.      Diagnosis: HTN (hypertension)  Assessment and Plan of Treatment: Continúe con phillip medicamentos según lo prescrito.  Latanya un seguimiento con jeffery cardiólogo y médico de atención primaria de 2 a 4 semanas después del reyna.      Diagnosis: Ileus  Assessment and Plan of Treatment: Latanya un seguimiento con jeffery cardiólogo y médico de atención primaria de 2 a 4 semanas después del reyna.  Continúe caminando regularmenta para ayudar a tener deposiciones. Continúe tomando ablandadores de heces según lo prescrito.

## 2023-03-14 NOTE — PROGRESS NOTE ADULT - PROBLEM SELECTOR PLAN 3
Continue BP control  Nicardipine weaned off  Continue Clonidine patch, lopressor IV, PRN hydralazine for BP control

## 2023-03-14 NOTE — DISCHARGE NOTE PROVIDER - NSDCMRMEDTOKEN_GEN_ALL_CORE_FT
NIFEdipine 60 mg oral tablet, extended release: 1 tab(s) orally 2 times a day   NIFEdipine 60 mg oral tablet, extended release: 1 tab(s) orally 2 times a day  sacubitril-valsartan 24 mg-26 mg oral tablet: 1 tab(s) orally 2 times a day   aspirin 325 mg oral tablet: 1 tab(s) orally once a day  carvedilol 25 mg oral tablet: 1 tab(s) orally every 12 hours  furosemide 40 mg oral tablet: 1 tab(s) orally once a day  K-Tab 20 mEq oral tablet, extended release: 1 tab(s) orally once a day   NIFEdipine 60 mg oral tablet, extended release: 1 tab(s) orally once a day  sacubitril-valsartan 24 mg-26 mg oral tablet: 1 tab(s) orally 2 times a day  senna leaf extract oral tablet: 2 tab(s) orally once a day (at bedtime)  simethicone 80 mg oral tablet, chewable: 1 tab(s) orally every 8 hours, As needed, Gas

## 2023-03-14 NOTE — DISCHARGE NOTE PROVIDER - CARE PROVIDERS DIRECT ADDRESSES
,DirectAddress_Unknown,DirectAddress_Unknown,[email or web address]

## 2023-03-14 NOTE — PROGRESS NOTE ADULT - ASSESSMENT
42M, poor historian, PMH of HTN (uncontrolled), Pulmonary Interstitial Disease (per chart review), CAD w/ stents (2021 in [place]), CKD (unknown baseline SCr), with recent admission for hypertensive urgency, was sent to [place] [facility] 2/28/23 [facility] secondary to HTN urgency (). CTA C/A/P showed Type B dissection and he was transferred to [facility] CTICU for further management. Inpatient course significant for: RHC/LHC initially canceled 2/2 worsening renal function then complicated by right radial partial thrombosis, right cephalic and basilic vein thrombophlebitis, and HTN. On 3/9 underwent celiac and SMA Bypass.

## 2023-03-14 NOTE — DISCHARGE NOTE PROVIDER - NSDCFUADDINST_GEN_ALL_CORE_FT
Ortho: If symptoms persist or worsen, call or follow-up outpatient with Dr. Baig.  Por favor llame a la oficina de Cirugía Cardiotorácica al [phone] si usted está experimentando cualquier dificultad para respirar, dolor de pecho, fiebre o escalofríos, drenaje de la incisión , náuseas , vómitos o si tiene alguna pregunta sobre phillip medicamentos. Si los síntomas son graves , llame al 911 y vaya al hospital más [facility]

## 2023-03-14 NOTE — DISCHARGE NOTE PROVIDER - CARE PROVIDER_API CALL
Alpa Chaudhari)  Cardiovascular Disease; Internal Medicine  [address] [place] [address]  Phone: [phone]  Follow Up Time: 2 weeks    Po Heaton; GENE)  Surgery; Thoracic and Cardiac Surgery  [address] [place] [address]  Phone: [phone]  Fax: [phone]  Established Patient  Follow Up Time:     Teo Gutierrez)  Surgery; Vascular Surgery  [address] [facility], [address] [place] [address]  Phone: [phone]  Fax: [phone]  Established Patient  Follow Up Time: 2 weeks

## 2023-03-14 NOTE — DISCHARGE NOTE PROVIDER - INSTRUCTIONS
Elija isiah magras y aves sin piel y prepararlos sin grasa añadida saturadas y trans. Coma pescado al menos dos veces a la semana. Investigaciones recientes muestran que el consumo de pescado jazmine contiene ácidos grasos omega-3 (por ejemplo, el salmón, la trucha y arenque) puede ayudar a reducir el riesgo de muerte por enfermedad coronaria. Seleccionar nelly de grasa, grasa 1 por ciento y productos lácteos bajos en grasa. Reduzca el consumo de alimentos que contienen aceites vegetales parcialmente hidrogenados para reducir las grasas trans en jeffery dieta. Para reducir el colesterol, reducir la grasa saturada a no más de 5 a 6 por ciento del total de calorías. Para alguien que come 2.000 calorías al día, que es alrededor de 13 gramos de grasa saturada. Reduzca el consumo de bebidas y alimentos con azúcares añadidos. Elija y prepare alimentos con poca o nada de alex. Para reducir la presión arterial, tratar de comer no más de 2.400 miligramos de sodio por día. La reducción de la ingesta diaria a 1.500 mg es deseable, ya que puede reducir aún más la presión arterial. Si usted jovani alcohol, beber con moderación. Eso significa que kathleen bebida al día si usted es kathleen jinny y dos bebidas al día si usted es un hombre.

## 2023-03-14 NOTE — DISCHARGE NOTE PROVIDER - NSDCACTIVITY_GEN_ALL_CORE
Sex allowed/Do not drive or operate machinery/Showering allowed/Stairs allowed/Walking - Indoors allowed/No heavy lifting/straining/Walking - Outdoors allowed/Follow Instructions Provided by your Surgical Team

## 2023-03-15 LAB
ANION GAP SERPL CALC-SCNC: 11 MMOL/L — SIGNIFICANT CHANGE UP (ref 5–17)
ANION GAP SERPL CALC-SCNC: 13 MMOL/L — SIGNIFICANT CHANGE UP (ref 5–17)
BUN SERPL-MCNC: 8.1 MG/DL — SIGNIFICANT CHANGE UP (ref 8–20)
BUN SERPL-MCNC: 9.9 MG/DL — SIGNIFICANT CHANGE UP (ref 8–20)
CALCIUM SERPL-MCNC: 9.2 MG/DL — SIGNIFICANT CHANGE UP (ref 8.4–10.5)
CALCIUM SERPL-MCNC: 9.3 MG/DL — SIGNIFICANT CHANGE UP (ref 8.4–10.5)
CHLORIDE SERPL-SCNC: 100 MMOL/L — SIGNIFICANT CHANGE UP (ref 96–108)
CHLORIDE SERPL-SCNC: 100 MMOL/L — SIGNIFICANT CHANGE UP (ref 96–108)
CO2 SERPL-SCNC: 22 MMOL/L — SIGNIFICANT CHANGE UP (ref 22–29)
CO2 SERPL-SCNC: 24 MMOL/L — SIGNIFICANT CHANGE UP (ref 22–29)
CREAT SERPL-MCNC: 0.9 MG/DL — SIGNIFICANT CHANGE UP (ref 0.5–1.3)
CREAT SERPL-MCNC: 0.94 MG/DL — SIGNIFICANT CHANGE UP (ref 0.5–1.3)
EGFR: 102 ML/MIN/1.73M2 — SIGNIFICANT CHANGE UP
EGFR: 107 ML/MIN/1.73M2 — SIGNIFICANT CHANGE UP
GLUCOSE BLDC GLUCOMTR-MCNC: 100 MG/DL — HIGH (ref 70–99)
GLUCOSE BLDC GLUCOMTR-MCNC: 113 MG/DL — HIGH (ref 70–99)
GLUCOSE BLDC GLUCOMTR-MCNC: 89 MG/DL — SIGNIFICANT CHANGE UP (ref 70–99)
GLUCOSE BLDC GLUCOMTR-MCNC: 92 MG/DL — SIGNIFICANT CHANGE UP (ref 70–99)
GLUCOSE SERPL-MCNC: 102 MG/DL — HIGH (ref 70–99)
GLUCOSE SERPL-MCNC: 108 MG/DL — HIGH (ref 70–99)
HCT VFR BLD CALC: 34.4 % — LOW (ref 39–50)
HGB BLD-MCNC: 10.6 G/DL — LOW (ref 13–17)
MAGNESIUM SERPL-MCNC: 1.9 MG/DL — SIGNIFICANT CHANGE UP (ref 1.6–2.6)
MAGNESIUM SERPL-MCNC: 2.2 MG/DL — SIGNIFICANT CHANGE UP (ref 1.8–2.6)
MCHC RBC-ENTMCNC: 25.4 PG — LOW (ref 27–34)
MCHC RBC-ENTMCNC: 30.8 GM/DL — LOW (ref 32–36)
MCV RBC AUTO: 82.3 FL — SIGNIFICANT CHANGE UP (ref 80–100)
PLATELET # BLD AUTO: 190 K/UL — SIGNIFICANT CHANGE UP (ref 150–400)
POTASSIUM SERPL-MCNC: 3.5 MMOL/L — SIGNIFICANT CHANGE UP (ref 3.5–5.3)
POTASSIUM SERPL-MCNC: 4.1 MMOL/L — SIGNIFICANT CHANGE UP (ref 3.5–5.3)
POTASSIUM SERPL-SCNC: 3.5 MMOL/L — SIGNIFICANT CHANGE UP (ref 3.5–5.3)
POTASSIUM SERPL-SCNC: 4.1 MMOL/L — SIGNIFICANT CHANGE UP (ref 3.5–5.3)
RBC # BLD: 4.18 M/UL — LOW (ref 4.2–5.8)
RBC # FLD: 14.6 % — HIGH (ref 10.3–14.5)
SODIUM SERPL-SCNC: 135 MMOL/L — SIGNIFICANT CHANGE UP (ref 135–145)
SODIUM SERPL-SCNC: 135 MMOL/L — SIGNIFICANT CHANGE UP (ref 135–145)
WBC # BLD: 4.9 K/UL — SIGNIFICANT CHANGE UP (ref 3.8–10.5)
WBC # FLD AUTO: 4.9 K/UL — SIGNIFICANT CHANGE UP (ref 3.8–10.5)

## 2023-03-15 PROCEDURE — 71045 X-RAY EXAM CHEST 1 VIEW: CPT | Mod: 26

## 2023-03-15 PROCEDURE — 99232 SBSQ HOSP IP/OBS MODERATE 35: CPT

## 2023-03-15 RX ORDER — SODIUM CHLORIDE 9 MG/ML
3 INJECTION INTRAMUSCULAR; INTRAVENOUS; SUBCUTANEOUS EVERY 8 HOURS
Refills: 0 | Status: DISCONTINUED | OUTPATIENT
Start: 2023-03-15 | End: 2023-03-18

## 2023-03-15 RX ORDER — METOPROLOL TARTRATE 50 MG
12.5 TABLET ORAL ONCE
Refills: 0 | Status: COMPLETED | OUTPATIENT
Start: 2023-03-15 | End: 2023-03-15

## 2023-03-15 RX ORDER — HYDRALAZINE HCL 50 MG
10 TABLET ORAL ONCE
Refills: 0 | Status: COMPLETED | OUTPATIENT
Start: 2023-03-15 | End: 2023-03-15

## 2023-03-15 RX ORDER — MAGNESIUM SULFATE 500 MG/ML
2 VIAL (ML) INJECTION ONCE
Refills: 0 | Status: COMPLETED | OUTPATIENT
Start: 2023-03-15 | End: 2023-03-15

## 2023-03-15 RX ORDER — METOPROLOL TARTRATE 50 MG
50 TABLET ORAL
Refills: 0 | Status: DISCONTINUED | OUTPATIENT
Start: 2023-03-16 | End: 2023-03-16

## 2023-03-15 RX ORDER — METOPROLOL TARTRATE 50 MG
37.5 TABLET ORAL
Refills: 0 | Status: DISCONTINUED | OUTPATIENT
Start: 2023-03-15 | End: 2023-03-15

## 2023-03-15 RX ORDER — PANTOPRAZOLE SODIUM 20 MG/1
40 TABLET, DELAYED RELEASE ORAL
Refills: 0 | Status: DISCONTINUED | OUTPATIENT
Start: 2023-03-15 | End: 2023-03-18

## 2023-03-15 RX ORDER — FUROSEMIDE 40 MG
40 TABLET ORAL DAILY
Refills: 0 | Status: DISCONTINUED | OUTPATIENT
Start: 2023-03-15 | End: 2023-03-15

## 2023-03-15 RX ORDER — ASPIRIN/CALCIUM CARB/MAGNESIUM 324 MG
325 TABLET ORAL DAILY
Refills: 0 | Status: DISCONTINUED | OUTPATIENT
Start: 2023-03-15 | End: 2023-03-18

## 2023-03-15 RX ORDER — INSULIN LISPRO 100/ML
VIAL (ML) SUBCUTANEOUS
Refills: 0 | Status: DISCONTINUED | OUTPATIENT
Start: 2023-03-15 | End: 2023-03-17

## 2023-03-15 RX ORDER — HYDRALAZINE HCL 50 MG
5 TABLET ORAL ONCE
Refills: 0 | Status: COMPLETED | OUTPATIENT
Start: 2023-03-15 | End: 2023-03-15

## 2023-03-15 RX ORDER — POTASSIUM CHLORIDE 20 MEQ
40 PACKET (EA) ORAL ONCE
Refills: 0 | Status: COMPLETED | OUTPATIENT
Start: 2023-03-15 | End: 2023-03-15

## 2023-03-15 RX ORDER — FUROSEMIDE 40 MG
40 TABLET ORAL DAILY
Refills: 0 | Status: DISCONTINUED | OUTPATIENT
Start: 2023-03-15 | End: 2023-03-18

## 2023-03-15 RX ADMIN — Medication 40 MILLIGRAM(S): at 09:21

## 2023-03-15 RX ADMIN — HEPARIN SODIUM 5000 UNIT(S): 5000 INJECTION INTRAVENOUS; SUBCUTANEOUS at 13:01

## 2023-03-15 RX ADMIN — PANTOPRAZOLE SODIUM 40 MILLIGRAM(S): 20 TABLET, DELAYED RELEASE ORAL at 06:06

## 2023-03-15 RX ADMIN — SODIUM CHLORIDE 3 MILLILITER(S): 9 INJECTION INTRAMUSCULAR; INTRAVENOUS; SUBCUTANEOUS at 21:49

## 2023-03-15 RX ADMIN — SODIUM CHLORIDE 3 MILLILITER(S): 9 INJECTION INTRAMUSCULAR; INTRAVENOUS; SUBCUTANEOUS at 14:51

## 2023-03-15 RX ADMIN — HEPARIN SODIUM 5000 UNIT(S): 5000 INJECTION INTRAVENOUS; SUBCUTANEOUS at 21:46

## 2023-03-15 RX ADMIN — Medication 5 MILLIGRAM(S): at 20:17

## 2023-03-15 RX ADMIN — Medication 10 MILLIGRAM(S): at 17:20

## 2023-03-15 RX ADMIN — Medication 37.5 MILLIGRAM(S): at 17:06

## 2023-03-15 RX ADMIN — Medication 325 MILLIGRAM(S): at 16:09

## 2023-03-15 RX ADMIN — Medication 5 MILLIGRAM(S): at 21:14

## 2023-03-15 RX ADMIN — Medication 40 MILLIEQUIVALENT(S): at 04:46

## 2023-03-15 RX ADMIN — HEPARIN SODIUM 5000 UNIT(S): 5000 INJECTION INTRAVENOUS; SUBCUTANEOUS at 06:06

## 2023-03-15 RX ADMIN — SENNA PLUS 2 TABLET(S): 8.6 TABLET ORAL at 21:46

## 2023-03-15 RX ADMIN — Medication 25 GRAM(S): at 04:45

## 2023-03-15 RX ADMIN — Medication 12.5 MILLIGRAM(S): at 18:41

## 2023-03-15 RX ADMIN — Medication 10 MILLIGRAM(S): at 22:44

## 2023-03-15 RX ADMIN — Medication 10 MILLIGRAM(S): at 05:45

## 2023-03-15 RX ADMIN — Medication 37.5 MILLIGRAM(S): at 05:22

## 2023-03-15 RX ADMIN — Medication 10 MILLIGRAM(S): at 18:00

## 2023-03-15 RX ADMIN — Medication 1 PATCH: at 19:31

## 2023-03-15 RX ADMIN — Medication 1 PATCH: at 07:30

## 2023-03-15 NOTE — PROGRESS NOTE ADULT - ASSESSMENT
42M w/ PMH of HTN (uncontrolled), Pulmonary Interstitial Disease (per chart review), CAD w/ stents (2021 in [place]), CKD (unknown baseline SCr), with recent admission for hypertensive urgency, was sent to [place] [facility] 2/28/23 [facility] secondary to HTN urgency (). CTA C/A/P showed Type B dissection and he was transferred to [facility]ICU for further management. Inpatient course significant for: RHC/LHC initially canceled 2/2 worsening renal function then complicated by right radial partial thrombosis, right cephalic and basilic vein thrombophlebitis, and HTN. On 3/9 underwent celiac and SMA Bypass via midline laparotomy, ileus post procedure, now resolved.  Passing flatus and stool.      Plan:   -Awaiting TEVAR and L carotid to subclavian bypass in the AM 3/16   -c/w BP control, wean IV meds as able  -Rest of care per primary team

## 2023-03-15 NOTE — PROGRESS NOTE ADULT - SUBJECTIVE AND OBJECTIVE BOX
INTERVAL HPI/OVERNIGHT EVENTS:    Patient evaluated at bedside. No acute distress. No acute events overnight.    MEDICATIONS  (STANDING):  cloNIDine Patch 0.1 mG/24Hr(s) 1 patch Transdermal every 7 days  heparin   Injectable 5000 Unit(s) SubCutaneous every 8 hours  insulin lispro (ADMELOG) corrective regimen sliding scale   SubCutaneous Before meals and at bedtime  metoprolol tartrate 37.5 milliGRAM(s) Oral two times a day  pantoprazole    Tablet 40 milliGRAM(s) Oral before breakfast  senna 2 Tablet(s) Oral at bedtime    MEDICATIONS  (PRN):  benzocaine 20% Spray 1 Spray(s) Topical every 4 hours PRN Discomfort  hydrALAZINE Injectable 10 milliGRAM(s) IV Push every 4 hours PRN SBP >160  HYDROmorphone  Injectable 0.5 milliGRAM(s) IV Push every 3 hours PRN Severe Pain (7 - 10)      Vital Signs Last 24 Hrs  T(C): 37.1 (15 Mar 2023 08:00), Max: 37.7 (15 Mar 2023 04:00)  T(F): 98.7 (15 Mar 2023 08:00), Max: 99.8 (15 Mar 2023 04:00)  HR: 82 (15 Mar 2023 07:00) (65 - 92)  BP: 155/93 (15 Mar 2023 07:00) (141/98 - 173/106)  BP(mean): 117 (15 Mar 2023 07:00) (109 - 133)  RR: 20 (15 Mar 2023 07:00) (15 - 24)  SpO2: 94% (15 Mar 2023 07:00) (90% - 97%)    Parameters below as of 15 Mar 2023 07:00  Patient On (Oxygen Delivery Method): room air        Constitutional: NAD  HEENT: PERRLA, EOMI, no drainage or redness  Neck: No bruits; no thyromegaly or nodules,  No JVD  Back: Normal spine flexure, No CVA tenderness, No deformity or limitation of movement  Respiratory: Breath Sounds equal & clear to percussion & auscultation, no accessory muscle use  Cardiovascular: Regular rate & rhythm, normal S1, S2; no murmurs, gallops or rubs; no S3, S4  Gastrointestinal: Soft, distended, non-tender, no hepatosplenomegaly, normal bowel sounds, midline mepilex dressing in place   Extremities: No peripheral edema, No cyanosis, clubbing   Vascular: Equal and normal pulses: 2+ peripheral pulses throughout      I&O's Detail    14 Mar 2023 07:01  -  15 Mar 2023 07:00  --------------------------------------------------------  IN:    dextrose 5% + sodium chloride 0.45%: 850 mL    IV PiggyBack: 50 mL    IV PiggyBack: 100 mL    IV PiggyBack: 333.2 mL    Oral Fluid: 250 mL  Total IN: 1583.2 mL    OUT:    Voided (mL): 2425 mL  Total OUT: 2425 mL    Total NET: -841.8 mL          LABS:                        10.6   4.90  )-----------( 190      ( 15 Mar 2023 02:40 )             34.4     03-15    135  |  100  |  8.1  ----------------------------<  108<H>  3.5   |  24.0  |  0.94    Ca    9.2      15 Mar 2023 02:40  Phos  1.4     03-14  Mg     1.9     03-15    TPro  7.2  /  Alb  4.0  /  TBili  0.5  /  DBili  0.2  /  AST  19  /  ALT  21  /  AlkPhos  74  03-14          RADIOLOGY & ADDITIONAL STUDIES:

## 2023-03-15 NOTE — PROGRESS NOTE ADULT - PROBLEM SELECTOR PLAN 1
S/p Celiac and SMA bypass. Plan for staged procedure with TEVAR  off inotropes  Ileus improving. +BM . Tolerating clear liquid diet.  Resume PO meds  Encourage OOB to chair and ambulation with PT  Encourage deep breathing exercised and coughing  Chest PT and IS use with bedside nurse   Nephrology following. Likely baseline SCr between 1.3-1.6.   Heart failure team following for chronic systolic heart failure.

## 2023-03-15 NOTE — PROGRESS NOTE ADULT - PROBLEM SELECTOR PLAN 3
Continue BP control  Nicardipine weaned off  Continue Clonidine patch, lopressor, PRN hydralazine for BP control

## 2023-03-15 NOTE — PROGRESS NOTE ADULT - SUBJECTIVE AND OBJECTIVE BOX
FABIANA CALIXTO  MRN#: [id number]  Subjective: The patient was in the CTICU seen and evaluate on AM rounds with the entire multidisciplinary team.   Improved abdominal issues this AM.    OBJECTIVE:  ICU Vital Signs Last 24 Hrs  T(C): 37.1 (15 Mar 2023 12:00), Max: 37.7 (15 Mar 2023 04:00)  T(F): 98.8 (15 Mar 2023 12:00), Max: 99.8 (15 Mar 2023 04:00)  HR: 76 (15 Mar 2023 12:00) (65 - 92)  BP: 143/97 (15 Mar 2023 12:00) (134/72 - 173/106)  BP(mean): 115 (15 Mar 2023 12:00) (97 - 133)  ABP: --  ABP(mean): --  RR: 23 (15 Mar 2023 12:00) (18 - 24)  SpO2: 96% (15 Mar 2023 12:00) (90% - 96%)    O2 Parameters below as of 15 Mar 2023 07:00  Patient On (Oxygen Delivery Method): room air      I&O's Summary    14 Mar 2023 07:01  -  15 Mar 2023 07:00  --------------------------------------------------------  IN: 1583.2 mL / OUT: 2425 mL / NET: -841.8 mL    15 Mar 2023 07:01  -  15 Mar 2023 14:39  --------------------------------------------------------  IN: 240 mL / OUT: 400 mL / NET: -160 mL        PHYSICAL EXAM:Daily Height in cm: 162.56 (28 Feb 2023 21:17)    Daily   General: WN/WD NAD    HEENT:     + NCAT  + EOMI  - Conjuctival edema   - Icterus   - Thrush   - ETT  - NGT/OGT  Neck:         + FROM    + JVD     - Nodes     - Masses    + Mid-line trachea   - Tracheostomy  Chest:         - Sternal click  - Sternal drainage  - Pacing wires  - Chest tubes  - SubQ emphysema  Lungs:          + CTA   - Rhonchi    - Rales    - Wheezing     - Decreased BS   - Dullness R L  Cardiac:       + S1 + S2    + RRR   - Irregular   - S3  - S4    - Murmurs   - Rub   - Hamman’s sign   Abdomen:    + decreased  BS     + Soft    + tender     + Distended    - Organomegaly  - PEG  Extremities:   - Cyanosis U/L   - Clubbing  U/L  - LE/UE Edema   + Capillary refill    + Pulses   Neuro:        + Awake   +  Alert   - Confused   - Lethargic   - Sedated   - Generalized Weakness  Skin:        - Rashes    - Erythema   + Normal incisions   + IV sites intact  - Sacral decubitus    MEDICATIONS  (STANDING):  cloNIDine Patch 0.1 mG/24Hr(s) 1 patch Transdermal every 7 days  furosemide    Tablet 40 milliGRAM(s) Oral daily  heparin   Injectable 5000 Unit(s) SubCutaneous every 8 hours  insulin lispro (ADMELOG) corrective regimen sliding scale   SubCutaneous Before meals and at bedtime  metoprolol tartrate 37.5 milliGRAM(s) Oral two times a day  pantoprazole    Tablet 40 milliGRAM(s) Oral before breakfast  senna 2 Tablet(s) Oral at bedtime  sodium chloride 0.9% lock flush 3 milliLiter(s) IV Push every 8 hours    MEDICATIONS  (PRN):  benzocaine 20% Spray 1 Spray(s) Topical every 4 hours PRN Discomfort  hydrALAZINE Injectable 10 milliGRAM(s) IV Push every 4 hours PRN SBP >160  HYDROmorphone  Injectable 0.5 milliGRAM(s) IV Push every 3 hours PRN Severe Pain (7 - 10)        LABS: All Lab data reviewed and analyzed                        10.6   4.90  )-----------( 190      ( 15 Mar 2023 02:40 )             34.4   03-15    135  |  100  |  9.9  ----------------------------<  102<H>  4.1   |  22.0  |  0.90    Ca    9.3      15 Mar 2023 12:45  Phos  1.4     03-14  Mg     2.2     03-15    TPro  7.2  /  Alb  4.0  /  TBili  0.5  /  DBili  0.2  /  AST  19  /  ALT  21  /  AlkPhos  74  03-14    Assessment: Acute aortic syndrome-Type B dissection    Plan:   - Respiratory status required the following of continuous pulse oximetry for support & to prevent decompensation  - Patient was advanced to clears as tolerated with improved ileus and diarrhea and no further emesis  - Protonix maintained for GI bleeding prophylaxis  - Continue anti-impulse therapy

## 2023-03-15 NOTE — PROGRESS NOTE ADULT - ASSESSMENT
42M, poor historian, PMH of HTN (uncontrolled), Pulmonary Interstitial Disease (per chart review), CAD w/ stents (2021 in [place]), CKD (unknown baseline SCr), with recent admission for hypertensive urgency, was sent to [place] [facility] 2/28/23 [facility] secondary to HTN urgency (). CTA C/A/P showed Type B dissection and he was transferred to [facility] CTICU for further management. Inpatient course significant for: RHC/LHC initially canceled 2/2 worsening renal function then complicated by right radial partial thrombosis, right cephalic and basilic vein thrombophlebitis, and HTN. On 3/9 underwent celiac and SMA Bypass.

## 2023-03-15 NOTE — DIETITIAN NUTRITION RISK NOTIFICATION - ADDITIONAL COMMENTS/DIETITIAN RECOMMENDATIONS
Continue diet as tolerated.   Ensure Clear TID to optimize po intake and provide an additional 240 kcal, 8g protein per serving.  Advance diet as medically feasible/tolerable.  Rx: MVI and vitamin C 500mg daily.  Encourage po intake, monitor diet tolerance, and provide assistance at meals as needed.  Obtain daily weights to monitor trends.

## 2023-03-15 NOTE — PROGRESS NOTE ADULT - SUBJECTIVE AND OBJECTIVE BOX
SubjectiSubjective - patient seen and evaluated bedside. Sitting comfortably in bed. Admits to abdominal discomfort, improving. Denies CP, SOB, HA, dizziness, n/v/d    Review of Systems: negative x 10 systems except as noted above    Brief summary:  47yMale POD# POD 5 Celiac/SMA bypass via minilaparotomy. Postop course complicated by ileus    Significant/[id number] events: Now passing flatus with multiple BM. Tolerating clear liquid diet.       PAST MEDICAL & SURGICAL HISTORY:  Uncontrolled hypertension      CKD (chronic kidney disease)      Interstitial lung disease      Prophylactic measure            benzocaine 20% Spray 1 Spray(s) Topical every 4 hours PRN  cloNIDine Patch 0.1 mG/24Hr(s) 1 patch Transdermal every 7 days  heparin   Injectable 5000 Unit(s) SubCutaneous every 8 hours  hydrALAZINE Injectable 10 milliGRAM(s) IV Push every 4 hours PRN  HYDROmorphone  Injectable 0.5 milliGRAM(s) IV Push every 3 hours PRN  insulin lispro (ADMELOG) corrective regimen sliding scale   SubCutaneous Before meals and at bedtime  metoprolol tartrate 37.5 milliGRAM(s) Oral two times a day  pantoprazole    Tablet 40 milliGRAM(s) Oral before breakfast  senna 2 Tablet(s) Oral at bedtime  MEDICATIONS  (PRN):  benzocaine 20% Spray 1 Spray(s) Topical every 4 hours PRN Discomfort  hydrALAZINE Injectable 10 milliGRAM(s) IV Push every 4 hours PRN SBP >160  HYDROmorphone  Injectable 0.5 milliGRAM(s) IV Push every 3 hours PRN Severe Pain (7 - 10)      Daily     Daily                               10.0   5.85  )-----------( 168      ( 14 Mar 2023 02:10 )             33.1   03-14    135  |  98  |  9.8  ----------------------------<  92  3.6   |  24.0  |  0.92    Ca    9.2      14 Mar 2023 12:40  Phos  1.4     03-14  Mg     2.1     03-14    TPro  7.2  /  Alb  4.0  /  TBili  0.5  /  DBili  0.2  /  AST  19  /  ALT  21  /  AlkPhos  74  03-14            Objective:  T(C): 37.3 (03-14-23 @ 20:00), Max: 37.5 (03-14-23 @ 04:00)  HR: 82 (03-14-23 @ 23:00) (73 - 88)  BP: 156/98 (03-14-23 @ 23:00) (136/81 - 173/106)  RR: 20 (03-14-23 @ 23:00) (9 - 27)  SpO2: 92% (03-14-23 @ 23:00) (91% - 98%)  Wt(kg): --CAPILLARY BLOOD GLUCOSE      POCT Blood Glucose.: 88 mg/dL (14 Mar 2023 23:14)  POCT Blood Glucose.: 155 mg/dL (14 Mar 2023 17:08)  POCT Blood Glucose.: 76 mg/dL (14 Mar 2023 12:18)  POCT Blood Glucose.: 91 mg/dL (14 Mar 2023 05:26)  I&O's Summary    13 Mar 2023 07:01  -  14 Mar 2023 07:00  --------------------------------------------------------  IN: 1600 mL / OUT: 1350 mL / NET: 250 mL    14 Mar 2023 07:01  -  15 Mar 2023 01:17  --------------------------------------------------------  IN: 1283.2 mL / OUT: 2125 mL / NET: -841.8 mL        Physical Exam  General: NAD  Neuro: A+O x 3, non-focal, speech clear and intact  Psych: Appropriate affect  HEENT:  NCAT, No conjuctival edema or icterus, no thrush.  Pulm: CTA, equal bilaterally  CV: RRR, , +S1S2  Abd:firm, distended, tympanic to percussion, nontender, hypoactive bowel sounds, slightly improved since previous exam.  Ext: +DP Pulses b/l, no edema  Skin: Warm, dry, intact  Inc: Abdominal incision site c/d/i w/ dressing.         Imaging:    < from: Xray Chest 1 View- PORTABLE-Routine (Xray Chest 1 View- PORTABLE-Routine in AM.) (03.14.23 @ 06:01) >    FINDINGS:  CATHETERS AND TUBES: None    PULMONARY: The visualized lungs are clear of airspace consolidations or   pleural effusions.   No pneumothorax.    HEART/VASCULAR: Unchanged Widened mediastinum with enlarged aortic arch   and descending thoracic aorta in patient with type B aortic dissection   diagnosed on 2/28/2023 chest CT.  Mild cardiomegaly.      BONES: Visualized osseous thorax intact.    IMPRESSION:   No interval change..    < end of copied text >

## 2023-03-16 ENCOUNTER — APPOINTMENT (OUTPATIENT)
Dept: CARDIOTHORACIC SURGERY | Facility: HOSPITAL | Age: 48
End: 2023-03-16

## 2023-03-16 DIAGNOSIS — I10 ESSENTIAL (PRIMARY) HYPERTENSION: ICD-10-CM

## 2023-03-16 LAB
ANION GAP SERPL CALC-SCNC: 14 MMOL/L — SIGNIFICANT CHANGE UP (ref 5–17)
ANION GAP SERPL CALC-SCNC: 15 MMOL/L — SIGNIFICANT CHANGE UP (ref 5–17)
BASOPHILS # BLD AUTO: 0.01 K/UL — SIGNIFICANT CHANGE UP (ref 0–0.2)
BASOPHILS NFR BLD AUTO: 0.2 % — SIGNIFICANT CHANGE UP (ref 0–2)
BUN SERPL-MCNC: 12.1 MG/DL — SIGNIFICANT CHANGE UP (ref 8–20)
BUN SERPL-MCNC: 13.1 MG/DL — SIGNIFICANT CHANGE UP (ref 8–20)
CALCIUM SERPL-MCNC: 9.2 MG/DL — SIGNIFICANT CHANGE UP (ref 8.4–10.5)
CALCIUM SERPL-MCNC: 9.7 MG/DL — SIGNIFICANT CHANGE UP (ref 8.4–10.5)
CHLORIDE SERPL-SCNC: 100 MMOL/L — SIGNIFICANT CHANGE UP (ref 96–108)
CHLORIDE SERPL-SCNC: 99 MMOL/L — SIGNIFICANT CHANGE UP (ref 96–108)
CO2 SERPL-SCNC: 21 MMOL/L — LOW (ref 22–29)
CO2 SERPL-SCNC: 22 MMOL/L — SIGNIFICANT CHANGE UP (ref 22–29)
CREAT SERPL-MCNC: 0.95 MG/DL — SIGNIFICANT CHANGE UP (ref 0.5–1.3)
CREAT SERPL-MCNC: 1.05 MG/DL — SIGNIFICANT CHANGE UP (ref 0.5–1.3)
EGFR: 101 ML/MIN/1.73M2 — SIGNIFICANT CHANGE UP
EGFR: 89 ML/MIN/1.73M2 — SIGNIFICANT CHANGE UP
EOSINOPHIL # BLD AUTO: 0.09 K/UL — SIGNIFICANT CHANGE UP (ref 0–0.5)
EOSINOPHIL NFR BLD AUTO: 1.9 % — SIGNIFICANT CHANGE UP (ref 0–6)
GLUCOSE BLDC GLUCOMTR-MCNC: 113 MG/DL — HIGH (ref 70–99)
GLUCOSE BLDC GLUCOMTR-MCNC: 87 MG/DL — SIGNIFICANT CHANGE UP (ref 70–99)
GLUCOSE BLDC GLUCOMTR-MCNC: 95 MG/DL — SIGNIFICANT CHANGE UP (ref 70–99)
GLUCOSE BLDC GLUCOMTR-MCNC: 97 MG/DL — SIGNIFICANT CHANGE UP (ref 70–99)
GLUCOSE SERPL-MCNC: 110 MG/DL — HIGH (ref 70–99)
GLUCOSE SERPL-MCNC: 86 MG/DL — SIGNIFICANT CHANGE UP (ref 70–99)
HCT VFR BLD CALC: 38.2 % — LOW (ref 39–50)
HGB BLD-MCNC: 11.6 G/DL — LOW (ref 13–17)
IMM GRANULOCYTES NFR BLD AUTO: 1.9 % — HIGH (ref 0–0.9)
LYMPHOCYTES # BLD AUTO: 0.88 K/UL — LOW (ref 1–3.3)
LYMPHOCYTES # BLD AUTO: 19 % — SIGNIFICANT CHANGE UP (ref 13–44)
MAGNESIUM SERPL-MCNC: 1.7 MG/DL — SIGNIFICANT CHANGE UP (ref 1.6–2.6)
MAGNESIUM SERPL-MCNC: 1.8 MG/DL — SIGNIFICANT CHANGE UP (ref 1.6–2.6)
MCHC RBC-ENTMCNC: 25.5 PG — LOW (ref 27–34)
MCHC RBC-ENTMCNC: 30.4 GM/DL — LOW (ref 32–36)
MCV RBC AUTO: 84 FL — SIGNIFICANT CHANGE UP (ref 80–100)
MONOCYTES # BLD AUTO: 0.77 K/UL — SIGNIFICANT CHANGE UP (ref 0–0.9)
MONOCYTES NFR BLD AUTO: 16.6 % — HIGH (ref 2–14)
NEUTROPHILS # BLD AUTO: 2.8 K/UL — SIGNIFICANT CHANGE UP (ref 1.8–7.4)
NEUTROPHILS NFR BLD AUTO: 60.4 % — SIGNIFICANT CHANGE UP (ref 43–77)
PLATELET # BLD AUTO: 191 K/UL — SIGNIFICANT CHANGE UP (ref 150–400)
POTASSIUM SERPL-MCNC: 3.9 MMOL/L — SIGNIFICANT CHANGE UP (ref 3.5–5.3)
POTASSIUM SERPL-MCNC: 3.9 MMOL/L — SIGNIFICANT CHANGE UP (ref 3.5–5.3)
POTASSIUM SERPL-SCNC: 3.9 MMOL/L — SIGNIFICANT CHANGE UP (ref 3.5–5.3)
POTASSIUM SERPL-SCNC: 3.9 MMOL/L — SIGNIFICANT CHANGE UP (ref 3.5–5.3)
RBC # BLD: 4.55 M/UL — SIGNIFICANT CHANGE UP (ref 4.2–5.8)
RBC # FLD: 15.1 % — HIGH (ref 10.3–14.5)
SODIUM SERPL-SCNC: 135 MMOL/L — SIGNIFICANT CHANGE UP (ref 135–145)
SODIUM SERPL-SCNC: 136 MMOL/L — SIGNIFICANT CHANGE UP (ref 135–145)
SURGICAL PATHOLOGY STUDY: SIGNIFICANT CHANGE UP
WBC # BLD: 4.64 K/UL — SIGNIFICANT CHANGE UP (ref 3.8–10.5)
WBC # FLD AUTO: 4.64 K/UL — SIGNIFICANT CHANGE UP (ref 3.8–10.5)

## 2023-03-16 PROCEDURE — 71045 X-RAY EXAM CHEST 1 VIEW: CPT | Mod: 26

## 2023-03-16 PROCEDURE — 99233 SBSQ HOSP IP/OBS HIGH 50: CPT

## 2023-03-16 PROCEDURE — 99024 POSTOP FOLLOW-UP VISIT: CPT

## 2023-03-16 RX ORDER — METOPROLOL TARTRATE 50 MG
75 TABLET ORAL
Refills: 0 | Status: DISCONTINUED | OUTPATIENT
Start: 2023-03-16 | End: 2023-03-16

## 2023-03-16 RX ORDER — MAGNESIUM SULFATE 500 MG/ML
2 VIAL (ML) INJECTION ONCE
Refills: 0 | Status: COMPLETED | OUTPATIENT
Start: 2023-03-16 | End: 2023-03-16

## 2023-03-16 RX ORDER — POTASSIUM CHLORIDE 20 MEQ
40 PACKET (EA) ORAL ONCE
Refills: 0 | Status: COMPLETED | OUTPATIENT
Start: 2023-03-16 | End: 2023-03-16

## 2023-03-16 RX ORDER — POTASSIUM CHLORIDE 20 MEQ
20 PACKET (EA) ORAL ONCE
Refills: 0 | Status: COMPLETED | OUTPATIENT
Start: 2023-03-16 | End: 2023-03-16

## 2023-03-16 RX ORDER — NIFEDIPINE 30 MG
60 TABLET, EXTENDED RELEASE 24 HR ORAL EVERY 12 HOURS
Refills: 0 | Status: DISCONTINUED | OUTPATIENT
Start: 2023-03-16 | End: 2023-03-17

## 2023-03-16 RX ORDER — METOPROLOL TARTRATE 50 MG
25 TABLET ORAL ONCE
Refills: 0 | Status: COMPLETED | OUTPATIENT
Start: 2023-03-16 | End: 2023-03-16

## 2023-03-16 RX ORDER — METOPROLOL TARTRATE 50 MG
50 TABLET ORAL ONCE
Refills: 0 | Status: DISCONTINUED | OUTPATIENT
Start: 2023-03-16 | End: 2023-03-16

## 2023-03-16 RX ORDER — CARVEDILOL PHOSPHATE 80 MG/1
25 CAPSULE, EXTENDED RELEASE ORAL EVERY 12 HOURS
Refills: 0 | Status: DISCONTINUED | OUTPATIENT
Start: 2023-03-16 | End: 2023-03-18

## 2023-03-16 RX ADMIN — SODIUM CHLORIDE 3 MILLILITER(S): 9 INJECTION INTRAMUSCULAR; INTRAVENOUS; SUBCUTANEOUS at 04:50

## 2023-03-16 RX ADMIN — Medication 10 MILLIGRAM(S): at 16:13

## 2023-03-16 RX ADMIN — Medication 20 MILLIEQUIVALENT(S): at 20:30

## 2023-03-16 RX ADMIN — Medication 40 MILLIGRAM(S): at 04:53

## 2023-03-16 RX ADMIN — HEPARIN SODIUM 5000 UNIT(S): 5000 INJECTION INTRAVENOUS; SUBCUTANEOUS at 04:52

## 2023-03-16 RX ADMIN — Medication 1 PATCH: at 08:19

## 2023-03-16 RX ADMIN — HEPARIN SODIUM 5000 UNIT(S): 5000 INJECTION INTRAVENOUS; SUBCUTANEOUS at 21:42

## 2023-03-16 RX ADMIN — SODIUM CHLORIDE 3 MILLILITER(S): 9 INJECTION INTRAMUSCULAR; INTRAVENOUS; SUBCUTANEOUS at 14:00

## 2023-03-16 RX ADMIN — CARVEDILOL PHOSPHATE 25 MILLIGRAM(S): 80 CAPSULE, EXTENDED RELEASE ORAL at 16:14

## 2023-03-16 RX ADMIN — Medication 25 GRAM(S): at 20:30

## 2023-03-16 RX ADMIN — Medication 60 MILLIGRAM(S): at 09:38

## 2023-03-16 RX ADMIN — Medication 1 PATCH: at 20:20

## 2023-03-16 RX ADMIN — Medication 10 MILLIGRAM(S): at 04:52

## 2023-03-16 RX ADMIN — Medication 75 MILLIGRAM(S): at 04:53

## 2023-03-16 RX ADMIN — Medication 60 MILLIGRAM(S): at 20:33

## 2023-03-16 RX ADMIN — HEPARIN SODIUM 5000 UNIT(S): 5000 INJECTION INTRAVENOUS; SUBCUTANEOUS at 16:13

## 2023-03-16 RX ADMIN — Medication 25 MILLIGRAM(S): at 00:26

## 2023-03-16 RX ADMIN — PANTOPRAZOLE SODIUM 40 MILLIGRAM(S): 20 TABLET, DELAYED RELEASE ORAL at 04:53

## 2023-03-16 RX ADMIN — SODIUM CHLORIDE 3 MILLILITER(S): 9 INJECTION INTRAMUSCULAR; INTRAVENOUS; SUBCUTANEOUS at 21:40

## 2023-03-16 RX ADMIN — Medication 325 MILLIGRAM(S): at 16:14

## 2023-03-16 RX ADMIN — SENNA PLUS 2 TABLET(S): 8.6 TABLET ORAL at 21:43

## 2023-03-16 NOTE — PROGRESS NOTE ADULT - ASSESSMENT
43 yo M, originally from the  emigrated ~ 2008, with a history of HTN (uncontrolled diagnosed 5 years ago), interstitial lung disease, CAD w/ stents (? for a leaking blood vessel/dissection), CKD (unknown baseline) initially presented to [place] ED 2/28/23 [facility] he had gone for a routine physical exam and to get his medications filled.  He had previously not seen a physician for over 3 years.  He was last seen in [place] ED in 2018 for hypertensive urgency requiring IV labetalol and transition to lisinopril/ HCTZ/ amlodipine.  However, he has been continuously filling his home meds from the  including losartan 320mg daily, nifedipine 60mg ER daily, and rosuvastatin 40mg daily.  Due to inability to obtain all his medications, he has been unable to obtain recent doses so he has been taking half his medication daily.  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  On presentation to [facility] he presented with SBP > 200 and subsequent CTA C/A/P showed Type B dissection. He was started on labetalol and nifedipine drips and transferred to [facility] CTICU for further management. He was initially treated with esmolol and nicardipine gtts. Underwent R/LHC 3/3 which revealed normal coronaries and elevated LVEDP 19. Subsequently developed right radial, cephalic and basilic vein thrombi for which he is on heparin gtt. Vascular consulted and no intervention required-clinically improving.     Cardiac Testing:  Echo 2/28/23: LVEF 20-25%, LVEDD 6./23cm, LVIDs 5.59cm, TAPSE 2.54cm, mild to mod MR, trivial TR, RV normal function.

## 2023-03-16 NOTE — PROGRESS NOTE ADULT - ASSESSMENT
42M, poor historian, PMH of HTN (uncontrolled), Pulmonary Interstitial Disease (per chart review), CAD w/ stents (2021 in [place]), CKD (unknown baseline SCr), with recent admission for hypertensive urgency, was sent to [place] ED 2/28/23 [facility] secondary to HTN urgency (). CTA C/A/P showed Type B dissection and he was transferred to [facility] CTICU for further management. Inpatient course significant for: RHC/LHC initially canceled 2/2 worsening renal function then complicated by right radial partial thrombosis, right cephalic and basilic vein thrombophlebitis, and HTN. On 3/9 underwent celiac and SMA Bypass.

## 2023-03-16 NOTE — PROGRESS NOTE ADULT - PROBLEM SELECTOR PLAN 2
- Etiology: NICMP (LHC 3/3 revealed normal coronaries and no prior stents). Likely due to hypertensive heart disease with increased wall thickness on echo.  - RHC 3/3 showed LVEDP 19, PAP 40/24/31.   - Appears euvolemic on exam today.  GDMT: please change lopressor to coreg 12.5mg BID and uptitrate as needed. OK to start Entresto 24-26mg BID and uptitrate as needed. Will start MRA/SGLT2i in the near future.   -  Will need to establish care with local cardiology and HF teams.  - Can consider genetic testing as outpt.

## 2023-03-16 NOTE — PROGRESS NOTE ADULT - PROBLEM SELECTOR PLAN 3
Resistant HTN   Would benefit from work up for 2ry causes: obtain US renal Doppler (?renal art stenosis), 24h urine fractionated metanephrines/cathecholamines,   Will obtain sleep study as outpt

## 2023-03-16 NOTE — PROGRESS NOTE ADULT - SUBJECTIVE AND OBJECTIVE BOX
Subjective:  No overnight events except for uncontrolled HTN  Pt reports mild abdominal distention. He's tolerating PO intake.     Medications:  aspirin 325 milliGRAM(s) Oral daily  benzocaine 20% Spray 1 Spray(s) Topical every 4 hours PRN  carvedilol 25 milliGRAM(s) Oral every 12 hours  cloNIDine Patch 0.1 mG/24Hr(s) 1 patch Transdermal every 7 days  furosemide    Tablet 40 milliGRAM(s) Oral daily  heparin   Injectable 5000 Unit(s) SubCutaneous every 8 hours  hydrALAZINE Injectable 10 milliGRAM(s) IV Push every 4 hours PRN  insulin lispro (ADMELOG) corrective regimen sliding scale   SubCutaneous Before meals and at bedtime  NIFEdipine XL 60 milliGRAM(s) Oral every 12 hours  pantoprazole    Tablet 40 milliGRAM(s) Oral before breakfast  senna 2 Tablet(s) Oral at bedtime  sodium chloride 0.9% lock flush 3 milliLiter(s) IV Push every 8 hours    Vitals:  T(C): 37.2 (23 @ 16:03), Max: 37.2 (23 @ 08:00)  HR: 81 (23 @ 16:03) (75 - 88)  BP: 179/101 (23 @ 16:03) (150/89 - 191/105)  BP(mean): 109 (23 @ 11:04) (109 - 137)  RR: 18 (23 @ 16:03) (16 - 24)  SpO2: 98% (23 @ 16:03) (93% - 98%)    Daily     Daily Weight in k.8 (16 Mar 2023 06:01)        I&O's Summary    15 Mar 2023 07:  -  16 Mar 2023 07:00  --------------------------------------------------------  IN: 480 mL / OUT: 750 mL / NET: -270 mL    16 Mar 2023 07:01  -  16 Mar 2023 17:53  --------------------------------------------------------  IN: 0 mL / OUT: 500 mL / NET: -500 mL        Physical Exam:  Appearance: No Acute Distress  HEENT: JVP non elevated  Cardiovascular: RRR, Normal S1 S2, No murmurs/rubs/gallops  Respiratory: Clear to auscultation bilaterally  Gastrointestinal: mildly distended. Surgical wound c/d/i  Skin: no skin lesions  Neurologic: Non-focal  Extremities: No LE edema, warm and well perfused  Psychiatry: A & O x 3, Mood & affect appropriate      Labs:                        11.6   4.64  )-----------( 191      ( 16 Mar 2023 04:52 )             38.2     03-16    136  |  100  |  12.1  ----------------------------<  86  3.9   |  22.0  |  0.95    Ca    9.2      16 Mar 2023 04:52  Mg     1.7     -16                    Lactate, Blood: 0.9 mmol/L ( @ 12:40)

## 2023-03-16 NOTE — PROGRESS NOTE ADULT - PROBLEM SELECTOR PLAN 1
- Type B aortic dissection seen on CTA.  - S/p iliac to celiac SMA bypass 3/9 by CTS and vascular surgery teams. He will eventually undergo a staged TEVAR and carotid bypass.  - Recommend to aim for normotensive BP

## 2023-03-16 NOTE — PROGRESS NOTE ADULT - SUBJECTIVE AND OBJECTIVE BOX
Subjective - patient seen and evaluated bedside. Sitting comfortably in bed.  Denies CP, SOB, HA, dizziness, n/v/d    Review of Systems: negative x 10 systems except as noted above    Brief summary:  47yMale POD# POD 5 Celiac/SMA bypass via minilaparotomy. Postop course complicated by ileus, improved    Significant/[id number] events: Downgraded to floor from CTICU      PAST MEDICAL & SURGICAL HISTORY:  Uncontrolled hypertension      CKD (chronic kidney disease)      Interstitial lung disease      Prophylactic measure        aspirin 325 milliGRAM(s) Oral daily  benzocaine 20% Spray 1 Spray(s) Topical every 4 hours PRN  cloNIDine Patch 0.1 mG/24Hr(s) 1 patch Transdermal every 7 days  furosemide    Tablet 40 milliGRAM(s) Oral daily  heparin   Injectable 5000 Unit(s) SubCutaneous every 8 hours  hydrALAZINE Injectable 10 milliGRAM(s) IV Push every 4 hours PRN  HYDROmorphone  Injectable 0.5 milliGRAM(s) IV Push every 3 hours PRN  insulin lispro (ADMELOG) corrective regimen sliding scale   SubCutaneous Before meals and at bedtime  metoprolol tartrate 75 milliGRAM(s) Oral two times a day  pantoprazole    Tablet 40 milliGRAM(s) Oral before breakfast  senna 2 Tablet(s) Oral at bedtime  sodium chloride 0.9% lock flush 3 milliLiter(s) IV Push every 8 hours  MEDICATIONS  (PRN):  benzocaine 20% Spray 1 Spray(s) Topical every 4 hours PRN Discomfort  hydrALAZINE Injectable 10 milliGRAM(s) IV Push every 4 hours PRN SBP >160  HYDROmorphone  Injectable 0.5 milliGRAM(s) IV Push every 3 hours PRN Severe Pain (7 - 10)      Daily     Daily                               10.6   4.90  )-----------( 190      ( 15 Mar 2023 02:40 )             34.4   03-15    135  |  100  |  9.9  ----------------------------<  102<H>  4.1   |  22.0  |  0.90    Ca    9.3      15 Mar 2023 12:45  Phos  1.4     03-14  Mg     2.2     03-15    TPro  7.2  /  Alb  4.0  /  TBili  0.5  /  DBili  0.2  /  AST  19  /  ALT  21  /  AlkPhos  74  03-14            Objective:  T(C): 36.9 (03-15-23 @ 22:35), Max: 37.7 (03-15-23 @ 04:00)  HR: 88 (03-16-23 @ 00:25) (72 - 92)  BP: 176/98 (03-16-23 @ 00:25) (134/72 - 191/105)  RR: 18 (03-16-23 @ 00:25) (18 - 24)  SpO2: 94% (03-16-23 @ 00:25) (92% - 96%)  Wt(kg): --CAPILLARY BLOOD GLUCOSE      POCT Blood Glucose.: 89 mg/dL (15 Mar 2023 21:43)  POCT Blood Glucose.: 113 mg/dL (15 Mar 2023 15:46)  POCT Blood Glucose.: 92 mg/dL (15 Mar 2023 11:01)  POCT Blood Glucose.: 100 mg/dL (15 Mar 2023 07:24)  I&O's Summary    14 Mar 2023 07:01  -  15 Mar 2023 07:00  --------------------------------------------------------  IN: 1583.2 mL / OUT: 2425 mL / NET: -841.8 mL    15 Mar 2023 07:01  -  16 Mar 2023 02:11  --------------------------------------------------------  IN: 240 mL / OUT: 550 mL / NET: -310 mL        Physical Exam  General: NAD  Neuro: A+O x 3, non-focal, speech clear and intact  Psych: Appropriate affect  HEENT:  NCAT, No conjuctival edema or icterus, no thrush.  Pulm: CTA, equal bilaterally  CV: RRR, +S1S2  Abd: softly distended, nontender +BS. overall improving  Ext: +DP Pulses b/l, no edema  Skin: Warm, dry, intact  Inc: Abdominal incision c/d/i w/ dressing no evidence of bleeding          Imaging:    < from: Xray Chest 1 View- PORTABLE-Routine (Xray Chest 1 View- PORTABLE-Routine in AM.) (03.15.23 @ 06:15) >  PROCEDURE DATE:  03/15/2023          INTERPRETATION:  Chest one view    HISTORY: Pulmonary congestion    COMPARISON STUDY: 3/14/2023    Frontal expiratory view of the chest shows the heart and aortic arch to   be similar in size. The lungs show mild pulmonary congestion and there is   no evidence of pneumothorax nor pleural effusion.    IMPRESSION:  Mild pulmonary congestion.        Thank you for the courtesy of this referral.    < end of copied text >

## 2023-03-16 NOTE — PROGRESS NOTE ADULT - PROBLEM SELECTOR PLAN 1
S/p Celiac and SMA bypass. Plan for staged procedure with TEVAR  off inotropes  Ileus improving. +BM . Tolerating clear liquid diet.  Resume PO antihypertensive  meds  Uptitrating PO lopressor as tolerated  PRN hydralazine for BP >160  Encourage OOB to chair and ambulation with PT  Encourage deep breathing exercised and coughing  Chest PT and IS use with bedside nurse   Nephrology following. Likely baseline SCr between 1.3-1.6.   Heart failure team following for chronic systolic heart failure.

## 2023-03-17 LAB
ANION GAP SERPL CALC-SCNC: 15 MMOL/L — SIGNIFICANT CHANGE UP (ref 5–17)
BUN SERPL-MCNC: 12.3 MG/DL — SIGNIFICANT CHANGE UP (ref 8–20)
CALCIUM SERPL-MCNC: 9.5 MG/DL — SIGNIFICANT CHANGE UP (ref 8.4–10.5)
CHLORIDE SERPL-SCNC: 100 MMOL/L — SIGNIFICANT CHANGE UP (ref 96–108)
CO2 SERPL-SCNC: 23 MMOL/L — SIGNIFICANT CHANGE UP (ref 22–29)
CREAT SERPL-MCNC: 1.07 MG/DL — SIGNIFICANT CHANGE UP (ref 0.5–1.3)
EGFR: 87 ML/MIN/1.73M2 — SIGNIFICANT CHANGE UP
GLUCOSE BLDC GLUCOMTR-MCNC: 105 MG/DL — HIGH (ref 70–99)
GLUCOSE BLDC GLUCOMTR-MCNC: 107 MG/DL — HIGH (ref 70–99)
GLUCOSE BLDC GLUCOMTR-MCNC: 92 MG/DL — SIGNIFICANT CHANGE UP (ref 70–99)
GLUCOSE SERPL-MCNC: 99 MG/DL — SIGNIFICANT CHANGE UP (ref 70–99)
HCT VFR BLD CALC: 38.2 % — LOW (ref 39–50)
HGB BLD-MCNC: 11.9 G/DL — LOW (ref 13–17)
MAGNESIUM SERPL-MCNC: 2.3 MG/DL — SIGNIFICANT CHANGE UP (ref 1.6–2.6)
MCHC RBC-ENTMCNC: 25.6 PG — LOW (ref 27–34)
MCHC RBC-ENTMCNC: 31.2 GM/DL — LOW (ref 32–36)
MCV RBC AUTO: 82.2 FL — SIGNIFICANT CHANGE UP (ref 80–100)
PLATELET # BLD AUTO: 243 K/UL — SIGNIFICANT CHANGE UP (ref 150–400)
POTASSIUM SERPL-MCNC: 4.1 MMOL/L — SIGNIFICANT CHANGE UP (ref 3.5–5.3)
POTASSIUM SERPL-SCNC: 4.1 MMOL/L — SIGNIFICANT CHANGE UP (ref 3.5–5.3)
RBC # BLD: 4.65 M/UL — SIGNIFICANT CHANGE UP (ref 4.2–5.8)
RBC # FLD: 15.1 % — HIGH (ref 10.3–14.5)
SODIUM SERPL-SCNC: 138 MMOL/L — SIGNIFICANT CHANGE UP (ref 135–145)
WBC # BLD: 5.26 K/UL — SIGNIFICANT CHANGE UP (ref 3.8–10.5)
WBC # FLD AUTO: 5.26 K/UL — SIGNIFICANT CHANGE UP (ref 3.8–10.5)

## 2023-03-17 PROCEDURE — 99233 SBSQ HOSP IP/OBS HIGH 50: CPT

## 2023-03-17 PROCEDURE — 99024 POSTOP FOLLOW-UP VISIT: CPT

## 2023-03-17 PROCEDURE — 71045 X-RAY EXAM CHEST 1 VIEW: CPT | Mod: 26

## 2023-03-17 RX ORDER — SIMETHICONE 80 MG/1
80 TABLET, CHEWABLE ORAL ONCE
Refills: 0 | Status: COMPLETED | OUTPATIENT
Start: 2023-03-17 | End: 2023-03-17

## 2023-03-17 RX ORDER — SACUBITRIL AND VALSARTAN 24; 26 MG/1; MG/1
1 TABLET, FILM COATED ORAL
Qty: 60 | Refills: 1
Start: 2023-03-17 | End: 2023-05-15

## 2023-03-17 RX ORDER — SACUBITRIL AND VALSARTAN 24; 26 MG/1; MG/1
1 TABLET, FILM COATED ORAL
Refills: 0 | Status: DISCONTINUED | OUTPATIENT
Start: 2023-03-17 | End: 2023-03-18

## 2023-03-17 RX ORDER — SIMETHICONE 80 MG/1
80 TABLET, CHEWABLE ORAL EVERY 8 HOURS
Refills: 0 | Status: DISCONTINUED | OUTPATIENT
Start: 2023-03-17 | End: 2023-03-18

## 2023-03-17 RX ORDER — NIFEDIPINE 30 MG
60 TABLET, EXTENDED RELEASE 24 HR ORAL DAILY
Refills: 0 | Status: DISCONTINUED | OUTPATIENT
Start: 2023-03-18 | End: 2023-03-18

## 2023-03-17 RX ADMIN — HEPARIN SODIUM 5000 UNIT(S): 5000 INJECTION INTRAVENOUS; SUBCUTANEOUS at 15:45

## 2023-03-17 RX ADMIN — CARVEDILOL PHOSPHATE 25 MILLIGRAM(S): 80 CAPSULE, EXTENDED RELEASE ORAL at 05:19

## 2023-03-17 RX ADMIN — SACUBITRIL AND VALSARTAN 1 TABLET(S): 24; 26 TABLET, FILM COATED ORAL at 17:52

## 2023-03-17 RX ADMIN — CARVEDILOL PHOSPHATE 25 MILLIGRAM(S): 80 CAPSULE, EXTENDED RELEASE ORAL at 17:52

## 2023-03-17 RX ADMIN — HEPARIN SODIUM 5000 UNIT(S): 5000 INJECTION INTRAVENOUS; SUBCUTANEOUS at 21:59

## 2023-03-17 RX ADMIN — SODIUM CHLORIDE 3 MILLILITER(S): 9 INJECTION INTRAMUSCULAR; INTRAVENOUS; SUBCUTANEOUS at 17:48

## 2023-03-17 RX ADMIN — SODIUM CHLORIDE 3 MILLILITER(S): 9 INJECTION INTRAMUSCULAR; INTRAVENOUS; SUBCUTANEOUS at 03:55

## 2023-03-17 RX ADMIN — Medication 25 GRAM(S): at 00:27

## 2023-03-17 RX ADMIN — Medication 40 MILLIGRAM(S): at 05:19

## 2023-03-17 RX ADMIN — Medication 1 PATCH: at 21:10

## 2023-03-17 RX ADMIN — Medication 60 MILLIGRAM(S): at 05:19

## 2023-03-17 RX ADMIN — SIMETHICONE 80 MILLIGRAM(S): 80 TABLET, CHEWABLE ORAL at 08:31

## 2023-03-17 RX ADMIN — SIMETHICONE 80 MILLIGRAM(S): 80 TABLET, CHEWABLE ORAL at 18:29

## 2023-03-17 RX ADMIN — PANTOPRAZOLE SODIUM 40 MILLIGRAM(S): 20 TABLET, DELAYED RELEASE ORAL at 05:19

## 2023-03-17 RX ADMIN — HEPARIN SODIUM 5000 UNIT(S): 5000 INJECTION INTRAVENOUS; SUBCUTANEOUS at 05:19

## 2023-03-17 RX ADMIN — SODIUM CHLORIDE 3 MILLILITER(S): 9 INJECTION INTRAMUSCULAR; INTRAVENOUS; SUBCUTANEOUS at 21:10

## 2023-03-17 RX ADMIN — Medication 40 MILLIEQUIVALENT(S): at 00:27

## 2023-03-17 RX ADMIN — SENNA PLUS 2 TABLET(S): 8.6 TABLET ORAL at 21:59

## 2023-03-17 RX ADMIN — SACUBITRIL AND VALSARTAN 1 TABLET(S): 24; 26 TABLET, FILM COATED ORAL at 06:33

## 2023-03-17 RX ADMIN — Medication 325 MILLIGRAM(S): at 17:52

## 2023-03-17 NOTE — PROGRESS NOTE ADULT - PROBLEM SELECTOR PLAN 1
S/p Celiac and SMA bypass. Plan for staged procedure with TEVAR  off inotropes  Ileus improving. +BM . Tolerating regular diet.  Resume PO antihypertensive meds  on Coreg 25mg BID  Started on Entresto 24/26 BID  PRN hydralazine for BP >160  Encourage OOB to chair and ambulation with PT  Encourage deep breathing exercised and coughing  Chest PT and IS use with bedside nurse   Nephrology following. Likely baseline SCr between 1.3-1.6.   Heart failure team following for chronic systolic heart failure.

## 2023-03-17 NOTE — PROGRESS NOTE ADULT - PROBLEM SELECTOR PLAN 2
- Etiology: NICMP (LHC 3/3 revealed normal coronaries and no prior stents). Likely due to hypertensive heart disease with increased wall thickness on echo.  - RHC 3/3 showed LVEDP 19, PAP 40/24/31.   - Appears euvolemic on exam today.  - Diuretics: C/w Lasix 40mg PO daily  - GDMT: Coreg 25mg BID, Entresto 24-26mg BID. Will plan to start MRA/SGLT2i as outpt.   - Will need to establish care with local cardiology and HF teams.  - Can consider genetic testing as outpt. - Etiology: NICMP (LHC 3/3 revealed normal coronaries and no prior stents). Likely due to hypertensive heart disease with increased wall thickness on echo.  - RHC 3/3 showed LVEDP 19, PAP 40/24/31.   - Appears euvolemic on exam today.  - Diuretics: C/w Lasix 40mg PO daily  - GDMT: Coreg 25mg BID, Entresto 24-26mg BID. Will plan to start MRA/SGLT2i as outpt.   - Will need to establish care with local cardiology and HF teams.  - Can consider genetic testing as outpt.  - Will schedule HFU in [place] office with Dr. Alpa Chaudhari.

## 2023-03-17 NOTE — PROGRESS NOTE ADULT - NS ATTEND OPT1 GEN_ALL_CORE
I attest my time as attending is greater than 50% of the total combined time spent on qualifying patient care activities by the PA/NP and attending.
I independently performed the documented:
I independently performed the documented:
I attest my time as attending is greater than 50% of the total combined time spent on qualifying patient care activities by the PA/NP and attending.
I attest my time as attending is greater than 50% of the total combined time spent on qualifying patient care activities by the PA/NP and attending.

## 2023-03-17 NOTE — PROGRESS NOTE ADULT - PROBLEM SELECTOR PLAN 4
[facility] [place], [place]                                      CARDIOLOGY REPORT      NAME:            GIOVANNY ANDERSON                               AGE:  63   ACCT#:           [id number]                              :  1955   MR#:             [id number]                             ROOM:     ADMIT DATE:      2018                        DIS DATE:  2018   PT TYPE:         O                                       DP:  [id number]   ATTENDING:       JAIR ARIZMENDI MD                              DICTATING PHYSICIAN:  MASSIEL MCCAIN MD      DATE OF PROCEDURE:  2018               EXERCISE MYOVIEW STRESS TEST      INTERPRETATION:  The resting 12-lead EKG shows normal sinus rhythm, borderline   criteria for left ventricular hypertrophy by voltage, and incomplete right   bundle-branch block.  The patient exercised well on regular Marquise protocol for a   total of 10 minutes and 51 seconds.  Resting blood pressure was 147/112.   Maximum blood pressure was 216/88.  Resting heart rate was 61 beats per minute.   Maximum heart rate achieved was 144 beats per minute compatible with 90% of   maximum predicted target rate, which is 159 beats per minute.  During the   exercise, the patient expressed no chest pain or angina.  The test was stopped   because of leg fatigue and achieving target rate.  The cardiac monitor shows   normal acceleration of the heart rate and was negative for ischemia by EKG   criteria.  One minute prior to termination of exercise, Myoview was injected.   In recovery, blood pressure, heart rate, and EKG returned back to baseline and   the patient remained asymptomatic.  Later on, the patient went for imaging.      FINAL IMPRESSION:   1. Good exercise tolerance, functional class 1, 10 minute and 51 seconds on       regular Marquise protocol.   2. The test was stopped because of leg fatigue and achieving target rate.    3. No chest pain or angina.   4. No arrhythmia.   5. Resting hypertension and hypertensive response to exercise.   6. Negative for ischemia by EKG criteria at __________ of maximum predicted       target heart rate and finally, Nuclear Medicine report will follow       separately from the Radiology Department, correlation recommended.            ______________________________   MD ERIC Dominguez/Jina   DP:  [id number]   DD:  08/01/2018 19:12:15   DT:  08/02/2018 00:36:08   Job #:  [id number]         ROSALINO resolved   Likely secondary to hypoperfusion from dissection vs STEVAN.  Continue to trend BMP.  Nephrology following.   Likely baseline SCr between 1.3-1.6.

## 2023-03-17 NOTE — PROGRESS NOTE ADULT - NS ATTEND AMEND GEN_ALL_CORE FT
Patient seen and examined at bedside with  [id number]. He has had 2 bowel movements and denies further nausea or vomiting. No further intervention needed. Please re-engage GI as needed. Thank you
No acute cardiac events overnight except for elevated BP.  Pt has abdominal distention and was constipated. Had 2 BMs this am and reports +flatus.   Clinically euvolemic, warm extremities.   He is currently NPO and has been started on IV lopressor as well as clonidine patch.   Recommend aggressive BP control, can use hydralazine IV if needed.   Will start GDMT once able to take PO.   We will follow intermittently.
Feels well. Had some "cloudiness" after taking meds today.   BP has improved and normalized. Should consider downtitrating other meds with the goal to uptitrate entresto.   Would benefit from work up for 2ry HTN.   DC planning tomorrow as per CTS.   We will follow up as an outpt.
Patient seen and examined at bedside  Continue to wean nicardipine drip as tolerated  Swtich lopressor to coreg 6.25mg BID, can uptitrate as tolerated  Continue with cardura and amlodipine for now. Once patient has had his surgery, will transition to entresto for GDMT  Patient had a report PA sat of 71 which gives him a cardiac output of 5.2 and cardiac index of 2.9. For the surgery recommend cardiac anesthesia with swan assessment intraop
Patient was seen and examined at bedside with the advanced care provider.    Cr increased today from 1.6 to 3.0.  RHC/LHC cancelled for today.  Agree with nephrology consult.  Would keep NPO again at midnight tonight in the event Cr improves, can reschedule as an add on for tomorrow.  Keep in mind groin access will be prohibited given type B dissection.  Based on volume status JVP elevated to day to 12cm and in that sense would favor low dose diuretic.  In addition, would stop amlodipine and change metoprolol tartrate to carvedilol for added BP effect.  Targeting normotension for him /80.  Please involve social work with his care to assess his ongoing outpatient access to care.

## 2023-03-17 NOTE — PROGRESS NOTE ADULT - TIME BILLING
- Review of notes/records, telemetry, vital signs and daily labs.   - General and cardiovascular physical examination.  - Generation of cardiovascular treatment plan.  - Coordination of care with primary team.
- Generation of cardiovascular treatment plan.  - Coordination of care with primary team.
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
Time spent at bedside interviewing and examining the patient, reviewing the chart and telemetry, and coordinating care with the primary team.  I counselled patient on the heart failure disease process and the ongoing medical therapy.
- Generation of cardiovascular treatment plan.  - Coordination of care with primary team.
- Review of notes/records, telemetry, vital signs and daily labs.   - General and cardiovascular physical examination.  - Generation of cardiovascular treatment plan.  - Coordination of care with primary team.
- Review of notes/records, telemetry, vital signs and daily labs.   - General and cardiovascular physical examination.  - Generation of cardiovascular treatment plan.  - Coordination of care with primary team.

## 2023-03-17 NOTE — PROGRESS NOTE ADULT - SUBJECTIVE AND OBJECTIVE BOX
[facility] Advanced Heart Failure  Office: [address] [place] [address]  Service Phone [phone]  Office Phone: [phone]/Fax: [phone]    Subjective/Objective: NAEO. Patient denies overt HF symptoms. Reports feeling a bit "cloudy" this am likely associated to .     Medications:  aspirin 325 milliGRAM(s) Oral daily  benzocaine 20% Spray 1 Spray(s) Topical every 4 hours PRN  carvedilol 25 milliGRAM(s) Oral every 12 hours  cloNIDine Patch 0.1 mG/24Hr(s) 1 patch Transdermal every 7 days  furosemide    Tablet 40 milliGRAM(s) Oral daily  heparin   Injectable 5000 Unit(s) SubCutaneous every 8 hours  hydrALAZINE Injectable 10 milliGRAM(s) IV Push every 4 hours PRN  insulin lispro (ADMELOG) corrective regimen sliding scale   SubCutaneous Before meals and at bedtime  pantoprazole    Tablet 40 milliGRAM(s) Oral before breakfast  sacubitril 24 mG/valsartan 26 mG 1 Tablet(s) Oral two times a day  senna 2 Tablet(s) Oral at bedtime  sodium chloride 0.9% lock flush 3 milliLiter(s) IV Push every 8 hours    Vital Signs Last 24 Hours  T(C): 36.8 (23 @ 08:01), Max: 37.2 (23 @ 16:03)  HR: 70 (23 @ 08:01) (70 - 100)  BP: 123/75 (23 @ 08:01) (123/75 - 179/101)  RR: 18 (23 @ 08:01) (16 - 18)  SpO2: 96% (23 @ 08:01) (95% - 98%)    Weight in k.5 ( @ 06:06)    I&O's Summary  16 Mar 2023 07:01  -  17 Mar 2023 07:00  --------------------------------------------------------  IN: 1020 mL / OUT: 1000 mL / NET: 20 mL    Tele: SR    Physical Exam:  General: Sitting up in chair, in no distress.  HEENT: EOMs intact.  Neck: Neck supple. JVP not elevated.  Chest: Clear to auscultation bilaterally  CV: RRR. jNormal S1 and S2. No murmurs, rub, or gallops. Warm peripherally.  PV: No LE edeam.   Abdomen: Soft  Skin: warm, dry, no rash  Neurology: Alert and oriented times three. Sensation intact.  Psych: Affect appropriate    Labs:                        11.9   5.26  )-----------( 243      ( 17 Mar 2023 06:23 )             38.2         138  |  100  |  12.3  ----------------------------<  99  4.1   |  23.0  |  1.07    Ca    9.5      17 Mar 2023 06:23  Mg     2.3         Lactate, Blood: 0.9 mmol/L ( @ 12:40)

## 2023-03-17 NOTE — PROGRESS NOTE ADULT - SUBJECTIVE AND OBJECTIVE BOX
Brief summary:  47yMale seen and assessed at bedside.  Pt sitting comfortably in hospital chair in NAD on room air.  States no current physical complaints at this time.  Denies f/c, n/v, chest pain, sob, abdominal pain, d/c, urinary symptoms.  ROS negative x 10.    Overnight events:  None.  Hospital course progressing as expected.        PAST MEDICAL & SURGICAL HISTORY:  Uncontrolled hypertension    CKD (chronic kidney disease)    Interstitial lung disease    Prophylactic measure        Medications:  aspirin 325 milliGRAM(s) Oral daily  benzocaine 20% Spray 1 Spray(s) Topical every 4 hours PRN  carvedilol 25 milliGRAM(s) Oral every 12 hours  cloNIDine Patch 0.1 mG/24Hr(s) 1 patch Transdermal every 7 days  furosemide    Tablet 40 milliGRAM(s) Oral daily  heparin   Injectable 5000 Unit(s) SubCutaneous every 8 hours  hydrALAZINE Injectable 10 milliGRAM(s) IV Push every 4 hours PRN  insulin lispro (ADMELOG) corrective regimen sliding scale   SubCutaneous Before meals and at bedtime  NIFEdipine XL 60 milliGRAM(s) Oral every 12 hours  pantoprazole    Tablet 40 milliGRAM(s) Oral before breakfast  senna 2 Tablet(s) Oral at bedtime  sodium chloride 0.9% lock flush 3 milliLiter(s) IV Push every 8 hours      MEDICATIONS  (PRN):  benzocaine 20% Spray 1 Spray(s) Topical every 4 hours PRN Discomfort  hydrALAZINE Injectable 10 milliGRAM(s) IV Push every 4 hours PRN SBP >160        Daily Review:               11.6   4.64  )-----------( 191      ( 16 Mar 2023 04:52 )             38.2   03-16    135  |  99  |  13.1  ----------------------------<  110<H>  3.9   |  21.0<L>  |  1.05    Ca    9.7      16 Mar 2023 20:46  Mg     1.8     03-16      T(C): 36.9 (03-17-23 @ 00:56), Max: 37.2 (03-16-23 @ 08:00)  HR: 74 (03-17-23 @ 00:56) (71 - 88)  BP: 152/82 (03-17-23 @ 00:56) (150/89 - 179/101)  RR: 18 (03-17-23 @ 00:56) (16 - 18)  SpO2: 96% (03-17-23 @ 00:56) (95% - 98%)      CAPILLARY BLOOD GLUCOSE    POCT Blood Glucose.: 95 mg/dL (16 Mar 2023 21:00)  POCT Blood Glucose.: 97 mg/dL (16 Mar 2023 17:06)  POCT Blood Glucose.: 113 mg/dL (16 Mar 2023 12:23)  POCT Blood Glucose.: 87 mg/dL (16 Mar 2023 08:11)      I&O's Summary    15 Mar 2023 07:01  -  16 Mar 2023 07:00  --------------------------------------------------------  IN: 480 mL / OUT: 750 mL / NET: -270 mL    16 Mar 2023 07:01  -  17 Mar 2023 03:10  --------------------------------------------------------  IN: 700 mL / OUT: 800 mL / NET: -100 mL        Physical Exam    Neuro: A+O x 3, non-focal, speech clear and intact  Pulm: coarse breath sounds bilaterally, no wheezing or rales  CV: RRR, +S1S2  Abd: soft, mildly distended but nontender, normoactive bowel sounds  Ext: +DP Pulses b/l, +PT pulses, no edema, NVI distally  Inc: Abdominal incision C/D/I/stable w/ dressing        CXR 3/15/23    < from: Xray Chest 1 View- PORTABLE-Routine (Xray Chest 1 View- PORTABLE-Routine in AM.) (03.15.23 @ 06:15) >    ACC: [id number] EXAM:  XR CHEST PORTABLE ROUTINE 1V   ORDERED BY: ASHLEY GALLEGOS     PROCEDURE DATE:  03/15/2023          INTERPRETATION:  Chest one view    HISTORY: Pulmonary congestion    COMPARISON STUDY: 3/14/2023    Frontal expiratory view of the chest shows the heart and aortic arch to   be similar in size. The lungs show mild pulmonary congestion and there is   no evidence of pneumothorax nor pleural effusion.    IMPRESSION:  Mild pulmonary congestion.        Thank you for the courtesy of this referral.    --- End of Report ---    < end of copied text >

## 2023-03-17 NOTE — PROGRESS NOTE ADULT - PROBLEM SELECTOR PLAN 3
- Resistant HTN   - Improving with initiating of ARNi and Carvedilol. Would consider stopping Clonidine and reducing Procardia to once daily to allow for further optimization of GDMT.  - Would benefit from work up for 2ry causes: obtain US renal Doppler (?renal art stenosis), 24h urine fractionated metanephrines/cathecholamines.  - Will obtain sleep study as outpt - Resistant HTN   - Improving with initiating of ARNi and Carvedilol. Would consider stopping Clonidine and reducing Procardia to 60mg once daily to allow for further optimization of GDMT.  - Would benefit from work up for 2ry causes: obtain US renal Doppler (?renal art stenosis), 24h urine fractionated metanephrines/cathecholamines.  - Will obtain sleep study as outpt

## 2023-03-17 NOTE — PROGRESS NOTE ADULT - ASSESSMENT
42M, poor historian, PMH of HTN (uncontrolled), Pulmonary Interstitial Disease (per chart review), CAD w/ stents (2021 in [place]), CKD (unknown baseline SCr), with recent admission for hypertensive urgency, was sent to [facility] 2/28/23 from [facility] secondary to HTN urgency (). CTA C/A/P showed Type B dissection and he was transferred to [facility]ICU for further management. Inpatient course significant for: RHC/LHC initially canceled 2/2 worsening renal function then complicated by right radial partial thrombosis, right cephalic and basilic vein thrombophlebitis, and HTN. On 3/9 underwent celiac and SMA Bypass.

## 2023-03-17 NOTE — PROGRESS NOTE ADULT - ASSESSMENT
41 yo M, originally from the  emigrated ~ 2008, with a history of HTN (uncontrolled diagnosed 5 years ago), interstitial lung disease, CAD w/ stents (? for a leaking blood vessel/dissection), CKD (unknown baseline) initially presented to [facility] 2/28/23 from [facility] where he had gone for a routine physical exam and to get his medications filled.  He had previously not seen a physician for over 3 years.  He was last seen in [facility] ED in 2018 for hypertensive urgency requiring IV labetalol and transition to lisinopril/ HCTZ/ amlodipine.  However, he has been continuously filling his home meds from the  including losartan 320mg daily, nifedipine 60mg ER daily, and rosuvastatin 40mg daily.  Due to inability to obtain all his medications, he has been unable to obtain recent doses so he has been taking half his medication daily.  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  On presentation to [facility] he presented with SBP > 200 and subsequent CTA C/A/P showed Type B dissection. He was started on labetalol and nifedipine drips and transferred to [facility] CTICU for further management. He was initially treated with esmolol and nicardipine gtts. Underwent R/LHC 3/3 which revealed normal coronaries and elevated LVEDP 19. Subsequently developed right radial, cephalic and basilic vein thrombi for which he is on heparin gtt. Vascular consulted and no intervention required-clinically improving.     Cardiac Testing:  Echo 2/28/23: LVEF 20-25%, LVEDD 6./23cm, LVIDs 5.59cm, TAPSE 2.54cm, mild to mod MR, trivial TR, RV normal function.

## 2023-03-17 NOTE — PROGRESS NOTE ADULT - PROBLEM SELECTOR PLAN 3
Continue BP control  Nicardipine weaned off  Continue Clonidine patch, Coreg, Entresto, PRN hydralazine for BP control

## 2023-03-18 ENCOUNTER — TRANSCRIPTION ENCOUNTER (OUTPATIENT)
Age: 48
End: 2023-03-18

## 2023-03-18 VITALS
DIASTOLIC BLOOD PRESSURE: 62 MMHG | TEMPERATURE: 98 F | SYSTOLIC BLOOD PRESSURE: 132 MMHG | OXYGEN SATURATION: 96 % | RESPIRATION RATE: 16 BRPM | HEART RATE: 80 BPM

## 2023-03-18 LAB
ANION GAP SERPL CALC-SCNC: 15 MMOL/L — SIGNIFICANT CHANGE UP (ref 5–17)
BUN SERPL-MCNC: 22.1 MG/DL — HIGH (ref 8–20)
CALCIUM SERPL-MCNC: 9 MG/DL — SIGNIFICANT CHANGE UP (ref 8.4–10.5)
CHLORIDE SERPL-SCNC: 102 MMOL/L — SIGNIFICANT CHANGE UP (ref 96–108)
CO2 SERPL-SCNC: 21 MMOL/L — LOW (ref 22–29)
CREAT SERPL-MCNC: 1.56 MG/DL — HIGH (ref 0.5–1.3)
EGFR: 55 ML/MIN/1.73M2 — LOW
GLUCOSE SERPL-MCNC: 95 MG/DL — SIGNIFICANT CHANGE UP (ref 70–99)
HCT VFR BLD CALC: 38 % — LOW (ref 39–50)
HGB BLD-MCNC: 11.8 G/DL — LOW (ref 13–17)
MAGNESIUM SERPL-MCNC: 2.1 MG/DL — SIGNIFICANT CHANGE UP (ref 1.6–2.6)
MCHC RBC-ENTMCNC: 25.7 PG — LOW (ref 27–34)
MCHC RBC-ENTMCNC: 31.1 GM/DL — LOW (ref 32–36)
MCV RBC AUTO: 82.6 FL — SIGNIFICANT CHANGE UP (ref 80–100)
PLATELET # BLD AUTO: 283 K/UL — SIGNIFICANT CHANGE UP (ref 150–400)
POTASSIUM SERPL-MCNC: 4.3 MMOL/L — SIGNIFICANT CHANGE UP (ref 3.5–5.3)
POTASSIUM SERPL-SCNC: 4.3 MMOL/L — SIGNIFICANT CHANGE UP (ref 3.5–5.3)
RBC # BLD: 4.6 M/UL — SIGNIFICANT CHANGE UP (ref 4.2–5.8)
RBC # FLD: 14.9 % — HIGH (ref 10.3–14.5)
SODIUM SERPL-SCNC: 137 MMOL/L — SIGNIFICANT CHANGE UP (ref 135–145)
WBC # BLD: 5.17 K/UL — SIGNIFICANT CHANGE UP (ref 3.8–10.5)
WBC # FLD AUTO: 5.17 K/UL — SIGNIFICANT CHANGE UP (ref 3.8–10.5)

## 2023-03-18 PROCEDURE — C1769: CPT

## 2023-03-18 PROCEDURE — 87641 MR-STAPH DNA AMP PROBE: CPT

## 2023-03-18 PROCEDURE — C1889: CPT

## 2023-03-18 PROCEDURE — 93880 EXTRACRANIAL BILAT STUDY: CPT

## 2023-03-18 PROCEDURE — 83690 ASSAY OF LIPASE: CPT

## 2023-03-18 PROCEDURE — 83605 ASSAY OF LACTIC ACID: CPT

## 2023-03-18 PROCEDURE — 84132 ASSAY OF SERUM POTASSIUM: CPT

## 2023-03-18 PROCEDURE — 71045 X-RAY EXAM CHEST 1 VIEW: CPT | Mod: 26

## 2023-03-18 PROCEDURE — 93656 COMPRE EP EVAL ABLTJ ATR FIB: CPT

## 2023-03-18 PROCEDURE — U0005: CPT

## 2023-03-18 PROCEDURE — 80076 HEPATIC FUNCTION PANEL: CPT

## 2023-03-18 PROCEDURE — 86850 RBC ANTIBODY SCREEN: CPT

## 2023-03-18 PROCEDURE — 97530 THERAPEUTIC ACTIVITIES: CPT

## 2023-03-18 PROCEDURE — 74018 RADEX ABDOMEN 1 VIEW: CPT

## 2023-03-18 PROCEDURE — 81001 URINALYSIS AUTO W/SCOPE: CPT

## 2023-03-18 PROCEDURE — P9045: CPT

## 2023-03-18 PROCEDURE — 82330 ASSAY OF CALCIUM: CPT

## 2023-03-18 PROCEDURE — C1894: CPT

## 2023-03-18 PROCEDURE — 86900 BLOOD TYPING SEROLOGIC ABO: CPT

## 2023-03-18 PROCEDURE — 82150 ASSAY OF AMYLASE: CPT

## 2023-03-18 PROCEDURE — 99024 POSTOP FOLLOW-UP VISIT: CPT

## 2023-03-18 PROCEDURE — 80307 DRUG TEST PRSMV CHEM ANLYZR: CPT

## 2023-03-18 PROCEDURE — 83036 HEMOGLOBIN GLYCOSYLATED A1C: CPT

## 2023-03-18 PROCEDURE — 85520 HEPARIN ASSAY: CPT

## 2023-03-18 PROCEDURE — 88305 TISSUE EXAM BY PATHOLOGIST: CPT

## 2023-03-18 PROCEDURE — 86923 COMPATIBILITY TEST ELECTRIC: CPT

## 2023-03-18 PROCEDURE — 93931 UPPER EXTREMITY STUDY: CPT

## 2023-03-18 PROCEDURE — 83735 ASSAY OF MAGNESIUM: CPT

## 2023-03-18 PROCEDURE — 82550 ASSAY OF CK (CPK): CPT

## 2023-03-18 PROCEDURE — 85018 HEMOGLOBIN: CPT

## 2023-03-18 PROCEDURE — 84156 ASSAY OF PROTEIN URINE: CPT

## 2023-03-18 PROCEDURE — 84443 ASSAY THYROID STIM HORMONE: CPT

## 2023-03-18 PROCEDURE — 85730 THROMBOPLASTIN TIME PARTIAL: CPT

## 2023-03-18 PROCEDURE — 82570 ASSAY OF URINE CREATININE: CPT

## 2023-03-18 PROCEDURE — 84484 ASSAY OF TROPONIN QUANT: CPT

## 2023-03-18 PROCEDURE — 82962 GLUCOSE BLOOD TEST: CPT

## 2023-03-18 PROCEDURE — 83880 ASSAY OF NATRIURETIC PEPTIDE: CPT

## 2023-03-18 PROCEDURE — 97116 GAIT TRAINING THERAPY: CPT

## 2023-03-18 PROCEDURE — 85025 COMPLETE CBC W/AUTO DIFF WBC: CPT

## 2023-03-18 PROCEDURE — 94010 BREATHING CAPACITY TEST: CPT

## 2023-03-18 PROCEDURE — 82803 BLOOD GASES ANY COMBINATION: CPT

## 2023-03-18 PROCEDURE — 93975 VASCULAR STUDY: CPT

## 2023-03-18 PROCEDURE — 93306 TTE W/DOPPLER COMPLETE: CPT

## 2023-03-18 PROCEDURE — 85610 PROTHROMBIN TIME: CPT

## 2023-03-18 PROCEDURE — 80053 COMPREHEN METABOLIC PANEL: CPT

## 2023-03-18 PROCEDURE — 85027 COMPLETE CBC AUTOMATED: CPT

## 2023-03-18 PROCEDURE — 82947 ASSAY GLUCOSE BLOOD QUANT: CPT

## 2023-03-18 PROCEDURE — 94002 VENT MGMT INPAT INIT DAY: CPT

## 2023-03-18 PROCEDURE — 87640 STAPH A DNA AMP PROBE: CPT

## 2023-03-18 PROCEDURE — 83615 LACTATE (LD) (LDH) ENZYME: CPT

## 2023-03-18 PROCEDURE — 93971 EXTREMITY STUDY: CPT

## 2023-03-18 PROCEDURE — 36415 COLL VENOUS BLD VENIPUNCTURE: CPT

## 2023-03-18 PROCEDURE — 84100 ASSAY OF PHOSPHORUS: CPT

## 2023-03-18 PROCEDURE — C8929: CPT

## 2023-03-18 PROCEDURE — C1751: CPT

## 2023-03-18 PROCEDURE — 80048 BASIC METABOLIC PNL TOTAL CA: CPT

## 2023-03-18 PROCEDURE — 71045 X-RAY EXAM CHEST 1 VIEW: CPT

## 2023-03-18 PROCEDURE — 84134 ASSAY OF PREALBUMIN: CPT

## 2023-03-18 PROCEDURE — 82805 BLOOD GASES W/O2 SATURATION: CPT

## 2023-03-18 PROCEDURE — U0003: CPT

## 2023-03-18 PROCEDURE — C1768: CPT

## 2023-03-18 PROCEDURE — 85014 HEMATOCRIT: CPT

## 2023-03-18 PROCEDURE — 84295 ASSAY OF SERUM SODIUM: CPT

## 2023-03-18 PROCEDURE — 86901 BLOOD TYPING SEROLOGIC RH(D): CPT

## 2023-03-18 PROCEDURE — 82043 UR ALBUMIN QUANTITATIVE: CPT

## 2023-03-18 PROCEDURE — 82435 ASSAY OF BLOOD CHLORIDE: CPT

## 2023-03-18 PROCEDURE — 93005 ELECTROCARDIOGRAM TRACING: CPT

## 2023-03-18 RX ORDER — ASPIRIN/CALCIUM CARB/MAGNESIUM 324 MG
1 TABLET ORAL
Qty: 30 | Refills: 0
Start: 2023-03-18 | End: 2023-04-16

## 2023-03-18 RX ORDER — NIFEDIPINE 30 MG
1 TABLET, EXTENDED RELEASE 24 HR ORAL
Qty: 0 | Refills: 0 | DISCHARGE
Start: 2023-03-18

## 2023-03-18 RX ORDER — FUROSEMIDE 40 MG
1 TABLET ORAL
Qty: 0 | Refills: 0 | DISCHARGE
Start: 2023-03-18

## 2023-03-18 RX ORDER — CARVEDILOL PHOSPHATE 80 MG/1
1 CAPSULE, EXTENDED RELEASE ORAL
Qty: 60 | Refills: 0
Start: 2023-03-18 | End: 2023-04-16

## 2023-03-18 RX ORDER — SENNA PLUS 8.6 MG/1
2 TABLET ORAL
Qty: 0 | Refills: 0 | DISCHARGE
Start: 2023-03-18

## 2023-03-18 RX ORDER — ASPIRIN/CALCIUM CARB/MAGNESIUM 324 MG
1 TABLET ORAL
Qty: 0 | Refills: 0 | DISCHARGE
Start: 2023-03-18

## 2023-03-18 RX ORDER — CARVEDILOL PHOSPHATE 80 MG/1
1 CAPSULE, EXTENDED RELEASE ORAL
Qty: 0 | Refills: 0 | DISCHARGE
Start: 2023-03-18

## 2023-03-18 RX ORDER — NIFEDIPINE 30 MG
1 TABLET, EXTENDED RELEASE 24 HR ORAL
Qty: 0 | Refills: 0 | DISCHARGE

## 2023-03-18 RX ORDER — POTASSIUM CHLORIDE 20 MEQ
1 PACKET (EA) ORAL
Qty: 30 | Refills: 0
Start: 2023-03-18 | End: 2023-04-16

## 2023-03-18 RX ORDER — SIMETHICONE 80 MG/1
1 TABLET, CHEWABLE ORAL
Qty: 0 | Refills: 0 | DISCHARGE
Start: 2023-03-18

## 2023-03-18 RX ORDER — NIFEDIPINE 30 MG
1 TABLET, EXTENDED RELEASE 24 HR ORAL
Qty: 30 | Refills: 0
Start: 2023-03-18 | End: 2023-04-16

## 2023-03-18 RX ORDER — FUROSEMIDE 40 MG
1 TABLET ORAL
Qty: 30 | Refills: 0
Start: 2023-03-18 | End: 2023-04-16

## 2023-03-18 RX ADMIN — Medication 40 MILLIGRAM(S): at 05:27

## 2023-03-18 RX ADMIN — PANTOPRAZOLE SODIUM 40 MILLIGRAM(S): 20 TABLET, DELAYED RELEASE ORAL at 05:27

## 2023-03-18 RX ADMIN — SACUBITRIL AND VALSARTAN 1 TABLET(S): 24; 26 TABLET, FILM COATED ORAL at 05:26

## 2023-03-18 RX ADMIN — CARVEDILOL PHOSPHATE 25 MILLIGRAM(S): 80 CAPSULE, EXTENDED RELEASE ORAL at 05:27

## 2023-03-18 RX ADMIN — SIMETHICONE 80 MILLIGRAM(S): 80 TABLET, CHEWABLE ORAL at 05:27

## 2023-03-18 RX ADMIN — HEPARIN SODIUM 5000 UNIT(S): 5000 INJECTION INTRAVENOUS; SUBCUTANEOUS at 05:27

## 2023-03-18 RX ADMIN — SODIUM CHLORIDE 3 MILLILITER(S): 9 INJECTION INTRAMUSCULAR; INTRAVENOUS; SUBCUTANEOUS at 03:09

## 2023-03-18 RX ADMIN — Medication 1 PATCH: at 07:00

## 2023-03-18 RX ADMIN — Medication 60 MILLIGRAM(S): at 05:27

## 2023-03-18 NOTE — PROGRESS NOTE ADULT - ASSESSMENT
42M, poor historian, PMH of HTN (uncontrolled), Pulmonary Interstitial Disease (per chart review), CAD w/ stents (2021 in [place]), CKD (unknown baseline SCr), with recent admission for hypertensive urgency, was sent to [place] ED 2/28/23 from [place] [facility] secondary to HTN urgency (). CTA C/A/P showed Type B dissection and he was transferred to [facility]ICU for further management. Inpatient course significant for: RHC/LHC initially canceled 2/2 worsening renal function then complicated by right radial partial thrombosis, right cephalic and basilic vein thrombophlebitis, and HTN. On 3/9 underwent celiac and SMA Bypass.

## 2023-03-18 NOTE — PROGRESS NOTE ADULT - PROBLEM SELECTOR PROBLEM 2
Chronic combined systolic and diastolic heart failure
Dilated cardiomyopathy
Chronic combined systolic and diastolic heart failure
Chronic combined systolic and diastolic heart failure
Hypertensive urgency
Chronic combined systolic and diastolic heart failure
Dilated cardiomyopathy
Chronic combined systolic and diastolic heart failure
Chronic combined systolic and diastolic heart failure
Dissection of aorta
Chronic combined systolic and diastolic heart failure
Dilated cardiomyopathy
Dilated cardiomyopathy
Chronic combined systolic and diastolic heart failure
Hypertensive urgency
Chronic combined systolic and diastolic heart failure

## 2023-03-18 NOTE — PROGRESS NOTE ADULT - PROBLEM SELECTOR PROBLEM 3
Hypertensive urgency
Dilated cardiomyopathy
Hypertensive urgency
Dilated cardiomyopathy
HTN (hypertension)
Hypertensive urgency
HTN (hypertension)
Hypertensive urgency

## 2023-03-18 NOTE — PROGRESS NOTE ADULT - PROBLEM SELECTOR PROBLEM 4
Chronic kidney disease (CKD)

## 2023-03-18 NOTE — PROGRESS NOTE ADULT - PROBLEM SELECTOR PROBLEM 1
Dissection of aorta
Ileus
Dissection of aorta
Dilated cardiomyopathy
Dissection of aorta

## 2023-03-18 NOTE — DISCHARGE NOTE NURSING/CASE MANAGEMENT/SOCIAL WORK - NSSCNAMETXT_GEN_ALL_CORE
[facility] (formerly [facility]) [phone]  [address] [place], [place] [id number]

## 2023-03-18 NOTE — PROGRESS NOTE ADULT - PROBLEM SELECTOR PLAN 5
Continue GI ppx with Protonix and Senna  DVT ppx with SCD boots    Plan to be discussed with Dr. Heaton and CTS team during AM rounds.
Vascular following  AC on hold in periop setting
Vascular following  On heparin drip PTT goal 50-60
Vascular following  AC on hold in periop setting
Vascular following  AC on hold in periop setting
Continue GI ppx with Protonix and Senna.  DVT ppx with SCD boots.    Plan to be discussed with Dr. Heaton and CTS team during AM rounds.
Vascular following  AC on hold in periop setting
Continue GI ppx with Protonix and Senna.  DVT ppx with SCD boots.    Plan to be discussed with Dr. Heaton and CTS team during AM rounds.
Vascular following  AC on hold in periop setting
Vascular following  AC on hold in periop setting
Continue GI ppx with Protonix and Senna.  DVT ppx with SCD boots and heparin drip     Plan to be discussed with attending and CTS team during AM rounds.
Continue GI ppx with Protonix and Senna.  DVT ppx with SCD boots and heparin drip     Plan to be discussed with attending and CTS team during AM rounds.
Continue GI ppx with Protonix and Senna.  DVT ppx with SCD boots.    Plan to be discussed with attending and CTS team during AM rounds.

## 2023-03-18 NOTE — PROGRESS NOTE ADULT - PROBLEM SELECTOR PLAN 6
Right cephalic and basilic vein thrombophlebitis  Vascular surgery recommends warm compresses  Improving on physical exam
Right cephalic and basilic vein thrombophlebitis  Vascular surgery recommends warm compresses
Right cephalic and basilic vein thrombophlebitis  Vascular surgery recommends warm compresses
Right cephalic and basilic vein thrombophlebitis  Vascular surgery recommends warm compresses  Improving on physical exam
Vascular following  On heparin drip PTT goal 50-60
Right cephalic and basilic vein thrombophlebitis  Vascular surgery recommends warm compresses  Improving on physical exam
Vascular following  On heparin drip PTT goal 50-60
Right cephalic and basilic vein thrombophlebitis  Vascular surgery recommends warm compresses  Improved on physical exam
Right cephalic and basilic vein thrombophlebitis  Vascular surgery recommends warm compresses  Improving on physical exam
Right cephalic and basilic vein thrombophlebitis  Vascular surgery recommends warm compresses  Improving on physical exam

## 2023-03-18 NOTE — PROGRESS NOTE ADULT - PROBLEM SELECTOR PLAN 3
Continue BP control  Nicardipine weaned  Continue Clonidine patch, Coreg, Entresto, PRN hydralazine for BP control

## 2023-03-18 NOTE — PROGRESS NOTE ADULT - REASON FOR ADMISSION
Type B Dissection

## 2023-03-18 NOTE — PROGRESS NOTE ADULT - SUBJECTIVE AND OBJECTIVE BOX
Brief summary:  47yMale seen and assessed at bedside.  Pt laying comfortably in hospital bed in NAD on room air.  States no current physical complaints at this time.  States he had BM x 2.  Is passing gas.  Denies f/c, n/v, chest pain, sob, abdominal pain, d/c, urinary symptoms.  ROS negative x 10.    Overnight events:  None.  Hospital course progressing as expected.        PAST MEDICAL & SURGICAL HISTORY:  Uncontrolled hypertension    CKD (chronic kidney disease)    Interstitial lung disease    Prophylactic measure        Medications:  aspirin 325 milliGRAM(s) Oral daily  benzocaine 20% Spray 1 Spray(s) Topical every 4 hours PRN  carvedilol 25 milliGRAM(s) Oral every 12 hours  cloNIDine Patch 0.1 mG/24Hr(s) 1 patch Transdermal every 7 days  furosemide    Tablet 40 milliGRAM(s) Oral daily  heparin   Injectable 5000 Unit(s) SubCutaneous every 8 hours  NIFEdipine XL 60 milliGRAM(s) Oral daily  pantoprazole    Tablet 40 milliGRAM(s) Oral before breakfast  sacubitril 24 mG/valsartan 26 mG 1 Tablet(s) Oral two times a day  senna 2 Tablet(s) Oral at bedtime  simethicone 80 milliGRAM(s) Chew every 8 hours PRN  sodium chloride 0.9% lock flush 3 milliLiter(s) IV Push every 8 hours      MEDICATIONS  (PRN):  benzocaine 20% Spray 1 Spray(s) Topical every 4 hours PRN Discomfort  simethicone 80 milliGRAM(s) Chew every 8 hours PRN Gas        Daily Review:               11.9   5.26  )-----------( 243      ( 17 Mar 2023 06:23 )             38.2   03-17    138  |  100  |  12.3  ----------------------------<  99  4.1   |  23.0  |  1.07    Ca    9.5      17 Mar 2023 06:23  Mg     2.3     03-17      T(C): 36.9 (03-18-23 @ 00:34), Max: 36.9 (03-17-23 @ 05:10)  HR: 87 (03-18-23 @ 00:34) (67 - 100)  BP: 148/80 (03-18-23 @ 00:34) (112/71 - 148/80)  RR: 18 (03-18-23 @ 00:34) (18 - 18)  SpO2: 95% (03-18-23 @ 00:34) (95% - 97%)      CAPILLARY BLOOD GLUCOSE    POCT Blood Glucose.: 105 mg/dL (17 Mar 2023 17:03)  POCT Blood Glucose.: 107 mg/dL (17 Mar 2023 12:15)  POCT Blood Glucose.: 92 mg/dL (17 Mar 2023 08:22)      I&O's Summary    16 Mar 2023 07:01  -  17 Mar 2023 07:00  --------------------------------------------------------  IN: 1020 mL / OUT: 1000 mL / NET: 20 mL    17 Mar 2023 07:01  -  18 Mar 2023 04:05  --------------------------------------------------------  IN: 800 mL / OUT: 550 mL / NET: 250 mL        Physical Exam    Neuro: A+O x 3, non-focal, speech clear and intact  Pulm: coarse breath sounds bilaterally, no wheezing or rales  CV: RRR, +S1S2  Abd: soft, NT, ND, normoactive bowel sounds  Ext: +DP Pulses b/l, +PT pulses, no edema, NVI distally  Inc: Abdominal incision C/D/I/stable w/ dressing        CXR 3/16/23    < from: Xray Chest 1 View- PORTABLE-Routine (Xray Chest 1 View- PORTABLE-Routine in AM.) (03.16.23 @ 06:16) >    ACC: [id number] EXAM:  XR CHEST PORTABLE ROUTINE 1V   ORDERED BY: ASHLEY GALLEGOS     PROCEDURE DATE:  03/16/2023          INTERPRETATION:  Portable chest radiograph    CLINICAL INFORMATION: PVC    TECHNIQUE:  Portable  AP chest radiograph.    COMPARISON: 3/15/2023 chest x-ray .    FINDINGS:  CATHETERS AND TUBES: None    PULMONARY: The visualized lungs are clear of airspace consolidations or   pleural effusions.  No pneumothorax.    HEART/VASCULAR: Unchanged Widened mediastinum with enlarged aortic arch   and descending thoracic aorta in patient with type B aortic dissection   diagnosed on 2/28/2023 chest CT.  Mild cardiomegaly.      BONES: Visualized osseous thorax intact.    IMPRESSION: No interval change..    --- End of Report ---    < end of copied text >

## 2023-03-18 NOTE — PROGRESS NOTE ADULT - NUTRITIONAL ASSESSMENT
48 yo M s/p r and L heart cath complicated by R radial partial thrombosis and patient noted to have also cephalic and basilic vein thrombosis complaining of swollen forearm.    -No vascular intervention required  -Recommend anticoagulation for thrombosis of radial artery, cephalic, and basilic veins  -Recommend elevation and warm compress  -No evidence of ischemia  -If concerned for compartment syndrome, please consult Hand surgery on call.
This patient has been assessed with a concern for Malnutrition and has been determined to have a diagnosis/diagnoses of Moderate protein-calorie malnutrition.    This patient is being managed with:   Diet Clear Liquid-  Entered: Mar 14 2023  1:48PM    
This patient has been assessed with a concern for Malnutrition and has been determined to have a diagnosis/diagnoses of Moderate protein-calorie malnutrition.    This patient is being managed with:   Diet Clear Liquid-  Entered: Mar 14 2023  1:48PM    
This patient has been assessed with a concern for Malnutrition and has been determined to have a diagnosis/diagnoses of Moderate protein-calorie malnutrition.    This patient is being managed with:   Diet Regular-  DASH/TLC {Sodium & Cholesterol Restricted} (DASH)  Entered: Mar 16 2023 11:05AM    
This patient has been assessed with a concern for Malnutrition and has been determined to have a diagnosis/diagnoses of Moderate protein-calorie malnutrition.    This patient is being managed with:   Diet Regular-  DASH/TLC {Sodium & Cholesterol Restricted} (DASH)  Entered: Mar 16 2023 11:05AM

## 2023-03-18 NOTE — PROGRESS NOTE ADULT - PROBLEM SELECTOR PLAN 1
S/p Celiac and SMA bypass. Plan for staged procedure with TEVAR and carotid bypass  off inotropes  Ileus improving. +BM . Tolerating regular diet.  Resume PO antihypertensive meds  on Coreg 25mg BID  Started on Entresto 24/26 BID  PRN hydralazine for BP >160  Encourage OOB to chair and ambulation with PT  Encourage deep breathing exercised and coughing  Chest PT and IS use with bedside nurse   Nephrology following. Likely baseline SCr between 1.3-1.6.   Heart failure team following for chronic systolic heart failure.    Plan to be discussed with CT Surgery attendings during AM rounds.

## 2023-03-18 NOTE — PROGRESS NOTE ADULT - PROVIDER SPECIALTY LIST ADULT
Critical Care
Vascular Surgery
Anesthesia
Critical Care
Intervent Cardiology
Vascular Surgery
Critical Care
Gastroenterology
Intervent Cardiology
Vascular Surgery
Nephrology
Nephrology
Vascular Surgery
CT Surgery
CT Surgery
Heart Failure
CT Surgery
Cardiology
CT Surgery
CT Surgery
Heart Failure
CT Surgery
Heart Failure
CT Surgery
Heart Failure
CT Surgery

## 2023-03-18 NOTE — DISCHARGE NOTE NURSING/CASE MANAGEMENT/SOCIAL WORK - PATIENT PORTAL LINK FT
You can access the FollowMyHealth Patient Portal offered by [facility] by registering at the following website: [email or web address]. By joining [facility]’s FollowMyHealth portal, you will also be able to view your health information using other applications (apps) compatible with our system.

## 2023-03-18 NOTE — PROGRESS NOTE ADULT - PROBLEM SELECTOR PLAN 7
Continue GI ppx with Protonix and Senna.  DVT ppx with SCD boots and HSQ  Postop abx for surgical wound ppx complete
Continue GI ppx with Protonix and Senna.  DVT ppx with SCD boots and HSQ  Postop abx for surgical wound ppx complete
Continue GI ppx with Protonix and Senna.  DVT ppx with SCD boots   Postop abx for surgical wound ppx
Continue GI ppx with Protonix and Senna.  DVT ppx with SCD boots and HSQ  Postop abx for surgical wound ppx complete
Continue GI ppx with Protonix and Senna.  DVT ppx with SCD boots   Postop abx for surgical wound ppx
Continue GI ppx with Protonix and Senna.  DVT ppx with SCD boots   Postop abx for surgical wound ppx
Continue GI ppx with Protonix and Senna.  DVT ppx with SCD boots and HSQ  Postop abx for surgical wound ppx complete
Continue GI ppx with Protonix and Senna.  DVT ppx with SCD boots and heparin drip
Continue GI ppx with Protonix and Senna.  DVT ppx with SCD boots and HSQ  Postop abx for surgical wound ppx
Continue GI ppx with Protonix and Senna.  DVT ppx with SCD boots   Postop abx for surgical wound ppx

## 2023-03-18 NOTE — PROGRESS NOTE ADULT - PROBLEM SELECTOR PROBLEM 6
Thrombophlebitis arm
Radial artery thrombosis
Thrombophlebitis arm
Radial artery thrombosis
Thrombophlebitis arm

## 2023-03-28 ENCOUNTER — APPOINTMENT (OUTPATIENT)
Dept: VASCULAR SURGERY | Facility: CLINIC | Age: 48
End: 2023-03-28
Payer: MEDICAID

## 2023-03-28 ENCOUNTER — APPOINTMENT (OUTPATIENT)
Dept: TRAUMA SURGERY | Facility: CLINIC | Age: 48
End: 2023-03-28

## 2023-03-28 VITALS
SYSTOLIC BLOOD PRESSURE: 135 MMHG | WEIGHT: 177 LBS | BODY MASS INDEX: 31.76 KG/M2 | OXYGEN SATURATION: 97 % | RESPIRATION RATE: 16 BRPM | HEART RATE: 76 BPM | DIASTOLIC BLOOD PRESSURE: 82 MMHG | TEMPERATURE: 97 F | HEIGHT: 62.5 IN

## 2023-03-28 PROCEDURE — 99024 POSTOP FOLLOW-UP VISIT: CPT

## 2023-03-28 NOTE — PHYSICAL EXAM
[de-identified] : NAD. well appearing  [FreeTextEntry1] : abdominal incision closed and healed well.

## 2023-03-28 NOTE — ASSESSMENT
[FreeTextEntry1] : 48 yo male is with type B aortic dissection. Pt is s/p bypass graft from iliac artery to SMA and celiac artery via midline laparotomy. Incision closed and healed well. \par \par Pt counseled on surgical procedure performed\par Continue all medications as prescribed\par Continue to closely monitor BP\par Plan for TEAVR and carotid subclavian bypass with Dr. Lluvia Mandel. \par \par A total of 20 minutes was spent with patient and coordinating care.\par

## 2023-03-28 NOTE — HISTORY OF PRESENT ILLNESS
[FreeTextEntry1] : 3/1/23 H&P from [facility]: 42M, Lithuanian speaking poor historian, with PMH HTN (uncontrolled), Pulmonary Interstitial Disease, CAD w/ stents (2021 in [place]), CKD (unknown baseline), was sent to [place] ED 2/28/23 from [facility] where he had gone for a physical exam and to get his medications filled. He obtains his Nifedipine from his home country of [place], however has been unable to obtain recent doses so he has been taking half his medication daily. Of note, HIE shows recent admission to [facility] for photophobia where he was also found to have . 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 He was transferred to [facility] CTICU for further management.\par  \par At time of admission, his SBP was 180s and he was started on Esmolol and Nicardipine. Patient denies acute pain with radiating or aggravating factors. He does admit to occasional headaches and dizziness.\par \par 3/28/23: 46 yo male is s/p bypass graft from iliac artery to SMA and celiac artery via midline laparotomy. Pt is doing well since discharge. Denies any post prandial pain. Pt states bowel movements are regular. \par \par \par PSHx: \par 3/9/23 bypass graft from iliac artery to SMA and celiac artery via midline laparotomy \par

## 2023-03-31 ENCOUNTER — APPOINTMENT (OUTPATIENT)
Dept: CARDIOTHORACIC SURGERY | Facility: CLINIC | Age: 48
End: 2023-03-31
Payer: MEDICAID

## 2023-03-31 VITALS
WEIGHT: 176 LBS | TEMPERATURE: 98.3 F | HEIGHT: 62 IN | HEART RATE: 70 BPM | OXYGEN SATURATION: 97 % | SYSTOLIC BLOOD PRESSURE: 147 MMHG | RESPIRATION RATE: 15 BRPM | BODY MASS INDEX: 32.39 KG/M2 | DIASTOLIC BLOOD PRESSURE: 93 MMHG

## 2023-03-31 DIAGNOSIS — Z95.5 PRESENCE OF CORONARY ANGIOPLASTY IMPLANT AND GRAFT: ICD-10-CM

## 2023-03-31 DIAGNOSIS — Z82.49 FAMILY HISTORY OF ISCHEMIC HEART DISEASE AND OTHER DISEASES OF THE CIRCULATORY SYSTEM: ICD-10-CM

## 2023-03-31 DIAGNOSIS — Z78.9 OTHER SPECIFIED HEALTH STATUS: ICD-10-CM

## 2023-03-31 DIAGNOSIS — J84.9 INTERSTITIAL PULMONARY DISEASE, UNSPECIFIED: ICD-10-CM

## 2023-03-31 DIAGNOSIS — I10 ESSENTIAL (PRIMARY) HYPERTENSION: ICD-10-CM

## 2023-03-31 PROCEDURE — 99024 POSTOP FOLLOW-UP VISIT: CPT

## 2023-03-31 NOTE — PHYSICAL EXAM
[] : no respiratory distress [Exaggerated Use Of Accessory Muscles For Inspiration] : no accessory muscle use [Apical Impulse] : the apical impulse was normal [Heart Sounds] : normal S1 and S2 [Clean] : clean [Dry] : dry [Healing Well] : healing well [FreeTextEntry1] : mid abdominal

## 2023-03-31 NOTE — ASSESSMENT
[FreeTextEntry1] : Mr. Nazario Wetzel is a 47 year old male s/p bypass graft from the iliac artery to the superior mesenteric artery and celiac artery on 3/09/2023 with Dr. Heaton. He has a medical history of uncontrolled hypertension diagnosed five years ago, interstitial lung disease, coronary artery disease with stents (done in 2021 in ), and chronic kidney disease of unknown baseline.\par \par  He presented to [place] ED on 2/28/23 from [place] [facility] for a routine physical exam and to refill his medications, where he was found to be in hypertensive urgency with a systolic blood pressure of 200. He had not seen a doctor for over three years and was last seen in [place] ED in 2018 for hypertensive urgency. A CTA of the chest, abdomen, and pelvis showed a Type B dissection, and he was transferred to [facility] CTICU for further management. His inpatient course was significant for RHC/LHC, initially cancelled on 3/2 due to worsening renal function, but later resolved. 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 The vascular surgery team recommended no intervention. The Heart Failure team followed up on Mr. Wetzel's depressed LV function (EF 40-45%, improved from 20-25%) and difficulty controlling his blood pressure, which is now optimized. He developed ileus postoperatively, which has since resolved, and he is planned for outpatient follow-up for staged TEVAR procedure.\par  \par \par PLAN:\par CTA chest abdomen and pelvis \par Return in 1-2 week after CTA to review images and discuss surgical plan \par Dr. Lluvia Wilks Vascular Surgery\par Dr. Kuldeep Yuen cardiovascular disease, internal medicine\par return after can for follow up visit \par -plan  for TEVAR procedure after CTA imaging gets performed \par  Continue current medication regimen\par Follow up with cardiologist\par May return on as needed basis\par  SBE antibiotic prophylaxis discussed at length \par \par "IRosalba, am scribing for and the presence of Dr. Heaton the following sections HISTORY OF PRESENT ILLNESS, PAST MEDICAL/FAMILY/SOCIAL HISTORY; REVIEW OF SYSTEMS; VITAL SIGNS; PHYSICAL EXAM; DISPOSITION."\par \par "I personally performed the services described in the documentation, review the documentation recorded by the scribe in my presence and it accurately and completely records my words and actions."\par

## 2023-03-31 NOTE — CONSULT LETTER
[Dear  ___] : Dear  [unfilled], [Courtesy Letter:] : I had the pleasure of seeing your patient, [unfilled], in my office today. [Please see my note below.] : Please see my note below. [Referral Closing:] : Thank you very much for seeing this patient.  If you have any questions, please do not hesitate to contact me. [Sincerely,] : Sincerely, [FreeTextEntry2] : Ministerio [FreeTextEntry3] : Po Heaton MD\par Chief of Cardiovascular & Thoracic Surgery\par System Director of Endovascular & Cardiovascular Surgery\par \par Cardiovascular & Thoracic Surgery\par [facility] [employer]\par \par [facility] \par [address] \[place] [address]\par Tel   [phone]\par Fax  [phone]\par

## 2023-03-31 NOTE — REASON FOR VISIT
[de-identified] : bypass graft from iliac artery to SMA and celiac artery \par via midline laparotomy \par  [de-identified] : 03/09/23

## 2023-04-03 DIAGNOSIS — I80.9 PHLEBITIS AND THROMBOPHLEBITIS OF UNSPECIFIED SITE: ICD-10-CM

## 2023-04-03 DIAGNOSIS — I82.409 ACUTE EMBOLISM AND THROMBOSIS OF UNSPECIFIED DEEP VEINS OF UNSPECIFIED LOWER EXTREMITY: ICD-10-CM

## 2023-04-03 DIAGNOSIS — K56.7 ILEUS, UNSPECIFIED: ICD-10-CM

## 2023-04-03 RX ORDER — LANCETS
EACH MISCELLANEOUS
Refills: 0 | Status: DISCONTINUED | COMMUNITY
End: 2023-04-03

## 2023-04-03 RX ORDER — NIFEDIPINE 60 MG
60 TABLET, EXTENDED RELEASE ORAL
Refills: 0 | Status: DISCONTINUED | COMMUNITY
End: 2023-04-03

## 2023-04-05 ENCOUNTER — NON-APPOINTMENT (OUTPATIENT)
Age: 48
End: 2023-04-05

## 2023-04-05 ENCOUNTER — APPOINTMENT (OUTPATIENT)
Dept: HEART FAILURE | Facility: CLINIC | Age: 48
End: 2023-04-05
Payer: MEDICAID

## 2023-04-05 VITALS
OXYGEN SATURATION: 98 % | DIASTOLIC BLOOD PRESSURE: 74 MMHG | SYSTOLIC BLOOD PRESSURE: 122 MMHG | WEIGHT: 178 LBS | HEIGHT: 62 IN | TEMPERATURE: 99.2 F | HEART RATE: 70 BPM | BODY MASS INDEX: 32.76 KG/M2

## 2023-04-05 PROCEDURE — 99214 OFFICE O/P EST MOD 30 MIN: CPT | Mod: 25

## 2023-04-05 PROCEDURE — 93000 ELECTROCARDIOGRAM COMPLETE: CPT

## 2023-04-05 RX ORDER — POTASSIUM CHLORIDE 1500 MG/1
20 TABLET, FILM COATED, EXTENDED RELEASE ORAL
Refills: 0 | Status: DISCONTINUED | COMMUNITY
Start: 2023-04-03 | End: 2023-04-05

## 2023-04-13 NOTE — CARDIOLOGY SUMMARY
[de-identified] : \par 5/5/23: SR, LAD, LVH, PAC [de-identified] : \par TTE 3/10/23: LVEF 40-45%, LVIDD 6.06, IVSD 1.29, relative wall thickness 0.46, mild LVH, normal RV size/function, mild TR.\par \par Echo 2/28/23: LVEF 20-25%, LVEDD 6./23cm, LVIDs 5.59cm, TAPSE 2.54cm, mild to mod MR, trivial TR, RV normal function. Post-Care Instructions: I reviewed with the patient in detail post-care instructions. Patient is to wear sunprotection, and avoid picking at any of the treated lesions. Pt may apply Vaseline to crusted or scabbing areas. Number Of Freeze-Thaw Cycles: 1 freeze-thaw cycle Consent: The patient's vebal consent was obtained including but not limited to risks of crusting, scabbing, blistering, scarring, darker or lighter pigmentary change, recurrence, incomplete removal and infection. Duration Of Freeze Thaw-Cycle (Seconds): 5 Detail Level: Zone Render Post-Care Instructions In Note?: no Total Number Of Aks Treated: 3

## 2023-04-13 NOTE — DISCUSSION/SUMMARY
[EKG obtained to assist in diagnosis and management of assessed problem(s)] : EKG obtained to assist in diagnosis and management of assessed problem(s) [Patient] : the patient [___ Week(s)] : in [unfilled] week(s) [With ___] : with [unfilled] [FreeTextEntry1] : Dilated cardiomyopathy LVEF 40-45% from 20-25%\par -Etiology: NICMP (LHC 3/3 revealed normal coronaries and no prior stents). Likely due to hypertensive heart disease with increased wall thickness on echo. Can consider genetic testing at return visit.\par -RHC 3/3 showed LVEDP 19, PAP 40/24/31. \par -Clinically euvolemic on exam today\par -Diuretics: C/w Lasix 40mg PO daily for now\par -GDMT: Start Spironolactone 25mg daily and stop potassium supplementation. Continue Coreg 25mg BID and Entresto 24-26mg BID. \par -Repeat labwork next week at [facility] (Rx provided).\par -Will need to also establish care with General Cardiology.\par \par Type B Dissection of Aorta \par -S/p iliac to celiac SMA bypass 3/9 by CTS and vascular surgery teams. \par -Plan for CTA then eventual staged TEVAR and carotid bypass.\par -F/u with Dr. Heaton\par \par HTN \par -Resistant HTN during recent admission now improved. Continue GDMT as above +Nifedipine. \par -Would benefit from work up for 2ry causes: obtain US renal Doppler (?renal art stenosis), 24h urine fractionated metanephrines/catecholamines.\par -Will obtain sleep study-referred to pulmonary \par \par CKD\par -ROSALINO resolved prior to discharge. Likely secondary to hypoperfusion from dissection vs STEVAN. \par -Baseline sCr 1.3-1.6\par -Continue close monitoring \par \par Radial artery thrombosis\par -F/u with vascular

## 2023-04-13 NOTE — ASSESSMENT
[FreeTextEntry1] : 46 y/o [place] speaking male, [place] from [place], with newly diagnosed dilated cardiomyopathy in setting of hypertensive urgency and Type B aortic dissection s/p bypass grafting (iliac artery to superior mesenteric and celiac arteries 3/9/23) who presents for hospital follow-up.\par \par Clinically he appears stable from a heart failure perspective. Currently endorsing NYHA class II symptoms. His BP has improved. \par \par

## 2023-04-13 NOTE — REASON FOR VISIT
[Cardiac Failure] : cardiac failure [Spouse] : spouse [FreeTextEntry1] : HF: Dr. Chaudhari \par CTS: Dr. Heaton

## 2023-04-13 NOTE — PHYSICAL EXAM
[Well Developed] : well developed [No Acute Distress] : no acute distress [Normal Conjunctiva] : normal conjunctiva [Normal Venous Pressure] : normal venous pressure [Normal S1, S2] : normal S1, S2 [No Murmur] : no murmur [Clear Lung Fields] : clear lung fields [No Respiratory Distress] : no respiratory distress  [Soft] : abdomen soft [No Edema] : no edema [No Cyanosis] : no cyanosis [No Clubbing] : no clubbing [Moves all extremities] : moves all extremities [Alert and Oriented] : alert and oriented [de-identified] : mid-abdominal incision well approximated [de-identified] : warm, dry

## 2023-04-13 NOTE — HISTORY OF PRESENT ILLNESS
[FreeTextEntry1] : 46 y/o [place] speaking [place], [place], with PMH significant for uncontrolled HTN, ILD, CKD and newly diagnosed dilated cardiomyopathy in the setting of hypertensive urgency and type B aortic dissection s/p bypass graft (iliac artery to superior mesenteric and celiac arteries 3/9/23) who presents for hospital follow-up.\par \par On 2/28/23 the patient initially presented to [facility] for routine physical exam and medication refills. Of note, he had not seen a doctor for over three years and [place] He was last treated at [facility] in 2018 for hypertensive urgency. \par At [facility] he was found to be in hypertensive urgency with a systolic blood pressure of 200 and then presented to [place] ED.  A CTA of the chest, abdomen and pelvis showed a Type B dissection and he was transferred to [facility] CTICU for further management. TTE showed newly reduced systolic function LVEF 20-25%. His inpatient course was significant for RHC/LHC, initially cancelled on 3/2 due to worsening renal function, but later resolved. 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 The vascular surgery team recommended no intervention. \par He underwent bypass grafting with Dr. Quintero on 3/9. He developed ileus postoperatively, which since resolved, and he is planned for outpatient follow-up for staged TEVAR procedure. Repeat TTE 3/10 showed improvement in his LV systolic function to 40-45%. He was discharged home 3/18 on Coreg 25mg BID, Entresto 24-26mg BID, Nifedipine 60mg daily and Lasix 40mg daily.\par \par  ID# [id number]\par Today, the patient reports feeling well overall. He has been taking medications as prescribed. Reports weight has remained stable 175-177lbs. Endorses good appetite. Denies headaches, CP, palpitations, SOB, dizziness/LH, edema and orthopnea.

## 2023-04-18 ENCOUNTER — APPOINTMENT (OUTPATIENT)
Dept: CT IMAGING | Facility: CLINIC | Age: 48
End: 2023-04-18

## 2023-04-18 ENCOUNTER — OUTPATIENT (OUTPATIENT)
Dept: OUTPATIENT SERVICES | Facility: HOSPITAL | Age: 48
LOS: 1 days | End: 2023-04-18
Payer: COMMERCIAL

## 2023-04-18 ENCOUNTER — RESULT REVIEW (OUTPATIENT)
Age: 48
End: 2023-04-18

## 2023-04-18 DIAGNOSIS — I71.012 DISSECTION OF DESCENDING THORACIC AORTA: ICD-10-CM

## 2023-04-18 DIAGNOSIS — I10 ESSENTIAL (PRIMARY) HYPERTENSION: ICD-10-CM

## 2023-04-18 PROCEDURE — 74174 CTA ABD&PLVS W/CONTRAST: CPT

## 2023-04-18 PROCEDURE — 71275 CT ANGIOGRAPHY CHEST: CPT

## 2023-04-18 PROCEDURE — 71275 CT ANGIOGRAPHY CHEST: CPT | Mod: 26

## 2023-04-18 PROCEDURE — 74174 CTA ABD&PLVS W/CONTRAST: CPT | Mod: 26

## 2023-04-28 ENCOUNTER — APPOINTMENT (OUTPATIENT)
Dept: CARDIOTHORACIC SURGERY | Facility: CLINIC | Age: 48
End: 2023-04-28
Payer: MEDICAID

## 2023-04-28 VITALS
RESPIRATION RATE: 15 BRPM | WEIGHT: 180 LBS | HEART RATE: 80 BPM | HEIGHT: 62 IN | OXYGEN SATURATION: 99 % | BODY MASS INDEX: 33.13 KG/M2 | DIASTOLIC BLOOD PRESSURE: 80 MMHG | SYSTOLIC BLOOD PRESSURE: 132 MMHG

## 2023-04-28 PROCEDURE — 99212 OFFICE O/P EST SF 10 MIN: CPT

## 2023-04-28 NOTE — HISTORY OF PRESENT ILLNESS
[FreeTextEntry1] : Mr. GORDO PFEIFFER is a 47 year old male referred by   who presents for post operative care and discussion of possible staged procedure for his Type B Dissection. 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 He was admitted to [facility] in March with Hypertensive crisis and transferred to [facility] for Type B Dissection. He was transferred on labetalol and nifedipine gtts and had Cardiac Catheterization performed. This revealed normal coronary arteries. He developed a right radial, cephalic, basilic vein thrombi for which he was on a heparin gtt for and Vascular Consultation was obtained (no intervention). He had Iliac artery to mesenteric artery bypass and iliac artery bypass in preparation for staged placement of a thoracoabdominal endograft via midline laparotomy on March 9th in collaboration with Dr Pablo. He developed a post operative ileus which has resolved. \par \par He presents back to the office today to discuss staged TEVAR. \par \par CT angiogram able to repeat this personally reviewed.\par \par The patient's past medical history, past surgical history, family history, social history, allergies, medications, and multisystem review of systems were individually reviewed with the patient.  There are no pertinent additions or subtractions.  The patient was personally seen and examined.\par \par \par

## 2023-04-28 NOTE — DATA REVIEWED
[FreeTextEntry1] : EXAM: [id number] - CT ANGIO ABD PELV (W)AW IC  - ORDERED BY: PREMA FUENTES\par \par EXAM: [id number] - CT ANGIO CHEST AORTA WAWIC  - ORDERED BY: PREMA FUENTES\par \par \par PROCEDURE DATE:  04/18/2023\par \par \par \par INTERPRETATION:  INDICATION: Type B aortic dissection\par \par Technique: Angiogram of the chest, abdomen and pelvis before and after the intravenous administration of 90  mL of Omnipaque 350. Three-dimensional images were analyzed.\par \par COMPARISON: None\par \par FINDINGS:\par \par ANGIOGRAM: Dissection from the isthmus to suprarenal aorta with partially thrombosed false lumen. Celiac trunk arises from the false lumen. Status post superior mesenteric artery to right common iliac artery bypass. Patent arch vessels.\par \par Aortic measurements (in true short axis):\par Sinuses of Valsalva: Obscured by motion, approximately 3.3 cm (largest cusp to commissure)\par Sinotubular junction: 2.7 cm\par Ascending aorta at the level of the right pulmonary artery: 3.7 cm\par Mid arch: 2.6 cm isthmus (start of dissection) 6.5 cm\par Descending aorta at the level of the left pulmonary artery: 6.1 cm\par Descending aorta at the level of the left inferior pulmonary vein: 4.5 cm\par At the hiatus: 3.6 cm\par At the level of the renal arteries: 1.9 cm\par Infrarenal aorta: 1.8 cm\par \par Enlarged heart. No pericardial effusion.\par \par \par \par CHEST:\par \par LUNGS/AIRWAYS/PLEURA: Patent trachea and bronchi. Mild passive atelectasis in the left lower lobe. No pleural effusion.\par \par LYMPH NODES/MEDIASTINUM: No lymphadenopathy.\par \par \par \par \par ABDOMEN/PELVIS:\par \par LIVER: Unremarkable.\par \par BILIARY SYSTEM: Unremarkable.\par \par SPLEEN:Unremarkable.\par \par PANCREAS: Unremarkable.\par \par ADRENALS: Unremarkable.\par \par KIDNEYS/URINARY TRACT: Symmetric size and enhancement of both kidneys. No hydronephrosis. Collapsed bladder limiting evaluation.\par \par GASTROINTESTINAL TRACT: No obstruction. Wall thickening of the transverse colon likely related to underdistention.\par \par REPRODUCTIVE ORGANS: Unremarkable.\par \par LYMPH NODES/PERITONEUM/RETROPERITONEUM: Postsurgical changes related to mesenteric to right iliac artery bypass. No free fluid or free air.\par \par \par \par BONES/SOFT TISSUES: Postsurgical changes of the anterior abdominal wall.\par \par \par IMPRESSION:\par \par Type B dissection from the aortic isthmus to suprarenal abdominal aorta. Status post mesenteric to right common iliac artery bypass.\par \par Dilated aorta up to 6.5 cm at the isthmus.\par

## 2023-04-28 NOTE — PHYSICAL EXAM
[Sclera] : the sclera and conjunctiva were normal [PERRL With Normal Accommodation] : pupils were equal in size, round, and reactive to light [Extraocular Movements] : extraocular movements were intact [Neck Appearance] : the appearance of the neck was normal [Neck Cervical Mass (___cm)] : no neck mass was observed [Jugular Venous Distention Increased] : there was no jugular-venous distention [Thyroid Nodule] : there were no palpable thyroid nodules [Thyroid Diffuse Enlargement] : the thyroid was not enlarged [Auscultation Breath Sounds / Voice Sounds] : lungs were clear to auscultation bilaterally [Heart Rate And Rhythm] : heart rate was normal and rhythm regular [Heart Sounds Gallop] : no gallops [Heart Sounds] : normal S1 and S2 [Murmurs] : no murmurs [Heart Sounds Pericardial Friction Rub] : no pericardial rub [Examination Of The Chest] : the chest was normal in appearance [Chest Visual Inspection Thoracic Asymmetry] : no chest asymmetry [Diminished Respiratory Excursion] : normal chest expansion [Right Carotid Bruit] : no bruit heard over the right carotid [Left Carotid Bruit] : no bruit heard over the left carotid [Right Femoral Bruit] : no bruit heard over the right femoral artery [Left Femoral Bruit] : no bruit heard over the left femoral artery [2+] : left 2+ [No Abnormalities] : the abdominal aorta was not enlarged and no bruit was heard [Bowel Sounds] : normal bowel sounds [Abdomen Soft] : soft [Abdomen Tenderness] : non-tender [] : no hepato-splenomegaly [Abdomen Mass (___ Cm)] : no abdominal mass palpated [Abnormal Walk] : normal gait [Nail Clubbing] : no clubbing  or cyanosis of the fingernails [Musculoskeletal - Swelling] : no joint swelling seen [Motor Tone] : muscle strength and tone were normal

## 2023-04-28 NOTE — ASSESSMENT
[FreeTextEntry1] : The patient appears well in the office today.  He denies abdominal pain postprandial.  CT scan from April 18 was reviewed.  The mesenteric bypass is patent.  However, I must be honest that I am having difficult time tracing each limb directly to the SMA and celiac territories.  I have therefore asked Dr. Johnson to assist and reviewed the films.\par \par Furthermore I discussed the case with Dr. Mandel.  We will schedule patient for aortic angiogram to confirm mesenteric bypass patency, followed by occlusion of the ostium of both the celiac and superior mesenteric arteries.  This needs to be performed in preparation for TEVAR to avoid large type II endoleak's.\par \par On the same admission, a left carotid subclavian bypass will be performed.  We will then observe the patient as an outpatient to ensure that he does not sustain mesenteric ischemia, and then bring him back for thoracic endograft.  This was explained in detail and he understands.\par \par I would like to thank you for referring this patient to my attention and for allowing me to participate in his care.  If there are any further questions, or I can be of any further assistance, please do not hesitate contacting me at any time.

## 2023-05-03 ENCOUNTER — NON-APPOINTMENT (OUTPATIENT)
Age: 48
End: 2023-05-03

## 2023-05-05 LAB
ALBUMIN SERPL ELPH-MCNC: 5 G/DL
ALP BLD-CCNC: 132 U/L
ALT SERPL-CCNC: 24 U/L
ANION GAP SERPL CALC-SCNC: 17 MMOL/L
AST SERPL-CCNC: 18 U/L
BILIRUB SERPL-MCNC: 0.4 MG/DL
BUN SERPL-MCNC: 26 MG/DL
CALCIUM SERPL-MCNC: 10.3 MG/DL
CHLORIDE SERPL-SCNC: 100 MMOL/L
CO2 SERPL-SCNC: 22 MMOL/L
CREAT SERPL-MCNC: 1.6 MG/DL
EGFR: 54 ML/MIN/1.73M2
GLUCOSE SERPL-MCNC: 119 MG/DL
MAGNESIUM SERPL-MCNC: 2 MG/DL
NT-PROBNP SERPL-MCNC: 99 PG/ML
POTASSIUM SERPL-SCNC: 4.6 MMOL/L
PROT SERPL-MCNC: 8.7 G/DL
SODIUM SERPL-SCNC: 139 MMOL/L

## 2023-05-08 ENCOUNTER — APPOINTMENT (OUTPATIENT)
Dept: HEART FAILURE | Facility: CLINIC | Age: 48
End: 2023-05-08
Payer: MEDICAID

## 2023-05-08 VITALS
OXYGEN SATURATION: 97 % | SYSTOLIC BLOOD PRESSURE: 116 MMHG | DIASTOLIC BLOOD PRESSURE: 78 MMHG | BODY MASS INDEX: 33.49 KG/M2 | WEIGHT: 182 LBS | HEIGHT: 62 IN | HEART RATE: 78 BPM

## 2023-05-08 DIAGNOSIS — Z86.79 PERSONAL HISTORY OF OTHER DISEASES OF THE CIRCULATORY SYSTEM: ICD-10-CM

## 2023-05-08 DIAGNOSIS — I71.20 THORACIC AORTIC ANEURYSM, WITHOUT RUPTURE, UNSPECIFIED: ICD-10-CM

## 2023-05-08 PROCEDURE — 99214 OFFICE O/P EST MOD 30 MIN: CPT

## 2023-05-08 RX ORDER — FUROSEMIDE 40 MG/1
40 TABLET ORAL
Qty: 30 | Refills: 1 | Status: DISCONTINUED | COMMUNITY
Start: 1900-01-01 | End: 2023-05-08

## 2023-05-08 RX ORDER — SIMETHICONE 125 MG
125 CAPSULE ORAL
Refills: 0 | Status: DISCONTINUED | COMMUNITY
End: 2023-05-08

## 2023-05-08 NOTE — PHYSICAL EXAM
[Well Developed] : well developed [No Acute Distress] : no acute distress [Normal Conjunctiva] : normal conjunctiva [Normal Venous Pressure] : normal venous pressure [Normal S1, S2] : normal S1, S2 [No Murmur] : no murmur [Clear Lung Fields] : clear lung fields [No Respiratory Distress] : no respiratory distress  [Soft] : abdomen soft [No Edema] : no edema [No Cyanosis] : no cyanosis [No Clubbing] : no clubbing [Moves all extremities] : moves all extremities [Alert and Oriented] : alert and oriented [de-identified] : mid-abdominal incision well approximated [de-identified] : warm, dry

## 2023-05-08 NOTE — HISTORY OF PRESENT ILLNESS
[FreeTextEntry1] : 46 y/o Divehi speaking [place], [place], with PMH significant for uncontrolled HTN, ILD, CKD and newly diagnosed dilated cardiomyopathy in the setting of hypertensive urgency and type B aortic dissection s/p bypass graft (iliac artery to superior mesenteric and celiac arteries 3/9/23) who presents for hospital follow-up.\par \par On 2/28/23 the patient initially presented to [facility] for routine physical exam and medication refills. Of note, he had not seen a doctor for over three years and [place] He was last treated at [facility] in 2018 for hypertensive urgency.  At [facility] he was found to be in hypertensive urgency with a systolic blood pressure of 200 and then presented to [place] ED.  A CTA of the chest, abdomen and pelvis showed a Type B dissection and he was transferred to [facility] CTICU for further management. TTE showed newly reduced systolic function LVEF 20-25%. His inpatient course was significant for RHC/LHC, initially cancelled on 3/2 due to worsening renal function, but later resolved. 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 The vascular surgery team recommended no intervention. \par \par He underwent bypass grafting with Dr. Quintero on 3/9. He developed ileus postoperatively, which since resolved, and he is planned for outpatient follow-up for staged TEVAR procedure. Repeat TTE 3/10 showed improvement in his LV systolic function to 40-45%. He was discharged home 3/18 on Coreg 25mg BID, Entresto 24-26mg BID, Nifedipine 60mg daily and Lasix 40mg daily.\par \par He was last seen by PETEY Munoz on 4/5/23.  At that visit, he was instructed to stop his potassium supplements and start on spironolactone 25mg daily.  Follow up blood work on 5/4/23 showed K of 4.6 with Cr 1.60 (similar to pre spirolactone labs). \par \par Today, he feels well. No difficulties with doing his usual daily activities.  He is able to sleep flat at nigh on 1 pillowt.  He has been taking medications as prescribed. He takes his BP at home prior to taking AM meds average is 140/80.  This AM BP reading was 148/97.  Reports weight has remained stable 175-177lbs. Endorses good appetite. Denies headaches, CP, palpitations, SOB, dizziness/LH, edema and orthopnea.

## 2023-05-08 NOTE — ASSESSMENT
[FreeTextEntry1] : 46 y/o Mozambican speaking male, originally from the  [place] ~ [place], with newly diagnosed dilated cardiomyopathy in setting of hypertensive urgency and Type B aortic dissection s/p bypass grafting (iliac artery to superior mesenteric and celiac arteries 3/9/23) who presents for HF follow up.\par \par He has ACC/AHA stage C CM with NYHA class II symptoms.\par \par

## 2023-05-08 NOTE — DISCUSSION/SUMMARY
[Patient] : the patient [___ Week(s)] : in [unfilled] week(s) [With ___] : with [unfilled] [FreeTextEntry1] : # Dilated cardiomyopathy LVEF 40-45% from 20-25%\par -Etiology: NICMP (LHC 3/3 revealed normal coronaries and no prior stents). Likely due to hypertensive heart disease with increased wall thickness on echo. Can consider genetic testing at return visit.\par -RHC 3/3 showed LVEDP 19, PAP 40/24/31. \par -Clinically euvolemic on exam today\par -GDMT: on spironolactone 25mg daily, coreg 25mg BID, Entresto 24-26mg BID and nifedipine 60mg daily. Increase Entresto to 49-51mg BID and decrease nifedipine to 30mg daily.  \par - Diuretics: stop lasix.\par -Repeat labwork ordered.\par \par # Type B Dissection of Aorta \par -S/p iliac to celiac SMA bypass 3/9 by CTS and vascular surgery teams. \par -Plan for staged TEVAR and carotid bypass.\par -F/u with Dr. Heaton\par \par # HTN \par -Resistant HTN during recent admission now improved. Continue GDMT as above +Nifedipine. \par -Would benefit from work up for secondary hypertension.  \par - Blood work ordered - spot maria e/renin ratio, plasma metanephrines, TSH, AM free cortisol.  Will hold off on 24 hour urine collections unless spot testing elevated.\par - obtain US renal Doppler (?renal art stenosis)\par - will need future referral for sleep study- will address at next visit. \par \par # CKD\par -Baseline sCr 1.3-1.6\par -Continue close monitoring \par \par # Radial artery thrombosis\par -F/u with vascular \par \par F/U with in 2 months after surgery.

## 2023-05-08 NOTE — CARDIOLOGY SUMMARY
[de-identified] : \par 5/5/23: SR, LAD, LVH, PAC [de-identified] : \par TTE 3/10/23: LVEF 40-45%, LVIDD 6.06, IVSD 1.29, relative wall thickness 0.46, mild LVH, normal RV size/function, mild TR.\par \par Echo 2/28/23: LVEF 20-25%, LVEDD 6./23cm, LVIDs 5.59cm, TAPSE 2.54cm, mild to mod MR, trivial TR, RV normal function. [de-identified] : \par LHC 3/3/23: no CAD.\par RHC 3/3/23: RA 22, RV 42/14, PA 40/24 (31), PCWP 17, CO 5.9/CI 3.09, PA sat 72.1%, Hb 13.4.

## 2023-05-17 ENCOUNTER — APPOINTMENT (OUTPATIENT)
Dept: ULTRASOUND IMAGING | Facility: CLINIC | Age: 48
End: 2023-05-17

## 2023-05-17 ENCOUNTER — OUTPATIENT (OUTPATIENT)
Dept: OUTPATIENT SERVICES | Facility: HOSPITAL | Age: 48
LOS: 1 days | End: 2023-05-17
Payer: MEDICAID

## 2023-05-17 PROCEDURE — 93975 VASCULAR STUDY: CPT | Mod: 26

## 2023-05-19 ENCOUNTER — NON-APPOINTMENT (OUTPATIENT)
Age: 48
End: 2023-05-19

## 2023-05-19 ENCOUNTER — OUTPATIENT (OUTPATIENT)
Dept: OUTPATIENT SERVICES | Facility: HOSPITAL | Age: 48
LOS: 1 days | End: 2023-05-19
Payer: COMMERCIAL

## 2023-05-19 VITALS
SYSTOLIC BLOOD PRESSURE: 140 MMHG | HEART RATE: 70 BPM | OXYGEN SATURATION: 98 % | TEMPERATURE: 97 F | WEIGHT: 186.73 LBS | DIASTOLIC BLOOD PRESSURE: 90 MMHG | RESPIRATION RATE: 16 BRPM | HEIGHT: 62 IN

## 2023-05-19 DIAGNOSIS — Z13.89 ENCOUNTER FOR SCREENING FOR OTHER DISORDER: ICD-10-CM

## 2023-05-19 DIAGNOSIS — I10 ESSENTIAL (PRIMARY) HYPERTENSION: ICD-10-CM

## 2023-05-19 DIAGNOSIS — Z95.828 PRESENCE OF OTHER VASCULAR IMPLANTS AND GRAFTS: Chronic | ICD-10-CM

## 2023-05-19 DIAGNOSIS — Z01.818 ENCOUNTER FOR OTHER PREPROCEDURAL EXAMINATION: ICD-10-CM

## 2023-05-19 DIAGNOSIS — Z98.890 OTHER SPECIFIED POSTPROCEDURAL STATES: Chronic | ICD-10-CM

## 2023-05-19 DIAGNOSIS — Z95.5 PRESENCE OF CORONARY ANGIOPLASTY IMPLANT AND GRAFT: Chronic | ICD-10-CM

## 2023-05-19 DIAGNOSIS — Z29.9 ENCOUNTER FOR PROPHYLACTIC MEASURES, UNSPECIFIED: ICD-10-CM

## 2023-05-19 DIAGNOSIS — I71.9 AORTIC ANEURYSM OF UNSPECIFIED SITE, WITHOUT RUPTURE: ICD-10-CM

## 2023-05-19 DIAGNOSIS — I25.10 ATHEROSCLEROTIC HEART DISEASE OF NATIVE CORONARY ARTERY WITHOUT ANGINA PECTORIS: ICD-10-CM

## 2023-05-19 LAB
A1C WITH ESTIMATED AVERAGE GLUCOSE RESULT: 5.9 % — HIGH (ref 4–5.6)
ANION GAP SERPL CALC-SCNC: 17 MMOL/L — SIGNIFICANT CHANGE UP (ref 5–17)
APTT BLD: 30 SEC — SIGNIFICANT CHANGE UP (ref 27.5–35.5)
BASOPHILS # BLD AUTO: 0.02 K/UL — SIGNIFICANT CHANGE UP (ref 0–0.2)
BASOPHILS NFR BLD AUTO: 0.4 % — SIGNIFICANT CHANGE UP (ref 0–2)
BLD GP AB SCN SERPL QL: SIGNIFICANT CHANGE UP
BUN SERPL-MCNC: 26.3 MG/DL — HIGH (ref 8–20)
CALCIUM SERPL-MCNC: 9.8 MG/DL — SIGNIFICANT CHANGE UP (ref 8.4–10.5)
CHLORIDE SERPL-SCNC: 101 MMOL/L — SIGNIFICANT CHANGE UP (ref 96–108)
CO2 SERPL-SCNC: 21 MMOL/L — LOW (ref 22–29)
CREAT SERPL-MCNC: 1.43 MG/DL — HIGH (ref 0.5–1.3)
EGFR: 62 ML/MIN/1.73M2 — SIGNIFICANT CHANGE UP
EOSINOPHIL # BLD AUTO: 0.11 K/UL — SIGNIFICANT CHANGE UP (ref 0–0.5)
EOSINOPHIL NFR BLD AUTO: 2.2 % — SIGNIFICANT CHANGE UP (ref 0–6)
ESTIMATED AVERAGE GLUCOSE: 123 MG/DL — HIGH (ref 68–114)
GLUCOSE SERPL-MCNC: 109 MG/DL — HIGH (ref 70–99)
HCT VFR BLD CALC: 44.6 % — SIGNIFICANT CHANGE UP (ref 39–50)
HGB BLD-MCNC: 13.9 G/DL — SIGNIFICANT CHANGE UP (ref 13–17)
IMM GRANULOCYTES NFR BLD AUTO: 0.2 % — SIGNIFICANT CHANGE UP (ref 0–0.9)
INR BLD: 1.01 RATIO — SIGNIFICANT CHANGE UP (ref 0.88–1.16)
LYMPHOCYTES # BLD AUTO: 1.69 K/UL — SIGNIFICANT CHANGE UP (ref 1–3.3)
LYMPHOCYTES # BLD AUTO: 34.2 % — SIGNIFICANT CHANGE UP (ref 13–44)
MAGNESIUM SERPL-MCNC: 1.9 MG/DL — SIGNIFICANT CHANGE UP (ref 1.6–2.6)
MCHC RBC-ENTMCNC: 26.1 PG — LOW (ref 27–34)
MCHC RBC-ENTMCNC: 31.2 GM/DL — LOW (ref 32–36)
MCV RBC AUTO: 83.7 FL — SIGNIFICANT CHANGE UP (ref 80–100)
MONOCYTES # BLD AUTO: 0.56 K/UL — SIGNIFICANT CHANGE UP (ref 0–0.9)
MONOCYTES NFR BLD AUTO: 11.3 % — SIGNIFICANT CHANGE UP (ref 2–14)
NEUTROPHILS # BLD AUTO: 2.55 K/UL — SIGNIFICANT CHANGE UP (ref 1.8–7.4)
NEUTROPHILS NFR BLD AUTO: 51.7 % — SIGNIFICANT CHANGE UP (ref 43–77)
PLATELET # BLD AUTO: 198 K/UL — SIGNIFICANT CHANGE UP (ref 150–400)
POTASSIUM SERPL-MCNC: 4.5 MMOL/L — SIGNIFICANT CHANGE UP (ref 3.5–5.3)
POTASSIUM SERPL-SCNC: 4.5 MMOL/L — SIGNIFICANT CHANGE UP (ref 3.5–5.3)
PROTHROM AB SERPL-ACNC: 11.7 SEC — SIGNIFICANT CHANGE UP (ref 10.5–13.4)
RBC # BLD: 5.33 M/UL — SIGNIFICANT CHANGE UP (ref 4.2–5.8)
RBC # FLD: 14.6 % — HIGH (ref 10.3–14.5)
SODIUM SERPL-SCNC: 139 MMOL/L — SIGNIFICANT CHANGE UP (ref 135–145)
WBC # BLD: 4.94 K/UL — SIGNIFICANT CHANGE UP (ref 3.8–10.5)
WBC # FLD AUTO: 4.94 K/UL — SIGNIFICANT CHANGE UP (ref 3.8–10.5)

## 2023-05-19 PROCEDURE — 93010 ELECTROCARDIOGRAM REPORT: CPT

## 2023-05-19 PROCEDURE — G0463: CPT

## 2023-05-19 PROCEDURE — 93005 ELECTROCARDIOGRAM TRACING: CPT

## 2023-05-19 NOTE — H&P PST ADULT - NSICDXPASTSURGICALHX_GEN_ALL_CORE_FT
PAST SURGICAL HISTORY:  H/O heart artery stent     Presence of bypass graft stent      PAST SURGICAL HISTORY:  H/O heart artery stent     Presence of bypass graft stent     S/P laparotomy

## 2023-05-19 NOTE — H&P PST ADULT - HISTORY OF PRESENT ILLNESS
48 y/o Slovak speaking [place], [place] , with PMH significant for uncontrolled HTN, ILD, CKD and newly diagnosed dilated cardiomyopathy in the setting of hypertensive urgency and type B aortic dissection s/p bypass graft (iliac artery to superior mesenteric and celiac arteries 3/9/23) who presents for hospital follow-up.    On 23 the patient initially presented to [facility] for routine physical exam and medication refills. Of note, he had not seen a doctor for over three years and was [place] He was last treated at [facility] in 2018 for hypertensive urgency. At [facility] he was found to be in hypertensive urgency with a systolic blood pressure of 200 and then presented to [place] ED. A CTA of the chest, abdomen and pelvis showed a Type B dissection and he was transferred to [facility] CTICU for further management. TTE showed newly reduced systolic function LVEF 20-25%. His inpatient course was significant for RHC/LHC, initially cancelled on 3/2 due to worsening renal function, but later resolved. 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 The vascular surgery team recommended no intervention.     He underwent bypass grafting with Dr. Quintero on 3/9. He developed ileus postoperatively, which since resolved, and he is planned for outpatient follow-up for staged TEVAR procedure. Repeat TTE 3/10 showed improvement in his LV systolic function to 40-45%. He was discharged home 3/18 on Coreg 25mg BID, Entresto 24-26mg BID, Nifedipine 60mg daily and Lasix 40mg daily.    He was last seen by PETEY Munoz on 23. At that visit, he was instructed to stop his potassium supplements and start on spironolactone 25mg daily. Follow up blood work on 23 showed K of 4.6 with Cr 1.60 (similar to pre spirolactone labs).     Today, he feels well. No difficulties with doing his usual daily activities. He is able to sleep flat at nigh on 1 pillowt. He has been taking medications as prescribed. He takes his BP at home prior to taking AM meds average is 140/80. This AM BP reading was 148/97. Reports weight has remained stable 175-177lbs. Endorses good appetite. Denies headaches, CP, palpitations, SOB, dizziness/LH, edema and orthopnea.      Cardiology Summary    EC/5/23: SR, LAD, LVH, PAC   Echo:   TTE 3/10/23: LVEF 40-45%, LVIDD 6.06, IVSD 1.29, relative wall thickness 0.46, mild LVH, normal RV size/function, mild TR.    Echo 23: LVEF 20-25%, LVEDD 6./23cm, LVIDs 5.59cm, TAPSE 2.54cm, mild to mod MR, trivial TR, RV normal function.   Cardiac Cath/PCI:   LHC 3/3/23: no CAD.  RHC 3/3/23: RA 22, RV 42/14, PA 40/24 (31), PCWP 17, CO 5.9/CI 3.09, PA sat 72.1%, Hb 13.4.  3/1/23 H&P from [facility]: 42M, French speaking poor historian, with PMH HTN (uncontrolled), Pulmonary Interstitial Disease, CAD w/ stents (2021 in [place]), CKD (unknown baseline), was sent to [place] ED 2/28/23 from [facility] where he had gone for a physical exam and to get his medications filled. He obtains his Nifedipine from his home country of [place], however has been unable to obtain recent doses so he has been taking half his medication daily. Of note, HIE shows recent admission to [place] for photophobia where he was also found to have . 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 He was transferred to [facility] CTICU for further management.     At time of admission, his SBP was 180s and he was started on Esmolol and Nicardipine. Patient denies acute pain with radiating or aggravating factors. He does admit to occasional headaches and dizziness.    3/28/23: 48 yo male is s/p bypass graft from iliac artery to SMA and celiac artery via midline laparotomy. Pt is doing well since discharge. Denies any post prandial pain. Pt states bowel movements are regular.       PSHx:   3/9/23 bypass graft from iliac artery to SMA and celiac artery via midline laparotomy     Dissecting aneurysm of thoracic aorta, [place] type B (441.01) (I71.012,I71.23)    48 yo male is with type B aortic dissection. Pt is s/p bypass graft from iliac artery to SMA and celiac artery via midline laparotomy. Incision closed and healed well.     Pt counseled on surgical procedure performed  Continue all medications as prescribed  Continue to closely monitor BP  Plan for TEAVR and carotid subclavian bypass with Dr. Lluvia Mandel.     A total of 20 minutes was spent with patient and coordinating care.   46 y/o male Persian speaking very poor historian, with PMH HTN (uncontrolled), Pulmonary Interstitial Disease, CAD w/ stents (2021 in [place]), CKD (unknown baseline) presents to PST today. Pt. reports hx of aoortic aneurysm since 2/2023. Reports that he went for a routine physical check and was then sent to the hospital Pt was found with a blood prssure of .  subsequent CTA C/A/P showed Type B dissection. Pt. is s/p bypass graft from iliac artery to SMA and celiac artery via midline laparotomy 3/2023. Denies any dizziness, lightheadness, syncope.     3/1/23 H&P from [facility]: 42M, Persian speaking poor historian, with PMH HTN (uncontrolled), Pulmonary Interstitial Disease, CAD w/ stents (2021 in [place]), CKD (unknown baseline), was sent to [place] ED 2/28/23 from [facility] where he had gone for a physical exam and to get his medications filled. He obtains his Nifedipine from his home country of [place], however has been unable to obtain recent doses so he has been taking half his medication daily. Of note, HIE shows recent admission to [place] for photophobia where he was also found to have . 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 He was transferred to [facility] CTICU for further management.     At time of admission, his SBP was 180s and he was started on Esmolol and Nicardipine. Patient denies acute pain with radiating or aggravating factors. He does admit to occasional headaches and dizziness.    3/28/23: 46 yo male is s/p bypass graft from iliac artery to SMA and celiac artery via midline laparotomy. Pt is doing well since discharge. Denies any post prandial pain. Pt states bowel movements are regular.       PSHx:   3/9/23 bypass graft from iliac artery to SMA and celiac artery via midline laparotomy     Dissecting aneurysm of thoracic aorta, [place] type B (441.01) (I71.012,I71.23)    46 yo male is with type B aortic dissection. Pt is s/p bypass graft from iliac artery to SMA and celiac artery via midline laparotomy. Incision closed and healed well.     Pt counseled on surgical procedure performed  Continue all medications as prescribed  Continue to closely monitor BP  Plan for TEAVR and carotid subclavian bypass with Dr. Lluvia Mandel.     A total of 20 minutes was spent with patient and coordinating care.   46 y/o male Icelandic speaking very poor historian, with PMH HTN (uncontrolled managed by cardiologist),CAD w/ stents (2021 in [place]), CKD (unknown baseline) presents to PST today. Pt. reports hx of aoortic aneurysm since 2/2023. Reports that he went for a routine physical exam and was then sent to the hospital. Pt was found with a blood pressure of . Subsequent CTA C/A/P showed Type B dissection. Pt. is s/p bypass graft from iliac artery to SMA and celiac artery via midline laparotomy 3/2023 per MARTINA Bear note. Denies any dizziness, lightheadedness syncope. Scheduled for aortic angiogram with embolization of celiac and superior mesenteric artery, possible left carotid subclavian bypass on 5/23/2023.

## 2023-05-19 NOTE — H&P PST ADULT - PROBLEM SELECTOR PLAN 1
48 y/o male Belarusian speaking very poor historian, with PMH HTN (uncontrolled managed by cardiologist), CAD w/ stents (2021 in [place]), CKD (unknown baseline) now with aortic aneurysm scheduled for aortic angiogram with embolization of celiac and superior mesenteric artery, possible left carotid subclavian bypass on 5/23/2023.     - Cardiac evaluation pending  - NPO after midnight the night before surgery except for meds  -Educated on NSAIDS, multivitamins and herbals that increase the risk of bleeding and need to be stopped 5 days before procedure  -Educated on infection prevention  -Tylenol can be taken 5 days before surgery if needed for pain  -Will continue all other medications as prescribed  -Verbalized understanding of all instructions.

## 2023-05-19 NOTE — H&P PST ADULT - PROBLEM SELECTOR PLAN 4
Caprini -4 moderate risk   Surgical team please assess/recommend mechanical/pharmacological measures for VTE prophylaxis

## 2023-05-19 NOTE — H&P PST ADULT - ASSESSMENT
Pt presented to ED with c/o of right foot pain and erythema x2 weeks, worse in last x5 days. Swelling to foot noted, pt is unable to bear weight. AOX4. Patient denies chest pain, discomfort, shortness of breath, difficulty breathing and any form of distress not noted. Patient oriented to ED area. All needs attended.  Rounding in progress. Fall risk precautions maintained.
46 y/o male Arabic speaking very poor historian, with PMH HTN (uncontrolled managed by cardiologist), CAD w/ stents ( in [place]), CKD (unknown baseline) now with aortic aneurysm scheduled for aortic angiogram with embolization of celiac and superior mesenteric artery, possible left carotid subclavian bypass on 2023.     - Cardiac evaluation pending  - NPO after midnight the night before surgery except for meds  -Educated on NSAIDS, multivitamins and herbals that increase the risk of bleeding and need to be stopped 5 days before procedure  -Educated on infection prevention  -Tylenol can be taken 5 days before surgery if needed for pain  -Will continue all other medications as prescribed  -Verbalized understanding of all instructions.    OPIOID RISK TOOL    ARNOLD EACH BOX THAT APPLIES AND ADD TOTALS AT THE END    FAMILY HISTORY OF SUBSTANCE ABUSE                 FEMALE         MALE                                                Alcohol                             [  ]1 pt          [  ]3pts                                               Illegal Durgs                     [  ]2 pts        [  ]3pts                                               Rx Drugs                           [  ]4 pts        [  ]4 pts    PERSONAL HISTORY OF SUBSTANCE ABUSE                                                                                          Alcohol                             [  ]3 pts       [  ]3 pts                                               Illegal Drugs                     [  ]4 pts        [  ]4 pts                                               Rx Drugs                           [  ]5 pts        [  ]5 pts    AGE BETWEEN 16-45 YEARS                                      [x  ]1 pt         [  ]1 pt    HISTORY OF PREADOLESCENT   SEXUAL ABUSE                                                             [  ]3 pts        [  ]0pts    PSYCHOLOGICAL DISEASE                     ADD, OCD, Bipolar, Schizophrenia        [  ]2 pts         [  ]2 pts                      Depression                                               [  ]1 pt           [  ]1 pt           SCORING TOTAL   (add numbers and type here)              (1)                                     A score of 3 or lower indicated LOW risk for future opioid abuse  A score of 4 to 7 indicated moderate risk for future opioid abuse  A score of 8 or higher indicates a high risk for opioid abuse    CAPRINI SCORE [CLOT]    AGE RELATED RISK FACTORS                                                       MOBILITY RELATED FACTORS  [x ] Age 41-60 years                                            (1 Point)                  [ ] Bed rest                                                        (1 Point)  [ ] Age: 61-74 years                                           (2 Points)                 [ ] Plaster cast                                                   (2 Points)  [ ] Age= 75 years                                              (3 Points)                 [ ] Bed bound for more than 72 hours                 (2 Points)    DISEASE RELATED RISK FACTORS                                               GENDER SPECIFIC FACTORS  [ ] Edema in the lower extremities                       (1 Point)                  [ ] Pregnancy                                                     (1 Point)  [ ] Varicose veins                                               (1 Point)                  [ ] Post-partum < 6 weeks                                   (1 Point)             [ x] BMI > 25 Kg/m2                                            (1 Point)                  [ ] Hormonal therapy  or oral contraception          (1 Point)                 [ ] Sepsis (in the previous month)                        (1 Point)                  [ ] History of pregnancy complications                 (1 point)  [ ] Pneumonia or serious lung disease                                               [ ] Unexplained or recurrent                     (1 Point)           (in the previous month)                               (1 Point)  [ ] Abnormal pulmonary function test                     (1 Point)                 SURGERY RELATED RISK FACTORS  [ ] Acute myocardial infarction                              (1 Point)                 [ ]  Section                                             (1 Point)  [ ] Congestive heart failure (in the previous month)  (1 Point)               [ ] Minor surgery                                                  (1 Point)   [ ] Inflammatory bowel disease                             (1 Point)                 [ ] Arthroscopic surgery                                        (2 Points)  [ ] Central venous access                                      (2 Points)                [ x] General surgery lasting more than 45 minutes   (2 Points)       [ ] Stroke (in the previous month)                          (5 Points)               [ ] Elective arthroplasty                                         (5 Points)                                                                                                                                               HEMATOLOGY RELATED FACTORS                                                 TRAUMA RELATED RISK FACTORS  [ ] Prior episodes of VTE                                     (3 Points)                [ ] Fracture of the hip, pelvis, or leg                       (5 Points)  [ ] Positive family history for VTE                         (3 Points)                 [ ] Acute spinal cord injury (in the previous month)  (5 Points)  [ ] Prothrombin 38136 A                                     (3 Points)                 [ ] Paralysis  (less than 1 month)                             (5 Points)  [ ] Factor V Leiden                                             (3 Points)                  [ ] Multiple Trauma within 1 month                        (5 Points)  [ ] Lupus anticoagulants                                     (3 Points)                                                           [ ] Anticardiolipin antibodies                               (3 Points)                                                       [ ] High homocysteine in the blood                      (3 Points)                                             [ ] Other congenital or acquired thrombophilia      (3 Points)                                                [ ] Heparin induced thrombocytopenia                  (3 Points)                                          Total Score [       4   ]    Caprini Score 0 - 2:  Low Risk, No VTE Prophylaxis required for most patients, encourage ambulation  Caprini Score 3 - 6:  At Risk, pharmacologic VTE prophylaxis is indicated for most patients (in the absence of a contraindication)  Caprini Score Greater than or = 7:  High Risk, pharmacologic VTE prophylaxis is indicated for most patients (in the absence of a contraindication)

## 2023-05-19 NOTE — H&P PST ADULT - NSANTHOSAYNRD_GEN_A_CORE
No. JANEE screening performed.  STOP BANG Legend: 0-2 = LOW Risk; 3-4 = INTERMEDIATE Risk; 5-8 = HIGH Risk

## 2023-05-19 NOTE — H&P PST ADULT - EKG AND INTERPRETATION
Sinus rhythm with frequent premature ventricular complexes  Left axis deviation  Moderate voltage criteria for LVH  may be normal variant  Pending final int

## 2023-05-19 NOTE — H&P PST ADULT - NSICDXPASTMEDICALHX_GEN_ALL_CORE_FT
PAST MEDICAL HISTORY:  At risk for sleep apnea     CAD (coronary artery disease)     CKD (chronic kidney disease)     HTN (hypertension)     Uncontrolled hypertension

## 2023-05-22 NOTE — ASU PATIENT PROFILE, ADULT - NSICDXPASTSURGICALHX_GEN_ALL_CORE_FT
PAST SURGICAL HISTORY:  H/O heart artery stent     Presence of bypass graft stent     S/P laparotomy

## 2023-05-25 PROBLEM — Z91.89 OTHER SPECIFIED PERSONAL RISK FACTORS, NOT ELSEWHERE CLASSIFIED: Chronic | Status: ACTIVE | Noted: 2023-05-19

## 2023-05-25 PROBLEM — I25.10 ATHEROSCLEROTIC HEART DISEASE OF NATIVE CORONARY ARTERY WITHOUT ANGINA PECTORIS: Chronic | Status: ACTIVE | Noted: 2023-05-19

## 2023-05-25 PROBLEM — I10 ESSENTIAL (PRIMARY) HYPERTENSION: Chronic | Status: ACTIVE | Noted: 2023-05-19

## 2023-05-29 ENCOUNTER — TRANSCRIPTION ENCOUNTER (OUTPATIENT)
Age: 48
End: 2023-05-29

## 2023-05-30 ENCOUNTER — TRANSCRIPTION ENCOUNTER (OUTPATIENT)
Age: 48
End: 2023-05-30

## 2023-05-30 ENCOUNTER — INPATIENT (INPATIENT)
Facility: HOSPITAL | Age: 48
LOS: 0 days | Discharge: HOME | DRG: 254 | End: 2023-05-31
Attending: SURGERY | Admitting: SURGERY
Payer: COMMERCIAL

## 2023-05-30 ENCOUNTER — APPOINTMENT (OUTPATIENT)
Dept: VASCULAR SURGERY | Facility: HOSPITAL | Age: 48
End: 2023-05-30

## 2023-05-30 VITALS
OXYGEN SATURATION: 100 % | TEMPERATURE: 98 F | WEIGHT: 186.73 LBS | DIASTOLIC BLOOD PRESSURE: 81 MMHG | HEART RATE: 78 BPM | SYSTOLIC BLOOD PRESSURE: 140 MMHG | HEIGHT: 62 IN | RESPIRATION RATE: 16 BRPM

## 2023-05-30 DIAGNOSIS — Z95.5 PRESENCE OF CORONARY ANGIOPLASTY IMPLANT AND GRAFT: Chronic | ICD-10-CM

## 2023-05-30 DIAGNOSIS — Z95.828 PRESENCE OF OTHER VASCULAR IMPLANTS AND GRAFTS: Chronic | ICD-10-CM

## 2023-05-30 DIAGNOSIS — I71.9 AORTIC ANEURYSM OF UNSPECIFIED SITE, WITHOUT RUPTURE: ICD-10-CM

## 2023-05-30 DIAGNOSIS — Z98.890 OTHER SPECIFIED POSTPROCEDURAL STATES: Chronic | ICD-10-CM

## 2023-05-30 LAB
ANION GAP SERPL CALC-SCNC: 13 MMOL/L — SIGNIFICANT CHANGE UP (ref 5–17)
ANION GAP SERPL CALC-SCNC: 13 MMOL/L — SIGNIFICANT CHANGE UP (ref 5–17)
BASOPHILS # BLD AUTO: 0.02 K/UL — SIGNIFICANT CHANGE UP (ref 0–0.2)
BASOPHILS NFR BLD AUTO: 0.2 % — SIGNIFICANT CHANGE UP (ref 0–2)
BUN SERPL-MCNC: 22.5 MG/DL — HIGH (ref 8–20)
BUN SERPL-MCNC: 24.4 MG/DL — HIGH (ref 8–20)
CALCIUM SERPL-MCNC: 8.5 MG/DL — SIGNIFICANT CHANGE UP (ref 8.4–10.5)
CALCIUM SERPL-MCNC: 9.8 MG/DL — SIGNIFICANT CHANGE UP (ref 8.4–10.5)
CHLORIDE SERPL-SCNC: 102 MMOL/L — SIGNIFICANT CHANGE UP (ref 96–108)
CHLORIDE SERPL-SCNC: 104 MMOL/L — SIGNIFICANT CHANGE UP (ref 96–108)
CO2 SERPL-SCNC: 20 MMOL/L — LOW (ref 22–29)
CO2 SERPL-SCNC: 22 MMOL/L — SIGNIFICANT CHANGE UP (ref 22–29)
CREAT SERPL-MCNC: 1.34 MG/DL — HIGH (ref 0.5–1.3)
CREAT SERPL-MCNC: 1.39 MG/DL — HIGH (ref 0.5–1.3)
EGFR: 64 ML/MIN/1.73M2 — SIGNIFICANT CHANGE UP
EGFR: 67 ML/MIN/1.73M2 — SIGNIFICANT CHANGE UP
EOSINOPHIL # BLD AUTO: 0.01 K/UL — SIGNIFICANT CHANGE UP (ref 0–0.5)
EOSINOPHIL NFR BLD AUTO: 0.1 % — SIGNIFICANT CHANGE UP (ref 0–6)
GLUCOSE SERPL-MCNC: 131 MG/DL — HIGH (ref 70–99)
GLUCOSE SERPL-MCNC: 148 MG/DL — HIGH (ref 70–99)
HCT VFR BLD CALC: 38.6 % — LOW (ref 39–50)
HGB BLD-MCNC: 12.5 G/DL — LOW (ref 13–17)
IMM GRANULOCYTES NFR BLD AUTO: 0.5 % — SIGNIFICANT CHANGE UP (ref 0–0.9)
LACTATE SERPL-SCNC: 2 MMOL/L — SIGNIFICANT CHANGE UP (ref 0.5–2)
LYMPHOCYTES # BLD AUTO: 0.88 K/UL — LOW (ref 1–3.3)
LYMPHOCYTES # BLD AUTO: 9.1 % — LOW (ref 13–44)
MAGNESIUM SERPL-MCNC: 1.5 MG/DL — LOW (ref 1.6–2.6)
MCHC RBC-ENTMCNC: 27.1 PG — SIGNIFICANT CHANGE UP (ref 27–34)
MCHC RBC-ENTMCNC: 32.4 GM/DL — SIGNIFICANT CHANGE UP (ref 32–36)
MCV RBC AUTO: 83.7 FL — SIGNIFICANT CHANGE UP (ref 80–100)
MONOCYTES # BLD AUTO: 0.2 K/UL — SIGNIFICANT CHANGE UP (ref 0–0.9)
MONOCYTES NFR BLD AUTO: 2.1 % — SIGNIFICANT CHANGE UP (ref 2–14)
NEUTROPHILS # BLD AUTO: 8.48 K/UL — HIGH (ref 1.8–7.4)
NEUTROPHILS NFR BLD AUTO: 88 % — HIGH (ref 43–77)
PHOSPHATE SERPL-MCNC: 2.3 MG/DL — LOW (ref 2.4–4.7)
PLATELET # BLD AUTO: 159 K/UL — SIGNIFICANT CHANGE UP (ref 150–400)
POTASSIUM SERPL-MCNC: 4.1 MMOL/L — SIGNIFICANT CHANGE UP (ref 3.5–5.3)
POTASSIUM SERPL-MCNC: 4.3 MMOL/L — SIGNIFICANT CHANGE UP (ref 3.5–5.3)
POTASSIUM SERPL-SCNC: 4.1 MMOL/L — SIGNIFICANT CHANGE UP (ref 3.5–5.3)
POTASSIUM SERPL-SCNC: 4.3 MMOL/L — SIGNIFICANT CHANGE UP (ref 3.5–5.3)
RBC # BLD: 4.61 M/UL — SIGNIFICANT CHANGE UP (ref 4.2–5.8)
RBC # FLD: 14.5 % — SIGNIFICANT CHANGE UP (ref 10.3–14.5)
SODIUM SERPL-SCNC: 137 MMOL/L — SIGNIFICANT CHANGE UP (ref 135–145)
SODIUM SERPL-SCNC: 137 MMOL/L — SIGNIFICANT CHANGE UP (ref 135–145)
WBC # BLD: 9.64 K/UL — SIGNIFICANT CHANGE UP (ref 3.8–10.5)
WBC # FLD AUTO: 9.64 K/UL — SIGNIFICANT CHANGE UP (ref 3.8–10.5)

## 2023-05-30 PROCEDURE — 75726 ARTERY X-RAYS ABDOMEN: CPT | Mod: 26

## 2023-05-30 PROCEDURE — 35606 BPG CAROTID-SUBCLAVIAN: CPT | Mod: LT,79

## 2023-05-30 DEVICE — IMPLANTABLE DEVICE: Type: IMPLANTABLE DEVICE | Status: FUNCTIONAL

## 2023-05-30 DEVICE — SURGIFOAM PAD 8CM X 12.5CM X 10MM (100): Type: IMPLANTABLE DEVICE | Status: FUNCTIONAL

## 2023-05-30 DEVICE — GWIRE VASC ENTRY MINISTICK MAX 5FR 10CM: Type: IMPLANTABLE DEVICE | Status: FUNCTIONAL

## 2023-05-30 DEVICE — CLIP APPLIER COVIDIEN SURGICLIP III 9" SM: Type: IMPLANTABLE DEVICE | Status: FUNCTIONAL

## 2023-05-30 DEVICE — GUIDEWIRE RADIFOCUS GLIDEWIRE STANDARD ANGLED TIP 0.035" X 260CM: Type: IMPLANTABLE DEVICE | Status: FUNCTIONAL

## 2023-05-30 DEVICE — CATH ANGIO GLIDECATH ANGLE 5FR X 100CM: Type: IMPLANTABLE DEVICE | Status: FUNCTIONAL

## 2023-05-30 DEVICE — CLIP APPLIER ETHICON LIGACLIP 11.5" MEDIUM: Type: IMPLANTABLE DEVICE | Status: FUNCTIONAL

## 2023-05-30 DEVICE — CATH ANGIO GLIDECATH ANGLE TAPER 5FR X 65CM: Type: IMPLANTABLE DEVICE | Status: FUNCTIONAL

## 2023-05-30 DEVICE — CATH SOS OMNI3 0.038IN 5FRX80: Type: IMPLANTABLE DEVICE | Status: FUNCTIONAL

## 2023-05-30 DEVICE — SHEATH INTRODUCER TERUMO PINNACLE PERIPHERAL 6FR X 10CM X 0.035" MINI WIRE: Type: IMPLANTABLE DEVICE | Status: FUNCTIONAL

## 2023-05-30 DEVICE — KIT A-LINE 1LUM 20G X 12CM SAFE KIT: Type: IMPLANTABLE DEVICE | Status: FUNCTIONAL

## 2023-05-30 DEVICE — GWIRE ROSEN 0.035INX260CM: Type: IMPLANTABLE DEVICE | Status: FUNCTIONAL

## 2023-05-30 DEVICE — CATH SIZING PIGTAIL 5FR X 70CM: Type: IMPLANTABLE DEVICE | Status: FUNCTIONAL

## 2023-05-30 DEVICE — DEVICE CLOSURE 6F/7F MYNX GRIP MUST ORDER MIN OF 10 EA: Type: IMPLANTABLE DEVICE | Status: FUNCTIONAL

## 2023-05-30 RX ORDER — CEFAZOLIN SODIUM 1 G
2000 VIAL (EA) INJECTION ONCE
Refills: 0 | Status: DISCONTINUED | OUTPATIENT
Start: 2023-05-30 | End: 2023-05-30

## 2023-05-30 RX ORDER — SACUBITRIL AND VALSARTAN 24; 26 MG/1; MG/1
1 TABLET, FILM COATED ORAL
Refills: 0 | Status: DISCONTINUED | OUTPATIENT
Start: 2023-05-30 | End: 2023-05-31

## 2023-05-30 RX ORDER — SIMETHICONE 80 MG/1
80 TABLET, CHEWABLE ORAL EVERY 8 HOURS
Refills: 0 | Status: DISCONTINUED | OUTPATIENT
Start: 2023-05-30 | End: 2023-05-31

## 2023-05-30 RX ORDER — SODIUM CHLORIDE 9 MG/ML
3 INJECTION INTRAMUSCULAR; INTRAVENOUS; SUBCUTANEOUS EVERY 8 HOURS
Refills: 0 | Status: DISCONTINUED | OUTPATIENT
Start: 2023-05-30 | End: 2023-05-30

## 2023-05-30 RX ORDER — SODIUM CHLORIDE 9 MG/ML
1000 INJECTION, SOLUTION INTRAVENOUS
Refills: 0 | Status: DISCONTINUED | OUTPATIENT
Start: 2023-05-30 | End: 2023-05-31

## 2023-05-30 RX ORDER — FUROSEMIDE 40 MG
40 TABLET ORAL DAILY
Refills: 0 | Status: DISCONTINUED | OUTPATIENT
Start: 2023-05-30 | End: 2023-05-31

## 2023-05-30 RX ORDER — IBUPROFEN 200 MG
600 TABLET ORAL EVERY 6 HOURS
Refills: 0 | Status: DISCONTINUED | OUTPATIENT
Start: 2023-05-30 | End: 2023-05-31

## 2023-05-30 RX ORDER — POTASSIUM CHLORIDE 20 MEQ
20 PACKET (EA) ORAL DAILY
Refills: 0 | Status: DISCONTINUED | OUTPATIENT
Start: 2023-05-30 | End: 2023-05-31

## 2023-05-30 RX ORDER — CARVEDILOL PHOSPHATE 80 MG/1
25 CAPSULE, EXTENDED RELEASE ORAL EVERY 12 HOURS
Refills: 0 | Status: DISCONTINUED | OUTPATIENT
Start: 2023-05-30 | End: 2023-05-31

## 2023-05-30 RX ORDER — ASPIRIN/CALCIUM CARB/MAGNESIUM 324 MG
81 TABLET ORAL DAILY
Refills: 0 | Status: DISCONTINUED | OUTPATIENT
Start: 2023-05-30 | End: 2023-05-31

## 2023-05-30 RX ORDER — SPIRONOLACTONE 25 MG/1
25 TABLET, FILM COATED ORAL DAILY
Refills: 0 | Status: DISCONTINUED | OUTPATIENT
Start: 2023-05-30 | End: 2023-05-31

## 2023-05-30 RX ORDER — MORPHINE SULFATE 50 MG/1
2 CAPSULE, EXTENDED RELEASE ORAL EVERY 4 HOURS
Refills: 0 | Status: DISCONTINUED | OUTPATIENT
Start: 2023-05-30 | End: 2023-05-31

## 2023-05-30 RX ORDER — SENNA PLUS 8.6 MG/1
2 TABLET ORAL AT BEDTIME
Refills: 0 | Status: DISCONTINUED | OUTPATIENT
Start: 2023-05-30 | End: 2023-05-31

## 2023-05-30 RX ORDER — ACETAMINOPHEN 500 MG
975 TABLET ORAL ONCE
Refills: 0 | Status: COMPLETED | OUTPATIENT
Start: 2023-05-30 | End: 2023-05-30

## 2023-05-30 RX ORDER — ACETAMINOPHEN 500 MG
975 TABLET ORAL EVERY 6 HOURS
Refills: 0 | Status: DISCONTINUED | OUTPATIENT
Start: 2023-05-30 | End: 2023-05-31

## 2023-05-30 RX ORDER — HEPARIN SODIUM 5000 [USP'U]/ML
5000 INJECTION INTRAVENOUS; SUBCUTANEOUS EVERY 8 HOURS
Refills: 0 | Status: DISCONTINUED | OUTPATIENT
Start: 2023-05-30 | End: 2023-05-31

## 2023-05-30 RX ORDER — NIFEDIPINE 30 MG
60 TABLET, EXTENDED RELEASE 24 HR ORAL DAILY
Refills: 0 | Status: DISCONTINUED | OUTPATIENT
Start: 2023-05-30 | End: 2023-05-31

## 2023-05-30 RX ADMIN — CARVEDILOL PHOSPHATE 25 MILLIGRAM(S): 80 CAPSULE, EXTENDED RELEASE ORAL at 21:19

## 2023-05-30 RX ADMIN — SENNA PLUS 2 TABLET(S): 8.6 TABLET ORAL at 21:21

## 2023-05-30 RX ADMIN — SODIUM CHLORIDE 80 MILLILITER(S): 9 INJECTION, SOLUTION INTRAVENOUS at 21:18

## 2023-05-30 RX ADMIN — Medication 975 MILLIGRAM(S): at 23:23

## 2023-05-30 RX ADMIN — Medication 975 MILLIGRAM(S): at 18:37

## 2023-05-30 RX ADMIN — HEPARIN SODIUM 5000 UNIT(S): 5000 INJECTION INTRAVENOUS; SUBCUTANEOUS at 21:19

## 2023-05-30 RX ADMIN — SACUBITRIL AND VALSARTAN 1 TABLET(S): 24; 26 TABLET, FILM COATED ORAL at 21:19

## 2023-05-30 RX ADMIN — Medication 975 MILLIGRAM(S): at 23:15

## 2023-05-30 RX ADMIN — Medication 975 MILLIGRAM(S): at 08:48

## 2023-05-30 NOTE — DISCHARGE NOTE NURSING/CASE MANAGEMENT/SOCIAL WORK - PATIENT PORTAL LINK FT
You can access the FollowMyHealth Patient Portal offered by [facility] by registering at the following website: [email or web address]. By joining [employer]’s FollowMyHealth portal, you will also be able to view your health information using other applications (apps) compatible with our system.

## 2023-05-30 NOTE — BRIEF OPERATIVE NOTE - OPERATION/FINDINGS
Left carotid to subclavian bypass with PTFE graft. Aortogram and superior mesenteric angiogram. Patent iliac to SMA bypass.

## 2023-05-30 NOTE — PATIENT PROFILE ADULT - FALL HARM RISK - HARM RISK INTERVENTIONS

## 2023-05-30 NOTE — CHART NOTE - NSCHARTNOTEFT_GEN_A_CORE
POST-OPERATIVE NOTE    Subjective: Patient is feeling well post-op, resting in bed. Reports mild pain at the incision site of the left side of the neck, but pain is well controlled with pain medications. Denies sob, chest pain, abdominal pain, nausea or vomiting, numbness, weakness, tingling in UE and LE b/l.  Patient is s/p L carotid to subclavian artery bypass.    Vital Signs Last 24 Hrs  T(C): 36.3 (30 May 2023 19:57), Max: 36.7 (30 May 2023 08:29)  T(F): 97.4 (30 May 2023 19:57), Max: 98 (30 May 2023 08:29)  HR: 64 (30 May 2023 19:57) (60 - 79)  BP: 137/77 (30 May 2023 19:57) (121/83 - 143/83)  BP(mean): 86 (30 May 2023 16:30) (86 - 90)  RR: 18 (30 May 2023 19:57) (12 - 18)  SpO2: 94% (30 May 2023 19:57) (94% - 100%)    Parameters below as of 30 May 2023 19:57  Patient On (Oxygen Delivery Method): room air      I&O's Detail    30 May 2023 07:01  -  30 May 2023 20:48  --------------------------------------------------------  IN:    Lactated Ringers: 320 mL    Oral Fluid: 100 mL  Total IN: 420 mL    OUT:    Indwelling Catheter - Urethral (mL): 850 mL    Voided (mL): 150 mL  Total OUT: 1000 mL    Total NET: -580 mL        aspirin  chewable 81  carvedilol 25  furosemide    Tablet 40  heparin   Injectable 5000  NIFEdipine XL 60  sacubitril 24 mG/valsartan 26 mG 1  spironolactone 25    PAST MEDICAL & SURGICAL HISTORY:  Uncontrolled hypertension      CKD (chronic kidney disease)      CAD (coronary artery disease)      HTN (hypertension)      At risk for sleep apnea      H/O heart artery stent      Presence of bypass graft stent      S/P laparotomy            Physical Exam:  General: NAD, resting comfortably in bed  Neck: appropriate incisional tenderness at the left side of the neck, no hematoma or ecchymosis. Dressing c/d/i  Pulmonary: Nonlabored breathing, no respiratory distress  Cardiovascular: NSR  Abdominal: soft, NT/ND, no rebound or guarding  Extremities: 2+ palpable peripheral pulses in UE and LE b/l,  R groin: dressing c/d/i, soft and nontender to palpation    LABS:                        12.5   9.64  )-----------( 159      ( 30 May 2023 15:26 )             38.6     05-30    137  |  104  |  22.5<H>  ----------------------------<  148<H>  4.1   |  20.0<L>  |  1.39<H>    Ca    8.5      30 May 2023 15:26  Phos  2.3     05-30  Mg     1.5     05-30        CAPILLARY BLOOD GLUCOSE          Radiology and Additional Studies:    Assessment:  The patient is a 47y Male who is now several hours post-op from a L carotid to subclavian artery bypass.    Plan:  - Pain control as needed  - Neurovascular checks  - DASH diet  - Heparin, Aspirin  - Major to stay until 5/31 AM  - F/u AM labs

## 2023-05-31 ENCOUNTER — TRANSCRIPTION ENCOUNTER (OUTPATIENT)
Age: 48
End: 2023-05-31

## 2023-05-31 VITALS
RESPIRATION RATE: 18 BRPM | TEMPERATURE: 98 F | HEART RATE: 75 BPM | SYSTOLIC BLOOD PRESSURE: 167 MMHG | DIASTOLIC BLOOD PRESSURE: 84 MMHG | OXYGEN SATURATION: 97 %

## 2023-05-31 DIAGNOSIS — Z86.79 PERSONAL HISTORY OF OTHER DISEASES OF THE CIRCULATORY SYSTEM: ICD-10-CM

## 2023-05-31 LAB
ANION GAP SERPL CALC-SCNC: 13 MMOL/L — SIGNIFICANT CHANGE UP (ref 5–17)
BASOPHILS # BLD AUTO: 0.01 K/UL — SIGNIFICANT CHANGE UP (ref 0–0.2)
BASOPHILS NFR BLD AUTO: 0.1 % — SIGNIFICANT CHANGE UP (ref 0–2)
BUN SERPL-MCNC: 17.3 MG/DL — SIGNIFICANT CHANGE UP (ref 8–20)
CALCIUM SERPL-MCNC: 8.8 MG/DL — SIGNIFICANT CHANGE UP (ref 8.4–10.5)
CHLORIDE SERPL-SCNC: 105 MMOL/L — SIGNIFICANT CHANGE UP (ref 96–108)
CO2 SERPL-SCNC: 23 MMOL/L — SIGNIFICANT CHANGE UP (ref 22–29)
CREAT SERPL-MCNC: 1.28 MG/DL — SIGNIFICANT CHANGE UP (ref 0.5–1.3)
EGFR: 70 ML/MIN/1.73M2 — SIGNIFICANT CHANGE UP
EOSINOPHIL # BLD AUTO: 0 K/UL — SIGNIFICANT CHANGE UP (ref 0–0.5)
EOSINOPHIL NFR BLD AUTO: 0 % — SIGNIFICANT CHANGE UP (ref 0–6)
GLUCOSE SERPL-MCNC: 116 MG/DL — HIGH (ref 70–99)
HCT VFR BLD CALC: 39.8 % — SIGNIFICANT CHANGE UP (ref 39–50)
HGB BLD-MCNC: 12.6 G/DL — LOW (ref 13–17)
IMM GRANULOCYTES NFR BLD AUTO: 0.4 % — SIGNIFICANT CHANGE UP (ref 0–0.9)
LYMPHOCYTES # BLD AUTO: 1.35 K/UL — SIGNIFICANT CHANGE UP (ref 1–3.3)
LYMPHOCYTES # BLD AUTO: 12.2 % — LOW (ref 13–44)
MAGNESIUM SERPL-MCNC: 1.7 MG/DL — SIGNIFICANT CHANGE UP (ref 1.6–2.6)
MCHC RBC-ENTMCNC: 26.9 PG — LOW (ref 27–34)
MCHC RBC-ENTMCNC: 31.7 GM/DL — LOW (ref 32–36)
MCV RBC AUTO: 84.9 FL — SIGNIFICANT CHANGE UP (ref 80–100)
MONOCYTES # BLD AUTO: 1.03 K/UL — HIGH (ref 0–0.9)
MONOCYTES NFR BLD AUTO: 9.3 % — SIGNIFICANT CHANGE UP (ref 2–14)
NEUTROPHILS # BLD AUTO: 8.6 K/UL — HIGH (ref 1.8–7.4)
NEUTROPHILS NFR BLD AUTO: 78 % — HIGH (ref 43–77)
PHOSPHATE SERPL-MCNC: 3.7 MG/DL — SIGNIFICANT CHANGE UP (ref 2.4–4.7)
PLATELET # BLD AUTO: 135 K/UL — LOW (ref 150–400)
POTASSIUM SERPL-MCNC: 4.1 MMOL/L — SIGNIFICANT CHANGE UP (ref 3.5–5.3)
POTASSIUM SERPL-SCNC: 4.1 MMOL/L — SIGNIFICANT CHANGE UP (ref 3.5–5.3)
RBC # BLD: 4.69 M/UL — SIGNIFICANT CHANGE UP (ref 4.2–5.8)
RBC # FLD: 14.7 % — HIGH (ref 10.3–14.5)
SODIUM SERPL-SCNC: 141 MMOL/L — SIGNIFICANT CHANGE UP (ref 135–145)
WBC # BLD: 11.03 K/UL — HIGH (ref 3.8–10.5)
WBC # FLD AUTO: 11.03 K/UL — HIGH (ref 3.8–10.5)

## 2023-05-31 PROCEDURE — 76000 FLUOROSCOPY <1 HR PHYS/QHP: CPT

## 2023-05-31 PROCEDURE — 71275 CT ANGIOGRAPHY CHEST: CPT | Mod: 26

## 2023-05-31 PROCEDURE — C9399: CPT

## 2023-05-31 PROCEDURE — 84295 ASSAY OF SERUM SODIUM: CPT

## 2023-05-31 PROCEDURE — 83605 ASSAY OF LACTIC ACID: CPT

## 2023-05-31 PROCEDURE — 71275 CT ANGIOGRAPHY CHEST: CPT

## 2023-05-31 PROCEDURE — C1769: CPT

## 2023-05-31 PROCEDURE — C1768: CPT

## 2023-05-31 PROCEDURE — 84100 ASSAY OF PHOSPHORUS: CPT

## 2023-05-31 PROCEDURE — 74174 CTA ABD&PLVS W/CONTRAST: CPT

## 2023-05-31 PROCEDURE — 84132 ASSAY OF SERUM POTASSIUM: CPT

## 2023-05-31 PROCEDURE — 82803 BLOOD GASES ANY COMBINATION: CPT

## 2023-05-31 PROCEDURE — 85025 COMPLETE CBC W/AUTO DIFF WBC: CPT

## 2023-05-31 PROCEDURE — 36415 COLL VENOUS BLD VENIPUNCTURE: CPT

## 2023-05-31 PROCEDURE — C1889: CPT

## 2023-05-31 PROCEDURE — 82435 ASSAY OF BLOOD CHLORIDE: CPT

## 2023-05-31 PROCEDURE — 85014 HEMATOCRIT: CPT

## 2023-05-31 PROCEDURE — 83735 ASSAY OF MAGNESIUM: CPT

## 2023-05-31 PROCEDURE — C1760: CPT

## 2023-05-31 PROCEDURE — 82947 ASSAY GLUCOSE BLOOD QUANT: CPT

## 2023-05-31 PROCEDURE — 80048 BASIC METABOLIC PNL TOTAL CA: CPT

## 2023-05-31 PROCEDURE — 82330 ASSAY OF CALCIUM: CPT

## 2023-05-31 PROCEDURE — 85018 HEMOGLOBIN: CPT

## 2023-05-31 PROCEDURE — 74174 CTA ABD&PLVS W/CONTRAST: CPT | Mod: 26

## 2023-05-31 PROCEDURE — C1887: CPT

## 2023-05-31 PROCEDURE — C1894: CPT

## 2023-05-31 RX ORDER — SACUBITRIL AND VALSARTAN 24; 26 MG/1; MG/1
1 TABLET, FILM COATED ORAL
Qty: 0 | Refills: 0 | DISCHARGE
Start: 2023-05-31

## 2023-05-31 RX ADMIN — SODIUM CHLORIDE 80 MILLILITER(S): 9 INJECTION, SOLUTION INTRAVENOUS at 05:30

## 2023-05-31 RX ADMIN — Medication 975 MILLIGRAM(S): at 12:59

## 2023-05-31 RX ADMIN — Medication 975 MILLIGRAM(S): at 05:36

## 2023-05-31 RX ADMIN — MORPHINE SULFATE 2 MILLIGRAM(S): 50 CAPSULE, EXTENDED RELEASE ORAL at 05:33

## 2023-05-31 RX ADMIN — HEPARIN SODIUM 5000 UNIT(S): 5000 INJECTION INTRAVENOUS; SUBCUTANEOUS at 05:29

## 2023-05-31 RX ADMIN — Medication 975 MILLIGRAM(S): at 05:28

## 2023-05-31 RX ADMIN — Medication 975 MILLIGRAM(S): at 11:59

## 2023-05-31 RX ADMIN — Medication 20 MILLIEQUIVALENT(S): at 11:59

## 2023-05-31 RX ADMIN — Medication 40 MILLIGRAM(S): at 05:28

## 2023-05-31 RX ADMIN — SPIRONOLACTONE 25 MILLIGRAM(S): 25 TABLET, FILM COATED ORAL at 05:28

## 2023-05-31 RX ADMIN — Medication 81 MILLIGRAM(S): at 11:59

## 2023-05-31 RX ADMIN — HEPARIN SODIUM 5000 UNIT(S): 5000 INJECTION INTRAVENOUS; SUBCUTANEOUS at 11:59

## 2023-05-31 RX ADMIN — MORPHINE SULFATE 2 MILLIGRAM(S): 50 CAPSULE, EXTENDED RELEASE ORAL at 05:48

## 2023-05-31 NOTE — DISCHARGE NOTE PROVIDER - NSDCFUSCHEDAPPT_GEN_ALL_CORE_FT
Tawana Angulo  [facility] Physician [address]  Scheduled Appointment: 07/11/2023

## 2023-05-31 NOTE — DISCHARGE NOTE PROVIDER - CARE PROVIDER_API CALL
Teo Gutierrez  Vascular Surgery  [address] [place] [id number]  Phone: [phone]  Fax: [phone]  Follow Up Time: 2 weeks

## 2023-05-31 NOTE — DISCHARGE NOTE PROVIDER - NSDCMRMEDTOKEN_GEN_ALL_CORE_FT
aspirin 81 mg oral tablet: 1 tab(s) orally once a day MDD: 1 daily  carvedilol 25 mg oral tablet: 1 tab(s) orally every 12 hours  furosemide 40 mg oral tablet: 1 tab(s) orally once a day  K-Tab 20 mEq oral tablet, extended release: 1 tab(s) orally once a day   NIFEdipine 60 mg oral tablet, extended release: 1 tab(s) orally once a day  sacubitril-valsartan 24 mg-26 mg oral tablet: 1 tab(s) orally 2 times a day  senna leaf extract oral tablet: 2 tab(s) orally once a day (at bedtime)  simethicone 80 mg oral tablet, chewable: 1 tab(s) orally every 8 hours, As needed, Gas  spironolactone 25 mg oral tablet: 1 orally once a day

## 2023-05-31 NOTE — PROGRESS NOTE ADULT - SUBJECTIVE AND OBJECTIVE BOX
FABIANA DURGA    [id number]    History:      Patient seen and examined  s/p left carotid to subclavian bypass and attempted sma angiogram on 5/31  No acute events overnight   Denies nausea, vomiting, chest pain, shortness of breath, abdominal pain or fever. No new complaints.   No acute motor or sensory changes are reported.    Vital Signs Last 24 Hrs  T(C): 36.6 (31 May 2023 04:05), Max: 36.7 (30 May 2023 08:29)  T(F): 97.8 (31 May 2023 04:05), Max: 98 (30 May 2023 08:29)  HR: 58 (31 May 2023 04:05) (58 - 79)  BP: 141/73 (31 May 2023 04:05) (118/77 - 143/83)  BP(mean): 86 (30 May 2023 16:30) (86 - 90)  RR: 18 (31 May 2023 04:05) (12 - 18)  SpO2: 97% (31 May 2023 04:05) (93% - 100%)    Parameters below as of 31 May 2023 04:05  Patient On (Oxygen Delivery Method): room air      I&O's Summary    30 May 2023 07:01  -  31 May 2023 07:00  --------------------------------------------------------  IN: 1380 mL / OUT: 3900 mL / NET: -2520 mL                              12.6   11.03 )-----------( 135      ( 31 May 2023 04:45 )             39.8     05-31    141  |  105  |  17.3  ----------------------------<  116<H>  4.1   |  23.0  |  1.28    Ca    8.8      31 May 2023 04:45  Phos  3.7     05-31  Mg     1.7     05-31        MEDICATIONS  (STANDING):  acetaminophen     Tablet .. 975 milliGRAM(s) Oral every 6 hours  aspirin  chewable 81 milliGRAM(s) Oral daily  carvedilol 25 milliGRAM(s) Oral every 12 hours  furosemide    Tablet 40 milliGRAM(s) Oral daily  heparin   Injectable 5000 Unit(s) SubCutaneous every 8 hours  lactated ringers. 1000 milliLiter(s) (80 mL/Hr) IV Continuous <Continuous>  NIFEdipine XL 60 milliGRAM(s) Oral daily  potassium chloride    Tablet ER 20 milliEquivalent(s) Oral daily  sacubitril 24 mG/valsartan 26 mG 1 Tablet(s) Oral two times a day  senna 2 Tablet(s) Oral at bedtime  spironolactone 25 milliGRAM(s) Oral daily    MEDICATIONS  (PRN):  ibuprofen  Tablet. 600 milliGRAM(s) Oral every 6 hours PRN Mild Pain (1 - 3)  morphine  - Injectable 2 milliGRAM(s) IV Push every 4 hours PRN Moderate Pain (4 - 6)  simethicone 80 milliGRAM(s) Chew every 8 hours PRN Gas      Physical exam:     No distress   alert and oriented  non labored breathing  left clavicle region surgical site with clean dressing, no surround hematoma  no gross upper ext motor/sensory/perfusion deficits  groin access site benign   lower ext warm, no noted deficits     Primary Assessment:  History of complex Type B aortic dissection   s/p left carotic_subclavian artery bypass  attempted unsuccessful sma angiogram     Plan:   • OOB, ambulate today  - d/c thakkar catheter  - may require repeat CT angio prior to discharge

## 2023-05-31 NOTE — DISCHARGE NOTE PROVIDER - NSDCCPTREATMENT_GEN_ALL_CORE_FT
PRINCIPAL PROCEDURE  Procedure: Bypass (arterial) carotid to subclavian  Findings and Treatment:

## 2023-05-31 NOTE — DISCHARGE NOTE PROVIDER - HOSPITAL COURSE
48 y/o male Lao speaking very poor historian, with PMH HTN (uncontrolled managed by cardiologist),CAD w/ stents (2021 in [place]), CKD (unknown baseline) presents to [facility] today. Pt. reports hx of aoortic aneurysm since 2/2023. Reports that he went for a routine physical exam and was then sent to the hospital. Pt was found with a blood pressure of . Subsequent CTA C/A/P showed Type B dissection. Pt. is s/p bypass graft from iliac artery to SMA and celiac artery via midline laparotomy 3/2023 per MARTINA Bear note. Denies any dizziness, lightheadedness syncope.  Patient presented to [facility] on 5/30/23 for elective carotid to subclavian bypass and aortogram. Patient tolerated the procedure well. On POD 1 he was tolerating diet, ambulating, voiding, and pain was under control. He had CTA chest/abdomen/pelvis for further imaging. Patient met all criteria for discharge.

## 2023-05-31 NOTE — DISCHARGE NOTE PROVIDER - NSDCCPCAREPLAN_GEN_ALL_CORE_FT
PRINCIPAL DISCHARGE DIAGNOSIS  Diagnosis: History of aortic dissection  Assessment and Plan of Treatment:

## 2023-06-01 RX ORDER — ASPIRIN/CALCIUM CARB/MAGNESIUM 324 MG
1 TABLET ORAL
Qty: 60 | Refills: 0
Start: 2023-06-01 | End: 2023-07-30

## 2023-06-05 ENCOUNTER — RX RENEWAL (OUTPATIENT)
Age: 48
End: 2023-06-05

## 2023-06-07 ENCOUNTER — RX RENEWAL (OUTPATIENT)
Age: 48
End: 2023-06-07

## 2023-06-22 ENCOUNTER — APPOINTMENT (OUTPATIENT)
Dept: VASCULAR SURGERY | Facility: CLINIC | Age: 48
End: 2023-06-22
Payer: MEDICAID

## 2023-06-22 VITALS
HEIGHT: 62 IN | TEMPERATURE: 97.2 F | OXYGEN SATURATION: 97 % | DIASTOLIC BLOOD PRESSURE: 70 MMHG | WEIGHT: 183 LBS | HEART RATE: 85 BPM | SYSTOLIC BLOOD PRESSURE: 138 MMHG | BODY MASS INDEX: 33.68 KG/M2

## 2023-06-22 PROCEDURE — 99213 OFFICE O/P EST LOW 20 MIN: CPT

## 2023-06-22 NOTE — PROCEDURE
[FreeTextEntry1] : 4/19/23 CTA abd and pelvis: Type B dissection from the aortic isthmus to suprarenal abdominal aorta. Status post mesenteric to right common iliac artery bypass.\par \par Dilated aorta up to 6.5 cm at the isthmus

## 2023-06-22 NOTE — PHYSICAL EXAM
[de-identified] : NAD. well appearing  [FreeTextEntry1] : left upper chest incision closed and healed well.

## 2023-06-22 NOTE — HISTORY OF PRESENT ILLNESS
[FreeTextEntry1] : 3/1/23 H&P from [facility]: 42M, Telugu speaking poor historian, with PMH HTN (uncontrolled), Pulmonary Interstitial Disease, CAD w/ stents (2021 in [place]), CKD (unknown baseline), was sent to [place] [facility] ED 2/28/23 from [facility] where he had gone for a physical exam and to get his medications filled. He obtains his Nifedipine from his home country of [place], however has been unable to obtain recent doses so he has been taking half his medication daily. Of note, HIE shows recent admission to [facility] for photophobia where he was also found to have . 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 He was transferred to [facility] CTICU for further management.\par  \par At time of admission, his SBP was 180s and he was started on Esmolol and Nicardipine. Patient denies acute pain with radiating or aggravating factors. He does admit to occasional headaches and dizziness.\par \par 3/28/23: 48 yo male is s/p bypass graft from iliac artery to SMA and celiac artery via midline laparotomy. Pt is doing well since discharge. Denies any post prandial pain. Pt states bowel movements are regular. \par \par 6/22/23: Pt is doing well s/p left carotid to subclavian artery bypass. The incision is closed and pt denies any pain at or around incision. Denies fever or chills. \par \par PSHx: \par 5/30/23 left carotid to subclavian artery bypass \par 3/9/23 bypass graft from iliac artery to SMA and celiac artery via midline laparotomy \par

## 2023-06-22 NOTE — ASSESSMENT
[FreeTextEntry1] : 48 yo male with type B aortic dissection. Pt is s/p left carotid to subclavian bypass. Pt is also s/p bypass graft from iliac artery to SMA and celiac artery via midline laparotomy. Incisions closed and healed well. \par \par Pt counseled on surgical procedure performed\par Continue all medications as prescribed\par Continue to closely monitor BP\par Plan for aortogram with SMA embolization with myself and Dr. Johnson\par \par A total of 20 minutes was spent with patient and coordinating care.\par

## 2023-07-06 LAB
ALBUMIN SERPL ELPH-MCNC: 4.8 G/DL
ALDOSTERONE SERUM: 24.8 NG/DL
ALP BLD-CCNC: 120 U/L
ALT SERPL-CCNC: 23 U/L
ANION GAP SERPL CALC-SCNC: 13 MMOL/L
AST SERPL-CCNC: 19 U/L
BILIRUB SERPL-MCNC: 0.2 MG/DL
BUN SERPL-MCNC: 23 MG/DL
CALCIUM SERPL-MCNC: 10.2 MG/DL
CHLORIDE SERPL-SCNC: 102 MMOL/L
CO2 SERPL-SCNC: 24 MMOL/L
CORTIS SERPL-MCNC: 18 UG/DL
CREAT SERPL-MCNC: 1.42 MG/DL
EGFR: 62 ML/MIN/1.73M2
GLUCOSE SERPL-MCNC: 113 MG/DL
NT-PROBNP SERPL-MCNC: 47 PG/ML
POTASSIUM SERPL-SCNC: 4.5 MMOL/L
PROT SERPL-MCNC: 7.9 G/DL
SODIUM SERPL-SCNC: 139 MMOL/L
TSH SERPL-ACNC: 1.17 UIU/ML

## 2023-07-11 ENCOUNTER — APPOINTMENT (OUTPATIENT)
Dept: HEART FAILURE | Facility: CLINIC | Age: 48
End: 2023-07-11
Payer: SELF-PAY

## 2023-07-11 VITALS
WEIGHT: 192 LBS | HEIGHT: 62 IN | HEART RATE: 64 BPM | SYSTOLIC BLOOD PRESSURE: 128 MMHG | BODY MASS INDEX: 35.33 KG/M2 | OXYGEN SATURATION: 97 % | DIASTOLIC BLOOD PRESSURE: 76 MMHG

## 2023-07-11 LAB
METANEPHRINE, PL: 32.6 PG/ML
NORMETANEPHRINE, PL: 220.3 PG/ML

## 2023-07-11 PROCEDURE — 99214 OFFICE O/P EST MOD 30 MIN: CPT

## 2023-07-11 RX ORDER — NIFEDIPINE 30 MG/1
30 TABLET, EXTENDED RELEASE ORAL DAILY
Qty: 90 | Refills: 1 | Status: DISCONTINUED | COMMUNITY
Start: 2023-04-03 | End: 2023-07-11

## 2023-07-11 RX ORDER — ASPIRIN 325 MG/1
325 TABLET, FILM COATED ORAL DAILY
Qty: 60 | Refills: 1 | Status: DISCONTINUED | COMMUNITY
Start: 2023-04-03 | End: 2023-07-11

## 2023-07-11 NOTE — REASON FOR VISIT
[Cardiac Failure] : cardiac failure [[employer] ] : provided by [employer]   [Interpreters_IDNumber] : [id number] [Interpreters_FullName] : Santosh [FreeTextEntry1] : HF: Dr. Chaudhari \par CTS: Dr. Heaton

## 2023-07-11 NOTE — HISTORY OF PRESENT ILLNESS
[FreeTextEntry1] : 47 y/o Kinyarwanda speaking male, [place] ~ [place], with PMH significant for uncontrolled HTN, ILD, CKD and newly diagnosed dilated cardiomyopathy in the setting of hypertensive urgency and type B aortic dissection s/p bypass graft (iliac artery to superior mesenteric and celiac arteries 3/9/23) and left carotid to subclavian artery bypass on 5/30/23, who presents for hospital follow-up.\par \par On 2/28/23 the patient initially presented to [facility] for routine physical exam and medication refills. Of note, he had not seen a doctor for over three years and was obtaining [place] medications previously [place] the [place]. He was last treated at [facility] in 2018 for hypertensive urgency.  At [facility] he was found to be in hypertensive urgency with a systolic blood pressure of 200 and then presented to [place] ED.  A CTA of the chest, abdomen and pelvis showed a Type B dissection and he was transferred to [facility] CTICU for further management. TTE showed newly reduced systolic function LVEF 20-25%. His inpatient course was significant for RHC/LHC, initially cancelled on 3/2 due to worsening renal function, but later resolved. 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 The vascular surgery team recommended no intervention. \par \par He underwent bypass grafting with Dr. Quintero on 3/9. He developed ileus postoperatively, which since resolved. Repeat TTE 3/10 showed improvement in his LV systolic function to 40-45%. He was discharged home 3/18 on Coreg 25mg BID, Entresto 24-26mg BID, Nifedipine 60mg daily and Lasix 40mg daily.\par \par Since last visit he is now s/p left carotid to subclavian artery bypass on 5/30/23. Today he reports feeling well. He denies any functional limitations. He reports slow weight gain over the past few months, but his BP has been generally controlled (SBP mostly 120s, occasionally 130s). He denies any CP, palpitations, syncope, LH/dizziness, SOB, orthopnea, PND, cough, abdominal discomfort, or LE edema. His appetite is normal. He has been limiting fluid and sodium in his diet and taking his medications as directed.

## 2023-07-11 NOTE — PHYSICAL EXAM
[Well Developed] : well developed [No Acute Distress] : no acute distress [Normal Conjunctiva] : normal conjunctiva [Normal Venous Pressure] : normal venous pressure [Normal S1, S2] : normal S1, S2 [No Murmur] : no murmur [Clear Lung Fields] : clear lung fields [No Respiratory Distress] : no respiratory distress  [Soft] : abdomen soft [No Edema] : no edema [No Cyanosis] : no cyanosis [No Clubbing] : no clubbing [Moves all extremities] : moves all extremities [Alert and Oriented] : alert and oriented [Non Tender] : non-tender [Normal Gait] : normal gait [de-identified] : warm, dry

## 2023-07-11 NOTE — DISCUSSION/SUMMARY
[FreeTextEntry1] : # Dilated cardiomyopathy LVEF 40-45% from 20-25%\par -Etiology: NICMP (LHC 3/3 revealed normal coronaries and no prior stents). Likely due to hypertensive heart disease with increased wall thickness on echo. Will send genetic testing today.\par -RHC 3/3 showed LVEDP 19, PAP 40/24/31. \par -Clinically euvolemic on exam today\par -GDMT: on spironolactone 25mg daily, coreg 25mg BID, Entresto 49-51mg BID and nifedipine 30mg daily. Increase Entresto to 97-103mg BID and stop nifedipine. Will consider adding SGLT2i after all his vascular procedures are complete\par -Diuretics: Currently taking Lasix 40 mg daily which was resumed after his last surgery. Instructed him to only take PRN for acute weight gain\par -Repeat labwork ordered 1 week after increasing Entresto\par \par # Type B Dissection of Aorta \par -S/p iliac to celiac SMA bypass 3/9 by CTS and vascular surgery teams. \par -S/p left carotid to subclavian artery bypass on 5/30/23 with Dr. Jones\par -Planned for aortogram with SMA embolization with Dr. Teixeira and Dr. Johnson\par -Continue to follow-up with CTS/vascular\par \par # HTN \par -Resistant HTN during recent admission now improved. Continue GDMT as above\par -He will call if his SBP is sustaining >130. \par -Work up for secondary hypertension with unremarkable labs and US renal Doppler negative for MICHELLE\par -will refer to pulmonology for sleep study\par \par # CKD\par -Baseline sCr 1.3-1.6\par -Continue close monitoring \par \par # Radial artery thrombosis\par -F/u with vascular

## 2023-07-11 NOTE — ASSESSMENT
[FreeTextEntry1] : 47 y/o Jamaican speaking male, [place], with newly diagnosed dilated cardiomyopathy in setting of hypertensive urgency and Type B aortic dissection s/p bypass grafting (iliac artery to superior mesenteric and celiac arteries 3/9/23) and left carotid to subclavian artery bypass on 5/30/23,who presents for HF follow up.\par \par He has ACC/AHA stage C CM with NYHA class I symptoms.

## 2023-07-11 NOTE — CARDIOLOGY SUMMARY
[de-identified] : \par 5/5/23: SR, LAD, LVH, PAC\par  [de-identified] : \par TTE 3/10/23: LVEF 40-45%, LVIDD 6.06, IVSD 1.29, relative wall thickness 0.46, mild LVH, normal RV size/function, mild TR.\par \par TTE 2/28/23: LVEF 20-25%, LVEDD 6./23cm, LVIDs 5.59cm, TAPSE 2.54cm, mild to mod MR, trivial TR, RV normal function.\par  [de-identified] : \par LHC 3/3/23: no CAD.\par RHC 3/3/23: RA 22, RV 42/14, PA 40/24 (31), PCWP 17, CO 5.9/CI 3.09, PA sat 72.1%, Hb 13.4.\par

## 2023-08-01 ENCOUNTER — OUTPATIENT (OUTPATIENT)
Dept: OUTPATIENT SERVICES | Facility: HOSPITAL | Age: 48
LOS: 1 days | End: 2023-08-01
Payer: COMMERCIAL

## 2023-08-01 VITALS
DIASTOLIC BLOOD PRESSURE: 95 MMHG | TEMPERATURE: 98 F | SYSTOLIC BLOOD PRESSURE: 162 MMHG | RESPIRATION RATE: 17 BRPM | WEIGHT: 192.02 LBS | HEART RATE: 55 BPM | HEIGHT: 62 IN | OXYGEN SATURATION: 98 %

## 2023-08-01 DIAGNOSIS — Z98.890 OTHER SPECIFIED POSTPROCEDURAL STATES: Chronic | ICD-10-CM

## 2023-08-01 DIAGNOSIS — Z95.1 PRESENCE OF AORTOCORONARY BYPASS GRAFT: Chronic | ICD-10-CM

## 2023-08-01 DIAGNOSIS — I71.012 DISSECTION OF DESCENDING THORACIC AORTA: ICD-10-CM

## 2023-08-01 DIAGNOSIS — Z95.5 PRESENCE OF CORONARY ANGIOPLASTY IMPLANT AND GRAFT: Chronic | ICD-10-CM

## 2023-08-01 DIAGNOSIS — Z95.828 PRESENCE OF OTHER VASCULAR IMPLANTS AND GRAFTS: Chronic | ICD-10-CM

## 2023-08-01 DIAGNOSIS — Z01.818 ENCOUNTER FOR OTHER PREPROCEDURAL EXAMINATION: ICD-10-CM

## 2023-08-01 LAB
ANION GAP SERPL CALC-SCNC: 6 MMOL/L — SIGNIFICANT CHANGE UP (ref 5–17)
APTT BLD: 31 SEC — SIGNIFICANT CHANGE UP (ref 24.5–35.6)
BASOPHILS # BLD AUTO: 0.03 K/UL — SIGNIFICANT CHANGE UP (ref 0–0.2)
BASOPHILS NFR BLD AUTO: 0.6 % — SIGNIFICANT CHANGE UP (ref 0–2)
BLD GP AB SCN SERPL QL: SIGNIFICANT CHANGE UP
BUN SERPL-MCNC: 20 MG/DL — SIGNIFICANT CHANGE UP (ref 7–23)
CALCIUM SERPL-MCNC: 9.5 MG/DL — SIGNIFICANT CHANGE UP (ref 8.5–10.1)
CHLORIDE SERPL-SCNC: 107 MMOL/L — SIGNIFICANT CHANGE UP (ref 96–108)
CO2 SERPL-SCNC: 25 MMOL/L — SIGNIFICANT CHANGE UP (ref 22–31)
CREAT SERPL-MCNC: 1.39 MG/DL — HIGH (ref 0.5–1.3)
EGFR: 63 ML/MIN/1.73M2 — SIGNIFICANT CHANGE UP
EOSINOPHIL # BLD AUTO: 0.12 K/UL — SIGNIFICANT CHANGE UP (ref 0–0.5)
EOSINOPHIL NFR BLD AUTO: 2.2 % — SIGNIFICANT CHANGE UP (ref 0–6)
GLUCOSE SERPL-MCNC: 93 MG/DL — SIGNIFICANT CHANGE UP (ref 70–99)
HCT VFR BLD CALC: 45.2 % — SIGNIFICANT CHANGE UP (ref 39–50)
HGB BLD-MCNC: 13.9 G/DL — SIGNIFICANT CHANGE UP (ref 13–17)
IMM GRANULOCYTES NFR BLD AUTO: 0.4 % — SIGNIFICANT CHANGE UP (ref 0–0.9)
INR BLD: 0.97 RATIO — SIGNIFICANT CHANGE UP (ref 0.85–1.18)
LYMPHOCYTES # BLD AUTO: 1.73 K/UL — SIGNIFICANT CHANGE UP (ref 1–3.3)
LYMPHOCYTES # BLD AUTO: 32.1 % — SIGNIFICANT CHANGE UP (ref 13–44)
MCHC RBC-ENTMCNC: 27.5 PG — SIGNIFICANT CHANGE UP (ref 27–34)
MCHC RBC-ENTMCNC: 30.8 GM/DL — LOW (ref 32–36)
MCV RBC AUTO: 89.5 FL — SIGNIFICANT CHANGE UP (ref 80–100)
MONOCYTES # BLD AUTO: 0.63 K/UL — SIGNIFICANT CHANGE UP (ref 0–0.9)
MONOCYTES NFR BLD AUTO: 11.7 % — SIGNIFICANT CHANGE UP (ref 2–14)
NEUTROPHILS # BLD AUTO: 2.86 K/UL — SIGNIFICANT CHANGE UP (ref 1.8–7.4)
NEUTROPHILS NFR BLD AUTO: 53 % — SIGNIFICANT CHANGE UP (ref 43–77)
PLATELET # BLD AUTO: 207 K/UL — SIGNIFICANT CHANGE UP (ref 150–400)
POTASSIUM SERPL-MCNC: 3.8 MMOL/L — SIGNIFICANT CHANGE UP (ref 3.5–5.3)
POTASSIUM SERPL-SCNC: 3.8 MMOL/L — SIGNIFICANT CHANGE UP (ref 3.5–5.3)
PROTHROM AB SERPL-ACNC: 11 SEC — SIGNIFICANT CHANGE UP (ref 9.5–13)
RBC # BLD: 5.05 M/UL — SIGNIFICANT CHANGE UP (ref 4.2–5.8)
RBC # FLD: 13.3 % — SIGNIFICANT CHANGE UP (ref 10.3–14.5)
SODIUM SERPL-SCNC: 138 MMOL/L — SIGNIFICANT CHANGE UP (ref 135–145)
WBC # BLD: 5.39 K/UL — SIGNIFICANT CHANGE UP (ref 3.8–10.5)
WBC # FLD AUTO: 5.39 K/UL — SIGNIFICANT CHANGE UP (ref 3.8–10.5)

## 2023-08-01 PROCEDURE — 85025 COMPLETE CBC W/AUTO DIFF WBC: CPT

## 2023-08-01 PROCEDURE — 36415 COLL VENOUS BLD VENIPUNCTURE: CPT

## 2023-08-01 PROCEDURE — 86900 BLOOD TYPING SEROLOGIC ABO: CPT

## 2023-08-01 PROCEDURE — 80048 BASIC METABOLIC PNL TOTAL CA: CPT

## 2023-08-01 PROCEDURE — 86901 BLOOD TYPING SEROLOGIC RH(D): CPT

## 2023-08-01 PROCEDURE — 85730 THROMBOPLASTIN TIME PARTIAL: CPT

## 2023-08-01 PROCEDURE — 99214 OFFICE O/P EST MOD 30 MIN: CPT | Mod: 25

## 2023-08-01 PROCEDURE — 85610 PROTHROMBIN TIME: CPT

## 2023-08-01 PROCEDURE — 86850 RBC ANTIBODY SCREEN: CPT

## 2023-08-01 NOTE — H&P PST ADULT - NSICDXPASTMEDICALHX_GEN_ALL_CORE_FT
PAST MEDICAL HISTORY:  At risk for sleep apnea     CAD (coronary artery disease)     CKD (chronic kidney disease)     Dissecting aortic aneurysm     HTN (hypertension)     Respiratory arrest     Uncontrolled hypertension

## 2023-08-01 NOTE — H&P PST ADULT - ASSESSMENT
48 year old male who present for aortagram with SMA embolization.      Plan:  1. PST instructions given ; NPO status/ instructions to be given by ASU   2. Pt instructed to take following meds on day of surgery: carvedilol   3. Pt instructed to take routine evening medications unless indicated   4. Stop NSAIDS ( Aspirin, Aleve, Motrin, Mobic, Diclofenac), herbal supplements, MVI, Vitamins, fish oil 7 days prior to surgery unless directed by surgeon or cardiologist;   5. Cardiology Optimization with Dr. Angulo   6. EZ wash instructions given.  7. Labs, EKG as per surgeon request

## 2023-08-01 NOTE — H&P PST ADULT - NSICDXPASTSURGICALHX_GEN_ALL_CORE_FT
PAST SURGICAL HISTORY:  H/O heart artery stent     Presence of bypass graft stent     S/P coronary artery bypass graft x 2     S/P laparotomy

## 2023-08-01 NOTE — H&P PST ADULT - HISTORY OF PRESENT ILLNESS
47 year old male Kiswahili speaking very poor historian, with PMH, HTN (uncontrolled managed by cardiologist),CAD w/ stents (2021 in [place]), CKD presents to PST today. Pt. reports hx of aortic aneurysm diagnosed in 2/2023. He explains how he went to PCP for routine physical and was found to be hypertensive with 's and sent to ED. 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 Pt is s/p bypass graft from iliac artery to SMA and celiac artery via midline laparotomy 3/2023 and a left carotid to subclavian bypass with PTFE graft 5/2023.  Denies any dizziness, lightheadedness syncope. He is scheduled to have aortagram with SMA embolization.

## 2023-08-01 NOTE — H&P PST ADULT - NSICDXFAMILYHX_GEN_ALL_CORE_FT
FAMILY HISTORY:  Father  Still living? Unknown  FH: hypertension, Age at diagnosis: Age Unknown    Mother  Still living? Yes, Estimated age: 71-80  FH: hypertension, Age at diagnosis: Age Unknown

## 2023-08-02 DIAGNOSIS — Z01.818 ENCOUNTER FOR OTHER PREPROCEDURAL EXAMINATION: ICD-10-CM

## 2023-08-02 DIAGNOSIS — I71.012 DISSECTION OF DESCENDING THORACIC AORTA: ICD-10-CM

## 2023-08-03 ENCOUNTER — OUTPATIENT (OUTPATIENT)
Dept: INPATIENT UNIT | Facility: HOSPITAL | Age: 48
LOS: 1 days | Discharge: HOME | End: 2023-08-03
Payer: COMMERCIAL

## 2023-08-03 ENCOUNTER — APPOINTMENT (OUTPATIENT)
Dept: VASCULAR SURGERY | Facility: HOSPITAL | Age: 48
End: 2023-08-03

## 2023-08-03 ENCOUNTER — TRANSCRIPTION ENCOUNTER (OUTPATIENT)
Age: 48
End: 2023-08-03

## 2023-08-03 ENCOUNTER — RESULT REVIEW (OUTPATIENT)
Age: 48
End: 2023-08-03

## 2023-08-03 VITALS
SYSTOLIC BLOOD PRESSURE: 132 MMHG | TEMPERATURE: 98 F | HEART RATE: 66 BPM | OXYGEN SATURATION: 100 % | RESPIRATION RATE: 16 BRPM | HEIGHT: 62 IN | DIASTOLIC BLOOD PRESSURE: 87 MMHG | WEIGHT: 192.02 LBS

## 2023-08-03 VITALS
OXYGEN SATURATION: 100 % | SYSTOLIC BLOOD PRESSURE: 159 MMHG | TEMPERATURE: 98 F | DIASTOLIC BLOOD PRESSURE: 92 MMHG | RESPIRATION RATE: 16 BRPM | HEART RATE: 58 BPM

## 2023-08-03 DIAGNOSIS — Z95.1 PRESENCE OF AORTOCORONARY BYPASS GRAFT: Chronic | ICD-10-CM

## 2023-08-03 DIAGNOSIS — Z95.828 PRESENCE OF OTHER VASCULAR IMPLANTS AND GRAFTS: Chronic | ICD-10-CM

## 2023-08-03 DIAGNOSIS — I71.40 ABDOMINAL AORTIC ANEURYSM, WITHOUT RUPTURE, UNSPECIFIED: ICD-10-CM

## 2023-08-03 DIAGNOSIS — I71.012 DISSECTION OF DESCENDING THORACIC AORTA: ICD-10-CM

## 2023-08-03 DIAGNOSIS — Z95.5 PRESENCE OF CORONARY ANGIOPLASTY IMPLANT AND GRAFT: Chronic | ICD-10-CM

## 2023-08-03 DIAGNOSIS — Z98.890 OTHER SPECIFIED POSTPROCEDURAL STATES: Chronic | ICD-10-CM

## 2023-08-03 PROCEDURE — 37242 VASC EMBOLIZE/OCCLUDE ARTERY: CPT | Mod: 58

## 2023-08-03 PROCEDURE — C1894: CPT

## 2023-08-03 PROCEDURE — 36200 PLACE CATHETER IN AORTA: CPT

## 2023-08-03 PROCEDURE — C1889: CPT

## 2023-08-03 PROCEDURE — C1769: CPT

## 2023-08-03 PROCEDURE — C1887: CPT

## 2023-08-03 PROCEDURE — 37242 VASC EMBOLIZE/OCCLUDE ARTERY: CPT | Mod: 74

## 2023-08-03 RX ORDER — FENTANYL CITRATE 50 UG/ML
25 INJECTION INTRAVENOUS
Refills: 0 | Status: DISCONTINUED | OUTPATIENT
Start: 2023-08-03 | End: 2023-08-03

## 2023-08-03 RX ORDER — ONDANSETRON 8 MG/1
4 TABLET, FILM COATED ORAL ONCE
Refills: 0 | Status: DISCONTINUED | OUTPATIENT
Start: 2023-08-03 | End: 2023-08-03

## 2023-08-03 RX ORDER — SODIUM CHLORIDE 9 MG/ML
500 INJECTION, SOLUTION INTRAVENOUS
Refills: 0 | Status: DISCONTINUED | OUTPATIENT
Start: 2023-08-03 | End: 2023-08-03

## 2023-08-03 RX ORDER — OXYCODONE HYDROCHLORIDE 5 MG/1
5 TABLET ORAL ONCE
Refills: 0 | Status: DISCONTINUED | OUTPATIENT
Start: 2023-08-03 | End: 2023-08-03

## 2023-08-03 RX ORDER — ACETAMINOPHEN 500 MG
1000 TABLET ORAL ONCE
Refills: 0 | Status: DISCONTINUED | OUTPATIENT
Start: 2023-08-03 | End: 2023-08-03

## 2023-08-03 NOTE — ASU DISCHARGE PLAN (ADULT/PEDIATRIC) - ASU DC SPECIAL INSTRUCTIONSFT
You can restart aspirin and plavix tomorrow. In case of pain, you can take over the counter pain meds, such as tylenol as needed.

## 2023-08-03 NOTE — ASU DISCHARGE PLAN (ADULT/PEDIATRIC) - CARE PROVIDER_API CALL
Teo Gutierrez  Vascular Surgery  [address] [place] [id number]  Phone: [phone]  Fax: [phone]  Follow Up Time: 2 weeks

## 2023-08-07 LAB — RENIN ACTIVITY, PLASMA: 22.53 NG/ML/HR

## 2023-08-09 ENCOUNTER — APPOINTMENT (OUTPATIENT)
Dept: HEART FAILURE | Facility: CLINIC | Age: 48
End: 2023-08-09
Payer: COMMERCIAL

## 2023-08-09 VITALS
TEMPERATURE: 98.7 F | HEART RATE: 60 BPM | BODY MASS INDEX: 34.5 KG/M2 | SYSTOLIC BLOOD PRESSURE: 106 MMHG | DIASTOLIC BLOOD PRESSURE: 78 MMHG | WEIGHT: 187.5 LBS | OXYGEN SATURATION: 98 % | HEIGHT: 62 IN

## 2023-08-09 DIAGNOSIS — N18.9 CHRONIC KIDNEY DISEASE, UNSPECIFIED: ICD-10-CM

## 2023-08-09 PROCEDURE — 99214 OFFICE O/P EST MOD 30 MIN: CPT

## 2023-08-09 RX ORDER — ASPIRIN 81 MG
81 TABLET, DELAYED RELEASE (ENTERIC COATED) ORAL DAILY
Refills: 0 | Status: DISCONTINUED | COMMUNITY
End: 2023-08-09

## 2023-08-09 RX ORDER — SACUBITRIL AND VALSARTAN 97; 103 MG/1; MG/1
97-103 TABLET, FILM COATED ORAL TWICE DAILY
Qty: 60 | Refills: 5 | Status: DISCONTINUED | COMMUNITY
End: 2023-08-09

## 2023-08-09 NOTE — ASSESSMENT
[FreeTextEntry1] : 49 y/o Salvadorean speaking male, originally from the  [place] ~ [place], with newly diagnosed dilated cardiomyopathy in setting of hypertensive urgency and Type B aortic dissection s/p bypass grafting (iliac artery to superior mesenteric and celiac arteries 3/9/23) and left carotid to subclavian artery bypass on 5/30/23,who presents for HF follow up.  He has ACC/AHA stage C CM with NYHA class I symptoms.

## 2023-08-09 NOTE — PHYSICAL EXAM
[Well Developed] : well developed [No Acute Distress] : no acute distress [Normal Conjunctiva] : normal conjunctiva [Normal Venous Pressure] : normal venous pressure [Normal S1, S2] : normal S1, S2 [No Murmur] : no murmur [Clear Lung Fields] : clear lung fields [No Respiratory Distress] : no respiratory distress  [Soft] : abdomen soft [Non Tender] : non-tender [Normal Gait] : normal gait [No Edema] : no edema [No Cyanosis] : no cyanosis [No Clubbing] : no clubbing [Moves all extremities] : moves all extremities [Alert and Oriented] : alert and oriented [de-identified] : warm, dry

## 2023-08-09 NOTE — HISTORY OF PRESENT ILLNESS
[FreeTextEntry1] : 49 y/o Khmer speaking male, [place], with PMH significant for uncontrolled HTN, ILD, CKD and newly diagnosed dilated cardiomyopathy in the setting of hypertensive urgency and type B aortic dissection s/p bypass graft (iliac artery to superior mesenteric and celiac arteries 3/9/23) and left carotid to subclavian artery bypass on 5/30/23, who presents for hospital follow-up.  On 2/28/23 the patient initially presented to [facility] for routine physical exam and medication refills. Of note, he had not seen a doctor for over three years and [place] He was last treated at [facility] in 2018 for hypertensive urgency.  At [facility] he was found to be in hypertensive urgency with a systolic blood pressure of 200 and then presented to [facility] ED.  A CTA of the chest, abdomen and pelvis showed a Type B dissection and he was transferred to [facility] for further management. TTE showed newly reduced systolic function LVEF 20-25%. His inpatient course was significant for RHC/LHC, initially cancelled on 3/2 due to worsening renal function, but later resolved. 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 The vascular surgery team recommended no intervention.   He underwent bypass grafting with Dr. Quintero on 3/9. He developed ileus postoperatively, which since resolved. Repeat TTE 3/10 showed improvement in his LV systolic function to 40-45%. He was discharged home 3/18 on Coreg 25mg BID, Entresto 24-26mg BID, Nifedipine 60mg daily and Lasix 40mg daily.  Since last visit he is now s/p aortogram and coil embolization of celiac artery on 8/3. Since his procedure he reports having generalized abdominal/chest pain which caused him to vomit once 2 days after the procedure, but is no longer feeling nauseas and the pain has somewhat improved. He also endorses having a "fever" and chills for the past few days and took Tylenol yesterday morning. Today is afebrile. His BMs are normal and regular. He denies any SOB, orthopnea, PND, or LE edema. He has some dizziness after he takes his morning medications. BP at home this morning was 124/82 and after he takes his medications his SBP will be ~100s. He was told to resume all of his medications post procedure including Coreg 25 mg BID, Entresto  mg BID, spironolactone 25 mg daily, and Lasix 40 mg daily.

## 2023-08-09 NOTE — CARDIOLOGY SUMMARY
[de-identified] : 5/5/23: SR, LAD, LVH, PAC [de-identified] : TTE 3/10/23: LVEF 40-45%, LVIDD 6.06, IVSD 1.29, relative wall thickness 0.46, mild LVH, normal RV size/function, mild TR.  TTE 2/28/23: LVEF 20-25%, LVEDD 6./23cm, LVIDs 5.59cm, TAPSE 2.54cm, mild to mod MR, trivial TR, RV normal function. [de-identified] : C 3/3/23: no CAD. RHC 3/3/23: RA 22, RV 42/14, PA 40/24 (31), PCWP 17, CO 5.9/CI 3.09, PA sat 72.1%, Hb 13.4.

## 2023-08-09 NOTE — REASON FOR VISIT
[Cardiac Failure] : cardiac failure [Other: ______] : provided by [facility] [Interpreters_IDNumber] : [id number] [Interpreters_FullName] : Brand [FreeTextEntry1] : HF: Dr. Chaudhari  CTS: Dr. Heaton

## 2023-08-09 NOTE — DISCUSSION/SUMMARY
[FreeTextEntry1] : # Dilated cardiomyopathy LVEF 40-45% from 20-25% -Etiology: [facility] (LHC 3/3 revealed normal coronaries and no prior stents). Likely due to hypertensive heart disease with increased wall thickness on echo. Genetic testing sent and will also refer to Dr. Miles. -RHC 3/3 showed LVEDP 19, PAP 40/24/31.  -Clinically euvolemic on exam today -GDMT: on spironolactone 25mg daily, coreg 25mg BID, and Entresto 97-103mg BID. He will try  the Entresto and Coreg by at least an hour apart to see if this helps with his dizziness. He will start Farxiga 10 mg daily and repeat labs in 1 week. -Diuretics: He will only take Lasix 40 mg as needed for acute weight gain  # Type B Dissection of Aorta  -S/p iliac to celiac SMA bypass 3/9 by CTS and vascular surgery teams.  -S/p left carotid to subclavian artery bypass on 5/30/23 with Dr. Jones -S/p aortogram with SMA embolization on 8/3/23 -He will call to follow-up with CTS/vascular about his symptoms he has been experiencing after his recent embolization  # HTN  -Resistant HTN during recent admission now improved. Continue GDMT as above -Work up for secondary hypertension with unremarkable labs and US renal Doppler negative for MICHELLE -will refer to pulmonology for sleep study  # CKD -Baseline sCr 1.3-1.6 -Continue close monitoring   # Radial artery thrombosis -F/u with vascular   RTO with Dr. Chaudhari in 1 month.

## 2023-08-15 PROBLEM — R09.2 RESPIRATORY ARREST: Chronic | Status: ACTIVE | Noted: 2023-08-01

## 2023-08-15 PROBLEM — I71.00 DISSECTION OF UNSPECIFIED SITE OF AORTA: Chronic | Status: ACTIVE | Noted: 2023-08-01

## 2023-08-22 ENCOUNTER — OUTPATIENT (OUTPATIENT)
Dept: OUTPATIENT SERVICES | Facility: HOSPITAL | Age: 48
LOS: 1 days | End: 2023-08-22
Payer: COMMERCIAL

## 2023-08-22 DIAGNOSIS — Z95.828 PRESENCE OF OTHER VASCULAR IMPLANTS AND GRAFTS: Chronic | ICD-10-CM

## 2023-08-22 DIAGNOSIS — I71.9 AORTIC ANEURYSM OF UNSPECIFIED SITE, WITHOUT RUPTURE: ICD-10-CM

## 2023-08-22 DIAGNOSIS — Z95.1 PRESENCE OF AORTOCORONARY BYPASS GRAFT: Chronic | ICD-10-CM

## 2023-08-22 DIAGNOSIS — Z95.5 PRESENCE OF CORONARY ANGIOPLASTY IMPLANT AND GRAFT: Chronic | ICD-10-CM

## 2023-08-22 DIAGNOSIS — I71.20 THORACIC AORTIC ANEURYSM, WITHOUT RUPTURE, UNSPECIFIED: ICD-10-CM

## 2023-08-22 DIAGNOSIS — Z98.890 OTHER SPECIFIED POSTPROCEDURAL STATES: Chronic | ICD-10-CM

## 2023-08-22 PROCEDURE — 93306 TTE W/DOPPLER COMPLETE: CPT | Mod: 26

## 2023-08-22 PROCEDURE — C8929: CPT

## 2023-08-24 ENCOUNTER — APPOINTMENT (OUTPATIENT)
Dept: CT IMAGING | Facility: CLINIC | Age: 48
End: 2023-08-24
Payer: COMMERCIAL

## 2023-08-24 ENCOUNTER — OUTPATIENT (OUTPATIENT)
Dept: OUTPATIENT SERVICES | Facility: HOSPITAL | Age: 48
LOS: 1 days | End: 2023-08-24
Payer: COMMERCIAL

## 2023-08-24 VITALS
HEART RATE: 56 BPM | OXYGEN SATURATION: 97 % | HEIGHT: 62 IN | SYSTOLIC BLOOD PRESSURE: 160 MMHG | TEMPERATURE: 98 F | RESPIRATION RATE: 14 BRPM | DIASTOLIC BLOOD PRESSURE: 90 MMHG | WEIGHT: 189.6 LBS

## 2023-08-24 DIAGNOSIS — Z98.890 OTHER SPECIFIED POSTPROCEDURAL STATES: Chronic | ICD-10-CM

## 2023-08-24 DIAGNOSIS — Z01.818 ENCOUNTER FOR OTHER PREPROCEDURAL EXAMINATION: ICD-10-CM

## 2023-08-24 DIAGNOSIS — Z98.890 OTHER SPECIFIED POSTPROCEDURAL STATES: ICD-10-CM

## 2023-08-24 DIAGNOSIS — I10 ESSENTIAL (PRIMARY) HYPERTENSION: ICD-10-CM

## 2023-08-24 DIAGNOSIS — I71.012 DISSECTION OF DESCENDING THORACIC AORTA: ICD-10-CM

## 2023-08-24 DIAGNOSIS — Z95.828 PRESENCE OF OTHER VASCULAR IMPLANTS AND GRAFTS: Chronic | ICD-10-CM

## 2023-08-24 DIAGNOSIS — Z95.1 PRESENCE OF AORTOCORONARY BYPASS GRAFT: Chronic | ICD-10-CM

## 2023-08-24 DIAGNOSIS — I25.10 ATHEROSCLEROTIC HEART DISEASE OF NATIVE CORONARY ARTERY WITHOUT ANGINA PECTORIS: ICD-10-CM

## 2023-08-24 DIAGNOSIS — Z95.5 PRESENCE OF CORONARY ANGIOPLASTY IMPLANT AND GRAFT: Chronic | ICD-10-CM

## 2023-08-24 DIAGNOSIS — Z29.9 ENCOUNTER FOR PROPHYLACTIC MEASURES, UNSPECIFIED: ICD-10-CM

## 2023-08-24 LAB
A1C WITH ESTIMATED AVERAGE GLUCOSE RESULT: 6.2 % — HIGH (ref 4–5.6)
ALBUMIN SERPL ELPH-MCNC: 4.1 G/DL — SIGNIFICANT CHANGE UP (ref 3.3–5.2)
ALP SERPL-CCNC: 126 U/L — HIGH (ref 40–120)
ALT FLD-CCNC: 25 U/L — SIGNIFICANT CHANGE UP
ANION GAP SERPL CALC-SCNC: 15 MMOL/L — SIGNIFICANT CHANGE UP (ref 5–17)
APPEARANCE UR: ABNORMAL
APTT BLD: 30.2 SEC — SIGNIFICANT CHANGE UP (ref 24.5–35.6)
AST SERPL-CCNC: 19 U/L — SIGNIFICANT CHANGE UP
BACTERIA # UR AUTO: ABNORMAL
BASOPHILS # BLD AUTO: 0.02 K/UL — SIGNIFICANT CHANGE UP (ref 0–0.2)
BASOPHILS NFR BLD AUTO: 0.4 % — SIGNIFICANT CHANGE UP (ref 0–2)
BILIRUB SERPL-MCNC: 0.3 MG/DL — LOW (ref 0.4–2)
BILIRUB UR-MCNC: NEGATIVE — SIGNIFICANT CHANGE UP
BLD GP AB SCN SERPL QL: SIGNIFICANT CHANGE UP
BUN SERPL-MCNC: 22 MG/DL — HIGH (ref 8–20)
CALCIUM SERPL-MCNC: 9.9 MG/DL — SIGNIFICANT CHANGE UP (ref 8.4–10.5)
CHLORIDE SERPL-SCNC: 101 MMOL/L — SIGNIFICANT CHANGE UP (ref 96–108)
CO2 SERPL-SCNC: 22 MMOL/L — SIGNIFICANT CHANGE UP (ref 22–29)
COLOR SPEC: YELLOW — SIGNIFICANT CHANGE UP
CREAT SERPL-MCNC: 1.44 MG/DL — HIGH (ref 0.5–1.3)
DIFF PNL FLD: NEGATIVE — SIGNIFICANT CHANGE UP
EGFR: 61 ML/MIN/1.73M2 — SIGNIFICANT CHANGE UP
EOSINOPHIL # BLD AUTO: 0.12 K/UL — SIGNIFICANT CHANGE UP (ref 0–0.5)
EOSINOPHIL NFR BLD AUTO: 2.4 % — SIGNIFICANT CHANGE UP (ref 0–6)
EPI CELLS # UR: SIGNIFICANT CHANGE UP
ESTIMATED AVERAGE GLUCOSE: 131 MG/DL — HIGH (ref 68–114)
GLUCOSE SERPL-MCNC: 110 MG/DL — HIGH (ref 70–99)
GLUCOSE UR QL: 1000 MG/DL
HCT VFR BLD CALC: 41.8 % — SIGNIFICANT CHANGE UP (ref 39–50)
HGB BLD-MCNC: 13.1 G/DL — SIGNIFICANT CHANGE UP (ref 13–17)
IMM GRANULOCYTES NFR BLD AUTO: 0.6 % — SIGNIFICANT CHANGE UP (ref 0–0.9)
INR BLD: 1.07 RATIO — SIGNIFICANT CHANGE UP (ref 0.85–1.18)
KETONES UR-MCNC: NEGATIVE — SIGNIFICANT CHANGE UP
LEUKOCYTE ESTERASE UR-ACNC: NEGATIVE — SIGNIFICANT CHANGE UP
LYMPHOCYTES # BLD AUTO: 1.33 K/UL — SIGNIFICANT CHANGE UP (ref 1–3.3)
LYMPHOCYTES # BLD AUTO: 26.2 % — SIGNIFICANT CHANGE UP (ref 13–44)
MCHC RBC-ENTMCNC: 27.8 PG — SIGNIFICANT CHANGE UP (ref 27–34)
MCHC RBC-ENTMCNC: 31.3 GM/DL — LOW (ref 32–36)
MCV RBC AUTO: 88.6 FL — SIGNIFICANT CHANGE UP (ref 80–100)
MONOCYTES # BLD AUTO: 0.63 K/UL — SIGNIFICANT CHANGE UP (ref 0–0.9)
MONOCYTES NFR BLD AUTO: 12.4 % — SIGNIFICANT CHANGE UP (ref 2–14)
MRSA PCR RESULT.: SIGNIFICANT CHANGE UP
NEUTROPHILS # BLD AUTO: 2.95 K/UL — SIGNIFICANT CHANGE UP (ref 1.8–7.4)
NEUTROPHILS NFR BLD AUTO: 58 % — SIGNIFICANT CHANGE UP (ref 43–77)
NITRITE UR-MCNC: NEGATIVE — SIGNIFICANT CHANGE UP
NT-PROBNP SERPL-SCNC: 30 PG/ML — SIGNIFICANT CHANGE UP (ref 0–300)
PH UR: 5 — SIGNIFICANT CHANGE UP (ref 5–8)
PLATELET # BLD AUTO: 286 K/UL — SIGNIFICANT CHANGE UP (ref 150–400)
POTASSIUM SERPL-MCNC: 4.3 MMOL/L — SIGNIFICANT CHANGE UP (ref 3.5–5.3)
POTASSIUM SERPL-SCNC: 4.3 MMOL/L — SIGNIFICANT CHANGE UP (ref 3.5–5.3)
PREALB SERPL-MCNC: 20 MG/DL — SIGNIFICANT CHANGE UP (ref 18–38)
PROT SERPL-MCNC: 8.2 G/DL — SIGNIFICANT CHANGE UP (ref 6.6–8.7)
PROT UR-MCNC: 30 MG/DL
PROTHROM AB SERPL-ACNC: 11.9 SEC — SIGNIFICANT CHANGE UP (ref 9.5–13)
RBC # BLD: 4.72 M/UL — SIGNIFICANT CHANGE UP (ref 4.2–5.8)
RBC # FLD: 12.6 % — SIGNIFICANT CHANGE UP (ref 10.3–14.5)
RBC CASTS # UR COMP ASSIST: NEGATIVE /HPF — SIGNIFICANT CHANGE UP (ref 0–4)
S AUREUS DNA NOSE QL NAA+PROBE: SIGNIFICANT CHANGE UP
SODIUM SERPL-SCNC: 138 MMOL/L — SIGNIFICANT CHANGE UP (ref 135–145)
SP GR SPEC: 1.02 — SIGNIFICANT CHANGE UP (ref 1.01–1.02)
T3 SERPL-MCNC: 127 NG/DL — SIGNIFICANT CHANGE UP (ref 80–200)
T4 AB SER-ACNC: 8.7 UG/DL — SIGNIFICANT CHANGE UP (ref 4.5–12)
TSH SERPL-MCNC: 1.57 UIU/ML — SIGNIFICANT CHANGE UP (ref 0.27–4.2)
UROBILINOGEN FLD QL: NEGATIVE MG/DL — SIGNIFICANT CHANGE UP
WBC # BLD: 5.08 K/UL — SIGNIFICANT CHANGE UP (ref 3.8–10.5)
WBC # FLD AUTO: 5.08 K/UL — SIGNIFICANT CHANGE UP (ref 3.8–10.5)
WBC UR QL: SIGNIFICANT CHANGE UP /HPF (ref 0–5)

## 2023-08-24 PROCEDURE — 71275 CT ANGIOGRAPHY CHEST: CPT | Mod: 26

## 2023-08-24 PROCEDURE — 71046 X-RAY EXAM CHEST 2 VIEWS: CPT

## 2023-08-24 PROCEDURE — 93005 ELECTROCARDIOGRAM TRACING: CPT

## 2023-08-24 PROCEDURE — 71275 CT ANGIOGRAPHY CHEST: CPT

## 2023-08-24 PROCEDURE — 74174 CTA ABD&PLVS W/CONTRAST: CPT | Mod: 26

## 2023-08-24 PROCEDURE — 74174 CTA ABD&PLVS W/CONTRAST: CPT

## 2023-08-24 PROCEDURE — 93010 ELECTROCARDIOGRAM REPORT: CPT

## 2023-08-24 PROCEDURE — 71046 X-RAY EXAM CHEST 2 VIEWS: CPT | Mod: 26

## 2023-08-24 PROCEDURE — G0463: CPT

## 2023-08-24 RX ORDER — NIFEDIPINE 30 MG
1 TABLET, EXTENDED RELEASE 24 HR ORAL
Refills: 0 | DISCHARGE

## 2023-08-24 NOTE — H&P PST ADULT - ASSESSMENT
48 yr old Zimbabwean speaking male presents to [facility] for a thoracic endovascular aortic repair with spinal drain on 23 with Dr. Heaton.  Pt is a poor historian, [place] is from [place] , travelled here in . Pt has hx of HTN, CAD, AOrtic aneurysm , CKD, and cardiomyopathy. Pt was seen at [facility] in 2023 for HTN, was sent to ER with systolic of > 200. ct scan showed type b dissection and heart failure . 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 Heaton on 3/9/23. Pt had a aortogram and coil embolization of celiac artery on 8/3/23 at [place]. pt denies any CP, pALp or DYspnea , he states he is having the surgery to complete his previous repair. pt lives with his wife and is currently unemployed.   ECHO 2023   Summary:   1. Left ventricular ejection fraction, by visual estimation, is 45 to   50%.   2. Mildly decreased global left ventricular systolic function.   3. Normal left atrial size.   4. Normal right atrial size.   5. Mild thickening of the anterior and posterior mitral valve leaflets.   6. Trace mitral valve regurgitation.   7. Mild tricuspid regurgitation.  OPIOID RISK TOOL    ARNOLD EACH BOX THAT APPLIES AND ADD TOTALS AT THE END    FAMILY HISTORY OF SUBSTANCE ABUSE                 FEMALE         MALE                                                Alcohol                             [  ]1 pt          [  ]3pts                                               Illegal Durgs                     [  ]2 pts        [  ]3pts                                               Rx Drugs                           [  ]4 pts        [  ]4 pts    PERSONAL HISTORY OF SUBSTANCE ABUSE                                                                                          Alcohol                             [  ]3 pts       [  ]3 pts                                               Illegal Durgs                     [  ]4 pts        [  ]4 pts                                               Rx Drugs                           [  ]5 pts        [  ]5 pts    AGE BETWEEN 16-45 YEARS                                      [  ]1 pt         [  ]1 pt    HISTORY OF PREADOLESCENT   SEXUAL ABUSE                                                             [  ]3 pts        [  ]0pts    PSYCHOLOGICAL DISEASE                     ADD, OCD, Bipolar, Schizophrenia        [  ]2 pts         [  ]2 pts                      Depression                                               [  ]1 pt           [  ]1 pt           SCORING TOTAL   (add numbers and type here)              (**0*)                                     A score of 3 or lower indicated LOW risk for future opiod abuse  A score of 4 to 7 indicated moderate risk for future opiod abuse  A score of 8 or higher indicates a high risk for opiod abuse   8. Mild pulmonic valve regurgitation.   9. Endocardial visualization was enhanced with intravenous echocontrast.    CAPRINI VTE 2.0 SCORE [CLOT updated 2019]    AGE RELATED RISK FACTORS                                                       MOBILITY RELATED FACTORS  [X ] Age 41-60 years                                            (1 Point)                    [ ] Bed rest                                                        (1 Point)  [ ] Age: 61-74 years                                           (2 Points)                  [ ] Plaster cast                                                   (2 Points)  [ ] Age= 75 years                                              (3 Points)                    [ ] Bed bound for more than 72 hours                 (2 Points)    DISEASE RELATED RISK FACTORS                                               GENDER SPECIFIC FACTORS  [ ] Edema in the lower extremities                       (1 Point)              [ ] Pregnancy                                                     (1 Point)  [ ] Varicose veins                                               (1 Point)                     [ ] Post-partum < 6 weeks                                   (1 Point)             [X ] BMI > 25 Kg/m2                                            (1 Point)                     [ ] Hormonal therapy  or oral contraception          (1 Point)                 [ ] Sepsis (in the previous month)                        (1 Point)               [ ] History of pregnancy complications                 (1 point)  [ ] Pneumonia or serious lung disease                                               [ ] Unexplained or recurrent                     (1 Point)           (in the previous month)                               (1 Point)  [ ] Abnormal pulmonary function test                     (1 Point)                 SURGERY RELATED RISK FACTORS  [ ] Acute myocardial infarction                              (1 Point)               [ ]  Section                                             (1 Point)  [ ] Congestive heart failure (in the previous month)  (1 Point)      [ ] Minor surgery                                                  (1 Point)   [ ] Inflammatory bowel disease                             (1 Point)               [ ] Arthroscopic surgery                                        (2 Points)  [ ] Central venous access                                      (2 Points)                [X ] General surgery lasting more than 45 minutes (2 points)  [ ] Malignancy- Present or previous                   (2 Points)                [ ] Elective arthroplasty                                         (5 points)    [ ] Stroke (in the previous month)                          (5 Points)                                                                                                                                                           HEMATOLOGY RELATED FACTORS                                                 TRAUMA RELATED RISK FACTORS  [x ] Prior episodes of VTE                                     (3 Points)                [ ] Fracture of the hip, pelvis, or leg                       (5 Points)  [ ] Positive family history for VTE                         (3 Points)             [ ] Acute spinal cord injury (in the previous month)  (5 Points)  [ ] Prothrombin 23887 A                                     (3 Points)               [ ] Paralysis  (less than 1 month)                             (5 Points)  [ ] Factor V Leiden                                             (3 Points)                  [ ] Multiple Trauma within 1 month                        (5 Points)  [ ] Lupus anticoagulants                                     (3 Points)                                                           [ ] Anticardiolipin antibodies                               (3 Points)                                                       [ ] High homocysteine in the blood                      (3 Points)                                             [ ] Other congenital or acquired thrombophilia      (3 Points)                                                [ ] Heparin induced thrombocytopenia                  (3 Points)                                     Total Score [ 7         ]

## 2023-08-24 NOTE — H&P PST ADULT - NSICDXPASTSURGICALHX_GEN_ALL_CORE_FT
PAST SURGICAL HISTORY:  H/O heart artery stent     Presence of bypass graft stent     S/P coronary artery bypass graft x 2     S/P laparotomy     Status post carotid surgery

## 2023-08-24 NOTE — H&P PST ADULT - LAST CARDIAC ANGIOGRAM/IMAGING
ct 5/31/23 type b aortic dissection  involving the thoracoabdominal aorta , occlusion of the celiac limb of the right common iliac to SMA and celiac artery bypass grafts

## 2023-08-24 NOTE — H&P PST ADULT - HISTORY OF PRESENT ILLNESS
48 yr old Vietnamese speaking male presents to [facility] for a thoracic endovascular aortic repair with spinal drain on 9/7/23 with Dr. Heaton.  Pt is a poor historian, [place] is from [place] , travelled here in 2008. Pt has hx of HTN, CAD, AOrtic aneurysm , CKD, and cardiomyopathy. Pt was seen at [facility] in Feb 2023 for HTN, was sent to ER with systolic of > 200. ct scan showed type b dissection and heart failure . 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 Heaton on 3/9/23. Pt had a aortogram and coil embolization of celiac artery on 8/3/23 at [place]. pt denies any CP, pALp or DYspnea , he states he is having the surgery to complete his previous repair.

## 2023-08-25 LAB
CULTURE RESULTS: SIGNIFICANT CHANGE UP
SPECIMEN SOURCE: SIGNIFICANT CHANGE UP

## 2023-09-06 ENCOUNTER — TRANSCRIPTION ENCOUNTER (OUTPATIENT)
Age: 48
End: 2023-09-06

## 2023-09-07 ENCOUNTER — INPATIENT (INPATIENT)
Facility: HOSPITAL | Age: 48
LOS: 4 days | Discharge: HOME | DRG: 220 | End: 2023-09-12
Attending: THORACIC SURGERY (CARDIOTHORACIC VASCULAR SURGERY) | Admitting: THORACIC SURGERY (CARDIOTHORACIC VASCULAR SURGERY)
Payer: COMMERCIAL

## 2023-09-07 ENCOUNTER — TRANSCRIPTION ENCOUNTER (OUTPATIENT)
Age: 48
End: 2023-09-07

## 2023-09-07 ENCOUNTER — APPOINTMENT (OUTPATIENT)
Dept: CARDIOTHORACIC SURGERY | Facility: HOSPITAL | Age: 48
End: 2023-09-07

## 2023-09-07 VITALS
DIASTOLIC BLOOD PRESSURE: 80 MMHG | HEART RATE: 64 BPM | OXYGEN SATURATION: 99 % | WEIGHT: 189.6 LBS | HEIGHT: 62 IN | TEMPERATURE: 98 F | RESPIRATION RATE: 15 BRPM | SYSTOLIC BLOOD PRESSURE: 145 MMHG

## 2023-09-07 DIAGNOSIS — Z95.1 PRESENCE OF AORTOCORONARY BYPASS GRAFT: Chronic | ICD-10-CM

## 2023-09-07 DIAGNOSIS — Z98.890 OTHER SPECIFIED POSTPROCEDURAL STATES: Chronic | ICD-10-CM

## 2023-09-07 DIAGNOSIS — I71.9 AORTIC ANEURYSM OF UNSPECIFIED SITE, WITHOUT RUPTURE: ICD-10-CM

## 2023-09-07 DIAGNOSIS — Z95.828 PRESENCE OF OTHER VASCULAR IMPLANTS AND GRAFTS: Chronic | ICD-10-CM

## 2023-09-07 DIAGNOSIS — Z95.5 PRESENCE OF CORONARY ANGIOPLASTY IMPLANT AND GRAFT: Chronic | ICD-10-CM

## 2023-09-07 LAB
ALBUMIN SERPL ELPH-MCNC: 3.6 G/DL — SIGNIFICANT CHANGE UP (ref 3.3–5.2)
ALP SERPL-CCNC: 116 U/L — SIGNIFICANT CHANGE UP (ref 40–120)
ALT FLD-CCNC: 21 U/L — SIGNIFICANT CHANGE UP
ANION GAP SERPL CALC-SCNC: 12 MMOL/L — SIGNIFICANT CHANGE UP (ref 5–17)
APTT BLD: 28.8 SEC — SIGNIFICANT CHANGE UP (ref 24.5–35.6)
AST SERPL-CCNC: 19 U/L — SIGNIFICANT CHANGE UP
BASE EXCESS BLDA CALC-SCNC: -1.9 MMOL/L — SIGNIFICANT CHANGE UP (ref -2–3)
BASE EXCESS BLDA CALC-SCNC: 0.1 MMOL/L — SIGNIFICANT CHANGE UP (ref -2–3)
BASOPHILS # BLD AUTO: 0.02 K/UL — SIGNIFICANT CHANGE UP (ref 0–0.2)
BASOPHILS NFR BLD AUTO: 0.1 % — SIGNIFICANT CHANGE UP (ref 0–2)
BILIRUB SERPL-MCNC: 0.5 MG/DL — SIGNIFICANT CHANGE UP (ref 0.4–2)
BLD GP AB SCN SERPL QL: SIGNIFICANT CHANGE UP
BUN SERPL-MCNC: 20.3 MG/DL — HIGH (ref 8–20)
CA-I BLDA-SCNC: 1.23 MMOL/L — SIGNIFICANT CHANGE UP (ref 1.15–1.33)
CA-I BLDA-SCNC: 1.24 MMOL/L — SIGNIFICANT CHANGE UP (ref 1.15–1.33)
CALCIUM SERPL-MCNC: 8.9 MG/DL — SIGNIFICANT CHANGE UP (ref 8.4–10.5)
CHLORIDE BLDA-SCNC: 106 MMOL/L — SIGNIFICANT CHANGE UP (ref 96–108)
CHLORIDE BLDA-SCNC: 108 MMOL/L — SIGNIFICANT CHANGE UP (ref 96–108)
CHLORIDE SERPL-SCNC: 100 MMOL/L — SIGNIFICANT CHANGE UP (ref 96–108)
CK SERPL-CCNC: 123 U/L — SIGNIFICANT CHANGE UP (ref 30–200)
CO2 SERPL-SCNC: 21 MMOL/L — LOW (ref 22–29)
COHGB MFR BLDA: 0.7 % — SIGNIFICANT CHANGE UP
COHGB MFR BLDA: 0.9 % — SIGNIFICANT CHANGE UP
CREAT SERPL-MCNC: 1.33 MG/DL — HIGH (ref 0.5–1.3)
EGFR: 67 ML/MIN/1.73M2 — SIGNIFICANT CHANGE UP
EOSINOPHIL # BLD AUTO: 0.08 K/UL — SIGNIFICANT CHANGE UP (ref 0–0.5)
EOSINOPHIL NFR BLD AUTO: 0.5 % — SIGNIFICANT CHANGE UP (ref 0–6)
GAS PNL BLDA: SIGNIFICANT CHANGE UP
GLUCOSE BLDA-MCNC: 154 MG/DL — HIGH (ref 70–99)
GLUCOSE BLDA-MCNC: 205 MG/DL — HIGH (ref 70–99)
GLUCOSE SERPL-MCNC: 188 MG/DL — HIGH (ref 70–99)
HCO3 BLDA-SCNC: 23 MMOL/L — SIGNIFICANT CHANGE UP (ref 21–28)
HCO3 BLDA-SCNC: 25 MMOL/L — SIGNIFICANT CHANGE UP (ref 21–28)
HCT VFR BLD CALC: 39.1 % — SIGNIFICANT CHANGE UP (ref 39–50)
HCT VFR BLDA CALC: 35 % — SIGNIFICANT CHANGE UP
HCT VFR BLDA CALC: 39 % — SIGNIFICANT CHANGE UP
HGB BLD-MCNC: 12.1 G/DL — LOW (ref 13–17)
HGB BLDA-MCNC: 11.5 G/DL — LOW (ref 12.6–17.4)
HGB BLDA-MCNC: 12.9 G/DL — SIGNIFICANT CHANGE UP (ref 12.6–17.4)
IMM GRANULOCYTES NFR BLD AUTO: 0.7 % — SIGNIFICANT CHANGE UP (ref 0–0.9)
INR BLD: 1.15 RATIO — SIGNIFICANT CHANGE UP (ref 0.85–1.18)
LACTATE BLDA-MCNC: 1.7 MMOL/L — SIGNIFICANT CHANGE UP (ref 0.5–2)
LACTATE BLDA-MCNC: 2.2 MMOL/L — HIGH (ref 0.5–2)
LYMPHOCYTES # BLD AUTO: 1.05 K/UL — SIGNIFICANT CHANGE UP (ref 1–3.3)
LYMPHOCYTES # BLD AUTO: 7.1 % — LOW (ref 13–44)
MAGNESIUM SERPL-MCNC: 1.5 MG/DL — LOW (ref 1.6–2.6)
MCHC RBC-ENTMCNC: 27.2 PG — SIGNIFICANT CHANGE UP (ref 27–34)
MCHC RBC-ENTMCNC: 30.9 GM/DL — LOW (ref 32–36)
MCV RBC AUTO: 87.9 FL — SIGNIFICANT CHANGE UP (ref 80–100)
METHGB MFR BLDA: 0.8 % — SIGNIFICANT CHANGE UP
METHGB MFR BLDA: 0.8 % — SIGNIFICANT CHANGE UP
MONOCYTES # BLD AUTO: 0.55 K/UL — SIGNIFICANT CHANGE UP (ref 0–0.9)
MONOCYTES NFR BLD AUTO: 3.7 % — SIGNIFICANT CHANGE UP (ref 2–14)
NEUTROPHILS # BLD AUTO: 12.93 K/UL — HIGH (ref 1.8–7.4)
NEUTROPHILS NFR BLD AUTO: 87.9 % — HIGH (ref 43–77)
OXYHGB MFR BLDA: 98 % — HIGH (ref 90–95)
OXYHGB MFR BLDA: 98 % — HIGH (ref 90–95)
PCO2 BLDA: 37 MMHG — SIGNIFICANT CHANGE UP (ref 35–48)
PCO2 BLDA: 38 MMHG — SIGNIFICANT CHANGE UP (ref 35–48)
PH BLDA: 7.4 — SIGNIFICANT CHANGE UP (ref 7.35–7.45)
PH BLDA: 7.42 — SIGNIFICANT CHANGE UP (ref 7.35–7.45)
PLATELET # BLD AUTO: 156 K/UL — SIGNIFICANT CHANGE UP (ref 150–400)
PO2 BLDA: 314 MMHG — HIGH (ref 83–108)
PO2 BLDA: >496 MMHG — HIGH (ref 83–108)
POTASSIUM BLDA-SCNC: 4.3 MMOL/L — SIGNIFICANT CHANGE UP (ref 3.5–5.1)
POTASSIUM BLDA-SCNC: 5.5 MMOL/L — HIGH (ref 3.5–5.1)
POTASSIUM SERPL-MCNC: 4.8 MMOL/L — SIGNIFICANT CHANGE UP (ref 3.5–5.3)
POTASSIUM SERPL-SCNC: 4.8 MMOL/L — SIGNIFICANT CHANGE UP (ref 3.5–5.3)
PROT SERPL-MCNC: 7.2 G/DL — SIGNIFICANT CHANGE UP (ref 6.6–8.7)
PROTHROM AB SERPL-ACNC: 12.7 SEC — SIGNIFICANT CHANGE UP (ref 9.5–13)
RBC # BLD: 4.45 M/UL — SIGNIFICANT CHANGE UP (ref 4.2–5.8)
RBC # FLD: 13 % — SIGNIFICANT CHANGE UP (ref 10.3–14.5)
SAO2 % BLDA: 100 % — HIGH (ref 94–98)
SAO2 % BLDA: 99.8 % — HIGH (ref 94–98)
SODIUM BLDA-SCNC: 131 MMOL/L — LOW (ref 136–145)
SODIUM BLDA-SCNC: 133 MMOL/L — LOW (ref 136–145)
SODIUM SERPL-SCNC: 133 MMOL/L — LOW (ref 135–145)
TROPONIN T SERPL-MCNC: <0.01 NG/ML — SIGNIFICANT CHANGE UP (ref 0–0.06)
WBC # BLD: 14.73 K/UL — HIGH (ref 3.8–10.5)
WBC # FLD AUTO: 14.73 K/UL — HIGH (ref 3.8–10.5)

## 2023-09-07 PROCEDURE — 71045 X-RAY EXAM CHEST 1 VIEW: CPT | Mod: 26,76

## 2023-09-07 PROCEDURE — 36200 PLACE CATHETER IN AORTA: CPT | Mod: LT

## 2023-09-07 PROCEDURE — 33880 EVASC RPR TA NDGFT COV LSA: CPT | Mod: 62

## 2023-09-07 PROCEDURE — 37252 INTRVASC US NONCORONARY 1ST: CPT | Mod: 62

## 2023-09-07 PROCEDURE — 36200 PLACE CATHETER IN AORTA: CPT | Mod: RT

## 2023-09-07 PROCEDURE — 34713 PERQ ACCESS & CLSR FEM ART: CPT | Mod: LT

## 2023-09-07 PROCEDURE — 75956: CPT | Mod: 26,80

## 2023-09-07 PROCEDURE — 99291 CRITICAL CARE FIRST HOUR: CPT

## 2023-09-07 PROCEDURE — 75956: CPT | Mod: 26

## 2023-09-07 PROCEDURE — 93010 ELECTROCARDIOGRAM REPORT: CPT

## 2023-09-07 DEVICE — KIT A-LINE 1LUM 20G X 12CM SAFE KIT: Type: IMPLANTABLE DEVICE | Status: FUNCTIONAL

## 2023-09-07 DEVICE — SHEATH INTRODUCER TERUMO PINNACLE PERIPHERAL 11FR X 10CM X 0.035" MINI WIRE: Type: IMPLANTABLE DEVICE | Status: FUNCTIONAL

## 2023-09-07 DEVICE — GUIDEWIRE RADIFOCUS GLIDEWIRE ANGLED 0.035" X 260CM STIFF: Type: IMPLANTABLE DEVICE | Status: FUNCTIONAL

## 2023-09-07 DEVICE — IMPLANTABLE DEVICE: Type: IMPLANTABLE DEVICE | Status: FUNCTIONAL

## 2023-09-07 DEVICE — GWIRE VASC ENTRY MINISTICK MAX 5FR 10CM: Type: IMPLANTABLE DEVICE | Status: FUNCTIONAL

## 2023-09-07 DEVICE — FLOSEAL FAST PREP 10ML: Type: IMPLANTABLE DEVICE | Status: FUNCTIONAL

## 2023-09-07 DEVICE — CATH IVUS T/A PU .035CM: Type: IMPLANTABLE DEVICE | Status: FUNCTIONAL

## 2023-09-07 DEVICE — SHEATH INTRODUCER TERUMO PINNACLE PERIPHERAL 9FR X 10CM X 0.035" MINI WIRE: Type: IMPLANTABLE DEVICE | Status: FUNCTIONAL

## 2023-09-07 DEVICE — SHEATH INTRODUCER TERUMO PINNACLE PERIPHERAL 6FR X 10CM X 0.035" MINI WIRE: Type: IMPLANTABLE DEVICE | Status: FUNCTIONAL

## 2023-09-07 DEVICE — GWIRE STR .035X260 STIFF: Type: IMPLANTABLE DEVICE | Status: FUNCTIONAL

## 2023-09-07 DEVICE — GWIRE BENSTON X260: Type: IMPLANTABLE DEVICE | Status: FUNCTIONAL

## 2023-09-07 DEVICE — DRAIN LUMBAR RADIOPAQUE 80CM: Type: IMPLANTABLE DEVICE | Status: FUNCTIONAL

## 2023-09-07 DEVICE — GWIRE ROSEN 0.035INX260CM: Type: IMPLANTABLE DEVICE | Status: FUNCTIONAL

## 2023-09-07 DEVICE — CATH SIZING MARK PIGTL 5FR 100CM: Type: IMPLANTABLE DEVICE | Status: FUNCTIONAL

## 2023-09-07 DEVICE — CATH IMPULSE PIG 145 5FR X 110CM: Type: IMPLANTABLE DEVICE | Status: FUNCTIONAL

## 2023-09-07 DEVICE — GUIDEWIRE LUNDERQUIST EXTRA-STIFF DOUBLE CURVED .035" X 260CM: Type: IMPLANTABLE DEVICE | Status: FUNCTIONAL

## 2023-09-07 DEVICE — SUT PERCLOSE PROGLIDE 6FR: Type: IMPLANTABLE DEVICE | Status: FUNCTIONAL

## 2023-09-07 DEVICE — CATH INFUS SL 7FR 20CM: Type: IMPLANTABLE DEVICE | Status: FUNCTIONAL

## 2023-09-07 DEVICE — CATH ANGIO GLIDECATH ANGLE 5FR X 100CM: Type: IMPLANTABLE DEVICE | Status: FUNCTIONAL

## 2023-09-07 RX ORDER — ACETAMINOPHEN 500 MG
650 TABLET ORAL EVERY 6 HOURS
Refills: 0 | Status: DISCONTINUED | OUTPATIENT
Start: 2023-09-10 | End: 2023-09-12

## 2023-09-07 RX ORDER — POLYETHYLENE GLYCOL 3350 17 G/17G
17 POWDER, FOR SOLUTION ORAL DAILY
Refills: 0 | Status: DISCONTINUED | OUTPATIENT
Start: 2023-09-08 | End: 2023-09-12

## 2023-09-07 RX ORDER — SODIUM CHLORIDE 9 MG/ML
3 INJECTION INTRAMUSCULAR; INTRAVENOUS; SUBCUTANEOUS EVERY 8 HOURS
Refills: 0 | Status: DISCONTINUED | OUTPATIENT
Start: 2023-09-07 | End: 2023-09-07

## 2023-09-07 RX ORDER — SENNA PLUS 8.6 MG/1
2 TABLET ORAL AT BEDTIME
Refills: 0 | Status: DISCONTINUED | OUTPATIENT
Start: 2023-09-08 | End: 2023-09-12

## 2023-09-07 RX ORDER — CHLORHEXIDINE GLUCONATE 213 G/1000ML
15 SOLUTION TOPICAL EVERY 12 HOURS
Refills: 0 | Status: DISCONTINUED | OUTPATIENT
Start: 2023-09-07 | End: 2023-09-07

## 2023-09-07 RX ORDER — MAGNESIUM SULFATE 500 MG/ML
2 VIAL (ML) INJECTION ONCE
Refills: 0 | Status: COMPLETED | OUTPATIENT
Start: 2023-09-07 | End: 2023-09-07

## 2023-09-07 RX ORDER — ASPIRIN/CALCIUM CARB/MAGNESIUM 324 MG
81 TABLET ORAL DAILY
Refills: 0 | Status: DISCONTINUED | OUTPATIENT
Start: 2023-09-07 | End: 2023-09-07

## 2023-09-07 RX ORDER — POTASSIUM CHLORIDE 20 MEQ
10 PACKET (EA) ORAL
Refills: 0 | Status: DISCONTINUED | OUTPATIENT
Start: 2023-09-07 | End: 2023-09-08

## 2023-09-07 RX ORDER — DEXTROSE 50 % IN WATER 50 %
50 SYRINGE (ML) INTRAVENOUS
Refills: 0 | Status: DISCONTINUED | OUTPATIENT
Start: 2023-09-07 | End: 2023-09-07

## 2023-09-07 RX ORDER — HYDROMORPHONE HYDROCHLORIDE 2 MG/ML
0.5 INJECTION INTRAMUSCULAR; INTRAVENOUS; SUBCUTANEOUS EVERY 6 HOURS
Refills: 0 | Status: DISCONTINUED | OUTPATIENT
Start: 2023-09-07 | End: 2023-09-08

## 2023-09-07 RX ORDER — OXYCODONE HYDROCHLORIDE 5 MG/1
5 TABLET ORAL EVERY 4 HOURS
Refills: 0 | Status: DISCONTINUED | OUTPATIENT
Start: 2023-09-07 | End: 2023-09-10

## 2023-09-07 RX ORDER — SODIUM CHLORIDE 9 MG/ML
1000 INJECTION INTRAMUSCULAR; INTRAVENOUS; SUBCUTANEOUS
Refills: 0 | Status: DISCONTINUED | OUTPATIENT
Start: 2023-09-07 | End: 2023-09-10

## 2023-09-07 RX ORDER — ACETAMINOPHEN 500 MG
1000 TABLET ORAL ONCE
Refills: 0 | Status: COMPLETED | OUTPATIENT
Start: 2023-09-07 | End: 2023-09-07

## 2023-09-07 RX ORDER — NOREPINEPHRINE BITARTRATE/D5W 8 MG/250ML
0.04 PLASTIC BAG, INJECTION (ML) INTRAVENOUS
Qty: 8 | Refills: 0 | Status: DISCONTINUED | OUTPATIENT
Start: 2023-09-07 | End: 2023-09-08

## 2023-09-07 RX ORDER — ACETAMINOPHEN 500 MG
650 TABLET ORAL EVERY 6 HOURS
Refills: 0 | Status: COMPLETED | OUTPATIENT
Start: 2023-09-07 | End: 2023-09-10

## 2023-09-07 RX ORDER — OXYCODONE HYDROCHLORIDE 5 MG/1
10 TABLET ORAL EVERY 4 HOURS
Refills: 0 | Status: DISCONTINUED | OUTPATIENT
Start: 2023-09-07 | End: 2023-09-10

## 2023-09-07 RX ORDER — ASCORBIC ACID 60 MG
500 TABLET,CHEWABLE ORAL
Refills: 0 | Status: COMPLETED | OUTPATIENT
Start: 2023-09-07 | End: 2023-09-12

## 2023-09-07 RX ORDER — CEFUROXIME AXETIL 250 MG
1500 TABLET ORAL EVERY 8 HOURS
Refills: 0 | Status: COMPLETED | OUTPATIENT
Start: 2023-09-07 | End: 2023-09-09

## 2023-09-07 RX ORDER — DEXTROSE 50 % IN WATER 50 %
25 SYRINGE (ML) INTRAVENOUS
Refills: 0 | Status: DISCONTINUED | OUTPATIENT
Start: 2023-09-07 | End: 2023-09-07

## 2023-09-07 RX ORDER — CHLORHEXIDINE GLUCONATE 213 G/1000ML
1 SOLUTION TOPICAL
Refills: 0 | Status: DISCONTINUED | OUTPATIENT
Start: 2023-09-07 | End: 2023-09-12

## 2023-09-07 RX ORDER — CEFUROXIME AXETIL 250 MG
1500 TABLET ORAL ONCE
Refills: 0 | Status: DISCONTINUED | OUTPATIENT
Start: 2023-09-07 | End: 2023-09-07

## 2023-09-07 RX ORDER — AMIODARONE HYDROCHLORIDE 400 MG/1
400 TABLET ORAL
Refills: 0 | Status: DISCONTINUED | OUTPATIENT
Start: 2023-09-07 | End: 2023-09-07

## 2023-09-07 RX ORDER — GABAPENTIN 400 MG/1
100 CAPSULE ORAL EVERY 8 HOURS
Refills: 0 | Status: COMPLETED | OUTPATIENT
Start: 2023-09-07 | End: 2023-09-12

## 2023-09-07 RX ORDER — PANTOPRAZOLE SODIUM 20 MG/1
40 TABLET, DELAYED RELEASE ORAL ONCE
Refills: 0 | Status: COMPLETED | OUTPATIENT
Start: 2023-09-07 | End: 2023-09-07

## 2023-09-07 RX ORDER — INSULIN HUMAN 100 [IU]/ML
2 INJECTION, SOLUTION SUBCUTANEOUS
Qty: 100 | Refills: 0 | Status: DISCONTINUED | OUTPATIENT
Start: 2023-09-07 | End: 2023-09-07

## 2023-09-07 RX ORDER — PANTOPRAZOLE SODIUM 20 MG/1
40 TABLET, DELAYED RELEASE ORAL DAILY
Refills: 0 | Status: DISCONTINUED | OUTPATIENT
Start: 2023-09-08 | End: 2023-09-12

## 2023-09-07 RX ADMIN — Medication 650 MILLIGRAM(S): at 21:04

## 2023-09-07 RX ADMIN — HYDROMORPHONE HYDROCHLORIDE 0.5 MILLIGRAM(S): 2 INJECTION INTRAMUSCULAR; INTRAVENOUS; SUBCUTANEOUS at 19:10

## 2023-09-07 RX ADMIN — OXYCODONE HYDROCHLORIDE 10 MILLIGRAM(S): 5 TABLET ORAL at 23:05

## 2023-09-07 RX ADMIN — Medication 400 MILLIGRAM(S): at 15:28

## 2023-09-07 RX ADMIN — PANTOPRAZOLE SODIUM 40 MILLIGRAM(S): 20 TABLET, DELAYED RELEASE ORAL at 12:49

## 2023-09-07 RX ADMIN — Medication 650 MILLIGRAM(S): at 22:04

## 2023-09-07 RX ADMIN — Medication 1000 MILLIGRAM(S): at 16:00

## 2023-09-07 RX ADMIN — Medication 500 MILLIGRAM(S): at 21:05

## 2023-09-07 RX ADMIN — Medication 25 GRAM(S): at 12:49

## 2023-09-07 RX ADMIN — GABAPENTIN 100 MILLIGRAM(S): 400 CAPSULE ORAL at 21:07

## 2023-09-07 RX ADMIN — HYDROMORPHONE HYDROCHLORIDE 0.5 MILLIGRAM(S): 2 INJECTION INTRAMUSCULAR; INTRAVENOUS; SUBCUTANEOUS at 18:42

## 2023-09-07 RX ADMIN — CHLORHEXIDINE GLUCONATE 1 APPLICATION(S): 213 SOLUTION TOPICAL at 21:05

## 2023-09-07 RX ADMIN — Medication 100 MILLIGRAM(S): at 19:21

## 2023-09-08 DIAGNOSIS — I71.03 DISSECTION OF THORACOABDOMINAL AORTA: ICD-10-CM

## 2023-09-08 DIAGNOSIS — I50.42 CHRONIC COMBINED SYSTOLIC (CONGESTIVE) AND DIASTOLIC (CONGESTIVE) HEART FAILURE: ICD-10-CM

## 2023-09-08 LAB
ALBUMIN SERPL ELPH-MCNC: 3.9 G/DL — SIGNIFICANT CHANGE UP (ref 3.3–5.2)
ALP SERPL-CCNC: 113 U/L — SIGNIFICANT CHANGE UP (ref 40–120)
ALT FLD-CCNC: 20 U/L — SIGNIFICANT CHANGE UP
ANION GAP SERPL CALC-SCNC: 12 MMOL/L — SIGNIFICANT CHANGE UP (ref 5–17)
APTT BLD: 28.2 SEC — SIGNIFICANT CHANGE UP (ref 24.5–35.6)
AST SERPL-CCNC: 18 U/L — SIGNIFICANT CHANGE UP
BILIRUB SERPL-MCNC: 0.4 MG/DL — SIGNIFICANT CHANGE UP (ref 0.4–2)
BUN SERPL-MCNC: 17.2 MG/DL — SIGNIFICANT CHANGE UP (ref 8–20)
CALCIUM SERPL-MCNC: 8.9 MG/DL — SIGNIFICANT CHANGE UP (ref 8.4–10.5)
CHLORIDE SERPL-SCNC: 100 MMOL/L — SIGNIFICANT CHANGE UP (ref 96–108)
CK SERPL-CCNC: 119 U/L — SIGNIFICANT CHANGE UP (ref 30–200)
CO2 SERPL-SCNC: 22 MMOL/L — SIGNIFICANT CHANGE UP (ref 22–29)
CREAT SERPL-MCNC: 1.33 MG/DL — HIGH (ref 0.5–1.3)
EGFR: 67 ML/MIN/1.73M2 — SIGNIFICANT CHANGE UP
GLUCOSE SERPL-MCNC: 150 MG/DL — HIGH (ref 70–99)
HCT VFR BLD CALC: 38.6 % — LOW (ref 39–50)
HGB BLD-MCNC: 12 G/DL — LOW (ref 13–17)
INR BLD: 1.13 RATIO — SIGNIFICANT CHANGE UP (ref 0.85–1.18)
MAGNESIUM SERPL-MCNC: 2.2 MG/DL — SIGNIFICANT CHANGE UP (ref 1.6–2.6)
MCHC RBC-ENTMCNC: 27 PG — SIGNIFICANT CHANGE UP (ref 27–34)
MCHC RBC-ENTMCNC: 31.1 GM/DL — LOW (ref 32–36)
MCV RBC AUTO: 86.7 FL — SIGNIFICANT CHANGE UP (ref 80–100)
PLATELET # BLD AUTO: 198 K/UL — SIGNIFICANT CHANGE UP (ref 150–400)
POTASSIUM SERPL-MCNC: 4.6 MMOL/L — SIGNIFICANT CHANGE UP (ref 3.5–5.3)
POTASSIUM SERPL-SCNC: 4.6 MMOL/L — SIGNIFICANT CHANGE UP (ref 3.5–5.3)
PROT SERPL-MCNC: 7.5 G/DL — SIGNIFICANT CHANGE UP (ref 6.6–8.7)
PROTHROM AB SERPL-ACNC: 12.5 SEC — SIGNIFICANT CHANGE UP (ref 9.5–13)
RBC # BLD: 4.45 M/UL — SIGNIFICANT CHANGE UP (ref 4.2–5.8)
RBC # FLD: 13.1 % — SIGNIFICANT CHANGE UP (ref 10.3–14.5)
SODIUM SERPL-SCNC: 134 MMOL/L — LOW (ref 135–145)
TROPONIN T SERPL-MCNC: <0.01 NG/ML — SIGNIFICANT CHANGE UP (ref 0–0.06)
WBC # BLD: 12.65 K/UL — HIGH (ref 3.8–10.5)
WBC # FLD AUTO: 12.65 K/UL — HIGH (ref 3.8–10.5)

## 2023-09-08 PROCEDURE — 99291 CRITICAL CARE FIRST HOUR: CPT

## 2023-09-08 PROCEDURE — 71045 X-RAY EXAM CHEST 1 VIEW: CPT | Mod: 26

## 2023-09-08 PROCEDURE — 93010 ELECTROCARDIOGRAM REPORT: CPT

## 2023-09-08 RX ORDER — SODIUM CHLORIDE 9 MG/ML
500 INJECTION, SOLUTION INTRAVENOUS ONCE
Refills: 0 | Status: COMPLETED | OUTPATIENT
Start: 2023-09-08 | End: 2023-09-08

## 2023-09-08 RX ORDER — FENTANYL CITRATE 50 UG/ML
25 INJECTION INTRAVENOUS ONCE
Refills: 0 | Status: DISCONTINUED | OUTPATIENT
Start: 2023-09-08 | End: 2023-09-08

## 2023-09-08 RX ADMIN — OXYCODONE HYDROCHLORIDE 10 MILLIGRAM(S): 5 TABLET ORAL at 10:00

## 2023-09-08 RX ADMIN — Medication 500 MILLIGRAM(S): at 05:02

## 2023-09-08 RX ADMIN — CHLORHEXIDINE GLUCONATE 1 APPLICATION(S): 213 SOLUTION TOPICAL at 05:41

## 2023-09-08 RX ADMIN — OXYCODONE HYDROCHLORIDE 10 MILLIGRAM(S): 5 TABLET ORAL at 18:00

## 2023-09-08 RX ADMIN — Medication 650 MILLIGRAM(S): at 15:43

## 2023-09-08 RX ADMIN — OXYCODONE HYDROCHLORIDE 10 MILLIGRAM(S): 5 TABLET ORAL at 00:05

## 2023-09-08 RX ADMIN — Medication 650 MILLIGRAM(S): at 03:53

## 2023-09-08 RX ADMIN — PANTOPRAZOLE SODIUM 40 MILLIGRAM(S): 20 TABLET, DELAYED RELEASE ORAL at 11:06

## 2023-09-08 RX ADMIN — Medication 650 MILLIGRAM(S): at 02:53

## 2023-09-08 RX ADMIN — Medication 650 MILLIGRAM(S): at 14:43

## 2023-09-08 RX ADMIN — Medication 500 MILLIGRAM(S): at 17:03

## 2023-09-08 RX ADMIN — Medication 650 MILLIGRAM(S): at 23:09

## 2023-09-08 RX ADMIN — GABAPENTIN 100 MILLIGRAM(S): 400 CAPSULE ORAL at 14:43

## 2023-09-08 RX ADMIN — SENNA PLUS 2 TABLET(S): 8.6 TABLET ORAL at 21:23

## 2023-09-08 RX ADMIN — HYDROMORPHONE HYDROCHLORIDE 0.5 MILLIGRAM(S): 2 INJECTION INTRAMUSCULAR; INTRAVENOUS; SUBCUTANEOUS at 11:57

## 2023-09-08 RX ADMIN — Medication 100 MILLIGRAM(S): at 14:44

## 2023-09-08 RX ADMIN — Medication 100 MILLIGRAM(S): at 05:01

## 2023-09-08 RX ADMIN — POLYETHYLENE GLYCOL 3350 17 GRAM(S): 17 POWDER, FOR SOLUTION ORAL at 11:07

## 2023-09-08 RX ADMIN — GABAPENTIN 100 MILLIGRAM(S): 400 CAPSULE ORAL at 21:23

## 2023-09-08 RX ADMIN — HYDROMORPHONE HYDROCHLORIDE 0.5 MILLIGRAM(S): 2 INJECTION INTRAMUSCULAR; INTRAVENOUS; SUBCUTANEOUS at 10:57

## 2023-09-08 RX ADMIN — Medication 650 MILLIGRAM(S): at 21:23

## 2023-09-08 RX ADMIN — Medication 650 MILLIGRAM(S): at 09:00

## 2023-09-08 RX ADMIN — OXYCODONE HYDROCHLORIDE 10 MILLIGRAM(S): 5 TABLET ORAL at 09:00

## 2023-09-08 RX ADMIN — OXYCODONE HYDROCHLORIDE 10 MILLIGRAM(S): 5 TABLET ORAL at 17:45

## 2023-09-08 RX ADMIN — Medication 100 MILLIGRAM(S): at 21:24

## 2023-09-08 RX ADMIN — GABAPENTIN 100 MILLIGRAM(S): 400 CAPSULE ORAL at 05:02

## 2023-09-08 RX ADMIN — SODIUM CHLORIDE 500 MILLILITER(S): 9 INJECTION, SOLUTION INTRAVENOUS at 02:35

## 2023-09-08 RX ADMIN — Medication 650 MILLIGRAM(S): at 10:00

## 2023-09-09 LAB
ALBUMIN SERPL ELPH-MCNC: 3.5 G/DL — SIGNIFICANT CHANGE UP (ref 3.3–5.2)
ALP SERPL-CCNC: 92 U/L — SIGNIFICANT CHANGE UP (ref 40–120)
ALT FLD-CCNC: 12 U/L — SIGNIFICANT CHANGE UP
AMYLASE P1 CFR SERPL: 66 U/L — SIGNIFICANT CHANGE UP (ref 36–128)
ANION GAP SERPL CALC-SCNC: 11 MMOL/L — SIGNIFICANT CHANGE UP (ref 5–17)
ANION GAP SERPL CALC-SCNC: 11 MMOL/L — SIGNIFICANT CHANGE UP (ref 5–17)
AST SERPL-CCNC: 12 U/L — SIGNIFICANT CHANGE UP
BILIRUB SERPL-MCNC: 0.5 MG/DL — SIGNIFICANT CHANGE UP (ref 0.4–2)
BUN SERPL-MCNC: 15.6 MG/DL — SIGNIFICANT CHANGE UP (ref 8–20)
BUN SERPL-MCNC: 17.3 MG/DL — SIGNIFICANT CHANGE UP (ref 8–20)
CALCIUM SERPL-MCNC: 8.7 MG/DL — SIGNIFICANT CHANGE UP (ref 8.4–10.5)
CALCIUM SERPL-MCNC: 8.8 MG/DL — SIGNIFICANT CHANGE UP (ref 8.4–10.5)
CHLORIDE SERPL-SCNC: 102 MMOL/L — SIGNIFICANT CHANGE UP (ref 96–108)
CHLORIDE SERPL-SCNC: 99 MMOL/L — SIGNIFICANT CHANGE UP (ref 96–108)
CK SERPL-CCNC: 68 U/L — SIGNIFICANT CHANGE UP (ref 30–200)
CO2 SERPL-SCNC: 24 MMOL/L — SIGNIFICANT CHANGE UP (ref 22–29)
CO2 SERPL-SCNC: 25 MMOL/L — SIGNIFICANT CHANGE UP (ref 22–29)
CREAT SERPL-MCNC: 1.18 MG/DL — SIGNIFICANT CHANGE UP (ref 0.5–1.3)
CREAT SERPL-MCNC: 1.2 MG/DL — SIGNIFICANT CHANGE UP (ref 0.5–1.3)
EGFR: 76 ML/MIN/1.73M2 — SIGNIFICANT CHANGE UP
EGFR: 77 ML/MIN/1.73M2 — SIGNIFICANT CHANGE UP
GAS PNL BLDA: SIGNIFICANT CHANGE UP
GLUCOSE SERPL-MCNC: 109 MG/DL — HIGH (ref 70–99)
GLUCOSE SERPL-MCNC: 95 MG/DL — SIGNIFICANT CHANGE UP (ref 70–99)
HCT VFR BLD CALC: 34.2 % — LOW (ref 39–50)
HCT VFR BLD CALC: 35 % — LOW (ref 39–50)
HGB BLD-MCNC: 10.8 G/DL — LOW (ref 13–17)
HGB BLD-MCNC: 11 G/DL — LOW (ref 13–17)
LIDOCAIN IGE QN: 21 U/L — LOW (ref 22–51)
MAGNESIUM SERPL-MCNC: 1.9 MG/DL — SIGNIFICANT CHANGE UP (ref 1.6–2.6)
MCHC RBC-ENTMCNC: 27.6 PG — SIGNIFICANT CHANGE UP (ref 27–34)
MCHC RBC-ENTMCNC: 27.6 PG — SIGNIFICANT CHANGE UP (ref 27–34)
MCHC RBC-ENTMCNC: 31.4 GM/DL — LOW (ref 32–36)
MCHC RBC-ENTMCNC: 31.6 GM/DL — LOW (ref 32–36)
MCV RBC AUTO: 87.5 FL — SIGNIFICANT CHANGE UP (ref 80–100)
MCV RBC AUTO: 87.9 FL — SIGNIFICANT CHANGE UP (ref 80–100)
PLATELET # BLD AUTO: 122 K/UL — LOW (ref 150–400)
PLATELET # BLD AUTO: 127 K/UL — LOW (ref 150–400)
POTASSIUM SERPL-MCNC: 4 MMOL/L — SIGNIFICANT CHANGE UP (ref 3.5–5.3)
POTASSIUM SERPL-MCNC: 4.1 MMOL/L — SIGNIFICANT CHANGE UP (ref 3.5–5.3)
POTASSIUM SERPL-SCNC: 4 MMOL/L — SIGNIFICANT CHANGE UP (ref 3.5–5.3)
POTASSIUM SERPL-SCNC: 4.1 MMOL/L — SIGNIFICANT CHANGE UP (ref 3.5–5.3)
PROT SERPL-MCNC: 6.8 G/DL — SIGNIFICANT CHANGE UP (ref 6.6–8.7)
RBC # BLD: 3.91 M/UL — LOW (ref 4.2–5.8)
RBC # BLD: 3.98 M/UL — LOW (ref 4.2–5.8)
RBC # FLD: 13.3 % — SIGNIFICANT CHANGE UP (ref 10.3–14.5)
RBC # FLD: 13.4 % — SIGNIFICANT CHANGE UP (ref 10.3–14.5)
SODIUM SERPL-SCNC: 135 MMOL/L — SIGNIFICANT CHANGE UP (ref 135–145)
SODIUM SERPL-SCNC: 137 MMOL/L — SIGNIFICANT CHANGE UP (ref 135–145)
TROPONIN T SERPL-MCNC: <0.01 NG/ML — SIGNIFICANT CHANGE UP (ref 0–0.06)
WBC # BLD: 7.09 K/UL — SIGNIFICANT CHANGE UP (ref 3.8–10.5)
WBC # BLD: 7.78 K/UL — SIGNIFICANT CHANGE UP (ref 3.8–10.5)
WBC # FLD AUTO: 7.09 K/UL — SIGNIFICANT CHANGE UP (ref 3.8–10.5)
WBC # FLD AUTO: 7.78 K/UL — SIGNIFICANT CHANGE UP (ref 3.8–10.5)

## 2023-09-09 PROCEDURE — 99024 POSTOP FOLLOW-UP VISIT: CPT

## 2023-09-09 PROCEDURE — 93010 ELECTROCARDIOGRAM REPORT: CPT | Mod: 76

## 2023-09-09 PROCEDURE — 74018 RADEX ABDOMEN 1 VIEW: CPT | Mod: 26

## 2023-09-09 PROCEDURE — 93306 TTE W/DOPPLER COMPLETE: CPT | Mod: 26

## 2023-09-09 PROCEDURE — 71045 X-RAY EXAM CHEST 1 VIEW: CPT | Mod: 26,76

## 2023-09-09 PROCEDURE — 99291 CRITICAL CARE FIRST HOUR: CPT

## 2023-09-09 RX ORDER — CARVEDILOL PHOSPHATE 80 MG/1
12.5 CAPSULE, EXTENDED RELEASE ORAL EVERY 12 HOURS
Refills: 0 | Status: DISCONTINUED | OUTPATIENT
Start: 2023-09-09 | End: 2023-09-11

## 2023-09-09 RX ORDER — LACTULOSE 10 G/15ML
30 SOLUTION ORAL ONCE
Refills: 0 | Status: COMPLETED | OUTPATIENT
Start: 2023-09-09 | End: 2023-09-09

## 2023-09-09 RX ORDER — CARVEDILOL PHOSPHATE 80 MG/1
12.5 CAPSULE, EXTENDED RELEASE ORAL EVERY 12 HOURS
Refills: 0 | Status: DISCONTINUED | OUTPATIENT
Start: 2023-09-09 | End: 2023-09-09

## 2023-09-09 RX ORDER — MINERAL OIL
133 OIL (ML) MISCELLANEOUS ONCE
Refills: 0 | Status: COMPLETED | OUTPATIENT
Start: 2023-09-09 | End: 2023-09-09

## 2023-09-09 RX ORDER — LACTULOSE 10 G/15ML
30 SOLUTION ORAL ONCE
Refills: 0 | Status: DISCONTINUED | OUTPATIENT
Start: 2023-09-09 | End: 2023-09-09

## 2023-09-09 RX ORDER — MAGNESIUM SULFATE 500 MG/ML
1 VIAL (ML) INJECTION ONCE
Refills: 0 | Status: COMPLETED | OUTPATIENT
Start: 2023-09-09 | End: 2023-09-09

## 2023-09-09 RX ADMIN — LACTULOSE 20 GRAM(S): 10 SOLUTION ORAL at 15:49

## 2023-09-09 RX ADMIN — Medication 650 MILLIGRAM(S): at 23:48

## 2023-09-09 RX ADMIN — Medication 100 MILLIGRAM(S): at 05:52

## 2023-09-09 RX ADMIN — Medication 133 MILLILITER(S): at 17:45

## 2023-09-09 RX ADMIN — OXYCODONE HYDROCHLORIDE 5 MILLIGRAM(S): 5 TABLET ORAL at 07:00

## 2023-09-09 RX ADMIN — GABAPENTIN 100 MILLIGRAM(S): 400 CAPSULE ORAL at 15:48

## 2023-09-09 RX ADMIN — Medication 10 MILLIGRAM(S): at 11:05

## 2023-09-09 RX ADMIN — OXYCODONE HYDROCHLORIDE 5 MILLIGRAM(S): 5 TABLET ORAL at 06:00

## 2023-09-09 RX ADMIN — Medication 500 MILLIGRAM(S): at 18:22

## 2023-09-09 RX ADMIN — Medication 100 GRAM(S): at 04:56

## 2023-09-09 RX ADMIN — CARVEDILOL PHOSPHATE 12.5 MILLIGRAM(S): 80 CAPSULE, EXTENDED RELEASE ORAL at 18:22

## 2023-09-09 RX ADMIN — GABAPENTIN 100 MILLIGRAM(S): 400 CAPSULE ORAL at 05:53

## 2023-09-09 RX ADMIN — Medication 650 MILLIGRAM(S): at 12:00

## 2023-09-09 RX ADMIN — Medication 650 MILLIGRAM(S): at 11:04

## 2023-09-09 RX ADMIN — Medication 500 MILLIGRAM(S): at 05:53

## 2023-09-09 RX ADMIN — GABAPENTIN 100 MILLIGRAM(S): 400 CAPSULE ORAL at 21:57

## 2023-09-09 RX ADMIN — PANTOPRAZOLE SODIUM 40 MILLIGRAM(S): 20 TABLET, DELAYED RELEASE ORAL at 11:05

## 2023-09-09 RX ADMIN — Medication 650 MILLIGRAM(S): at 18:35

## 2023-09-09 RX ADMIN — POLYETHYLENE GLYCOL 3350 17 GRAM(S): 17 POWDER, FOR SOLUTION ORAL at 11:04

## 2023-09-09 RX ADMIN — CHLORHEXIDINE GLUCONATE 1 APPLICATION(S): 213 SOLUTION TOPICAL at 05:53

## 2023-09-09 RX ADMIN — Medication 650 MILLIGRAM(S): at 18:22

## 2023-09-09 RX ADMIN — SENNA PLUS 2 TABLET(S): 8.6 TABLET ORAL at 21:57

## 2023-09-09 NOTE — PATIENT PROFILE ADULT - FALL HARM RISK - HARM RISK INTERVENTIONS

## 2023-09-10 LAB
ANION GAP SERPL CALC-SCNC: 14 MMOL/L — SIGNIFICANT CHANGE UP (ref 5–17)
BUN SERPL-MCNC: 13.5 MG/DL — SIGNIFICANT CHANGE UP (ref 8–20)
CALCIUM SERPL-MCNC: 9.2 MG/DL — SIGNIFICANT CHANGE UP (ref 8.4–10.5)
CHLORIDE SERPL-SCNC: 98 MMOL/L — SIGNIFICANT CHANGE UP (ref 96–108)
CO2 SERPL-SCNC: 25 MMOL/L — SIGNIFICANT CHANGE UP (ref 22–29)
CREAT SERPL-MCNC: 1.16 MG/DL — SIGNIFICANT CHANGE UP (ref 0.5–1.3)
EGFR: 79 ML/MIN/1.73M2 — SIGNIFICANT CHANGE UP
GLUCOSE SERPL-MCNC: 99 MG/DL — SIGNIFICANT CHANGE UP (ref 70–99)
HCT VFR BLD CALC: 32.9 % — LOW (ref 39–50)
HGB BLD-MCNC: 10.5 G/DL — LOW (ref 13–17)
MAGNESIUM SERPL-MCNC: 1.9 MG/DL — SIGNIFICANT CHANGE UP (ref 1.6–2.6)
MCHC RBC-ENTMCNC: 27.9 PG — SIGNIFICANT CHANGE UP (ref 27–34)
MCHC RBC-ENTMCNC: 31.9 GM/DL — LOW (ref 32–36)
MCV RBC AUTO: 87.3 FL — SIGNIFICANT CHANGE UP (ref 80–100)
PLATELET # BLD AUTO: 123 K/UL — LOW (ref 150–400)
POTASSIUM SERPL-MCNC: 3.8 MMOL/L — SIGNIFICANT CHANGE UP (ref 3.5–5.3)
POTASSIUM SERPL-SCNC: 3.8 MMOL/L — SIGNIFICANT CHANGE UP (ref 3.5–5.3)
RBC # BLD: 3.77 M/UL — LOW (ref 4.2–5.8)
RBC # FLD: 13.4 % — SIGNIFICANT CHANGE UP (ref 10.3–14.5)
SODIUM SERPL-SCNC: 136 MMOL/L — SIGNIFICANT CHANGE UP (ref 135–145)
WBC # BLD: 7.49 K/UL — SIGNIFICANT CHANGE UP (ref 3.8–10.5)
WBC # FLD AUTO: 7.49 K/UL — SIGNIFICANT CHANGE UP (ref 3.8–10.5)

## 2023-09-10 PROCEDURE — 93010 ELECTROCARDIOGRAM REPORT: CPT

## 2023-09-10 PROCEDURE — 99291 CRITICAL CARE FIRST HOUR: CPT

## 2023-09-10 PROCEDURE — 71045 X-RAY EXAM CHEST 1 VIEW: CPT | Mod: 26

## 2023-09-10 RX ORDER — MAGNESIUM SULFATE 500 MG/ML
2 VIAL (ML) INJECTION ONCE
Refills: 0 | Status: COMPLETED | OUTPATIENT
Start: 2023-09-10 | End: 2023-09-10

## 2023-09-10 RX ORDER — ACETAMINOPHEN 500 MG
1000 TABLET ORAL ONCE
Refills: 0 | Status: COMPLETED | OUTPATIENT
Start: 2023-09-10 | End: 2023-09-10

## 2023-09-10 RX ORDER — POTASSIUM CHLORIDE 20 MEQ
40 PACKET (EA) ORAL ONCE
Refills: 0 | Status: COMPLETED | OUTPATIENT
Start: 2023-09-10 | End: 2023-09-10

## 2023-09-10 RX ADMIN — POLYETHYLENE GLYCOL 3350 17 GRAM(S): 17 POWDER, FOR SOLUTION ORAL at 11:24

## 2023-09-10 RX ADMIN — CARVEDILOL PHOSPHATE 12.5 MILLIGRAM(S): 80 CAPSULE, EXTENDED RELEASE ORAL at 17:00

## 2023-09-10 RX ADMIN — GABAPENTIN 100 MILLIGRAM(S): 400 CAPSULE ORAL at 21:31

## 2023-09-10 RX ADMIN — GABAPENTIN 100 MILLIGRAM(S): 400 CAPSULE ORAL at 05:13

## 2023-09-10 RX ADMIN — Medication 1000 MILLIGRAM(S): at 19:36

## 2023-09-10 RX ADMIN — PANTOPRAZOLE SODIUM 40 MILLIGRAM(S): 20 TABLET, DELAYED RELEASE ORAL at 11:24

## 2023-09-10 RX ADMIN — CARVEDILOL PHOSPHATE 12.5 MILLIGRAM(S): 80 CAPSULE, EXTENDED RELEASE ORAL at 05:13

## 2023-09-10 RX ADMIN — Medication 650 MILLIGRAM(S): at 00:48

## 2023-09-10 RX ADMIN — Medication 500 MILLIGRAM(S): at 17:00

## 2023-09-10 RX ADMIN — CHLORHEXIDINE GLUCONATE 1 APPLICATION(S): 213 SOLUTION TOPICAL at 05:14

## 2023-09-10 RX ADMIN — SENNA PLUS 2 TABLET(S): 8.6 TABLET ORAL at 21:31

## 2023-09-10 RX ADMIN — Medication 400 MILLIGRAM(S): at 18:36

## 2023-09-10 RX ADMIN — Medication 40 MILLIEQUIVALENT(S): at 05:12

## 2023-09-10 RX ADMIN — Medication 650 MILLIGRAM(S): at 05:13

## 2023-09-10 RX ADMIN — Medication 500 MILLIGRAM(S): at 05:13

## 2023-09-10 RX ADMIN — Medication 650 MILLIGRAM(S): at 06:13

## 2023-09-10 RX ADMIN — GABAPENTIN 100 MILLIGRAM(S): 400 CAPSULE ORAL at 13:16

## 2023-09-10 RX ADMIN — Medication 25 GRAM(S): at 05:12

## 2023-09-11 ENCOUNTER — TRANSCRIPTION ENCOUNTER (OUTPATIENT)
Age: 48
End: 2023-09-11

## 2023-09-11 LAB
ANION GAP SERPL CALC-SCNC: 14 MMOL/L — SIGNIFICANT CHANGE UP (ref 5–17)
BUN SERPL-MCNC: 18.3 MG/DL — SIGNIFICANT CHANGE UP (ref 8–20)
CALCIUM SERPL-MCNC: 9.5 MG/DL — SIGNIFICANT CHANGE UP (ref 8.4–10.5)
CHLORIDE SERPL-SCNC: 99 MMOL/L — SIGNIFICANT CHANGE UP (ref 96–108)
CO2 SERPL-SCNC: 24 MMOL/L — SIGNIFICANT CHANGE UP (ref 22–29)
CREAT SERPL-MCNC: 1.18 MG/DL — SIGNIFICANT CHANGE UP (ref 0.5–1.3)
EGFR: 77 ML/MIN/1.73M2 — SIGNIFICANT CHANGE UP
GLUCOSE SERPL-MCNC: 99 MG/DL — SIGNIFICANT CHANGE UP (ref 70–99)
HCT VFR BLD CALC: 33.7 % — LOW (ref 39–50)
HGB BLD-MCNC: 10.9 G/DL — LOW (ref 13–17)
MAGNESIUM SERPL-MCNC: 2 MG/DL — SIGNIFICANT CHANGE UP (ref 1.6–2.6)
MCHC RBC-ENTMCNC: 27.8 PG — SIGNIFICANT CHANGE UP (ref 27–34)
MCHC RBC-ENTMCNC: 32.3 GM/DL — SIGNIFICANT CHANGE UP (ref 32–36)
MCV RBC AUTO: 86 FL — SIGNIFICANT CHANGE UP (ref 80–100)
PLATELET # BLD AUTO: 141 K/UL — LOW (ref 150–400)
POTASSIUM SERPL-MCNC: 4.1 MMOL/L — SIGNIFICANT CHANGE UP (ref 3.5–5.3)
POTASSIUM SERPL-SCNC: 4.1 MMOL/L — SIGNIFICANT CHANGE UP (ref 3.5–5.3)
RBC # BLD: 3.92 M/UL — LOW (ref 4.2–5.8)
RBC # FLD: 13.2 % — SIGNIFICANT CHANGE UP (ref 10.3–14.5)
SODIUM SERPL-SCNC: 137 MMOL/L — SIGNIFICANT CHANGE UP (ref 135–145)
WBC # BLD: 7.62 K/UL — SIGNIFICANT CHANGE UP (ref 3.8–10.5)
WBC # FLD AUTO: 7.62 K/UL — SIGNIFICANT CHANGE UP (ref 3.8–10.5)

## 2023-09-11 PROCEDURE — 71045 X-RAY EXAM CHEST 1 VIEW: CPT | Mod: 26

## 2023-09-11 PROCEDURE — 99233 SBSQ HOSP IP/OBS HIGH 50: CPT

## 2023-09-11 PROCEDURE — 99024 POSTOP FOLLOW-UP VISIT: CPT

## 2023-09-11 RX ORDER — ASPIRIN/CALCIUM CARB/MAGNESIUM 324 MG
81 TABLET ORAL DAILY
Refills: 0 | Status: DISCONTINUED | OUTPATIENT
Start: 2023-09-11 | End: 2023-09-12

## 2023-09-11 RX ORDER — SODIUM CHLORIDE 9 MG/ML
3 INJECTION INTRAMUSCULAR; INTRAVENOUS; SUBCUTANEOUS EVERY 8 HOURS
Refills: 0 | Status: DISCONTINUED | OUTPATIENT
Start: 2023-09-11 | End: 2023-09-12

## 2023-09-11 RX ORDER — ENOXAPARIN SODIUM 100 MG/ML
40 INJECTION SUBCUTANEOUS EVERY 24 HOURS
Refills: 0 | Status: DISCONTINUED | OUTPATIENT
Start: 2023-09-11 | End: 2023-09-12

## 2023-09-11 RX ORDER — CARVEDILOL PHOSPHATE 80 MG/1
12.5 CAPSULE, EXTENDED RELEASE ORAL ONCE
Refills: 0 | Status: COMPLETED | OUTPATIENT
Start: 2023-09-11 | End: 2023-09-11

## 2023-09-11 RX ORDER — CARVEDILOL PHOSPHATE 80 MG/1
25 CAPSULE, EXTENDED RELEASE ORAL EVERY 12 HOURS
Refills: 0 | Status: DISCONTINUED | OUTPATIENT
Start: 2023-09-11 | End: 2023-09-12

## 2023-09-11 RX ADMIN — GABAPENTIN 100 MILLIGRAM(S): 400 CAPSULE ORAL at 22:06

## 2023-09-11 RX ADMIN — SODIUM CHLORIDE 3 MILLILITER(S): 9 INJECTION INTRAMUSCULAR; INTRAVENOUS; SUBCUTANEOUS at 21:30

## 2023-09-11 RX ADMIN — SODIUM CHLORIDE 3 MILLILITER(S): 9 INJECTION INTRAMUSCULAR; INTRAVENOUS; SUBCUTANEOUS at 14:36

## 2023-09-11 RX ADMIN — CHLORHEXIDINE GLUCONATE 1 APPLICATION(S): 213 SOLUTION TOPICAL at 05:30

## 2023-09-11 RX ADMIN — POLYETHYLENE GLYCOL 3350 17 GRAM(S): 17 POWDER, FOR SOLUTION ORAL at 11:00

## 2023-09-11 RX ADMIN — Medication 81 MILLIGRAM(S): at 11:00

## 2023-09-11 RX ADMIN — CARVEDILOL PHOSPHATE 12.5 MILLIGRAM(S): 80 CAPSULE, EXTENDED RELEASE ORAL at 08:59

## 2023-09-11 RX ADMIN — Medication 500 MILLIGRAM(S): at 17:20

## 2023-09-11 RX ADMIN — GABAPENTIN 100 MILLIGRAM(S): 400 CAPSULE ORAL at 05:29

## 2023-09-11 RX ADMIN — Medication 500 MILLIGRAM(S): at 05:30

## 2023-09-11 RX ADMIN — CARVEDILOL PHOSPHATE 25 MILLIGRAM(S): 80 CAPSULE, EXTENDED RELEASE ORAL at 17:19

## 2023-09-11 RX ADMIN — PANTOPRAZOLE SODIUM 40 MILLIGRAM(S): 20 TABLET, DELAYED RELEASE ORAL at 11:01

## 2023-09-11 RX ADMIN — ENOXAPARIN SODIUM 40 MILLIGRAM(S): 100 INJECTION SUBCUTANEOUS at 11:00

## 2023-09-11 RX ADMIN — CARVEDILOL PHOSPHATE 12.5 MILLIGRAM(S): 80 CAPSULE, EXTENDED RELEASE ORAL at 05:29

## 2023-09-11 RX ADMIN — GABAPENTIN 100 MILLIGRAM(S): 400 CAPSULE ORAL at 14:10

## 2023-09-11 NOTE — PHYSICAL THERAPY INITIAL EVALUATION ADULT - NEUROVASCULAR ASSESSMENT LLE
Continued Stay Review    Date:     FOR  7/2                          Current Patient Class:   Inpatient  Current Level of Care:   Med surg    HPI:64 y o  female initially admitted on    7/1   With  Hypokalemia, likely  From chronic  Diarrhea, L   Arm pain     Assessment/Plan:   -2-21 Day 2:      Potassium improved to 2 3   Obtain aldosterone/renin level  Trend BMP  Continue telemetry  Continue iv kcl q2 hr and iv ns 75/hr  Today patient states her mental status has cleared  Yesterday she felt she was in a "fog "  New order for Imodium tid prn, Lidoderm patch at hs      97 3-74-18      121/59     sats   90 % RA    Nephrology consult  ( 7/2)    Etiology of  Hypokalemia  Unclear  Potassium levels  Low  But has been on  HCTZ, also with diarrhea  Continue to replace potassium  Need to monitor labs closely  Has  4-5  BM's, chronic  Issue, can be contributing to  Hypokalemia  7/3     K   3 1  Continue to replace electrolytes  No concern at this point for adrenal crisis, no hypotension  Continue to hold  Thiazide  Goal  K  Is   4, continue to replace  Until then  Continues to complain of left shoulder pain, recent  L shoulder arthroplasty,  No acute fracture noted  Lidocaine patch ordered  7/4       Complains of  Migraine, slightly dizzy  Admits to chronic migraines  Continue to  Monitor labs,  K  3 2  Replace as  Needed  7/5     Sats  Dropped to   87  % RA and placed on  O2  2L  NC  Denied any  Increased shortness of breath, cough, congestion, no respiratory complaints  Encouraged  Incentive spirometer     K now    4 1    Vital Signs:   07/05/21 2300  96 7 °F (35 9 °C)Abnormal   68  20  130/77  --  97 %  28  2 L/min  Nasal cannula  Lying   07/05/21 1900  --  --  --  --  --  --  28  2 L/min  --  --   07/05/21 1810  --  --  --  --  --  91 %  28  2 L/min  Nasal cannula  --   07/05/21 1800  --  --  --  --  --  87 %Abnormal   --  --  None (Room air)  --   07/05/21 1500  97 °F (36 1 no numbness/no tingling °C)Abnormal   86  20  124/67  --  92 %  --  --  None (Room air)  Lying   07/05/21 0738  --  --  --  --  --  94 %  28  2 L/min  Nasal cannula  --   07/05/21 0732  --  --  --  --  --  90 %  28  2 L/min  Nasal cannula  --   07/05/21 0725  98 2 °F (36 8 °C)  84  20  147/71  --  87 %Abnormal   --  --  None (Room air)  Lying   07/04/21 2216  97 8 °F (36 6 °C)  75  20  154/72  --  96 %  --  --  None (Room air)  Lying   07/04/21 1449  97 7 °F (36 5 °C)  75  16  134/70  89  94 %  --  --  None (Room air)  Lying   07/04/21 0751  97 7 °F (36 5 °C)  74  18  141/75  88  94 %  --  --  None (Room air)         Pertinent Labs/Diagnostic Results:       Results from last 7 days   Lab Units 07/06/21  0636 07/03/21  0520 07/02/21  0524 07/01/21  1707   WBC Thousand/uL 7 85 9 11 9 55 9 34   HEMOGLOBIN g/dL 11 2* 10 8* 10 4* 11 5   HEMATOCRIT % 35 4 34 4* 32 6* 35 4   PLATELETS Thousands/uL 372 381 407* 420*   NEUTROS ABS Thousands/µL 4 04  --  5 85 5 91         Results from last 7 days   Lab Units 07/06/21  0730 07/05/21  0434 07/04/21 2014 07/04/21  0454 07/03/21  1942 07/01/21  2352 07/01/21  1707   SODIUM mmol/L 143 144 143 144 141   < > 144   POTASSIUM mmol/L 4 2 4 1 4 5 3 2* 3 6  --  1 8*   CHLORIDE mmol/L 104 105 106 103 101   < > 100   CO2 mmol/L 37* 38* 36* 38* 39*   < > 42*   ANION GAP mmol/L 2* 1* 1* 3* 1*   < > 2*   BUN mg/dL 11 13 14 10 12   < > 12   CREATININE mg/dL 0 84 0 85 0 93 0 82 0 95   < > 0 91   EGFR ml/min/1 73sq m 75 74 66 77 64   < > 68   CALCIUM mg/dL 8 3 8 2* 8 8 8 5 8 1*   < > 8 8   MAGNESIUM mg/dL  --   --   --   --   --   --  2 1   PHOSPHORUS mg/dL  --   --   --   --   --   --  3 0    < > = values in this interval not displayed       Results from last 7 days   Lab Units 07/01/21  1707   AST U/L 23   ALT U/L 19   ALK PHOS U/L 147*   TOTAL PROTEIN g/dL 6 6   ALBUMIN g/dL 2 7*   TOTAL BILIRUBIN mg/dL 0 20         Results from last 7 days   Lab Units 07/06/21  0730 07/05/21  0434 07/04/21 2014 07/04/21  0454 07/03/21  1942 07/03/21  1349 07/03/21  0520 07/02/21  1852 07/02/21  0626 07/01/21  1707   GLUCOSE RANDOM mg/dL 82 70 101 94 118 152* 89 95 92 89              Results from last 7 days   Lab Units 07/02/21  1237   PH LAST  7 373   PCO2 LAST mm Hg 62 0*   PO2 LAST mm Hg 57 4*   HCO3 LAST mmol/L 35 3*   BASE EXC LAST mmol/L 8 2   O2 CONTENT LAST ml/dL 14 0   O2 HGB, VENOUS % 85 4*             Results from last 7 days   Lab Units 07/01/21 2015 07/01/21  1707   TROPONIN I ng/mL <0 02 <0 02         Results from last 7 days   Lab Units 07/01/21  1707   PROTIME seconds 15 1*   INR  1 22*   PTT seconds 34     Results from last 7 days   Lab Units 07/01/21  1707   TSH 3RD GENERATON uIU/mL 0 999                     Results from last 7 days   Lab Units 07/05/21  0434   NT-PRO BNP pg/mL 499*             Results from last 7 days   Lab Units 07/01/21  1707   LIPASE u/L 45*             Results from last 7 days   Lab Units 07/02/21  1343 07/01/21  1753   CLARITY UA   --  Clear   COLOR UA   --  Yellow   SPEC GRAV UA   --  1 010   PH UA   --  7 0   GLUCOSE UA mg/dl  --  Negative   KETONES UA mg/dl  --  Negative   BLOOD UA   --  Negative   PROTEIN UA mg/dl  --  Negative   NITRITE UA   --  Negative   BILIRUBIN UA   --  Negative   UROBILINOGEN UA E U /dl  --  0 2   LEUKOCYTES UA   --  Trace*   WBC UA /hpf  --  0-1*   RBC UA /hpf  --  None Seen   BACTERIA UA /hpf  --  Occasional   EPITHELIAL CELLS WET PREP /hpf  --  Occasional   CREATININE UR mg/dL 113 4  --                Medications:   Scheduled Medications:  apixaban, 5 mg, Oral, BID  ARIPiprazole, 5 mg, Oral, Daily  dicyclomine, 20 mg, Oral, 4x Daily (AC & HS)  docusate sodium, 100 mg, Oral, BID  fluvoxaMINE, 300 mg, Oral, HS  gabapentin, 300 mg, Oral, TID  lamoTRIgine, 200 mg, Oral, Daily  lidocaine, 1 patch, Topical, HS  LORazepam, 2 mg, Oral, HS  LORazepam, 2 mg, Oral, BID (AM & Afternoon)  pantoprazole, 40 mg, Oral, Early Morning  potassium chloride, 20 mEq, Oral, BID  IV  KCL Q 2 hrs      Continuous IV Infusions:     PRN Meds:  acetaminophen, 650 mg, Oral, Q6H PRN  cyclobenzaprine, 10 mg, Oral, TID PRN  loperamide, 2 mg, Oral, TID PRN  meclizine, 12 5 mg, Oral, Q8H PRN  ondansetron, 4 mg, Intravenous, Q6H PRN  ( x1  7/2      X1  7/3      X2  7/4)   oxyCODONE, 5 mg, Oral, Q4H PRN        Discharge Plan:    [facility]    Network Utilization Review Department  ATTENTION: Please call with any questions or concerns to [phone] and carefully listen to the prompts so that you are directed to the right person  All voicemails are confidential   Tosha Marsh all requests for admission clinical reviews, approved or denied determinations and any other requests to dedicated fax number below belonging to the campus where the patient is receiving treatment   List of dedicated fax numbers for the Facilities:  [address] DENIALS (Administrative/Medical Necessity) [phone]   [address] (Maternity/NICU/Pediatrics) [phone]   [address] Chao Kincaid 5974 [id number] [place] [address] Shayy Mata 1481 [address] [facility] [place] [address] [phone]

## 2023-09-11 NOTE — PROGRESS NOTE ADULT - PROBLEM SELECTOR PLAN 4
Continue GI ppx with Protonix and Senna  DVT ppx with SCD boots - holding chemical ppx in setting of spinal drain  CHG while invasive lines in place
Continue GI ppx with Protonix and Senna  DVT ppx with SCD boots - holding chemical ppx in setting of spinal drain  CHG while invasive lines in place  Strict glucose management for SWI ppx

## 2023-09-11 NOTE — PROGRESS NOTE ADULT - PROBLEM SELECTOR PROBLEM 1
Aortic dissection, thoracoabdominal

## 2023-09-11 NOTE — PHYSICAL THERAPY INITIAL EVALUATION ADULT - PERTINENT HX OF CURRENT PROBLEM, REHAB EVAL
as per medical chart: presents to PST for a thoracic endovascular aortic repair with spinal drain on 9/7/23 with Dr. Heaton.

## 2023-09-11 NOTE — PROGRESS NOTE ADULT - PROBLEM SELECTOR PLAN 1
Known to CT Surgery for Type B Dissection  Had staged procedures: celiac and SMA bypass via midline laparotomy on 3/9/23 with Angelica Mandel and Florina and left carotid subclavian bypass with Dr. Mandel on 5/30/23  Now s/p TEVAR with left brachial artery cutdown (with spinal drain) with Angelica Heaton and Ministerio 9/7/23.  Allowing permissive HTN for spinal perfusion by maintaining MAP >65  Maintain spinal drain in place. Clamped 9/9 and removed 9/10   Encourage PO intake  Encourage OOB to chair and ambulation with PT once spinal drain removed  Encourage deep breathing exercised and coughing  Chest PT and IS use with bedside nurse
Known to CT Surgery for Type B Dissection  Had staged procedures: celiac and SMA bypass via midline laparotomy on 3/9/23 with Angelica Mandel and Florina and left carotid subclavian bypass with Dr. Mandel on 5/30/23  Now s/p TEVAR with left brachial artery cutdown (with spinal drain) with Angelica Heaton and Ministerio 9/7/23.  Allowing permissive HTN for spinal perfusion by maintaining MAP >65  Maintain spinal drain in place. Clamped 9/9 plan to remove later today   Encourage PO intake  Encourage OOB to chair and ambulation with PT once spinal drain removed  Remove cordis and, thakkar later today  Encourage deep breathing exercised and coughing  Chest PT and IS use with bedside nurse
Known to CT Surgery for Type B Dissection  Had staged procedures: celiac and SMA bypass via midline laparotomy on 3/9/23 with Angelica Mandel and Florina and left carotid subclavian bypass with Dr. Mandel on 5/30/23  Now s/p TEVAR with left brachial artery cutdown (with spinal drain) with Angelica Heaton and Ministerio 9/7/23.  Allowing permissive HTN for spinal perfusion by maintaining MAP  - requiring Levophed at this point.   Maintain spinal drain in place. Drain 10cc max per hour for ICP > 12.  Encourage PO intake  Encourage OOB to chair and ambulation with PT once spinal drain removed  Encourage deep breathing exercised and coughing  Chest PT and IS use with bedside nurse
Known to CT Surgery for Type B Dissection  Had staged procedures: celiac and SMA bypass via midline laparotomy on 3/9/23 with Angelica Mandel and Florina and left carotid subclavian bypass with Dr. Mandel on 5/30/23  Now s/p TEVAR with left brachial artery cutdown (with spinal drain) with Angelica Heaton and Ministerio 9/7/23.  Allowing permissive HTN for spinal perfusion by maintaining MAP >65  Maintain spinal drain in place. Drain 10cc max per hour for ICP > 12. Possibly clamp later today or remove   Encourage PO intake  Encourage OOB to chair and ambulation with PT once spinal drain removed  Encourage deep breathing exercised and coughing  Chest PT and IS use with bedside nurse

## 2023-09-11 NOTE — PROGRESS NOTE ADULT - PROBLEM SELECTOR PLAN 2
Resume home medications once able  Restarted half dose of coreg 9/9
Resume home medications once able  Restarted half dose of coreg 9/9
Resume home medications once able
Resume home medications once able

## 2023-09-11 NOTE — PROGRESS NOTE ADULT - PROBLEM SELECTOR PLAN 3
Resume home medications once able

## 2023-09-11 NOTE — PHYSICAL THERAPY INITIAL EVALUATION ADULT - ADDITIONAL COMMENTS
as per pt: resides in the house with 12 steps to enter (+) rails, Family available to assist as needed upon D/C home, (-) DME

## 2023-09-11 NOTE — PROGRESS NOTE ADULT - ASSESSMENT
48M, poor historian, PMH of HTN (uncontrolled), CAD w/ stents (2021 in [place]), and CKD (baseline SCr ~1.3). He is known to CT Surgery at [facility] since March 2023 for Type B dissection for which he underwent celiac and SMA bypass via midline laparotomy on 3/9/23 with Angelica Mandel and Florina and left carotid subclavian bypass with Dr. Mandel on 5/30/23. He was SDA 9/7/23 for TEVAR with left brachial artery cutdown (with spinal drain) with Angelica Heaton and Ministerio. Postoperative course thus far uneventful. 9/9 spinal drain clamped. 
48M, poor historian, PMH of HTN (uncontrolled), CAD w/ stents (2021 in [place]), and CKD (baseline SCr ~1.3). He is known to CT Surgery at [facility] since March 2023 for Type B dissection for which he underwent celiac and SMA bypass via midline laparotomy on 3/9/23 with Angelica Mandel and Florina and left carotid subclavian bypass with Dr. Mandel on 5/30/23. He was SDA 9/7/23 for TEVAR with left brachial artery cutdown (with spinal drain) with Angelica Heaton and Ministerio. Postoperative course thus far uneventful. 
48M, poor historian, PMH of HTN (uncontrolled), CAD w/ stents (2021 in [place]), and CKD (baseline SCr ~1.3). He is known to CT Surgery at [facility] since March 2023 for Type B dissection for which he underwent celiac and SMA bypass via midline laparotomy on 3/9/23 with Angelica Mandel and Florina and left carotid subclavian bypass with Dr. Mandel on 5/30/23. He was SDA 9/7/23 for TEVAR with left brachial artery cutdown (with spinal drain) with Angelica Heaton and Ministerio. Postoperative course thus far uneventful. 9/9 spinal drain clamped. 9/10 spinal drain removed. 
48M, poor historian, PMH of HTN (uncontrolled), CAD w/ stents (2021 in [place]), and CKD (baseline SCr ~1.3). He is known to CT Surgery at [facility] since March 2023 for Type B dissection for which he underwent celiac and SMA bypass via midline laparotomy on 3/9/23 with Angelica Mandel and Florina and left carotid subclavian bypass with Dr. Mandel on 5/30/23. He was SDA 9/7/23 for TEVAR with left brachial artery cutdown (with spinal drain) with Angelica Heaton and Ministerio. Postoperative course thus far uneventful.

## 2023-09-12 ENCOUNTER — TRANSCRIPTION ENCOUNTER (OUTPATIENT)
Age: 48
End: 2023-09-12

## 2023-09-12 VITALS
HEART RATE: 63 BPM | SYSTOLIC BLOOD PRESSURE: 164 MMHG | RESPIRATION RATE: 18 BRPM | DIASTOLIC BLOOD PRESSURE: 89 MMHG | OXYGEN SATURATION: 99 %

## 2023-09-12 LAB
ANION GAP SERPL CALC-SCNC: 13 MMOL/L — SIGNIFICANT CHANGE UP (ref 5–17)
BUN SERPL-MCNC: 21.3 MG/DL — HIGH (ref 8–20)
CALCIUM SERPL-MCNC: 9.3 MG/DL — SIGNIFICANT CHANGE UP (ref 8.4–10.5)
CHLORIDE SERPL-SCNC: 96 MMOL/L — SIGNIFICANT CHANGE UP (ref 96–108)
CO2 SERPL-SCNC: 25 MMOL/L — SIGNIFICANT CHANGE UP (ref 22–29)
CREAT SERPL-MCNC: 1.19 MG/DL — SIGNIFICANT CHANGE UP (ref 0.5–1.3)
EGFR: 76 ML/MIN/1.73M2 — SIGNIFICANT CHANGE UP
GLUCOSE SERPL-MCNC: 97 MG/DL — SIGNIFICANT CHANGE UP (ref 70–99)
HCT VFR BLD CALC: 33.6 % — LOW (ref 39–50)
HGB BLD-MCNC: 10.5 G/DL — LOW (ref 13–17)
MAGNESIUM SERPL-MCNC: 1.8 MG/DL — SIGNIFICANT CHANGE UP (ref 1.6–2.6)
MCHC RBC-ENTMCNC: 27.2 PG — SIGNIFICANT CHANGE UP (ref 27–34)
MCHC RBC-ENTMCNC: 31.3 GM/DL — LOW (ref 32–36)
MCV RBC AUTO: 87 FL — SIGNIFICANT CHANGE UP (ref 80–100)
PLATELET # BLD AUTO: 158 K/UL — SIGNIFICANT CHANGE UP (ref 150–400)
POTASSIUM SERPL-MCNC: 3.7 MMOL/L — SIGNIFICANT CHANGE UP (ref 3.5–5.3)
POTASSIUM SERPL-SCNC: 3.7 MMOL/L — SIGNIFICANT CHANGE UP (ref 3.5–5.3)
RBC # BLD: 3.86 M/UL — LOW (ref 4.2–5.8)
RBC # FLD: 13.1 % — SIGNIFICANT CHANGE UP (ref 10.3–14.5)
SODIUM SERPL-SCNC: 134 MMOL/L — LOW (ref 135–145)
WBC # BLD: 5.92 K/UL — SIGNIFICANT CHANGE UP (ref 3.8–10.5)
WBC # FLD AUTO: 5.92 K/UL — SIGNIFICANT CHANGE UP (ref 3.8–10.5)

## 2023-09-12 PROCEDURE — C1753: CPT

## 2023-09-12 PROCEDURE — 71045 X-RAY EXAM CHEST 1 VIEW: CPT | Mod: 26

## 2023-09-12 PROCEDURE — 83690 ASSAY OF LIPASE: CPT

## 2023-09-12 PROCEDURE — 93325 DOPPLER ECHO COLOR FLOW MAPG: CPT

## 2023-09-12 PROCEDURE — 93312 ECHO TRANSESOPHAGEAL: CPT

## 2023-09-12 PROCEDURE — C1894: CPT

## 2023-09-12 PROCEDURE — 80053 COMPREHEN METABOLIC PANEL: CPT

## 2023-09-12 PROCEDURE — 86900 BLOOD TYPING SEROLOGIC ABO: CPT

## 2023-09-12 PROCEDURE — 94760 N-INVAS EAR/PLS OXIMETRY 1: CPT

## 2023-09-12 PROCEDURE — 97163 PT EVAL HIGH COMPLEX 45 MIN: CPT

## 2023-09-12 PROCEDURE — 84484 ASSAY OF TROPONIN QUANT: CPT

## 2023-09-12 PROCEDURE — C1887: CPT

## 2023-09-12 PROCEDURE — C1729: CPT

## 2023-09-12 PROCEDURE — 85014 HEMATOCRIT: CPT

## 2023-09-12 PROCEDURE — 86901 BLOOD TYPING SEROLOGIC RH(D): CPT

## 2023-09-12 PROCEDURE — 85025 COMPLETE CBC W/AUTO DIFF WBC: CPT

## 2023-09-12 PROCEDURE — 94660 CPAP INITIATION&MGMT: CPT

## 2023-09-12 PROCEDURE — 71045 X-RAY EXAM CHEST 1 VIEW: CPT

## 2023-09-12 PROCEDURE — 36415 COLL VENOUS BLD VENIPUNCTURE: CPT

## 2023-09-12 PROCEDURE — 83735 ASSAY OF MAGNESIUM: CPT

## 2023-09-12 PROCEDURE — C1769: CPT

## 2023-09-12 PROCEDURE — 85018 HEMOGLOBIN: CPT

## 2023-09-12 PROCEDURE — 93005 ELECTROCARDIOGRAM TRACING: CPT

## 2023-09-12 PROCEDURE — C1889: CPT

## 2023-09-12 PROCEDURE — 93320 DOPPLER ECHO COMPLETE: CPT

## 2023-09-12 PROCEDURE — C1760: CPT

## 2023-09-12 PROCEDURE — 83605 ASSAY OF LACTIC ACID: CPT

## 2023-09-12 PROCEDURE — 80048 BASIC METABOLIC PNL TOTAL CA: CPT

## 2023-09-12 PROCEDURE — 85730 THROMBOPLASTIN TIME PARTIAL: CPT

## 2023-09-12 PROCEDURE — 99291 CRITICAL CARE FIRST HOUR: CPT

## 2023-09-12 PROCEDURE — 82550 ASSAY OF CK (CPK): CPT

## 2023-09-12 PROCEDURE — 76000 FLUOROSCOPY <1 HR PHYS/QHP: CPT

## 2023-09-12 PROCEDURE — C9399: CPT

## 2023-09-12 PROCEDURE — C8929: CPT

## 2023-09-12 PROCEDURE — 84132 ASSAY OF SERUM POTASSIUM: CPT

## 2023-09-12 PROCEDURE — 84295 ASSAY OF SERUM SODIUM: CPT

## 2023-09-12 PROCEDURE — C1874: CPT

## 2023-09-12 PROCEDURE — 82435 ASSAY OF BLOOD CHLORIDE: CPT

## 2023-09-12 PROCEDURE — C1751: CPT

## 2023-09-12 PROCEDURE — 82947 ASSAY GLUCOSE BLOOD QUANT: CPT

## 2023-09-12 PROCEDURE — 86850 RBC ANTIBODY SCREEN: CPT

## 2023-09-12 PROCEDURE — 74018 RADEX ABDOMEN 1 VIEW: CPT

## 2023-09-12 PROCEDURE — 85027 COMPLETE CBC AUTOMATED: CPT

## 2023-09-12 PROCEDURE — 82803 BLOOD GASES ANY COMBINATION: CPT

## 2023-09-12 PROCEDURE — 82330 ASSAY OF CALCIUM: CPT

## 2023-09-12 PROCEDURE — 82150 ASSAY OF AMYLASE: CPT

## 2023-09-12 PROCEDURE — 85610 PROTHROMBIN TIME: CPT

## 2023-09-12 RX ORDER — POTASSIUM CHLORIDE 20 MEQ
40 PACKET (EA) ORAL ONCE
Refills: 0 | Status: COMPLETED | OUTPATIENT
Start: 2023-09-12 | End: 2023-09-12

## 2023-09-12 RX ORDER — DAPAGLIFLOZIN 10 MG/1
1 TABLET, FILM COATED ORAL
Refills: 0 | DISCHARGE

## 2023-09-12 RX ORDER — MAGNESIUM OXIDE 400 MG ORAL TABLET 241.3 MG
800 TABLET ORAL ONCE
Refills: 0 | Status: COMPLETED | OUTPATIENT
Start: 2023-09-12 | End: 2023-09-12

## 2023-09-12 RX ORDER — SPIRONOLACTONE 25 MG/1
1 TABLET, FILM COATED ORAL
Refills: 0 | DISCHARGE

## 2023-09-12 RX ORDER — SACUBITRIL AND VALSARTAN 24; 26 MG/1; MG/1
1 TABLET, FILM COATED ORAL
Refills: 0 | DISCHARGE

## 2023-09-12 RX ORDER — ASPIRIN/CALCIUM CARB/MAGNESIUM 324 MG
1 TABLET ORAL
Qty: 30 | Refills: 2
Start: 2023-09-12 | End: 2023-12-10

## 2023-09-12 RX ADMIN — CHLORHEXIDINE GLUCONATE 1 APPLICATION(S): 213 SOLUTION TOPICAL at 05:26

## 2023-09-12 RX ADMIN — SODIUM CHLORIDE 3 MILLILITER(S): 9 INJECTION INTRAMUSCULAR; INTRAVENOUS; SUBCUTANEOUS at 06:10

## 2023-09-12 RX ADMIN — Medication 40 MILLIEQUIVALENT(S): at 05:24

## 2023-09-12 RX ADMIN — CARVEDILOL PHOSPHATE 25 MILLIGRAM(S): 80 CAPSULE, EXTENDED RELEASE ORAL at 05:25

## 2023-09-12 RX ADMIN — MAGNESIUM OXIDE 400 MG ORAL TABLET 800 MILLIGRAM(S): 241.3 TABLET ORAL at 05:25

## 2023-09-12 RX ADMIN — GABAPENTIN 100 MILLIGRAM(S): 400 CAPSULE ORAL at 05:25

## 2023-09-12 RX ADMIN — Medication 500 MILLIGRAM(S): at 05:25

## 2023-09-12 NOTE — DISCHARGE NOTE NURSING/CASE MANAGEMENT/SOCIAL WORK - PATIENT PORTAL LINK FT
You can access the FollowMyHealth Patient Portal offered by [facility] by registering at the following website: [email or web address]. By joining [facility]’s FollowMyHealth portal, you will also be able to view your health information using other applications (apps) compatible with our system.

## 2023-09-12 NOTE — DISCHARGE NOTE PROVIDER - CARE PROVIDERS DIRECT ADDRESSES
,DirectAddress_Unknown ,DirectAddress_Unknown,[email or web address],DirectAddress_Unknown

## 2023-09-12 NOTE — DISCHARGE NOTE PROVIDER - PROVIDER TOKENS
PROVIDER:[TOKEN:[[id number]],FOLLOWUP:[2 weeks]] PROVIDER:[TOKEN:[[id number]],FOLLOWUP:[2 weeks]],PROVIDER:[TOKEN:[[id number]]],PROVIDER:[TOKEN:[[id number]]]

## 2023-09-12 NOTE — DISCHARGE NOTE PROVIDER - NSDCFUADDAPPT_GEN_ALL_CORE_FT
Please follow up with Dr. Heaton on _______ at [facility].     **Please obtain Chest X-Ray (script enclosed in folder) on the day of your scheduled appointment, preferably at a [facility] Imaging facility.**     The cardiac surgery office is located on the first floor of [facility] at [address] [place] [address]. Please enter through the lobby. A [facility] employee will then direct you where to go.  Please follow up with Dr. Heaton on    The cardiac surgery office is located at [facility], first floor. Take a left at the end of the lobby until the end of that pinzon (past the elevator bank). Make a left and the office is on your right across from the elevators.      **Please obtain Chest X-Ray (script enclosed in folder) on the day of your scheduled appointment, preferably at a [facility] Imaging facility.**     The cardiac surgery office is located on the first floor of [facility] at [address] [place] [address]. Please enter through the lobby. A [facility] employee will then direct you where to go.  Please follow up with Dr. Heaton on 9/29/23 at 1230    Please be ~ 30 mins early on the day of your appointment to have a chest xray (script is in your folder) taken on the first floor in radiology PRIOR to going to the CT surgery office.    The cardiac surgery office is located at [facility], first floor. Take a left at the end of the lobby until the end of that pinzon (past the elevator bank). Make a left and the office is on your right across from the elevators.      **Please obtain Chest X-Ray (script enclosed in folder) on the day of your scheduled appointment, preferably at a [facility] Imaging facility.**     The cardiac surgery office is located on the first floor of [facility] at [address] [place] [address]. Please enter through the lobby. A [facility] employee will then direct you where to go.

## 2023-09-12 NOTE — DISCHARGE NOTE PROVIDER - NSDCFUADDINST_GEN_ALL_CORE_FT
Please call the Cardiothoracic Surgery office at [phone] if you are experiencing any shortness of breath, chest pain, fevers or chills, drainage from the incisions, persistent nausea, vomiting or if you have any questions about your medications. If the symptoms are severe, call 911 and go to the nearest hospital.

## 2023-09-12 NOTE — DISCHARGE NOTE PROVIDER - NSDCMRMEDTOKEN_GEN_ALL_CORE_FT
carvedilol 25 mg oral tablet: 1 tab(s) orally every 12 hours  Entresto 49 mg-51 mg oral tablet: 1 orally once a day  Farxiga 10 mg oral tablet: 1 orally once a day pt states he was given 10 doses for heart denies diabetes  spironolactone 25 mg oral tablet: 1 orally once a day   aspirin 81 mg oral delayed release tablet: 1 tab(s) orally once a day  carvedilol 25 mg oral tablet: 1 tab(s) orally every 12 hours

## 2023-09-12 NOTE — PROGRESS NOTE ADULT - PROVIDER SPECIALTY LIST ADULT
Critical Care
CT Surgery
Critical Care
Critical Care
CT Surgery

## 2023-09-12 NOTE — PROGRESS NOTE ADULT - SUBJECTIVE AND OBJECTIVE BOX
FABIANA CALIXTO  MRN#: [id number]  Subjective: The patient was in the CTICU in critical condition at risk for imminent decompensation and was seen and evaluate on AM rounds with the entire multidisciplinary team.     OBJECTIVE:  ICU Vital Signs Last 24 Hrs  T(C): 37.3 (12 Sep 2023 08:00), Max: 37.4 (11 Sep 2023 12:00)  T(F): 99.1 (12 Sep 2023 08:00), Max: 99.3 (11 Sep 2023 12:00)  HR: 61 (12 Sep 2023 08:00) (61 - 69)  BP: 148/96 (12 Sep 2023 08:00) (117/56 - 158/78)  BP(mean): 115 (12 Sep 2023 08:00) (80 - 115)  ABP: --  ABP(mean): --  RR: 15 (12 Sep 2023 08:00) (15 - 25)  SpO2: 95% (12 Sep 2023 08:00) (90% - 97%)    O2 Parameters below as of 12 Sep 2023 08:00  Patient On (Oxygen Delivery Method): room air       @ 07:  -   @ 07:00  --------------------------------------------------------  IN: 720 mL / OUT: 725 mL / NET: -5 mL     @ 07: @ 10:21  --------------------------------------------------------  IN: 240 mL / OUT: 0 mL / NET: 240 mL        PHYSICAL EXAM:Daily     Daily Weight in k.2 (12 Sep 2023 04:59)  General: WN/WD NAD    HEENT:     + NCAT  + EOMI  - Conjuctival edema   - Icterus   - Thrush   - ETT  - NGT/OGT  Neck:         + FROM    + JVD     - Nodes     - Masses    + Mid-line trachea   - Tracheostomy  Chest:         - Sternal click  - Sternal drainage  + Pacing wires  + Chest tubes  - SubQ emphysema  Lungs:          + CTA   - Rhonchi    - Rales    - Wheezing     - Decreased BS   - Dullness R L  Cardiac:       + S1 + S2    + RRR   - Irregular   - S3  - S4    - Murmurs   - Rub   - Hamman’s sign   Abdomen:    + BS     + Soft    + Non-tender     - Distended    - Organomegaly  - PEG  Extremities:   - Cyanosis U/L   - Clubbing  U/L  - LE/UE Edema   + Capillary refill    + Pulses   Neuro:        + Awake   +  Alert   - Confused   - Lethargic   - Sedated   - Generalized Weakness  Skin:        - Rashes    - Erythema   + Normal incisions   + IV sites intact  - Sacral decubitus      MEDICATIONS  (STANDING):  aspirin enteric coated 81 milliGRAM(s) Oral daily  carvedilol 25 milliGRAM(s) Oral every 12 hours  chlorhexidine 2% Cloths 1 Application(s) Topical <User Schedule>  enoxaparin Injectable 40 milliGRAM(s) SubCutaneous every 24 hours  pantoprazole    Tablet 40 milliGRAM(s) Oral daily  polyethylene glycol 3350 17 Gram(s) Oral daily  senna 2 Tablet(s) Oral at bedtime  sodium chloride 0.9% lock flush 3 milliLiter(s) IV Push every 8 hours    MEDICATIONS  (PRN):  acetaminophen     Tablet .. 650 milliGRAM(s) Oral every 6 hours PRN Mild Pain (1 - 3)    LABS: All Lab data reviewed and analyzed                        10.   5.92  )-----------( 158      ( 12 Sep 2023 02:20 )             33.6        134<L>  |  96  |  21.3<H>  ----------------------------<  97  3.7   |  25.0  |  1.19    Ca    9.3      12 Sep 2023 02:20  Mg     1.8         I independently reviewed CXR overnight from today - @ 10:21 and ruled out effusion, PTX, or collapse of lung     Assessment: Respiratory insufficieny, hypervolemia, and hyperglycemia,     Plan:   - Respiratory status required supplemental oxygen and the following of continuous pulse oximetry for support & to prevent decompensation  - Continued early mobilization as tolerated  - Patient requires deresuscitation for perioperative fluid administration and edema  - Addressed analgesic regimen to optimize function; Patient require IV/parenteral narcotics  - ASA continued for graft occlusion-thromboembolism prophylaxis  - Lipitor for long term graft patency  - Lovenox continued for VTE prophylaxis in addition to Venodyne boots  - Protonix maintained for GI bleeding prophylaxis  - Coreg continued for atrial fibrillation prophylaxis  - Reviewed & addressed surgical site infection prophylaxis regimen    Patient required critical care management and is at risk for life threatening decompensation  I provided 40 minutes of non-continuous care to the patient.  
CRITICAL CARE ATTENDING - CTICU    MEDICATIONS  (STANDING):  ascorbic acid 500 milliGRAM(s) Oral two times a day  aspirin enteric coated 81 milliGRAM(s) Oral daily  carvedilol 12.5 milliGRAM(s) Oral once  carvedilol 25 milliGRAM(s) Oral every 12 hours  chlorhexidine 2% Cloths 1 Application(s) Topical <User Schedule>  enoxaparin Injectable 40 milliGRAM(s) SubCutaneous every 24 hours  gabapentin 100 milliGRAM(s) Oral every 8 hours  pantoprazole    Tablet 40 milliGRAM(s) Oral daily  polyethylene glycol 3350 17 Gram(s) Oral daily  senna 2 Tablet(s) Oral at bedtime  sodium chloride 0.9% lock flush 3 milliLiter(s) IV Push every 8 hours                                    10.9   7.62  )-----------( 141      ( 11 Sep 2023 02:15 )             33.7           137  |  99  |  18.3  ----------------------------<  99  4.1   |  24.0  |  1.18    Ca    9.5      11 Sep 2023 02:15  Mg     2.0         TPro  6.8  /  Alb  3.5  /  TBili  0.5  /  DBili  x   /  AST  12  /  ALT  12  /  AlkPhos  92                Daily     Daily Weight in k.6 (11 Sep 2023 04:32)      10 @ 07:01  -  09-11 @ 07:00  --------------------------------------------------------  IN: 690 mL / OUT: 2170 mL / NET: -1480 mL        Critically Ill patient  : [ ] preoperative ,   [x ] post operative    Requires :  [ ] Arterial Line   [x ] Central Line  [ ] PA catheter  [ ] IABP  [ ] ECMO  [ ] LVAD  [ ] Ventilator  [ ] pacemaker [ ] Impella.                      [x ] ABG's     [x ] Pulse Oxymetry Monitoring  Bedside evaluation , monitoring , treatment of hemodynamics , fluids , IVP/ IVCD meds.        Diagnosis:     Type B Aortic Dissection     POD  - TEVAR / Spinal Drain     L - Femoral Artery Deployment site     vascular checks    Spinal Drain Removed    Thrombocytopenia     Hypertension     CAD - PCI     Chronic Renal Failure                         Discussed with CT surgeon, Physician's Assistant - Nurse Practitioner- Critical care medicine team.   Discussed at  AM / PM rounds.   Chart, labs , films reviewed.    Cumulative Critical Care Time Given Today : 20 min
FABIANA CALIXTO  MRN-[id number]    HPI:  48 yr old [place] speaking male presents to [facility] for a thoracic endovascular aortic repair with spinal drain on 9/7/23 with Dr. Heaton.  Pt is a poor historian, [place] here [place]. Pt has hx of HTN, CAD, AOrtic aneurysm , CKD, and cardiomyopathy. Pt was seen at [facility] in Feb 2023 for HTN, was sent to ER with systolic of > 200. ct scan showed type b dissection and heart failure . 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 Heaton on 3/9/23. Pt had a aortogram and coil embolization of celiac artery on 8/3/23 at [facility]. pt denies any CP, pALp or DYspnea , he states he is having the surgery to complete his previous repair.  (24 Aug 2023 08:15)      Surgery/Hospital Course:  ·  PRE-OP DIAGNOSIS:  Aortic dissection  ·  POST-OP DIAGNOSIS:  S/P aortic dissection repair   ·  PROCEDURES:  Endovascular repair of thoracic aorta 07-Sep-2023   TEVAR , L brachial artery cut down    Today:  No acute events -    ICU Vital Signs Last 24 Hrs  T(C): 36.5 (08 Sep 2023 07:00), Max: 36.5 (08 Sep 2023 07:00)  T(F): 97.7 (08 Sep 2023 07:00), Max: 97.7 (08 Sep 2023 07:00)  HR: 48 (08 Sep 2023 10:45) (43 - 60)  BP: --  BP(mean): --  ABP: 152/67 (08 Sep 2023 10:45) (137/61 - 184/95)  ABP(mean): 99 (08 Sep 2023 10:45) (84 - 128)  RR: 17 (08 Sep 2023 10:45) (12 - 26)  SpO2: 100% (08 Sep 2023 10:45) (98% - 100%)    O2 Parameters below as of 08 Sep 2023 10:00  Patient On (Oxygen Delivery Method): nasal cannula  O2 Flow (L/min): 2          Physical Exam:  Gen: A&O   CNS: non focal 	  Neck: no JVD  RES : clear , no wheezing              CVS: Regular  rhythm. Normal S1/S2  Abd: Soft, non-distended. Bowel sounds present.  Skin: No rash.  Ext:  no edema    ============================I/O===========================   I&O's Detail    07 Sep 2023 07:01  -  08 Sep 2023 07:00  --------------------------------------------------------  IN:    IV PiggyBack: 50 mL    IV PiggyBack: 100 mL    Lactated Ringers Bolus: 500 mL    Norepinephrine: 93.3 mL    Oral Fluid: 120 mL    sodium chloride 0.9%: 200 mL    sodium chloride 0.9%: 100 mL  Total IN: 1163.3 mL    OUT:    Indwelling Catheter - Urethral (mL): 2195 mL  Total OUT: 2195 mL    Total NET: -1031.7 mL      08 Sep 2023 07:01  -  08 Sep 2023 11:14  --------------------------------------------------------  IN:    Norepinephrine: 9.6 mL    Oral Fluid: 240 mL    sodium chloride 0.9%: 30 mL    sodium chloride 0.9%: 15 mL  Total IN: 294.6 mL    OUT:    Drain (mL): 16 mL    Indwelling Catheter - Urethral (mL): 440 mL  Total OUT: 456 mL    Total NET: -161.4 mL        ============================ LABS =========================                        12.0   12.65 )-----------( 198      ( 08 Sep 2023 02:00 )             38.6     09-08    134<L>  |  100  |  17.2  ----------------------------<  150<H>  4.6   |  22.0  |  1.33<H>    Ca    8.9      08 Sep 2023 02:00  Mg     2.2     09-08    TPro  7.5  /  Alb  3.9  /  TBili  0.4  /  DBili  x   /  AST  18  /  ALT  20  /  AlkPhos  113  09-08    LIVER FUNCTIONS - ( 08 Sep 2023 02:00 )  Alb: 3.9 g/dL / Pro: 7.5 g/dL / ALK PHOS: 113 U/L / ALT: 20 U/L / AST: 18 U/L / GGT: x           PT/INR - ( 08 Sep 2023 02:00 )   PT: 12.5 sec;   INR: 1.13 ratio         PTT - ( 08 Sep 2023 02:00 )  PTT:28.2 sec  ABG - ( 07 Sep 2023 16:55 )  pH, Arterial: 7.370 pH, Blood: x     /  pCO2: 41    /  pO2: 124   / HCO3: 24    / Base Excess: -1.6  /  SaO2: 99.6              Urinalysis Basic - ( 08 Sep 2023 02:00 )    Color: x / Appearance: x / SG: x / pH: x  Gluc: 150 mg/dL / Ketone: x  / Bili: x / Urobili: x   Blood: x / Protein: x / Nitrite: x   Leuk Esterase: x / RBC: x / WBC x   Sq Epi: x / Non Sq Epi: x / Bacteria: x      ======================Micro/Rad/Cardio=================  Culture: Reviewed   CXR: Reviewed  Echo:Reviewed  ======================================================  PAST MEDICAL & SURGICAL HISTORY:  Uncontrolled hypertension      CKD (chronic kidney disease)      CAD (coronary artery disease)      HTN (hypertension)      At risk for sleep apnea      Dissecting aortic aneurysm      Respiratory arrest      H/O heart artery stent      Presence of bypass graft stent      S/P laparotomy      S/P coronary artery bypass graft x 2      Status post carotid surgery        ====================ASSESSMENT ==============  48 yr old [place] speaking male presents to [facility] for a thoracic endovascular aortic repair with spinal drain on 9/7/23 with Dr. Heaton.  Pt is a poor historian, [place] here [place]. Pt has hx of HTN, CAD, AOrtic aneurysm , CKD, and cardiomyopathy. Pt was seen at [facility] in Feb 2023 for HTN, was sent to ER with systolic of > 200. ct scan showed type b dissection and heart failure . 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 Heaton on 3/9/23. Pt had a aortogram and coil embolization of celiac artery on 8/3/23 at [facility]. pt denies any CP, pALp or DYspnea , he states he is having the surgery to complete his previous repair.  (24 Aug 2023 08:15)      ---Uncontrolled hypertension  ---CKD (chronic kidney disease)  ---CAD (coronary artery disease)  ---HTN (hypertension)  ---Dissecting aortic aneurysm  ---Respiratory arrest  ---H/O heart artery stent  ---S/P laparotomy  ---S/P coronary artery bypass graft x 2  ---Status post carotid surgery  ---Aortic dissection  ---S/P aortic dissection repair        Endovascular repair of thoracic aorta 07-Sep-2023        TEVAR , L brachial artery cut down  ---Post op Hypovolemia  ---Post op respiratory insufficiency       Plan:  -Allowing permissive HTN for spinal perfusion by maintaining MAP  - requiring Levophed at this point.   -Chest PT and IS use with bedside nurse.  -Continue GI ppx with Protonix and Senna  -DVT ppx with SCD boots - holding chemical ppx in setting of spinal drain  -wean off Levo    ====================== NEUROLOGY=====================  acetaminophen     Tablet .. 650 milliGRAM(s) Oral every 6 hours  gabapentin 100 milliGRAM(s) Oral every 8 hours  HYDROmorphone  Injectable 0.5 milliGRAM(s) IV Push every 6 hours PRN Breakthrough Pain  oxyCODONE    IR 10 milliGRAM(s) Oral every 4 hours PRN Severe Pain (7 - 10)  oxyCODONE    IR 5 milliGRAM(s) Oral every 4 hours PRN Moderate Pain (4 - 6)    ==================== RESPIRATORY======================  Post op respiratory insufficiency  ====================CARDIOVASCULAR==================  Post op Hypovolemia  norepinephrine Infusion 0.04 MICROgram(s)/kG/Min (6.45 mL/Hr) IV Continuous <Continuous>    ===================HEMATOLOGIC/ONC ===================  Monitor H&H/Plts      ===================== RENAL =========================  Continue monitoring urine output, I&OS, BUN/Cr     ==================== GASTROINTESTINAL===================  ascorbic acid 500 milliGRAM(s) Oral two times a day  pantoprazole    Tablet 40 milliGRAM(s) Oral daily  polyethylene glycol 3350 17 Gram(s) Oral daily  senna 2 Tablet(s) Oral at bedtime  sodium chloride 0.9%. 1000 milliLiter(s) (5 mL/Hr) IV Continuous <Continuous>  sodium chloride 0.9%. 1000 milliLiter(s) (10 mL/Hr) IV Continuous <Continuous>    =======================    ENDOCRINE  =====================    ========================INFECTIOUS DISEASE================  cefuroxime  IVPB 1500 milliGRAM(s) IV Intermittent every 8 hours      -Monitor Neurologic status ,   -Head of the bed should remain elevated to 45 degrees,  -Monitor for arrhythmias and monitor parameters for organ perfusion,  -Glycemic control is satisfactory,  -Nutritional goals will be met using po eventually , insure adequate caloric intake and monitor the same ,  -Electrolytes have been repleted as necessary , pain control has been achieved  and wound care has been carried out ,  -Stress ulcer and VTE prophylaxis will be achieved,    I have spent 35 minutes providing acute care for this critically ill patient     Patient requires continuous monitoring with bedside rhythm monitoring, pulse ox monitoring, and intermittent blood gas analysis. Care plan discussed with ICU care team. Patient remained critical and at risk for life threatening decompensation.           
FABIANA CALIXTO  MRN-[id number]    HPI:  48 yr old [place] speaking male presents to [facility] for a thoracic endovascular aortic repair with spinal drain on 9/7/23 with Dr. Heaton.  Pt is a poor historian, [place] is from [place] , travelled here in 2008. Pt has hx of HTN, CAD, AOrtic aneurysm , CKD, and cardiomyopathy. Pt was seen at [facility] in Feb 2023 for HTN, was sent to ER with systolic of > 200. ct scan showed type b dissection and heart failure . 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 Heaton on 3/9/23. Pt had a aortogram and coil embolization of celiac artery on 8/3/23 at [facility]. pt denies any CP, pALp or DYspnea , he states he is having the surgery to complete his previous repair.  (24 Aug 2023 08:15)      Surgery/Hospital Course:  ·  PRE-OP DIAGNOSIS:  Aortic dissection  ·  POST-OP DIAGNOSIS:  S/P aortic dissection repair   ·  PROCEDURES:  Endovascular repair of thoracic aorta 07-Sep-2023   TEVAR , L brachial artery cut down    Today:  No acute events --    ICU Vital Signs Last 24 Hrs  T(C): 37.3 (09 Sep 2023 08:00), Max: 37.3 (09 Sep 2023 08:00)  T(F): 99.1 (09 Sep 2023 08:00), Max: 99.1 (09 Sep 2023 08:00)  HR: 77 (09 Sep 2023 11:00) (53 - 92)  BP: 127/70 (09 Sep 2023 09:00) (127/70 - 127/70)  BP(mean): 80 (09 Sep 2023 09:00) (80 - 80)  ABP: 127/88 (09 Sep 2023 11:00) (111/76 - 167/81)  ABP(mean): 107 (09 Sep 2023 11:00) (77 - 131)  RR: 15 (09 Sep 2023 11:00) (12 - 22)  SpO2: 93% (09 Sep 2023 11:00) (92% - 100%)    O2 Parameters below as of 09 Sep 2023 11:00  Patient On (Oxygen Delivery Method): room air            Physical Exam:  Gen: A&O   CNS: non focal 	  Neck: no JVD  RES : clear , no wheezing              CVS: Regular  rhythm. Normal S1/S2  Abd: Soft, non-distended. Bowel sounds present.  Skin: No rash.  Ext:  no edema    ============================I/O===========================   I&O's Detail    08 Sep 2023 07:01  -  09 Sep 2023 07:00  --------------------------------------------------------  IN:    IV PiggyBack: 50 mL    IV PiggyBack: 100 mL    Norepinephrine: 14.4 mL    Oral Fluid: 300 mL    sodium chloride 0.9%: 240 mL    sodium chloride 0.9%: 120 mL  Total IN: 824.4 mL    OUT:    Drain (mL): 47 mL    Indwelling Catheter - Urethral (mL): 1960 mL  Total OUT: 2007 mL    Total NET: -1182.6 mL      09 Sep 2023 07:01  -  09 Sep 2023 12:10  --------------------------------------------------------  IN:    Oral Fluid: 150 mL    sodium chloride 0.9%: 105 mL    sodium chloride 0.9%: 20 mL  Total IN: 275 mL    OUT:    Drain (mL): 20 mL    Indwelling Catheter - Urethral (mL): 245 mL  Total OUT: 265 mL    Total NET: 10 mL        ============================ LABS =========================                        10.8   7.78  )-----------( 122      ( 09 Sep 2023 10:30 )             34.2     09-09    135  |  99  |  15.6  ----------------------------<  109<H>  4.0   |  25.0  |  1.18    Ca    8.7      09 Sep 2023 10:30  Mg     1.9     09-09    TPro  6.8  /  Alb  3.5  /  TBili  0.5  /  DBili  x   /  AST  12  /  ALT  12  /  AlkPhos  92  09-09    LIVER FUNCTIONS - ( 09 Sep 2023 10:30 )  Alb: 3.5 g/dL / Pro: 6.8 g/dL / ALK PHOS: 92 U/L / ALT: 12 U/L / AST: 12 U/L / GGT: x           PT/INR - ( 08 Sep 2023 02:00 )   PT: 12.5 sec;   INR: 1.13 ratio         PTT - ( 08 Sep 2023 02:00 )  PTT:28.2 sec  ABG - ( 09 Sep 2023 10:04 )  pH, Arterial: 7.450 pH, Blood: x     /  pCO2: 41    /  pO2: 74    / HCO3: 28    / Base Excess: 4.5   /  SaO2: 97.4              Urinalysis Basic - ( 09 Sep 2023 10:30 )    Color: x / Appearance: x / SG: x / pH: x  Gluc: 109 mg/dL / Ketone: x  / Bili: x / Urobili: x   Blood: x / Protein: x / Nitrite: x   Leuk Esterase: x / RBC: x / WBC x   Sq Epi: x / Non Sq Epi: x / Bacteria: x      ======================Micro/Rad/Cardio=================  Culture: Reviewed   CXR: Reviewed  Echo:Reviewed  ======================================================  PAST MEDICAL & SURGICAL HISTORY:  Uncontrolled hypertension      CKD (chronic kidney disease)      CAD (coronary artery disease)      HTN (hypertension)      At risk for sleep apnea      Dissecting aortic aneurysm      Respiratory arrest      H/O heart artery stent      Presence of bypass graft stent      S/P laparotomy      S/P coronary artery bypass graft x 2      Status post carotid surgery        ====================ASSESSMENT ==============  48 yr old [place] speaking male presents to [facility] for a thoracic endovascular aortic repair with spinal drain on 9/7/23 with Dr. Heaton.  Pt is a poor historian, [place] is [place] , [place] here [place]. Pt has hx of HTN, CAD, AOrtic aneurysm , CKD, and cardiomyopathy. Pt was seen at [facility] in Feb 2023 for HTN, was sent to ER with systolic of > 200. ct scan showed type b dissection and heart failure . 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 Heaton on 3/9/23. Pt had a aortogram and coil embolization of celiac artery on 8/3/23 at [facility]. pt denies any CP, pALp or DYspnea , he states he is having the surgery to complete his previous repair.  (24 Aug 2023 08:15)      ---Uncontrolled hypertension  ---CKD (chronic kidney disease)  ---CAD (coronary artery disease)  ---HTN (hypertension)  ---Dissecting aortic aneurysm  ---Respiratory arrest  ---H/O heart artery stent  ---S/P laparotomy  ---S/P coronary artery bypass graft x 2  ---Status post carotid surgery  ---Aortic dissection  ---S/P aortic dissection repair        Endovascular repair of thoracic aorta 07-Sep-2023        TEVAR , L brachial artery cut down  ---Post op Hypovolemia  ---Post op respiratory insufficiency   ---Acute upper abdominal and chest pain this am  with cramping with restlessness resolved after few minutes .    Plan:  -Allowing permissive HTN for spinal perfusion by maintaining MAP   -Chest PT and IS use with bedside nurse.  -Continue GI ppx with Protonix and Senna  -DVT ppx with SCD boots - holding chemical ppx in setting of spinal drain    ====================== NEUROLOGY=====================  acetaminophen     Tablet .. 650 milliGRAM(s) Oral every 6 hours  gabapentin 100 milliGRAM(s) Oral every 8 hours  HYDROmorphone  Injectable 0.5 milliGRAM(s) IV Push every 6 hours PRN Breakthrough Pain  oxyCODONE    IR 5 milliGRAM(s) Oral every 4 hours PRN Moderate Pain (4 - 6)  oxyCODONE    IR 10 milliGRAM(s) Oral every 4 hours PRN Severe Pain (7 - 10)    ==================== RESPIRATORY======================  Post op respiratory insufficiency    ====================CARDIOVASCULAR==================  Post op Hypovolemia    ===================HEMATOLOGIC/ONC ===================  Monitor H&H/Plts      ===================== RENAL =========================  Continue monitoring urine output, I&OS, BUN/Cr     ==================== GASTROINTESTINAL===================  ascorbic acid 500 milliGRAM(s) Oral two times a day  pantoprazole    Tablet 40 milliGRAM(s) Oral daily  polyethylene glycol 3350 17 Gram(s) Oral daily  senna 2 Tablet(s) Oral at bedtime  sodium chloride 0.9%. 1000 milliLiter(s) (10 mL/Hr) IV Continuous <Continuous>  sodium chloride 0.9%. 1000 milliLiter(s) (5 mL/Hr) IV Continuous <Continuous>    =======================    ENDOCRINE  =====================    ========================INFECTIOUS DISEASE================      -Monitor Neurologic status ,   -Head of the bed should remain elevated to 45 degrees,  -Monitor for arrhythmias and monitor parameters for organ perfusion,  -Glycemic control is satisfactory,  -Nutritional goals will be met using po eventually , insure adequate caloric intake and monitor the same ,  -Electrolytes have been repleted as necessary , pain control has been achieved  and wound care has been carried out ,  -Stress ulcer and VTE prophylaxis will be achieved,  -Agressive PT and early mobility and ambulation goals will be met,  -The family was updated about the course and plan .      I have spent 35 minutes providing acute care for this critically ill patient     Patient requires continuous monitoring with bedside rhythm monitoring, pulse ox monitoring, and intermittent blood gas analysis. Care plan discussed with ICU care team. Patient remained critical and at risk for life threatening decompensation.           
FABIANA CALIXTO  MRN-[id number]    HPI:  48 yr old [place] speaking male presents to [facility] for a thoracic endovascular aortic repair with spinal drain on 9/7/23 with Dr. Heaton.  Pt is a poor historian, [place] , travelled here [place]. Pt has hx of HTN, CAD, AOrtic aneurysm , CKD, and cardiomyopathy. Pt was seen at [facility] in Feb 2023 for HTN, was sent to ER with systolic of > 200. ct scan showed type b dissection and heart failure . 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 Heaton on 3/9/23. Pt had a aortogram and coil embolization of celiac artery on 8/3/23 at [place]. pt denies any CP, pALp or DYspnea , he states he is having the surgery to complete his previous repair.  (24 Aug 2023 08:15)      Surgery/Hospital Course:  ·  PRE-OP DIAGNOSIS:  Aortic dissection  ·  POST-OP DIAGNOSIS:  S/P aortic dissection repair   ·  PROCEDURES:  Endovascular repair of thoracic aorta 07-Sep-2023   TEVAR , L brachial artery cut down    Today:  No acute events     ICU Vital Signs Last 24 Hrs  T(C): 36.7 (07 Sep 2023 06:27), Max: 36.7 (07 Sep 2023 06:27)  T(F): 98 (07 Sep 2023 06:27), Max: 98 (07 Sep 2023 06:27)  HR: 52 (07 Sep 2023 15:00) (49 - 64)  BP: 145/80 (07 Sep 2023 06:27) (145/80 - 145/80)  BP(mean): --  ABP: 164/85 (07 Sep 2023 15:00) (147/78 - 184/95)  ABP(mean): 114 (07 Sep 2023 15:00) (103 - 128)  RR: 12 (07 Sep 2023 15:00) (12 - 17)  SpO2: 99% (07 Sep 2023 15:00) (98% - 100%)    O2 Parameters below as of 07 Sep 2023 15:00  Patient On (Oxygen Delivery Method): BiPAP/CPAP    O2 Concentration (%): 28        Physical Exam:  Gen: A&O   CNS: non focal 	  Neck: no JVD  RES : clear , no wheezing              CVS: Regular  rhythm. Normal S1/S2  Abd: Soft, non-distended. Bowel sounds present.  Skin: No rash.  Ext:  no edema    ============================I/O===========================   I&O's Detail    07 Sep 2023 07:01  -  07 Sep 2023 15:16  --------------------------------------------------------  IN:    IV PiggyBack: 50 mL    sodium chloride 0.9%: 20 mL    sodium chloride 0.9%: 40 mL  Total IN: 110 mL    OUT:    Indwelling Catheter - Urethral (mL): 615 mL  Total OUT: 615 mL    Total NET: -505 mL        ============================ LABS =========================                        12.1   14.73 )-----------( 156      ( 07 Sep 2023 11:45 )             39.1     09-07    133<L>  |  100  |  20.3<H>  ----------------------------<  188<H>  4.8   |  21.0<L>  |  1.33<H>    Ca    8.9      07 Sep 2023 11:45  Mg     1.5     09-07    TPro  7.2  /  Alb  3.6  /  TBili  0.5  /  DBili  x   /  AST  19  /  ALT  21  /  AlkPhos  116  09-07    LIVER FUNCTIONS - ( 07 Sep 2023 11:45 )  Alb: 3.6 g/dL / Pro: 7.2 g/dL / ALK PHOS: 116 U/L / ALT: 21 U/L / AST: 19 U/L / GGT: x           PT/INR - ( 07 Sep 2023 11:45 )   PT: 12.7 sec;   INR: 1.15 ratio         PTT - ( 07 Sep 2023 11:45 )  PTT:28.8 sec  ABG - ( 07 Sep 2023 12:48 )  pH, Arterial: 7.330 pH, Blood: x     /  pCO2: 43    /  pO2: 121   / HCO3: 23    / Base Excess: -3.2  /  SaO2: 99.1              Urinalysis Basic - ( 07 Sep 2023 11:45 )    Color: x / Appearance: x / SG: x / pH: x  Gluc: 188 mg/dL / Ketone: x  / Bili: x / Urobili: x   Blood: x / Protein: x / Nitrite: x   Leuk Esterase: x / RBC: x / WBC x   Sq Epi: x / Non Sq Epi: x / Bacteria: x      ======================Micro/Rad/Cardio=================  Culture: Reviewed   CXR: Reviewed  Echo:Reviewed  ======================================================  PAST MEDICAL & SURGICAL HISTORY:  Uncontrolled hypertension      CKD (chronic kidney disease)      CAD (coronary artery disease)      HTN (hypertension)      At risk for sleep apnea      Dissecting aortic aneurysm      Respiratory arrest      H/O heart artery stent      Presence of bypass graft stent      S/P laparotomy      S/P coronary artery bypass graft x 2      Status post carotid surgery        ====================ASSESSMENT ==============  48 yr old Micronesian speaking male presents to [facility] for a thoracic endovascular aortic repair with spinal drain on 9/7/23 with Dr. Heaton.  Pt is a poor historian, [place] here [place]. Pt has hx of HTN, CAD, AOrtic aneurysm , CKD, and cardiomyopathy. Pt was seen at [facility] in Feb 2023 for HTN, was sent to ER with systolic of > 200. ct scan showed type b dissection and heart failure . 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 Heaton on 3/9/23. Pt had a aortogram and coil embolization of celiac artery on 8/3/23 at [facility]. pt denies any CP, pALp or DYspnea , he states he is having the surgery to complete his previous repair.  (24 Aug 2023 08:15)      ---Uncontrolled hypertension  ---CKD (chronic kidney disease)  ---CAD (coronary artery disease)  ---HTN (hypertension)  ---Dissecting aortic aneurysm  ---Respiratory arrest  ---H/O heart artery stent  ---S/P laparotomy  ---S/P coronary artery bypass graft x 2  ---Status post carotid surgery  ---Aortic dissection  ---S/P aortic dissection repair        Endovascular repair of thoracic aorta 07-Sep-2023        TEVAR , L brachial artery cut down  ---Post op Hypovolemia  ---Post op respiratory insufficiency       Plan:  ====================== NEUROLOGY=====================  acetaminophen     Tablet .. 650 milliGRAM(s) Oral every 6 hours  acetaminophen   IVPB .. 1000 milliGRAM(s) IV Intermittent once  gabapentin 100 milliGRAM(s) Oral every 8 hours  HYDROmorphone  Injectable 0.5 milliGRAM(s) IV Push every 6 hours PRN Breakthrough Pain  oxyCODONE    IR 5 milliGRAM(s) Oral every 4 hours PRN Moderate Pain (4 - 6)  oxyCODONE    IR 10 milliGRAM(s) Oral every 4 hours PRN Severe Pain (7 - 10)    ==================== RESPIRATORY======================  Post op respiratory insufficiency  ====================CARDIOVASCULAR==================  Post op Hypovolemia    ===================HEMATOLOGIC/ONC ===================  Monitor H&H/Plts      ===================== RENAL =========================  Continue monitoring urine output, I&OS, BUN/Cr     ==================== GASTROINTESTINAL===================  ascorbic acid 500 milliGRAM(s) Oral two times a day  potassium chloride  10 mEq/50 mL IVPB 10 milliEquivalent(s) IV Intermittent every 1 hour  potassium chloride  10 mEq/50 mL IVPB 10 milliEquivalent(s) IV Intermittent every 1 hour  potassium chloride  10 mEq/50 mL IVPB 10 milliEquivalent(s) IV Intermittent every 1 hour  sodium chloride 0.9%. 1000 milliLiter(s) (10 mL/Hr) IV Continuous <Continuous>  sodium chloride 0.9%. 1000 milliLiter(s) (5 mL/Hr) IV Continuous <Continuous>    =======================    ENDOCRINE  =====================    ========================INFECTIOUS DISEASE================    -Monitor Neurologic status ,   -Head of the bed should remain elevated to 45 degrees,  -Monitor for arrhythmias and monitor parameters for organ perfusion,  -Glycemic control is satisfactory,  -Nutritional goals will be met using po eventually , insure adequate caloric intake and monitor the same ,  -Electrolytes have been repleted as necessary , pain control has been achieved  and wound care has been carried out ,  -Stress ulcer and VTE prophylaxis will be achieved,  -Agressive PT and early mobility and ambulation goals will be met,    I have spent 35 minutes providing acute care for this critically ill patient     Patient requires continuous monitoring with bedside rhythm monitoring, pulse ox monitoring, and intermittent blood gas analysis. Care plan discussed with ICU care team. Patient remained critical and at risk for life threatening decompensation.           
Patient seen and examined.  Denies CP, SOB, N/V. No complaints at this time     T(C): 36.9 (09-09-23 @ 00:00)  T(F): 98.4 (09-09-23 @ 00:00)  HR: 55 (09-09-23 @ 00:00)    ABP: 125/54 (09-09-23 @ 00:00)  ABP(mean): 77 (09-09-23 @ 00:00)  RR: 13 (09-09-23 @ 00:00)  SpO2: 98% (09-09-23 @ 00:00)  Wt(kg): --  CVP(mm Hg): 8 (09-09-23 @ 00:00)      Physical Exam:  Gen: A&Ox3  Pulm:  CTA b/l, no r/r/w  CV:  S1S2, RRR,  Abd:  soft, NT, ND  Ext:  +DP b/l, no c/c/e  Incision:  c/d/i  no exudate    I&O's Detail    07 Sep 2023 07:01  -  08 Sep 2023 07:00  --------------------------------------------------------  IN:    IV PiggyBack: 50 mL    IV PiggyBack: 100 mL    Lactated Ringers Bolus: 500 mL    Norepinephrine: 93.3 mL    Oral Fluid: 120 mL    sodium chloride 0.9%: 200 mL    sodium chloride 0.9%: 100 mL  Total IN: 1163.3 mL    OUT:    Indwelling Catheter - Urethral (mL): 2195 mL  Total OUT: 2195 mL    Total NET: -1031.7 mL      08 Sep 2023 07:01  -  09 Sep 2023 00:38  --------------------------------------------------------  IN:    IV PiggyBack: 50 mL    Norepinephrine: 14.4 mL    Oral Fluid: 300 mL    sodium chloride 0.9%: 180 mL    sodium chloride 0.9%: 90 mL  Total IN: 634.4 mL    OUT:    Drain (mL): 47 mL    Indwelling Catheter - Urethral (mL): 1415 mL  Total OUT: 1462 mL    Total NET: -827.6 mL                              12.0   12.65 )-----------( 198      ( 08 Sep 2023 02:00 )             38.6   09-08    134<L>  |  100  |  17.2  ----------------------------<  150<H>  4.6   |  22.0  |  1.33<H>    Ca    8.9      08 Sep 2023 02:00  Mg     2.2     09-08    TPro  7.5  /  Alb  3.9  /  TBili  0.4  /  DBili  x   /  AST  18  /  ALT  20  /  AlkPhos  113  09-08  aPTT: 28.2 sec; PT: 12.5 sec; INR: 1.13 ratio  09-08-23 @ 02:00       ABG - ( 07 Sep 2023 16:55 )  pH: 7.370 /  pCO2: 41    /  pO2: 124   / HCO3: 24    / Base Excess: -1.6  /  SaO2: 99.6 /  Lactate: x        CAPILLARY BLOOD GLUCOSE            Medications:  acetaminophen     Tablet .. 650 milliGRAM(s) Oral every 6 hours  ascorbic acid 500 milliGRAM(s) Oral two times a day  bisacodyl Suppository 10 milliGRAM(s) Rectal once  cefuroxime  IVPB 1500 milliGRAM(s) IV Intermittent every 8 hours  chlorhexidine 2% Cloths 1 Application(s) Topical <User Schedule>  gabapentin 100 milliGRAM(s) Oral every 8 hours  HYDROmorphone  Injectable 0.5 milliGRAM(s) IV Push every 6 hours PRN  oxyCODONE    IR 10 milliGRAM(s) Oral every 4 hours PRN  oxyCODONE    IR 5 milliGRAM(s) Oral every 4 hours PRN  pantoprazole    Tablet 40 milliGRAM(s) Oral daily  polyethylene glycol 3350 17 Gram(s) Oral daily  senna 2 Tablet(s) Oral at bedtime  sodium chloride 0.9%. 1000 milliLiter(s) IV Continuous <Continuous>  sodium chloride 0.9%. 1000 milliLiter(s) IV Continuous <Continuous>      
Brief summary:  48M, poor historian, PMH of HTN (uncontrolled), CAD w/ stents (2021 in [facility] [place]), and CKD (baseline SCr ~1.3). He is known to CT Surgery at [facility] since March 2023 for Type B dissection for which he underwent celiac and SMA bypass via midline laparotomy on 3/9/23 with Angelica Mandel and Florina and left carotid subclavian bypass with Dr. Mandel on 5/30/23. He was SDA 9/7/23 for TEVAR with left brachial artery cutdown (with spinal drain) with Angelica Heaton and Ministerio.     Overnight events:  Continue to allow permissive HTN for spinal perfusion by maintaining MAP .  MORRIS, neuro intact. Groins soft b/l.   Patient denies acute pain with radiating or aggravating factors.  He denies chest pain, shortness of breath, palpitations, headache, dizziness, nausea, or vomiting.     PAST MEDICAL & SURGICAL HISTORY:  Uncontrolled hypertension      CKD (chronic kidney disease)      CAD (coronary artery disease)      HTN (hypertension)      At risk for sleep apnea      Dissecting aortic aneurysm      Respiratory arrest      H/O heart artery stent      Presence of bypass graft stent      S/P laparotomy      S/P coronary artery bypass graft x 2      Status post carotid surgery          Medications:  acetaminophen     Tablet .. 650 milliGRAM(s) Oral every 6 hours  ascorbic acid 500 milliGRAM(s) Oral two times a day  cefuroxime  IVPB 1500 milliGRAM(s) IV Intermittent every 8 hours  chlorhexidine 2% Cloths 1 Application(s) Topical <User Schedule>  [id number]    Injectable 25 MICROGram(s) IV Push once  gabapentin 100 milliGRAM(s) Oral every 8 hours  HYDROmorphone  Injectable 0.5 milliGRAM(s) IV Push every 6 hours PRN  norepinephrine Infusion 0.04 MICROgram(s)/kG/Min IV Continuous <Continuous>  oxyCODONE    IR 10 milliGRAM(s) Oral every 4 hours PRN  oxyCODONE    IR 5 milliGRAM(s) Oral every 4 hours PRN  pantoprazole    Tablet 40 milliGRAM(s) Oral daily  polyethylene glycol 3350 17 Gram(s) Oral daily  potassium chloride  10 mEq/50 mL IVPB 10 milliEquivalent(s) IV Intermittent every 1 hour  potassium chloride  10 mEq/50 mL IVPB 10 milliEquivalent(s) IV Intermittent every 1 hour  potassium chloride  10 mEq/50 mL IVPB 10 milliEquivalent(s) IV Intermittent every 1 hour  senna 2 Tablet(s) Oral at bedtime  sodium chloride 0.9%. 1000 milliLiter(s) IV Continuous <Continuous>  sodium chloride 0.9%. 1000 milliLiter(s) IV Continuous <Continuous>      MEDICATIONS  (PRN):  HYDROmorphone  Injectable 0.5 milliGRAM(s) IV Push every 6 hours PRN Breakthrough Pain  oxyCODONE    IR 5 milliGRAM(s) Oral every 4 hours PRN Moderate Pain (4 - 6)  oxyCODONE    IR 10 milliGRAM(s) Oral every 4 hours PRN Severe Pain (7 - 10)      Daily Review:    Height (cm): 157.5 (09-07 @ 06:27)  Weight (kg): 85.956 (09-07 @ 06:27)  BMI (kg/m2): 34.7 (09-07 @ 06:27)  BSA (m2): 1.87 (09-07 @ 06:27)    ABG - ( 07 Sep 2023 16:55 )  pH, Arterial: 7.370 pH, Blood: x     /  pCO2: 41    /  pO2: 124   / HCO3: 24    / Base Excess: -1.6  /  SaO2: 99.6                                    12.1   14.73 )-----------( 156      ( 07 Sep 2023 11:45 )             39.1   09-07    133<L>  |  100  |  20.3<H>  ----------------------------<  188<H>  4.8   |  21.0<L>  |  1.33<H>    Ca    8.9      07 Sep 2023 11:45  Mg     1.5     09-07    TPro  7.2  /  Alb  3.6  /  TBili  0.5  /  DBili  x   /  AST  19  /  ALT  21  /  AlkPhos  116  09-07    CARDIAC MARKERS ( 07 Sep 2023 11:45 )  x     / <0.01 ng/mL / 123 U/L / x     / x        PT/INR - ( 07 Sep 2023 11:45 )   PT: 12.7 sec;   INR: 1.15 ratio         PTT - ( 07 Sep 2023 11:45 )  PTT:28.8 sec    T(C): 36 (09-07-23 @ 19:00), Max: 36.7 (09-07-23 @ 06:27)  HR: 48 (09-08-23 @ 00:30) (47 - 64)  BP: 145/80 (09-07-23 @ 06:27) (145/80 - 145/80)  RR: 20 (09-08-23 @ 00:30) (12 - 26)  SpO2: 99% (09-08-23 @ 00:30) (98% - 100%)  Wt(kg): --    I&O's Summary    07 Sep 2023 07:01  -  08 Sep 2023 00:49  --------------------------------------------------------  IN: 441 mL / OUT: 1385 mL / NET: -944 mL        Physical Exam  Neuro: A+O x 3, non-focal, speech clear and intact  Pulm: coarse breath sounds bilaterally, no wheezing or rales  CV: RRR, no murmurs, +S1S2  Abd: soft, NT, ND  Ext: +DP Pulses b/l, +PT pulses, no edema  Inc: b/l groins soft without hematoma            
Patient seen and examined.  Denies CP, SOB, N/V. Had episode of chest/ abdominal pain yesterday now relieved after BM yesterday. Spinal drain clamped yesterday with plans of removing later today.      T(C): 37.2 (09-09-23 @ 20:00)  T(F): 99 (09-09-23 @ 20:00)  HR: 75 (09-09-23 @ 23:00)  BP: 141/78 (09-09-23 @ 23:00)  BP(mean): 102 (09-09-23 @ 23:00)  ABP: 136/87 (09-09-23 @ 23:00)  ABP(mean): 109 (09-09-23 @ 23:00)  RR: 16 (09-09-23 @ 23:00)  SpO2: 95% (09-09-23 @ 23:00)    CVP(mm Hg): 15 (09-09-23 @ 23:00)        Physical Exam:  Gen: A&Ox3  Pulm:  CTA b/l, no r/r/w  CV:  S1S2, RRR, no m/r/g  Abd: +BS, soft, NT, ND  Ext:  +DP b/l, no c/c/e  Groins soft no hematoma. [place] all 4 limbs equal strength      I&O's Detail    08 Sep 2023 07:01  -  09 Sep 2023 07:00  --------------------------------------------------------  IN:    IV PiggyBack: 50 mL    IV PiggyBack: 100 mL    Norepinephrine: 14.4 mL    Oral Fluid: 300 mL    sodium chloride 0.9%: 240 mL    sodium chloride 0.9%: 120 mL  Total IN: 824.4 mL    OUT:    Drain (mL): 47 mL    Indwelling Catheter - Urethral (mL): 1960 mL  Total OUT: 2007 mL    Total NET: -1182.6 mL      09 Sep 2023 07:01  -  10 Sep 2023 00:16  --------------------------------------------------------  IN:    Oral Fluid: 450 mL    sodium chloride 0.9%: 80 mL    sodium chloride 0.9%: 225 mL  Total IN: 755 mL    OUT:    Drain (mL): 43 mL    Indwelling Catheter - Urethral (mL): 1070 mL  Total OUT: 1113 mL    Total NET: -358 mL                              10.8   7.78  )-----------( 122      ( 09 Sep 2023 10:30 )             34.2   09-09    135  |  99  |  15.6  ----------------------------<  109<H>  4.0   |  25.0  |  1.18    Ca    8.7      09 Sep 2023 10:30  Mg     1.9     09-09    TPro  6.8  /  Alb  3.5  /  TBili  0.5  /  DBili  x   /  AST  12  /  ALT  12  /  AlkPhos  92  09-09  aPTT: 28.2 sec; PT: 12.5 sec; INR: 1.13 ratio  09-08-23 @ 02:00       ABG - ( 09 Sep 2023 10:04 )  pH: 7.450 /  pCO2: 41    /  pO2: 74    / HCO3: 28    / Base Excess: 4.5   /  SaO2: 97.4 /  Lactate: x        CAPILLARY BLOOD GLUCOSE            Medications:  acetaminophen     Tablet .. 650 milliGRAM(s) Oral every 6 hours  acetaminophen     Tablet .. 650 milliGRAM(s) Oral every 6 hours PRN  ascorbic acid 500 milliGRAM(s) Oral two times a day  carvedilol 12.5 milliGRAM(s) Oral every 12 hours  chlorhexidine 2% Cloths 1 Application(s) Topical <User Schedule>  gabapentin 100 milliGRAM(s) Oral every 8 hours  oxyCODONE    IR 10 milliGRAM(s) Oral every 4 hours PRN  oxyCODONE    IR 5 milliGRAM(s) Oral every 4 hours PRN  pantoprazole    Tablet 40 milliGRAM(s) Oral daily  polyethylene glycol 3350 17 Gram(s) Oral daily  senna 2 Tablet(s) Oral at bedtime  sodium chloride 0.9%. 1000 milliLiter(s) IV Continuous <Continuous>  sodium chloride 0.9%. 1000 milliLiter(s) IV Continuous <Continuous>        
48y Male seen and evaluated bedside. Endorsing no acute complaints. Denies any chest pain, palpitations, shortness of breath, wheezing, abd pain, nausea, vomiting, constipation, lightheadedness, headaches, fevers, or chills.       PAST MEDICAL & SURGICAL HISTORY:  Uncontrolled hypertension      CKD (chronic kidney disease)      CAD (coronary artery disease)      HTN (hypertension)      At risk for sleep apnea      Dissecting aortic aneurysm      Respiratory arrest      H/O heart artery stent      Presence of bypass graft stent      S/P laparotomy      S/P coronary artery bypass graft x 2      Status post carotid surgery          Medications:  acetaminophen     Tablet .. 650 milliGRAM(s) Oral every 6 hours PRN  ascorbic acid 500 milliGRAM(s) Oral two times a day  carvedilol 12.5 milliGRAM(s) Oral every 12 hours  chlorhexidine 2% Cloths 1 Application(s) Topical <User Schedule>  gabapentin 100 milliGRAM(s) Oral every 8 hours  pantoprazole    Tablet 40 milliGRAM(s) Oral daily  polyethylene glycol 3350 17 Gram(s) Oral daily  senna 2 Tablet(s) Oral at bedtime      MEDICATIONS  (PRN):  acetaminophen     Tablet .. 650 milliGRAM(s) Oral every 6 hours PRN Mild Pain (1 - 3)      Daily Review:        ABG - ( 09 Sep 2023 10:04 )  pH, Arterial: 7.450 pH, Blood: x     /  pCO2: 41    /  pO2: 74    / HCO3: 28    / Base Excess: 4.5   /  SaO2: 97.4                                    10.5   7.49  )-----------( 123      ( 10 Sep 2023 02:35 )             32.9   09-10    136  |  98  |  13.5  ----------------------------<  99  3.8   |  25.0  |  1.16    Ca    9.2      10 Sep 2023 02:35  Mg     1.9     09-10    TPro  6.8  /  Alb  3.5  /  TBili  0.5  /  DBili  x   /  AST  12  /  ALT  12  /  AlkPhos  92  09-09    CARDIAC MARKERS ( 09 Sep 2023 10:30 )  x     / <0.01 ng/mL / 68 U/L / x     / x            T(C): 36.9 (09-10-23 @ 20:00), Max: 37.6 (09-10-23 @ 16:00)  HR: 53 (09-11-23 @ 00:00) (53 - 82)  BP: 158/76 (09-11-23 @ 00:00) (111/62 - 162/79)  RR: 20 (09-11-23 @ 00:00) (14 - 26)  SpO2: 94% (09-11-23 @ 00:00) (92% - 97%)  Wt(kg): --    CAPILLARY BLOOD GLUCOSE          I&O's Summary    09 Sep 2023 07:01  -  10 Sep 2023 07:00  --------------------------------------------------------  IN: 1215 mL / OUT: 1968 mL / NET: -753 mL    10 Sep 2023 07:01  -  11 Sep 2023 00:13  --------------------------------------------------------  IN: 530 mL / OUT: 1670 mL / NET: -1140 mL        Physical Exam  General: Well appearing, laying in bed on an incline, NAD  Neurological: AOx3, no focal neurological deficits  Cardiovascular: Sinus Bradycardia, S1/2 auscultated, No extra heart sounds  Respiratory: CTA b/l over all lung fields, No adventitious breath sounds  Gastrointestinal: Bowel sounds present in all 4 quadrants, Abdomen is soft, non-tender, mildly-distended  Extremities: No peripheral edema noted, 5/5 strength and full range of motion in all 4 extremities b/l  Vascular: 2+ peripheral pulses b/l in upper and lower extremities, Radial, DP  Incision Sites: Groin access site, brachial cutdown site c/d/i, no erythema, no purulence

## 2023-09-12 NOTE — DISCHARGE NOTE PROVIDER - HOSPITAL COURSE
48 Tajik Speaking Male, PMHx of HTN (uncontrolled), CAD w/ stents (2021 in [place]), and CKD (baseline SCr ~1.3). He is known to CT Surgery at [facility] since March 2023 for Type B dissection for which he underwent celiac and SMA bypass via midline laparotomy on 3/9/23 with Angelica Mandel and Florina and left carotid subclavian bypass with Dr. Mandel on 5/30/23. He was SDA 9/7/23 for TEVAR with left brachial artery cutdown (with spinal drain) with Angelica Heaton and Ministerio. Postoperative course was uneventful. 9/9 spinal drain clamped. 9/10 spinal drain removed. 9/12 Per Dr. Heaton, patient is ready for discharge home.     Patients home Entresto, Farxiga, and Aldactone will be held at discharge. Followup as Outpatient as to when to restart.    Patient will need a followup CT scan as an outpatient, to be ordered by Dr. Heaton    General: Well appearing, Sitting upright in recliner, NAD  Neurological: AOx3, no focal neurological deficits  Cardiovascular: Regular Rate/Rhythm, S1/2 auscultated, No extra heart sounds  Respiratory: CTA b/l over all lung fields, No adventitious breath sounds  Gastrointestinal: Bowel sounds present in all 4 quadrants, Abdomen is soft, non-tender, non-distended  Extremities: No peripheral edema noted, 5/5 strength and full range of motion in all 4 extremities b/l  Vascular: 2+ peripheral pulses b/l in upper and lower extremities, Radial, DP  Incision Sites: Groin access site, brachial cutdown site c/d/i, no erythema, no purulence

## 2023-09-12 NOTE — DISCHARGE NOTE PROVIDER - CARE PROVIDER_API CALL
Florina Luz, Po Parikh  Thoracic and Cardiac Surgery  [address] [place] [address]  Phone: [phone]  Fax: [phone]  Follow Up Time: 2 weeks   Florina Luz, Po Parikh  Thoracic and Cardiac Surgery  [address] [place] [address]  Phone: [phone]  Fax: [phone]  Follow Up Time: 2 weeks    Teo Gutierrez  Vascular Surgery  [address] [place] [address]  Phone: [phone]  Fax: [phone]  Follow Up Time:     Alpa Chaudhari  Cardiovascular Disease  [address] [place] [id number]  Phone: [phone]  Fax: [phone]  Follow Up Time:

## 2023-09-12 NOTE — DISCHARGE NOTE PROVIDER - NSDCCPCAREPLAN_GEN_ALL_CORE_FT
PRINCIPAL DISCHARGE DIAGNOSIS  Diagnosis: Aortic dissection, thoracoabdominal  Assessment and Plan of Treatment: 1. Take ALL of your medications as ordered. Fill your prescriptions the day you are discharged and take according to the schedule you were given. Continue to take a stool softener if you are taking narcotic pain medications. AVOID medications such as ibuprofen or naproxen if you have had bypass surgery. If you have any questions or are unable to fill the prescriptions, please call the office right away at [phone].  2. Shower daily. Clean all incisions daily while showering with warm water and mild soap, pat dry with a clean towel and do not cover with any dressings unless instructed to. No bathing, swimming in a pool or the ocean until instructed by MD.  DO NOT use creams or lotions on the wound.  3. We advise that you do not drive until instructed by MD.   4. You may not return to work until instructed by MD.   5. Please eat a low fat, low cholesterol, low salt diet. (No added/extra salt)  6. Weigh yourself every day in the morning and record it in the weight log in your red folder. Notify the office of any weight gain more than 2-3 pounds in 24 hours.  7. Continue breathing exercises several times a day. Continue to use your heart pillow.  8. No heavy lifting nothing greater than 5 pounds until cleared by MD.   9. Call / Notify MD any fever greater than 101.0, any drainage from incisions or if they become red, hot or very tender to the touch.  10. Increase activity as tolerated. Walk indoors and/or outdoors at least 3 times a day.   Keep your follow up appointments.      SECONDARY DISCHARGE DIAGNOSES  Diagnosis: CAD (coronary artery disease)  Assessment and Plan of Treatment: Please follow up with your Cardiologist and Primary Care Physician 1-2 weeks from discharge.    Diagnosis: HTN (hypertension)  Assessment and Plan of Treatment: Please follow up with your Cardiologist and Primary Care Physician 1-2 weeks from discharge.    Diagnosis: Chronic combined systolic and diastolic heart failure  Assessment and Plan of Treatment: You are being discharged with a diagnosis of heart failure. To prevent exacerbation of congestive heart failure (CHF) it is important to follow a heart-healthy, LOW SODIUM diet. You should read all food labels and keep your sodium intake less than 1500 mg per day. Examples of high sodium foods include fast food or processed food (ie frozen TV dinners), chinese food, soup and regular cold cuts. You should also restrict your fluid intake to less than 1-1.5 liters per day. Please weigh yourself every day at the same time in the morning and document your weight in a weight log. Please call your doctor right away if you gain over 2-3 pounds in one day, or you notice an increase in leg swelling, abdominal swelling or shortness of breath. Making sure you take all of your medications as prescribed and maintaining a normal blood pressure are also important for preventing a CHF exacerbation.   Please follow up with your Cardiologist and Primary Care Physician 1-2 weeks from discharge.

## 2023-09-12 NOTE — DISCHARGE NOTE NURSING/CASE MANAGEMENT/SOCIAL WORK - NSDCFUADDAPPT_GEN_ALL_CORE_FT
Please follow up with Dr. Heaton on 9/29/23 at 1230    Please be ~ 30 mins early on the day of your appointment to have a chest xray (script is in your folder) taken on the first floor in radiology PRIOR to going to the CT surgery office.    The cardiac surgery office is located at [facility], first floor. Take a left at the end of the lobby until the end of that pinzon (past the elevator bank). Make a left and the office is on your right across from the elevators.      **Please obtain Chest X-Ray (script enclosed in folder) on the day of your scheduled appointment, preferably at a [facility] facility.**     The cardiac surgery office is located on the first floor of [facility] at [address] [place] [address]. Please enter through the lobby. A [facility] employee will then direct you where to go.

## 2023-09-22 ENCOUNTER — APPOINTMENT (OUTPATIENT)
Dept: HEART FAILURE | Facility: CLINIC | Age: 48
End: 2023-09-22
Payer: COMMERCIAL

## 2023-09-22 VITALS
HEART RATE: 75 BPM | DIASTOLIC BLOOD PRESSURE: 84 MMHG | WEIGHT: 188.5 LBS | BODY MASS INDEX: 34.69 KG/M2 | HEIGHT: 62 IN | SYSTOLIC BLOOD PRESSURE: 132 MMHG | OXYGEN SATURATION: 99 %

## 2023-09-22 PROCEDURE — 99215 OFFICE O/P EST HI 40 MIN: CPT

## 2023-09-22 RX ORDER — ASPIRIN ENTERIC COATED TABLETS 81 MG 81 MG/1
81 TABLET, DELAYED RELEASE ORAL DAILY
Qty: 90 | Refills: 1 | Status: ACTIVE | COMMUNITY
Start: 2023-09-22

## 2023-09-22 RX ORDER — DAPAGLIFLOZIN 10 MG/1
10 TABLET, FILM COATED ORAL DAILY
Qty: 1 | Refills: 5 | Status: DISCONTINUED | COMMUNITY
Start: 2023-08-09 | End: 2023-09-22

## 2023-09-22 RX ORDER — ASPIRIN 81 MG
81 TABLET, DELAYED RELEASE (ENTERIC COATED) ORAL DAILY
Refills: 0 | Status: DISCONTINUED | COMMUNITY

## 2023-09-22 RX ORDER — FUROSEMIDE 40 MG/1
40 TABLET ORAL
Qty: 30 | Refills: 5 | Status: DISCONTINUED | COMMUNITY
End: 2023-09-22

## 2023-09-22 RX ORDER — SACUBITRIL AND VALSARTAN 97; 103 MG/1; MG/1
97-103 TABLET, FILM COATED ORAL TWICE DAILY
Qty: 60 | Refills: 5 | Status: DISCONTINUED | COMMUNITY
End: 2023-09-22

## 2023-09-29 ENCOUNTER — APPOINTMENT (OUTPATIENT)
Dept: CARDIOTHORACIC SURGERY | Facility: CLINIC | Age: 48
End: 2023-09-29
Payer: COMMERCIAL

## 2023-09-29 VITALS
SYSTOLIC BLOOD PRESSURE: 171 MMHG | BODY MASS INDEX: 34.6 KG/M2 | WEIGHT: 188 LBS | RESPIRATION RATE: 16 BRPM | TEMPERATURE: 98.3 F | HEIGHT: 62 IN | OXYGEN SATURATION: 98 % | DIASTOLIC BLOOD PRESSURE: 110 MMHG | HEART RATE: 112 BPM

## 2023-09-29 PROCEDURE — 99024 POSTOP FOLLOW-UP VISIT: CPT

## 2023-09-29 RX ORDER — FUROSEMIDE 40 MG/1
40 TABLET ORAL
Refills: 0 | Status: COMPLETED | COMMUNITY
End: 2023-09-29

## 2023-09-29 RX ORDER — SPIRONOLACTONE 25 MG/1
25 TABLET ORAL
Refills: 0 | Status: COMPLETED | COMMUNITY
End: 2023-09-29

## 2023-10-06 ENCOUNTER — APPOINTMENT (OUTPATIENT)
Dept: HEART FAILURE | Facility: CLINIC | Age: 48
End: 2023-10-06
Payer: COMMERCIAL

## 2023-10-06 VITALS
BODY MASS INDEX: 35.15 KG/M2 | HEART RATE: 69 BPM | WEIGHT: 191 LBS | HEIGHT: 62 IN | SYSTOLIC BLOOD PRESSURE: 132 MMHG | DIASTOLIC BLOOD PRESSURE: 82 MMHG | OXYGEN SATURATION: 98 %

## 2023-10-06 DIAGNOSIS — I71.9 AORTIC ANEURYSM OF UNSPECIFIED SITE, W/OUT RUPTURE: ICD-10-CM

## 2023-10-06 PROCEDURE — 99215 OFFICE O/P EST HI 40 MIN: CPT

## 2023-10-20 ENCOUNTER — APPOINTMENT (OUTPATIENT)
Dept: VASCULAR SURGERY | Facility: CLINIC | Age: 48
End: 2023-10-20
Payer: COMMERCIAL

## 2023-10-20 VITALS
OXYGEN SATURATION: 99 % | DIASTOLIC BLOOD PRESSURE: 78 MMHG | WEIGHT: 191 LBS | SYSTOLIC BLOOD PRESSURE: 122 MMHG | TEMPERATURE: 97.3 F | BODY MASS INDEX: 35.15 KG/M2 | HEART RATE: 84 BPM | HEIGHT: 62 IN

## 2023-10-20 PROCEDURE — 99024 POSTOP FOLLOW-UP VISIT: CPT

## 2023-10-30 LAB
ALBUMIN SERPL ELPH-MCNC: 4.5 G/DL
ALP BLD-CCNC: 120 U/L
ALT SERPL-CCNC: 16 U/L
ANION GAP SERPL CALC-SCNC: 13 MMOL/L
AST SERPL-CCNC: 17 U/L
BILIRUB SERPL-MCNC: 0.3 MG/DL
BUN SERPL-MCNC: 25 MG/DL
CALCIUM SERPL-MCNC: 9.9 MG/DL
CHLORIDE SERPL-SCNC: 105 MMOL/L
CO2 SERPL-SCNC: 24 MMOL/L
CREAT SERPL-MCNC: 1.52 MG/DL
EGFR: 57 ML/MIN/1.73M2
GLUCOSE SERPL-MCNC: 101 MG/DL
MAGNESIUM SERPL-MCNC: 1.9 MG/DL
NT-PROBNP SERPL-MCNC: 138 PG/ML
POTASSIUM SERPL-SCNC: 4.6 MMOL/L
PROT SERPL-MCNC: 7.9 G/DL
SODIUM SERPL-SCNC: 142 MMOL/L

## 2023-11-06 ENCOUNTER — APPOINTMENT (OUTPATIENT)
Dept: HEART FAILURE | Facility: CLINIC | Age: 48
End: 2023-11-06
Payer: COMMERCIAL

## 2023-11-06 VITALS
OXYGEN SATURATION: 99 % | WEIGHT: 203 LBS | HEART RATE: 66 BPM | SYSTOLIC BLOOD PRESSURE: 182 MMHG | DIASTOLIC BLOOD PRESSURE: 102 MMHG | BODY MASS INDEX: 37.13 KG/M2

## 2023-11-06 PROCEDURE — 99215 OFFICE O/P EST HI 40 MIN: CPT

## 2023-12-04 ENCOUNTER — APPOINTMENT (OUTPATIENT)
Dept: CARDIOLOGY | Facility: CLINIC | Age: 48
End: 2023-12-04
Payer: COMMERCIAL

## 2023-12-04 ENCOUNTER — NON-APPOINTMENT (OUTPATIENT)
Age: 48
End: 2023-12-04

## 2023-12-04 PROCEDURE — 93000 ELECTROCARDIOGRAM COMPLETE: CPT

## 2023-12-04 PROCEDURE — 99215 OFFICE O/P EST HI 40 MIN: CPT | Mod: 25

## 2023-12-04 PROCEDURE — 99024 POSTOP FOLLOW-UP VISIT: CPT | Mod: 25

## 2023-12-20 ENCOUNTER — RX RENEWAL (OUTPATIENT)
Age: 48
End: 2023-12-20

## 2023-12-28 LAB
OXYHGB MFR BLDMV: SIGNIFICANT CHANGE UP % (ref 90–95)
OXYHGB MFR BLDMV: SIGNIFICANT CHANGE UP % (ref 90–95)
SAO2 % BLDMV: SIGNIFICANT CHANGE UP %
SAO2 % BLDMV: SIGNIFICANT CHANGE UP %

## 2024-02-05 ENCOUNTER — APPOINTMENT (OUTPATIENT)
Dept: CARDIOLOGY | Facility: CLINIC | Age: 49
End: 2024-02-05
Payer: COMMERCIAL

## 2024-02-05 VITALS
OXYGEN SATURATION: 98 % | BODY MASS INDEX: 36.44 KG/M2 | HEIGHT: 62 IN | SYSTOLIC BLOOD PRESSURE: 138 MMHG | HEART RATE: 70 BPM | DIASTOLIC BLOOD PRESSURE: 88 MMHG | WEIGHT: 198 LBS

## 2024-02-05 DIAGNOSIS — Z86.79 PERSONAL HISTORY OF OTHER DISEASES OF THE CIRCULATORY SYSTEM: ICD-10-CM

## 2024-02-05 DIAGNOSIS — I71.012 DISSECTION OF DESCENDING THORACIC AORTA: ICD-10-CM

## 2024-02-05 PROCEDURE — 99215 OFFICE O/P EST HI 40 MIN: CPT | Mod: 25

## 2024-02-05 PROCEDURE — 93306 TTE W/DOPPLER COMPLETE: CPT

## 2024-02-06 PROBLEM — Z86.79 HISTORY OF CORONARY ARTERY DISEASE: Status: RESOLVED | Noted: 2023-03-31 | Resolved: 2024-02-06

## 2024-02-06 PROBLEM — I71.012 DISSECTING ANEURYSM OF THORACIC AORTA, STANFORD TYPE B: Status: ACTIVE | Noted: 2023-03-28

## 2024-02-06 RX ORDER — NIFEDIPINE 30 MG/1
30 TABLET, FILM COATED, EXTENDED RELEASE ORAL
Qty: 30 | Refills: 0 | Status: DISCONTINUED | COMMUNITY
Start: 2023-11-09

## 2024-02-06 RX ORDER — SPIRONOLACTONE 25 MG/1
25 TABLET ORAL DAILY
Refills: 0 | Status: DISCONTINUED | COMMUNITY

## 2024-02-06 NOTE — REASON FOR VISIT
[Cardiac Failure] : cardiac failure [Hypertension] : hypertension [Other: ______] : provided by TAD [Time Spent: ____ minutes] : Total time spent using  services: [unfilled] minutes. The patient's primary language is not English thus required  services. [Interpreters_IDNumber] : [id number] [Interpreters_FullName] : Brenda [TWNoteComboBox1] : Peruvian [FreeTextEntry1] : HF: Dr. Chaudhari  CTS: Dr. Heaton

## 2024-02-06 NOTE — PHYSICAL EXAM
[Well Developed] : well developed [No Acute Distress] : no acute distress [Normal Conjunctiva] : normal conjunctiva [Normal S1, S2] : normal S1, S2 [No Murmur] : no murmur [Clear Lung Fields] : clear lung fields [No Respiratory Distress] : no respiratory distress  [Soft] : abdomen soft [Non Tender] : non-tender [Normal Gait] : normal gait [No Edema] : no edema [No Cyanosis] : no cyanosis [No Clubbing] : no clubbing [Moves all extremities] : moves all extremities [Alert and Oriented] : alert and oriented [de-identified] : JVP at clavicle. [de-identified] : warm, dry

## 2024-02-06 NOTE — HISTORY OF PRESENT ILLNESS
[FreeTextEntry1] : 47 yo Citizen of the [place] speaking male, [place], with uncontrolled HTN, ILD, CKD and HFrEF diagnosed in the setting of hypertensive urgency (now recovered) and type B aortic dissection s/p bypass graft (iliac artery to superior mesenteric and celiac arteries 3/9/23), left carotid to subclavian artery bypass on 5/30/23 and TEVAR on 9/7/2023, who presents for follow-up.  On 2/28/23 the patient initially presented to [facility] for routine physical exam and medication refills. Of note, he had not seen a doctor for over three years and [place] He was last treated at [facility] in 2018 for hypertensive urgency.  At [facility] he was found to be in hypertensive urgency with a systolic blood pressure of 200 and then presented to [place] ED.  A CTA of the chest, abdomen and pelvis showed a Type B dissection and he was transferred to [facility] CTICU for further management. TTE showed newly reduced systolic function LVEF 20-25%. His inpatient course was significant for RHC/LHC, initially cancelled on 3/2 due to worsening renal function, but later resolved. 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 The vascular surgery team recommended no intervention.   He underwent bypass grafting with Dr. Heaton on 3/9. He developed ileus postoperatively, which since resolved. Repeat TTE 3/10 showed improvement in his LV systolic function to 40-45%. He was discharged home 3/18 on Coreg 25mg BID, Entresto 24-26mg BID, Nifedipine 60mg daily and Lasix 40mg daily.  He subsequently underwent coil embolization of celiac artery on 8/3 followed by TEAVR on 9/7/2023.  He was admitted from 9/7 - 9/12/23.  He was discharged home off of Entresto, spironolactone as well as Farxifa.  Discharge labs showed Cr of 1.19 with K 3.9.  Post discharge, we had been reintroducing Entresto.  10/6/2023 -  BP was 132/82.  Entresto was increased to 49 to 51 mg twice daily dose.  He was recently seen by vascular team on 10/20/2023.  He was cleared to return to work with a lifting restriction of 30 pounds.  11/6/23 - /102.  He is having dizziness as well as vision changes.  Sent to [facility].  He was hospitalized for hypertensive urgency with BP in hospital 220/100.  He was treated with nicardipine drip and discharged home on Entresto high-dose, spironolactone 25 mg daily, carvedilol 25 mg twice daily, and nifedipine 30 mg XL daily.  12/4/23 - BP  142/92.  Echo ordered.  No changes to medications made.   Today, BP in office 138/88.  He notes that he is feeling much better.  He denies chest pain, palpitations, dyspnea at rest or exertion, orthopnea, leg swelling, changes in appetite, changes in weight, bendopnea, and syncope/pre-syncope.  He is compliant with his medications however, today he presents with a lower Entresto dose of 24-26mg BID.  I am not clear who made the dose change.  Home BP: [phone]/84-85

## 2024-02-06 NOTE — CARDIOLOGY SUMMARY
[de-identified] : 12/4/2023: NSR, LVH, left axis deviation. 9/10/23: NSR, HR 67, TWI lead III, doesn't meet LVH criteria. 5/5/23: SR, LAD, LVH, PAC [de-identified] : TTE 2/5/24: LVEF 53%, LVEDD, LVEDD 6.0cm, IVSd 0.8cm, RV normal, ind DD, mild LAE, mild MR, RA =3. TTE 9/9/23: LVEF 55-60%, LVEDD 4.79cm, IVSd 1.07cm, TASPE 1.88cm, no DD, trace MR TTE 8/22/23: LVEF 45-50%, LVEDD 4.95cm, IVSd 0.9cm, PWd 1.14cm, TASPE 1.8cm, trace MR, mild TR. TTE 3/10/23: LVEF 40-45%, LVIDD 6.06, IVSD 1.29, relative wall thickness 0.46, mild LVH, normal RV size/function, mild TR. TTE 2/28/23: LVEF 20-25%, LVEDD 6.23cm, LVIDs 5.59cm, TAPSE 2.54cm, mild to mod MR, trivial TR, RV normal function. [de-identified] : C 3/3/23: no CAD. RHC 3/3/23: RA 22, RV 42/14, PA 40/24 (31), PCWP 17, CO 5.9/CI 3.09, PA sat 72.1%, Hb 13.4.  [de-identified] : Genetic testing 7/11/23: no significant variants

## 2024-02-06 NOTE — ASSESSMENT
[FreeTextEntry1] : 47 yo Qat[place] speaking male, [place], with uncontrolled HTN, ILD, CKD and HFrEF diagnosed in the setting of hypertensive urgency (now recovered) and type B aortic dissection s/p bypass graft (iliac artery to superior mesenteric and celiac arteries 3/9/23), left carotid to subclavian artery bypass on 5/30/23 and TEVAR on 9/7/2023, who presents for follow-up.  He has ACC/AHA stage C CM with NYHA class I symptoms.

## 2024-02-06 NOTE — DISCUSSION/SUMMARY
[FreeTextEntry1] : # Refractory Hypertension - BP finally better controlled at target of < 140/90.  - Additional BP meds: nifedipine 30mg XL BID (was on daily last visit, unclear who made this change).  Echo today with continued preserved LV function at 53%.  Nifedipine has been controlling his HTN well however, may not be the best option given his history of HFrEF.  Will need to monitor LV function closely.  Remains preserved currently.  If LV function drops, will need to stop this can consider hydralazine therapy or alternative. - Work up for secondary hypertension in past was unremarkable labs and US renal Doppler negative for MICHELLE -Per patient sleep study negative at [facility] 3 months ago.   -Would be a good candidate to consider renal denervation.  New program should be opening up at [facility] in the next few months.  Discussed with patient today.  Patient agreeable to be evaluated.    # Dilated cardiomyopathy  - HF recovered LVEDD from 20-25% in 2/28/23 to now 53% on 2/5/24 however, LVEDD remains at 6.0cm.   -Etiology: NICMP ([facility] 3/3/23 revealed normal coronaries and no prior stents). Likely due to hypertensive heart disease with increased wall thickness on echo. Genetic testing 7/11/23 - negative for significant variants.  -GDMT: on coreg 25mg BID, Entresto 24-26mg BID dosing (was on high dose last visit), and spironolactone 25mg daily. - oddly today patient presents with Entresto bottle with low dose tabs.  Last visit with me he was on high dose.  He notes he has been taking low dose BID with -135.  Will leave him on low dose for now.   - Not on farxiga 10mg daily.  He was on this in the past.  - pro BNP on labs today low.  I do not feel that he will tolerate more volume removal with SGLT2i or higher doses of Entresto.  He is still having positional orthostasis. Can re-consider if he develops HF symptoms.     -Diuretics: euvolemic on exam.  Not on standing diuretics.  # Type B Dissection of Aorta  -S/p iliac to celiac SMA bypass 3/9/23 by CTS and vascular surgery teams.  -S/p left carotid to subclavian artery bypass on 5/30/23 with Dr. Jones -S/p aortogram with SMA embolization on 8/3/23 - S/p TEVAR on 9/7/23  # CKD -Baseline sCr 1.3-1.6 -Continue close monitoring.   # Radial artery thrombosis -F/u with vascular team  F/U with me in 3 months.

## 2024-02-20 RX ORDER — NIFEDIPINE 30 MG/1
30 TABLET, EXTENDED RELEASE ORAL
Qty: 180 | Refills: 1 | Status: ACTIVE | COMMUNITY
Start: 1900-01-01 | End: 1900-01-01

## 2024-05-15 ENCOUNTER — APPOINTMENT (OUTPATIENT)
Dept: HEART FAILURE | Facility: CLINIC | Age: 49
End: 2024-05-15
Payer: COMMERCIAL

## 2024-05-15 ENCOUNTER — NON-APPOINTMENT (OUTPATIENT)
Age: 49
End: 2024-05-15

## 2024-05-15 VITALS
HEIGHT: 62 IN | SYSTOLIC BLOOD PRESSURE: 152 MMHG | OXYGEN SATURATION: 98 % | DIASTOLIC BLOOD PRESSURE: 78 MMHG | HEART RATE: 87 BPM | WEIGHT: 218 LBS | BODY MASS INDEX: 40.12 KG/M2

## 2024-05-15 DIAGNOSIS — I42.8 OTHER CARDIOMYOPATHIES: ICD-10-CM

## 2024-05-15 DIAGNOSIS — I10 ESSENTIAL (PRIMARY) HYPERTENSION: ICD-10-CM

## 2024-05-15 DIAGNOSIS — I50.9 HEART FAILURE, UNSPECIFIED: ICD-10-CM

## 2024-05-15 PROCEDURE — 93000 ELECTROCARDIOGRAM COMPLETE: CPT

## 2024-05-15 PROCEDURE — 99215 OFFICE O/P EST HI 40 MIN: CPT | Mod: 25

## 2024-05-15 NOTE — HISTORY OF PRESENT ILLNESS
[FreeTextEntry1] : 47 yo Iceland[place] speaking [place], [place], with uncontrolled HTN, ILD, CKD and HFrEF diagnosed in the setting of hypertensive urgency (now recovered) and type B aortic dissection s/p bypass graft (iliac artery to superior mesenteric and celiac arteries 3/9/23), left carotid to subclavian artery bypass on 5/30/23 and TEVAR on 9/7/2023, who presents for follow-up.  On 2/28/23 the patient initially presented to [facility] for routine physical exam and medication refills. Of note, he had not seen a doctor for over three years and was [place]. He was last treated at [facility] in 2018 for hypertensive urgency.  At [facility] he was found to be in hypertensive urgency with a systolic blood pressure of 200 and then presented to [place] ED.  A CTA of the chest, abdomen and pelvis showed a Type B dissection and he was transferred to [facility] CTICU for further management. TTE showed newly reduced systolic function LVEF 20-25%. His inpatient course was significant for RHC/LHC, initially cancelled on 3/2 due to worsening renal function, but later resolved. 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 The vascular surgery team recommended no intervention.   He underwent bypass grafting with Dr. Heaton on 3/9. He developed ileus postoperatively, which since resolved. Repeat TTE 3/10 showed improvement in his LV systolic function to 40-45%. He was discharged home 3/18 on Coreg 25mg BID, Entresto 24-26mg BID, Nifedipine 60mg daily and Lasix 40mg daily.  He subsequently underwent coil embolization of celiac artery on 8/3 followed by TEAVR on 9/7/2023.  He was admitted from 9/7 - 9/12/23.  He was discharged home off of Entresto, spironolactone as well as Farxifa.  Discharge labs showed Cr of 1.19 with K 3.9.  Post discharge, we had been reintroducing Entresto.  10/6/2023 -  BP was 132/82.  Entresto was increased to 49 to 51 mg twice daily dose.  He was recently seen by vascular team on 10/20/2023.  He was cleared to return to work with a lifting restriction of 30 pounds.  11/6/23 - /102.  He is having dizziness as well as vision changes.  Sent to [facility].  He was hospitalized for hypertensive urgency with BP in hospital 220/100.  He was treated with nicardipine drip and discharged home on Entresto high-dose, spironolactone 25 mg daily, carvedilol 25 mg twice daily, and nifedipine 30 mg XL daily.  12/4/23 - BP  142/92.  Echo ordered.  No changes to medications made.   2/5/24 - /88.  Presented with a lower Entresto dose of 24-26mg BID.  I am not clear who made the dose change.  Today, home -150 over 70s.  He also notes some lower extremity edema on standing for long periods. He denies chest pain, palpitations, dyspnea at rest or exertion, orthopnea, changes in appetite, changes in weight, bendopnea, and syncope/pre-syncope.  He is compliant with his medications.

## 2024-05-15 NOTE — ASSESSMENT
[FreeTextEntry1] : 47 yo Ug[place] speaking male, [place], with uncontrolled HTN, ILD, CKD and HFrEF diagnosed in the setting of hypertensive urgency (now recovered) and type B aortic dissection s/p bypass graft (iliac artery to superior mesenteric and celiac arteries 3/9/23), left carotid to subclavian artery bypass on 5/30/23 and TEVAR on 9/7/2023, who presents for follow-up.  He has ACC/AHA stage C CM with NYHA class I symptoms.

## 2024-05-15 NOTE — CARDIOLOGY SUMMARY
[de-identified] : 5/15/2024: NSR, left axis deviation possible left atrial abnormality. 12/4/2023: NSR, LVH, left axis deviation. 9/10/23: NSR, HR 67, TWI lead III, doesn't meet LVH criteria. 5/5/23: SR, LAD, LVH, PAC [de-identified] : TTE 2/5/24: LVEF 53%, LVEDD, LVEDD 6.0cm, IVSd 0.8cm, RV normal, ind DD, mild LAE, mild MR, RA =3. TTE 9/9/23: LVEF 55-60%, LVEDD 4.79cm, IVSd 1.07cm, TASPE 1.88cm, no DD, trace MR TTE 8/22/23: LVEF 45-50%, LVEDD 4.95cm, IVSd 0.9cm, PWd 1.14cm, TASPE 1.8cm, trace MR, mild TR. TTE 3/10/23: LVEF 40-45%, LVIDD 6.06, IVSD 1.29, relative wall thickness 0.46, mild LVH, normal RV size/function, mild TR. TTE 2/28/23: LVEF 20-25%, LVEDD 6.23cm, LVIDs 5.59cm, TAPSE 2.54cm, mild to mod MR, trivial TR, RV normal function. [de-identified] : C 3/3/23: no CAD. RHC 3/3/23: RA 22, RV 42/14, PA 40/24 (31), PCWP 17, CO 5.9/CI 3.09, PA sat 72.1%, Hb 13.4.  [de-identified] : Genetic testing 7/11/23: no significant variants

## 2024-05-15 NOTE — PHYSICAL EXAM
[Well Developed] : well developed [No Acute Distress] : no acute distress [Normal Conjunctiva] : normal conjunctiva [Normal S1, S2] : normal S1, S2 [No Murmur] : no murmur [Clear Lung Fields] : clear lung fields [No Respiratory Distress] : no respiratory distress  [Soft] : abdomen soft [Non Tender] : non-tender [Normal Gait] : normal gait [No Cyanosis] : no cyanosis [No Clubbing] : no clubbing [Moves all extremities] : moves all extremities [Alert and Oriented] : alert and oriented [de-identified] : JVP at clavicle. [de-identified] : Trace lower extremity edema. [de-identified] : warm

## 2024-05-15 NOTE — REASON FOR VISIT
[Cardiac Failure] : cardiac failure [Hypertension] : hypertension [Other: ______] : provided by TAD [Time Spent: ____ minutes] : Total time spent using  services: [unfilled] minutes. The patient's primary language is not English thus required  services. [Interpreters_IDNumber] : [id number] [Interpreters_FullName] : Eloy [TWNoteComboBox1] : Prydeinig [FreeTextEntry1] : HF: Dr. Chaudhari  CTS: Dr. Heaton

## 2024-05-15 NOTE — DISCUSSION/SUMMARY
[FreeTextEntry1] : # Refractory Hypertension - BP target 130/80.  Not yet at goal BP at home -150. - Additional BP meds: nifedipine 30mg XL BID.   - Work up for secondary hypertension in past was unremarkable labs and US renal Doppler negative for MICHELLE -Per patient sleep study negative at [facility] in 2023.   -Would be a good candidate to consider renal denervation.  New program should be opening up at [facility] in the next few months.  Discussed with patient today.  Patient agreeable to be evaluated.    # Dilated cardiomyopathy  - HF recovered LVEDD from 20-25% in 2/28/23 to now 53% on 2/5/24 however, LVEDD remains at 6.0cm.   -Etiology: NICMP (LHC 3/3/23 revealed normal coronaries and no prior stents). Likely due to hypertensive heart disease with increased wall thickness on echo. Genetic testing 7/11/23 - negative for significant variants.  -GDMT: - continue on coreg 25mg BID, and spironolactone 25mg daily. - INCREASE Entresto to 49-51mg BID. - Not on farxiga 10mg daily.  He was on this in the past.    -Diuretics: euvolemic on exam.  Not on standing diuretics.  # Type B Dissection of Aorta  -S/p iliac to celiac SMA bypass 3/9/23 by CTS and vascular surgery teams.  -S/p left carotid to subclavian artery bypass on 5/30/23 with Dr. Jones -S/p aortogram with SMA embolization on 8/3/23 - S/p TEVAR on 9/7/23  # CKD -Baseline sCr 1.3-1.6 -Continue close monitoring.   # Radial artery thrombosis -F/u with vascular team  F/U with me in 1 month for BP check. [EKG obtained to assist in diagnosis and management of assessed problem(s)] : EKG obtained to assist in diagnosis and management of assessed problem(s)

## 2024-05-20 ENCOUNTER — RX RENEWAL (OUTPATIENT)
Age: 49
End: 2024-05-20

## 2024-05-20 RX ORDER — CARVEDILOL 25 MG/1
25 TABLET, FILM COATED ORAL
Qty: 180 | Refills: 1 | Status: ACTIVE | COMMUNITY
Start: 1900-01-01 | End: 1900-01-01

## 2024-05-20 RX ORDER — SPIRONOLACTONE 25 MG/1
25 TABLET ORAL DAILY
Qty: 90 | Refills: 1 | Status: ACTIVE | COMMUNITY
Start: 2023-04-05 | End: 1900-01-01

## 2024-06-15 ENCOUNTER — RX RENEWAL (OUTPATIENT)
Age: 49
End: 2024-06-15

## 2024-06-17 RX ORDER — SACUBITRIL AND VALSARTAN 49; 51 MG/1; MG/1
49-51 TABLET, FILM COATED ORAL TWICE DAILY
Qty: 180 | Refills: 1 | Status: ACTIVE | COMMUNITY
Start: 2023-09-22 | End: 1900-01-01

## 2024-06-28 NOTE — H&P PST ADULT - RESPIRATORY AND THORAX
Patient Name (Optional- Will Render 'the Patient' If Blank): Anup Mohs Case Number: [id number] Date Of Previous Biopsy (Optional): 05-29-24 Previous Accession (Optional): [id number] Biopsy Photograph Reviewed: Yes Consent Type: Consent 1 (Standard) Eye Shield Used: No Surgeon Performing Repair (Optional): Dr. Akash Nolasco Initial Size Of Lesion: 0.6 X Size Of Lesion In Cm (Optional): 0.5 Number Of Stages: 1 Primary Defect Width In Cm (Final Defect Size - Required For Flaps/Grafts): 0.8 Primary Defect Depth In Cm (Optional But Required For Some Insurers): 0 Repair Type: Complex Repair Which Instrument Did You Use For Dermabrasion?: Wire Brush Which Eyelid Repair Cpt Are You Using?: 67890 Oculoplastic Surgeon Procedure Text (A): After obtaining clear surgical margins the patient was sent to oculoplastics for surgical repair.  The patient understands they will receive post-surgical care and follow-up from the referring physician's office. Otolaryngologist Procedure Text (A): After obtaining clear surgical margins the patient was sent to otolaryngology for surgical repair.  The patient understands they will receive post-surgical care and follow-up from the referring physician's office. Plastic Surgeon Procedure Text (A): After obtaining clear surgical margins the patient was sent to plastics for surgical repair.  The patient understands they will receive post-surgical care and follow-up from the referring physician's office. Mid-Level Procedure Text (A): After obtaining clear surgical margins the patient was sent to a mid-level provider for surgical repair.  The patient understands they will receive post-surgical care and follow-up from the mid-level provider. Provider Procedure Text (A): After obtaining clear surgical margins the defect was repaired by another provider. Asc Procedure Text (A): After obtaining clear surgical margins the patient was sent to an ASC for surgical repair.  The patient understands they will receive post-surgical care and follow-up from the ASC physician. Simple / Intermediate / Complex Repair - Final Wound Length In Cm: 4.2 Suturegard Retention Suture: 2-0 Nylon Retention Suture Bite Size: 3 mm Length To Time In Minutes Device Was In Place: 10 Distance Of Undermining In Cm (Required): 1.2 Undermining Type: Entire Wound Debridement Text: The wound edges were debrided prior to proceeding with the closure to facilitate wound healing. Helical Rim Text: The closure involved the helical rim. Vermilion Border Text: The closure involved the vermilion border. Nostril Rim Text: The closure involved the nostril rim. Retention Suture Text: Retention sutures were placed to support the closure and prevent dehiscence. Area H Indication Text: Tumors in this location are included in Area H (eyelids, eyebrows, nose, lips, chin, ear, pre-auricular, post-auricular, temple, genitalia, hands, feet, ankles and areola).  Tissue conservation is critical in these anatomic locations. Area M Indication Text: Tumors in this location are included in Area M (cheek, forehead, scalp, neck, jawline and pretibial skin).  Mohs surgery is indicated for tumors in these anatomic locations. Area L Indication Text: Tumors in this location are included in Area L (trunk and extremities).  Mohs surgery is indicated for larger tumors, or tumors with aggressive histologic features, in these anatomic locations. Tumor Debulked?: not debulked Depth Of Tumor Invasion (For Histology): tumor not visualized Perineural Invasion (For Histology - Be Specific If Possible): absent Stage 1: Number Of Blocks?: 2 Special Stains Stage 1 - Results: Base On Clearance Noted Above Stage 2: Additional Anesthesia Type: 1% lidocaine with epinephrine Staging Info: By selecting yes to the question above you will include information on AJCC 8 tumor staging in your Mohs note. Information on tumor staging will be automatically added for SCCs on the head and neck. AJCC 8 includes tumor size, tumor depth, perineural involvement and bone invasion. Tumor Depth: Less than 6mm from granular layer and no invasion beyond the subcutaneous fat Was The Patient On Physician Recommended Anticoagulation Therapy?: Please Select the Appropriate Response Medical Necessity Statement: Based on my medical judgement, Mohs surgery is the most appropriate treatment for this cancer compared to other treatments. Alternatives Discussed Intro (Do Not Add Period): I discussed alternative treatments to Mohs surgery and specifically discussed the risks and benefits of Consent 1/Introductory Paragraph: The rationale for Mohs was explained to the patient and consent was obtained. The risks, benefits and alternatives to therapy were discussed in detail. Specifically, the risks of infection, scarring, bleeding, prolonged wound healing, incomplete removal, allergy to anesthesia, nerve injury and recurrence were addressed. Prior to the procedure, the treatment site was clearly identified and confirmed by the patient. All components of Universal Protocol/PAUSE Rule completed. Consent 2/Introductory Paragraph: Mohs surgery was explained to the patient and consent was obtained. The risks, benefits and alternatives to therapy were discussed in detail. Specifically, the risks of infection, scarring, bleeding, prolonged wound healing, incomplete removal, allergy to anesthesia, nerve injury and recurrence were addressed. Prior to the procedure, the treatment site was clearly identified and confirmed by the patient. All components of Universal Protocol/PAUSE Rule completed. Consent 3/Introductory Paragraph: I gave the patient a chance to ask questions they had about the procedure.  Following this I explained the Mohs procedure and consent was obtained. The risks, benefits and alternatives to therapy were discussed in detail. Specifically, the risks of infection, scarring, bleeding, prolonged wound healing, incomplete removal, allergy to anesthesia, nerve injury and recurrence were addressed. Prior to the procedure, the treatment site was clearly identified and confirmed by the patient. All components of Universal Protocol/PAUSE Rule completed. Consent (Temporal Branch)/Introductory Paragraph: The rationale for Mohs was explained to the patient and consent was obtained. The risks, benefits and alternatives to therapy were discussed in detail. Specifically, the risks of damage to the temporal branch of the facial nerve, infection, scarring, bleeding, prolonged wound healing, incomplete removal, allergy to anesthesia, and recurrence were addressed. Prior to the procedure, the treatment site was clearly identified and confirmed by the patient. All components of Universal Protocol/PAUSE Rule completed. Consent (Marginal Mandibular)/Introductory Paragraph: The rationale for Mohs was explained to the patient and consent was obtained. The risks, benefits and alternatives to therapy were discussed in detail. Specifically, the risks of damage to the marginal mandibular branch of the facial nerve, infection, scarring, bleeding, prolonged wound healing, incomplete removal, allergy to anesthesia, and recurrence were addressed. Prior to the procedure, the treatment site was clearly identified and confirmed by the patient. All components of Universal Protocol/PAUSE Rule completed. Consent (Spinal Accessory)/Introductory Paragraph: The rationale for Mohs was explained to the patient and consent was obtained. The risks, benefits and alternatives to therapy were discussed in detail. Specifically, the risks of damage to the spinal accessory nerve, infection, scarring, bleeding, prolonged wound healing, incomplete removal, allergy to anesthesia, and recurrence were addressed. Prior to the procedure, the treatment site was clearly identified and confirmed by the patient. All components of Universal Protocol/PAUSE Rule completed. Consent (Near Eyelid Margin)/Introductory Paragraph: The rationale for Mohs was explained to the patient and consent was obtained. The risks, benefits and alternatives to therapy were discussed in detail. Specifically, the risks of ectropion or eyelid deformity, infection, scarring, bleeding, prolonged wound healing, incomplete removal, allergy to anesthesia, nerve injury and recurrence were addressed. Prior to the procedure, the treatment site was clearly identified and confirmed by the patient. All components of Universal Protocol/PAUSE Rule completed. Consent (Ear)/Introductory Paragraph: The rationale for Mohs was explained to the patient and consent was obtained. The risks, benefits and alternatives to therapy were discussed in detail. Specifically, the risks of ear deformity, infection, scarring, bleeding, prolonged wound healing, incomplete removal, allergy to anesthesia, nerve injury and recurrence were addressed. Prior to the procedure, the treatment site was clearly identified and confirmed by the patient. All components of Universal Protocol/PAUSE Rule completed. Consent (Nose)/Introductory Paragraph: The rationale for Mohs was explained to the patient and consent was obtained. The risks, benefits and alternatives to therapy were discussed in detail. Specifically, the risks of nasal deformity, changes in the flow of air through the nose, infection, scarring, bleeding, prolonged wound healing, incomplete removal, allergy to anesthesia, nerve injury and recurrence were addressed. Prior to the procedure, the treatment site was clearly identified and confirmed by the patient. All components of Universal Protocol/PAUSE Rule completed. Consent (Lip)/Introductory Paragraph: The rationale for Mohs was explained to the patient and consent was obtained. The risks, benefits and alternatives to therapy were discussed in detail. Specifically, the risks of lip deformity, changes in the oral aperture, infection, scarring, bleeding, prolonged wound healing, incomplete removal, allergy to anesthesia, nerve injury and recurrence were addressed. Prior to the procedure, the treatment site was clearly identified and confirmed by the patient. All components of Universal Protocol/PAUSE Rule completed. Consent (Scalp)/Introductory Paragraph: The rationale for Mohs was explained to the patient and consent was obtained. The risks, benefits and alternatives to therapy were discussed in detail. Specifically, the risks of changes in hair growth pattern secondary to repair, infection, scarring, bleeding, prolonged wound healing, incomplete removal, allergy to anesthesia, nerve injury and recurrence were addressed. Prior to the procedure, the treatment site was clearly identified and confirmed by the patient. All components of Universal Protocol/PAUSE Rule completed. Detail Level: Detailed Postop Diagnosis: same Anesthesia Type: 1% lidocaine with epinephrine and a 1:10 solution of 8.4% sodium bicarbonate Anesthesia Volume In Cc: 6 Hemostasis: Electrocautery Estimated Blood Loss (Cc): minimal Repair Anesthesia Method: local infiltration Brow Lift Text: A midfrontal incision was made medially to the defect to allow access to the tissues just superior to the left eyebrow. Following careful dissection inferiorly in a supraperiosteal plane to the level of the left eyebrow, several 3-0 monocryl sutures were used to resuspend the eyebrow orbicularis oculi muscular unit to the superior frontal bone periosteum. This resulted in an appropriate reapproximation of static eyebrow symmetry and correction of the left brow ptosis. Deep Sutures: 5-0 Monocryl Epidermal Sutures: 6-0 Prolene Epidermal Closure: running details… Suturegard Intro: Intraoperative tissue expansion was performed, utilizing the SUTUREGARD device, in order to reduce wound tension. Suturegard Body: The suture ends were repeatedly re-tightened and re-clamped to achieve the desired tissue expansion. Hemigard Intro: Due to skin fragility and wound tension, it was decided to use HEMIGARD adhesive retention suture devices to permit a linear closure. The skin was cleaned and dried for a 6cm distance away from the wound. Excessive hair, if present, was removed to allow for adhesion. Hemigard Postcare Instructions: The HEMIGARD strips are to remain completely dry for at least 5-7 days. Donor Site Anesthesia Type: same as repair anesthesia Graft Basting Suture (Optional): 6-0 Chromic Gut Graft Donor Site Bandage (Optional-Leave Blank If You Don't Want In Note): A pressure bandage was applied to the donor site. Closure 2 Information: This tab is for additional flaps and grafts, including complex repair and grafts and complex repair and flaps. You can also specify a different location for the additional defect, if the location is the same you do not need to select a new one. We will insert the automated text for the repair you select below just as we do for solitary flaps and grafts. Please note that at this time if you select a location with a different insurance zone you will need to override the ICD10 and CPT if appropriate. Closure 3 Information: This tab is for additional flaps and grafts above and beyond our usual structured repairs.  Please note if you enter information here it will not currently bill and you will need to add the billing information manually. Wound Care: Petrolatum Dressing: dry sterile dressing Suture Removal: 7 days Unna Boot Text: An Unna boot was placed to help immobilize the limb and facilitate more rapid healing. Home Suture Removal Text: Patient was provided instructions on removing sutures and will remove their sutures at home.  If they have any questions or difficulties they will call the office. Post-Care Instructions: I reviewed with the patient in detail post-care instructions. Patient is not to engage in any heavy lifting, exercise, or swimming for the next 14 days. Should the patient develop any fevers, chills, bleeding, severe pain patient will contact the office immediately. Pain Refusal Text: I offered to prescribe pain medication but the patient refused to take this medication. Mauc Instructions: By selecting yes to the question below the MAUC number will be added into the note.  This will be calculated automatically based on the diagnosis chosen, the size entered, the body zone selected (H,M,L) and the specific indications you chose. You will also have the option to override the Mohs AUC if you disagree with the automatically calculated number and this option is found in the Case Summary tab. Where Do You Want The Question To Include Opioid Counseling Located?: Case Summary Tab Eye Protection Verbiage: Before proceeding with the stage, a plastic scleral shield was inserted. The globe was anesthetized with a few drops of 1% lidocaine with 1:100,000 epinephrine. Then, an appropriate sized scleral shield was chosen and coated with lacrilube ointment. The shield was gently inserted and left in place for the duration of each stage. After the stage was completed, the shield was gently removed. Mohs Method Verbiage: An incision at a 45 degree angle following the standard Mohs approach was done and the specimen was harvested as a microscopic controlled layer. Surgeon/Pathologist Verbiage (Will Incorporate Name Of Surgeon From Intro If Not Blank): operated in two distinct and integrated capacities as the surgeon and pathologist. Mohs Histo Method Verbiage: Each section was then chromacoded and processed in the Mohs lab using the Mohs protocol and submitted for frozen section, utilizing hematoxylin and eosin histochemical stains. Subsequent Stages Histo Method Verbiage: Using a similar technique to that described above, a thin layer of tissue was removed from all areas where tumor was visible on the previous stage.  The tissue was again oriented, mapped, dyed, and processed as above. Mohs Rapid Report Verbiage: The area of clinically evident tumor was marked with skin marking ink and appropriately hatched.  The initial incision was made following the Mohs approach through the skin.  The specimen was taken to the lab, divided into the necessary number of pieces, chromacoded and processed according to the Mohs protocol.  This was repeated in successive stages until a tumor free defect was achieved. Complex Repair Preamble Text (Leave Blank If You Do Not Want): Extensive wide undermining was performed. Intermediate Repair Preamble Text (Leave Blank If You Do Not Want): Undermining was performed with blunt dissection. Non-Graft Cartilage Fenestration Text: The cartilage was fenestrated with a 2mm punch biopsy to help facilitate healing. Graft Cartilage Fenestration Text: The cartilage was fenestrated with a 2mm punch biopsy to help facilitate graft survival and healing. Secondary Intention Text (Leave Blank If You Do Not Want): The defect will heal with secondary intention. No Repair - Repaired With Adjacent Surgical Defect Text (Leave Blank If You Do Not Want): After obtaining clear surgical margins the defect was repaired concurrently with another surgical defect which was in close approximation. Adjacent Tissue Transfer Text: The defect edges were debeveled with a #15 scalpel blade. Given the location of the defect and the proximity to free margins an adjacent tissue transfer was deemed most appropriate. Using a sterile surgical marker, an appropriate flap was drawn incorporating the defect and placing the expected incisions within the relaxed skin tension lines where possible. The area thus outlined was incised deep to adipose tissue with a #15 scalpel blade. The skin margins were undermined to an appropriate distance in all directions utilizing iris scissors and carried over to close the primary defect. Advancement Flap (Single) Text: The defect edges were debeveled with a #15 scalpel blade. Given the location of the defect and the proximity to free margins a single advancement flap was deemed most appropriate. Using a sterile surgical marker, an appropriate advancement flap was drawn incorporating the defect and placing the expected incisions within the relaxed skin tension lines where possible. The area thus outlined was incised deep to adipose tissue with a #15 scalpel blade. The skin margins were undermined to an appropriate distance in all directions utilizing iris scissors. Following this, the designed flap was advanced and carried over into the primary defect and sutured into place. Advancement Flap (Double) Text: The defect edges were debeveled with a #15 scalpel blade. Given the location of the defect and the proximity to free margins a double advancement flap was deemed most appropriate. Using a sterile surgical marker, the appropriate advancement flaps were drawn incorporating the defect and placing the expected incisions within the relaxed skin tension lines where possible. The area thus outlined was incised deep to adipose tissue with a #15 scalpel blade. The skin margins were undermined to an appropriate distance in all directions utilizing iris scissors. Following this, the designed flaps were advanced and carried over into the primary defect and sutured into place. Burow's Advancement Flap Text: The defect edges were debeveled with a #15 scalpel blade. Given the location of the defect and the proximity to free margins a Burow's advancement flap was deemed most appropriate. Using a sterile surgical marker, the appropriate advancement flap was drawn incorporating the defect and placing the expected incisions within the relaxed skin tension lines where possible. The area thus outlined was incised deep to adipose tissue with a #15 scalpel blade. The skin margins were undermined to an appropriate distance in all directions utilizing iris scissors. Following this, the designed flap was advanced and carried over into the primary defect and sutured into place. Chonodrocutaneous Helical Advancement Flap Text: The defect edges were debeveled with a #15 scalpel blade. Given the location of the defect and the proximity to free margins a chondrocutaneous helical advancement flap was deemed most appropriate. Using a sterile surgical marker, the appropriate advancement flap was drawn incorporating the defect and placing the expected incisions within the relaxed skin tension lines where possible. The area thus outlined was incised deep to adipose tissue with a #15 scalpel blade. The skin margins were undermined to an appropriate distance in all directions utilizing iris scissors. Following this, the designed flap was advanced and carried over into the primary defect and sutured into place. Crescentic Advancement Flap Text: The defect edges were debeveled with a #15 scalpel blade. Given the location of the defect and the proximity to free margins a crescentic advancement flap was deemed most appropriate. Using a sterile surgical marker, the appropriate advancement flap was drawn incorporating the defect and placing the expected incisions within the relaxed skin tension lines where possible. The area thus outlined was incised deep to adipose tissue with a #15 scalpel blade. The skin margins were undermined to an appropriate distance in all directions utilizing iris scissors. Following this, the designed flap was advanced and carried over into the primary defect and sutured into place. A-T Advancement Flap Text: The defect edges were debeveled with a #15 scalpel blade. Given the location of the defect, shape of the defect and the proximity to free margins an A-T advancement flap was deemed most appropriate. Using a sterile surgical marker, an appropriate advancement flap was drawn incorporating the defect and placing the expected incisions within the relaxed skin tension lines where possible. The area thus outlined was incised deep to adipose tissue with a #15 scalpel blade. The skin margins were undermined to an appropriate distance in all directions utilizing iris scissors. Following this, the designed flap was advanced and carried over into the primary defect and sutured into place. O-T Advancement Flap Text: The defect edges were debeveled with a #15 scalpel blade. Given the location of the defect, shape of the defect and the proximity to free margins an O-T advancement flap was deemed most appropriate. Using a sterile surgical marker, an appropriate advancement flap was drawn incorporating the defect and placing the expected incisions within the relaxed skin tension lines where possible. The area thus outlined was incised deep to adipose tissue with a #15 scalpel blade. The skin margins were undermined to an appropriate distance in all directions utilizing iris scissors. Following this, the designed flap was advanced and carried over into the primary defect and sutured into place. O-L Flap Text: The defect edges were debeveled with a #15 scalpel blade. Given the location of the defect, shape of the defect and the proximity to free margins an O-L flap was deemed most appropriate. Using a sterile surgical marker, an appropriate advancement flap was drawn incorporating the defect and placing the expected incisions within the relaxed skin tension lines where possible. The area thus outlined was incised deep to adipose tissue with a #15 scalpel blade. The skin margins were undermined to an appropriate distance in all directions utilizing iris scissors. Following this, the designed flap was advanced and carried over into the primary defect and sutured into place. O-Z Flap Text: The defect edges were debeveled with a #15 scalpel blade. Given the location of the defect, shape of the defect and the proximity to free margins an O-Z flap was deemed most appropriate. Using a sterile surgical marker, an appropriate transposition flap was drawn incorporating the defect and placing the expected incisions within the relaxed skin tension lines where possible. The area thus outlined was incised deep to adipose tissue with a #15 scalpel blade. The skin margins were undermined to an appropriate distance in all directions utilizing iris scissors. Following this, the designed flap was carried over into the primary defect and sutured into place. Double O-Z Flap Text: The defect edges were debeveled with a #15 scalpel blade. Given the location of the defect, shape of the defect and the proximity to free margins a Double O-Z flap was deemed most appropriate. Using a sterile surgical marker, an appropriate transposition flap was drawn incorporating the defect and placing the expected incisions within the relaxed skin tension lines where possible. The area thus outlined was incised deep to adipose tissue with a #15 scalpel blade. The skin margins were undermined to an appropriate distance in all directions utilizing iris scissors. Following this, the designed flap was carried over into the primary defect and sutured into place. V-Y Flap Text: The defect edges were debeveled with a #15 scalpel blade. Given the location of the defect, shape of the defect and the proximity to free margins a V-Y flap was deemed most appropriate. Using a sterile surgical marker, an appropriate advancement flap was drawn incorporating the defect and placing the expected incisions within the relaxed skin tension lines where possible. The area thus outlined was incised deep to adipose tissue with a #15 scalpel blade. The skin margins were undermined to an appropriate distance in all directions utilizing iris scissors. Following this, the designed flap was advanced and carried over into the primary defect and sutured into place. Advancement-Rotation Flap Text: The defect edges were debeveled with a #15 scalpel blade. Given the location of the defect, shape of the defect and the proximity to free margins an advancement-rotation flap was deemed most appropriate. Using a sterile surgical marker, an appropriate flap was drawn incorporating the defect and placing the expected incisions within the relaxed skin tension lines where possible. The area thus outlined was incised deep to adipose tissue with a #15 scalpel blade. The skin margins were undermined to an appropriate distance in all directions utilizing iris scissors. Following this, the designed flap was carried over into the primary defect and sutured into place. Mercedes Flap Text: The defect edges were debeveled with a #15 scalpel blade. Given the location of the defect, shape of the defect and the proximity to free margins a Mercedes flap was deemed most appropriate. Using a sterile surgical marker, an appropriate advancement flap was drawn incorporating the defect and placing the expected incisions within the relaxed skin tension lines where possible. The area thus outlined was incised deep to adipose tissue with a #15 scalpel blade. The skin margins were undermined to an appropriate distance in all directions utilizing iris scissors. Following this, the designed flap was advanced and carried over into the primary defect and sutured into place. Modified Advancement Flap Text: The defect edges were debeveled with a #15 scalpel blade. Given the location of the defect, shape of the defect and the proximity to free margins a modified advancement flap was deemed most appropriate. Using a sterile surgical marker, an appropriate advancement flap was drawn incorporating the defect and placing the expected incisions within the relaxed skin tension lines where possible. The area thus outlined was incised deep to adipose tissue with a #15 scalpel blade. The skin margins were undermined to an appropriate distance in all directions utilizing iris scissors. Following this, the designed flap was advanced and carried over into the primary defect and sutured into place. Mucosal Advancement Flap Text: Given the location of the defect, shape of the defect and the proximity to free margins a mucosal advancement flap was deemed most appropriate. Incisions were made with a 15 blade scalpel in the appropriate fashion along the cutaneous vermilion border and the mucosal lip. The remaining actinically damaged mucosal tissue was excised.  The mucosal advancement flap was then elevated to the gingival sulcus with care taken to preserve the neurovascular structures and advanced into the primary defect. Care was taken to ensure that precise realignment of the vermilion border was achieved. Peng Advancement Flap Text: The defect edges were debeveled with a #15 scalpel blade. Given the location of the defect, shape of the defect and the proximity to free margins a Peng advancement flap was deemed most appropriate. Using a sterile surgical marker, an appropriate advancement flap was drawn incorporating the defect and placing the expected incisions within the relaxed skin tension lines where possible. The area thus outlined was incised deep to adipose tissue with a #15 scalpel blade. The skin margins were undermined to an appropriate distance in all directions utilizing iris scissors. Following this, the designed flap was advanced and carried over into the primary defect and sutured into place. Hatchet Flap Text: The defect edges were debeveled with a #15 scalpel blade. Given the location of the defect, shape of the defect and the proximity to free margins a hatchet flap was deemed most appropriate. Using a sterile surgical marker, an appropriate hatchet flap was drawn incorporating the defect and placing the expected incisions within the relaxed skin tension lines where possible. The area thus outlined was incised deep to adipose tissue with a #15 scalpel blade. The skin margins were undermined to an appropriate distance in all directions utilizing iris scissors. Following this, the designed flap was carried over into the primary defect and sutured into place. Rotation Flap Text: The defect edges were debeveled with a #15 scalpel blade. Given the location of the defect, shape of the defect and the proximity to free margins a rotation flap was deemed most appropriate. Using a sterile surgical marker, an appropriate rotation flap was drawn incorporating the defect and placing the expected incisions within the relaxed skin tension lines where possible. The area thus outlined was incised deep to adipose tissue with a #15 scalpel blade. The skin margins were undermined to an appropriate distance in all directions utilizing iris scissors. Following this, the designed flap was carried over into the primary defect and sutured into place. Bilateral Rotation Flap Text: The defect edges were debeveled with a #15 scalpel blade. Given the location of the defect, shape of the defect and the proximity to free margins a bilateral rotation flap was deemed most appropriate. Using a sterile surgical marker, an appropriate rotation flap was drawn incorporating the defect and placing the expected incisions within the relaxed skin tension lines where possible. The area thus outlined was incised deep to adipose tissue with a #15 scalpel blade. The skin margins were undermined to an appropriate distance in all directions utilizing iris scissors. Following this, the designed flap was carried over into the primary defect and sutured into place. Spiral Flap Text: The defect edges were debeveled with a #15 scalpel blade. Given the location of the defect, shape of the defect and the proximity to free margins a spiral flap was deemed most appropriate. Using a sterile surgical marker, an appropriate rotation flap was drawn incorporating the defect and placing the expected incisions within the relaxed skin tension lines where possible. The area thus outlined was incised deep to adipose tissue with a #15 scalpel blade. The skin margins were undermined to an appropriate distance in all directions utilizing iris scissors. Following this, the designed flap was carried over into the primary defect and sutured into place. Staged Advancement Flap Text: The defect edges were debeveled with a #15 scalpel blade. Given the location of the defect, shape of the defect and the proximity to free margins a staged advancement flap was deemed most appropriate. Using a sterile surgical marker, an appropriate advancement flap was drawn incorporating the defect and placing the expected incisions within the relaxed skin tension lines where possible. The area thus outlined was incised deep to adipose tissue with a #15 scalpel blade. The skin margins were undermined to an appropriate distance in all directions utilizing iris scissors. Following this, the designed flap was carried over into the primary defect and sutured into place. Star Wedge Flap Text: The defect edges were debeveled with a #15 scalpel blade. Given the location of the defect, shape of the defect and the proximity to free margins a star wedge flap was deemed most appropriate. Using a sterile surgical marker, an appropriate rotation flap was drawn incorporating the defect and placing the expected incisions within the relaxed skin tension lines where possible. The area thus outlined was incised deep to adipose tissue with a #15 scalpel blade. The skin margins were undermined to an appropriate distance in all directions utilizing iris scissors. Following this, the designed flap was carried over into the primary defect and sutured into place. Transposition Flap Text: The defect edges were debeveled with a #15 scalpel blade. Given the location of the defect and the proximity to free margins a transposition flap was deemed most appropriate. Using a sterile surgical marker, an appropriate transposition flap was drawn incorporating the defect. The area thus outlined was incised deep to adipose tissue with a #15 scalpel blade. The skin margins were undermined to an appropriate distance in all directions utilizing iris scissors. Following this, the designed flap was carried over into the primary defect and sutured into place. Muscle Hinge Flap Text: The defect edges were debeveled with a #15 scalpel blade.  Given the size, depth and location of the defect and the proximity to free margins a muscle hinge flap was deemed most appropriate. Using a sterile surgical marker, an appropriate hinge flap was drawn incorporating the defect. The area thus outlined was incised with a #15 scalpel blade. The skin margins were undermined to an appropriate distance in all directions utilizing iris scissors. Following this, the designed flap was carried into the primary defect and sutured into place. Mustarde Flap Text: The defect edges were debeveled with a #15 scalpel blade.  Given the size, depth and location of the defect and the proximity to free margins a Mustarde flap was deemed most appropriate. Using a sterile surgical marker, an appropriate flap was drawn incorporating the defect. The area thus outlined was incised with a #15 scalpel blade. The skin margins were undermined to an appropriate distance in all directions utilizing iris scissors. Following this, the designed flap was carried into the primary defect and sutured into place. Nasal Turnover Hinge Flap Text: The defect edges were debeveled with a #15 scalpel blade.  Given the size, depth, location of the defect and the defect being full thickness a nasal turnover hinge flap was deemed most appropriate. Using a sterile surgical marker, an appropriate hinge flap was drawn incorporating the defect. The area thus outlined was incised with a #15 scalpel blade. The flap was designed to recreate the nasal mucosal lining and the alar rim. The skin margins were undermined to an appropriate distance in all directions utilizing iris scissors. Following this, the designed flap was carried over into the primary defect and sutured into place Nasalis-Muscle-Based Myocutaneous Island Pedicle Flap Text: Using a #15 blade, an incision was made around the donor flap to the level of the nasalis muscle. Wide lateral undermining was then performed in both the subcutaneous plane above the nasalis muscle, and in a submuscular plane just above periosteum. This allowed the formation of a free nasalis muscle axial pedicle (based on the angular artery) which was still attached to the actual cutaneous flap, increasing its mobility and vascular viability. Hemostasis was obtained with pinpoint electrocoagulation. The flap was mobilized into position and the pivotal anchor points positioned and stabilized with buried interrupted sutures. Subcutaneous and dermal tissues were closed in a multilayered fashion with sutures. Tissue redundancies were excised, and the epidermal edges were apposed without significant tension and sutured with sutures. Nasalis Myocutaneous Flap Text: Using a #15 blade, an incision was made around the donor flap to the level of the nasalis muscle. Wide lateral undermining was then performed in both the subcutaneous plane above the nasalis muscle, and in a submuscular plane just above periosteum. This allowed the formation of a free nasalis muscle axial pedicle which was still attached to the actual cutaneous flap, increasing its mobility and vascular viability. Hemostasis was obtained with pinpoint electrocoagulation. The flap was mobilized into position and the pivotal anchor points positioned and stabilized with buried interrupted sutures. Subcutaneous and dermal tissues were closed in a multilayered fashion with sutures. Tissue redundancies were excised, and the epidermal edges were apposed without significant tension and sutured with sutures. Nasolabial Transposition Flap Text: The defect edges were debeveled with a #15 scalpel blade.  Given the size, depth and location of the defect and the proximity to free margins a nasolabial transposition flap was deemed most appropriate. Using a sterile surgical marker, an appropriate flap was drawn incorporating the defect. The area thus outlined was incised with a #15 scalpel blade. The skin margins were undermined to an appropriate distance in all directions utilizing iris scissors. Following this, the designed flap was carried into the primary defect and sutured into place. Orbicularis Oris Muscle Flap Text: The defect edges were debeveled with a #15 scalpel blade.  Given that the defect affected the competency of the oral sphincter an orbicularis oris muscle flap was deemed most appropriate to restore this competency and normal muscle function.  Using a sterile surgical marker, an appropriate flap was drawn incorporating the defect. The area thus outlined was incised with a #15 scalpel blade. Following this, the designed flap was carried over into the primary defect and sutured into place. Melolabial Transposition Flap Text: The defect edges were debeveled with a #15 scalpel blade. Given the location of the defect and the proximity to free margins a melolabial flap was deemed most appropriate. Using a sterile surgical marker, an appropriate melolabial transposition flap was drawn incorporating the defect. The area thus outlined was incised deep to adipose tissue with a #15 scalpel blade. The skin margins were undermined to an appropriate distance in all directions utilizing iris scissors. Following this, the designed flap was carried over into the primary defect and sutured into place. Rectangular Flap Text: The defect edges were debeveled with a #15 scalpel blade. Given the location of the defect and the proximity to free margins a rectangular flap was deemed most appropriate. Using a sterile surgical marker, an appropriate rectangular flap was drawn incorporating the defect. The area thus outlined was incised deep to adipose tissue with a #15 scalpel blade. The skin margins were undermined to an appropriate distance in all directions utilizing iris scissors. Following this, the designed flap was carried over into the primary defect and sutured into place. Rhombic Flap Text: The defect edges were debeveled with a #15 scalpel blade. Given the location of the defect and the proximity to free margins a rhombic flap was deemed most appropriate. Using a sterile surgical marker, an appropriate rhombic flap was drawn incorporating the defect. The area thus outlined was incised deep to adipose tissue with a #15 scalpel blade. The skin margins were undermined to an appropriate distance in all directions utilizing iris scissors. Following this, the designed flap was carried over into the primary defect and sutured into place. Rhomboid Transposition Flap Text: The defect edges were debeveled with a #15 scalpel blade. Given the location of the defect and the proximity to free margins a rhomboid transposition flap was deemed most appropriate. Using a sterile surgical marker, an appropriate rhomboid flap was drawn incorporating the defect. The area thus outlined was incised deep to adipose tissue with a #15 scalpel blade. The skin margins were undermined to an appropriate distance in all directions utilizing iris scissors. Following this, the designed flap was carried over into the primary defect and sutured into place. Bi-Rhombic Flap Text: The defect edges were debeveled with a #15 scalpel blade. Given the location of the defect and the proximity to free margins a bi-rhombic flap was deemed most appropriate. Using a sterile surgical marker, an appropriate rhombic flap was drawn incorporating the defect. The area thus outlined was incised deep to adipose tissue with a #15 scalpel blade. The skin margins were undermined to an appropriate distance in all directions utilizing iris scissors. Following this, the designed flap was carried over into the primary defect and sutured into place. Helical Rim Advancement Flap Text: The defect edges were debeveled with a #15 blade scalpel.  Given the location of the defect and the proximity to free margins (helical rim) a double helical rim advancement flap was deemed most appropriate. Using a sterile surgical marker, the appropriate advancement flaps were drawn incorporating the defect and placing the expected incisions between the helical rim and antihelix where possible.  The area thus outlined was incised through and through with a #15 scalpel blade.  With a skin hook and iris scissors, the flaps were gently and sharply undermined and freed up. Folllowing this, the designed flaps were carried over into the primary defect and sutured into place. Bilateral Helical Rim Advancement Flap Text: The defect edges were debeveled with a #15 blade scalpel.  Given the location of the defect and the proximity to free margins (helical rim) a bilateral helical rim advancement flap was deemed most appropriate. Using a sterile surgical marker, the appropriate advancement flaps were drawn incorporating the defect and placing the expected incisions between the helical rim and antihelix where possible.  The area thus outlined was incised through and through with a #15 scalpel blade.  With a skin hook and iris scissors, the flaps were gently and sharply undermined and freed up. Following this, the designed flaps were placed into the primary defect and sutured into place. Ear Star Wedge Flap Text: The defect edges were debeveled with a #15 blade scalpel.  Given the location of the defect and the proximity to free margins (helical rim) an ear star wedge flap was deemed most appropriate. Using a sterile surgical marker, the appropriate flap was drawn incorporating the defect and placing the expected incisions between the helical rim and antihelix where possible.  The area thus outlined was incised through and through with a #15 scalpel blade. Following this, the designed flap was carried over into the primary defect and sutured into place. Banner Transposition Flap Text: The defect edges were debeveled with a #15 scalpel blade. Given the location of the defect and the proximity to free margins a Banner transposition flap was deemed most appropriate. Using a sterile surgical marker, an appropriate flap was drawn around the defect. The area thus outlined was incised deep to adipose tissue with a #15 scalpel blade. The skin margins were undermined to an appropriate distance in all directions utilizing iris scissors. Following this, the designed flap was carried into the primary defect and sutured into place. Bilobed Flap Text: The defect edges were debeveled with a #15 scalpel blade. Given the location of the defect and the proximity to free margins a bilobe flap was deemed most appropriate. Using a sterile surgical marker, an appropriate bilobe flap drawn around the defect. The area thus outlined was incised deep to adipose tissue with a #15 scalpel blade. The skin margins were undermined to an appropriate distance in all directions utilizing iris scissors. Following this, the designed flap was carried over into the primary defect and sutured into place. Bilobed Transposition Flap Text: The defect edges were debeveled with a #15 scalpel blade. Given the location of the defect and the proximity to free margins a bilobed transposition flap was deemed most appropriate. Using a sterile surgical marker, an appropriate bilobe flap drawn around the defect. The area thus outlined was incised deep to adipose tissue with a #15 scalpel blade. The skin margins were undermined to an appropriate distance in all directions utilizing iris scissors. Following this, the designed flap was carried over into the primary defect and sutured into place. Trilobed Flap Text: The defect edges were debeveled with a #15 scalpel blade. Given the location of the defect and the proximity to free margins a trilobed flap was deemed most appropriate. Using a sterile surgical marker, an appropriate trilobed flap was drawn around the defect. The area thus outlined was incised deep to adipose tissue with a #15 scalpel blade. The skin margins were undermined to an appropriate distance in all directions utilizing iris scissors. Following this, the designed flap was carried into the primary defect and sutured into place. Dorsal Nasal Flap Text: The defect edges were debeveled with a #15 scalpel blade. Given the location of the defect and the proximity to free margins a dorsal nasal flap was deemed most appropriate. Using a sterile surgical marker, an appropriate dorsal nasal flap was drawn around the defect. The area thus outlined was incised deep to adipose tissue with a #15 scalpel blade. The skin margins were undermined to an appropriate distance in all directions utilizing iris scissors. Following this, the designed flap was carried into the primary defect and sutured into place. Island Pedicle Flap Text: The defect edges were debeveled with a #15 scalpel blade. Given the location of the defect, shape of the defect and the proximity to free margins an island pedicle advancement flap was deemed most appropriate. Using a sterile surgical marker, an appropriate advancement flap was drawn incorporating the defect, outlining the appropriate donor tissue and placing the expected incisions within the relaxed skin tension lines where possible. The area thus outlined was incised deep to adipose tissue with a #15 scalpel blade. The skin margins were undermined to an appropriate distance in all directions around the primary defect and laterally outward around the island pedicle utilizing iris scissors.  There was minimal undermining beneath the pedicle flap. Following this, the flap was carried over into the primary defect and sutured into place. Island Pedicle Flap With Canthal Suspension Text: The defect edges were debeveled with a #15 scalpel blade. Given the location of the defect, shape of the defect and the proximity to free margins an island pedicle advancement flap was deemed most appropriate. Using a sterile surgical marker, an appropriate advancement flap was drawn incorporating the defect, outlining the appropriate donor tissue and placing the expected incisions within the relaxed skin tension lines where possible. The area thus outlined was incised deep to adipose tissue with a #15 scalpel blade. The skin margins were undermined to an appropriate distance in all directions around the primary defect and laterally outward around the island pedicle utilizing iris scissors.  There was minimal undermining beneath the pedicle flap. A suspension suture was placed in the canthal tendon to prevent tension and prevent ectropion. Following this, the designed flap was placed into the primary defect and sutured into place. Alar Island Pedicle Flap Text: The defect edges were debeveled with a #15 scalpel blade. Given the location of the defect, shape of the defect and the proximity to the alar rim an island pedicle advancement flap was deemed most appropriate. Using a sterile surgical marker, an appropriate advancement flap was drawn incorporating the defect, outlining the appropriate donor tissue and placing the expected incisions within the nasal ala running parallel to the alar rim. The area thus outlined was incised with a #15 scalpel blade. The skin margins were undermined minimally to an appropriate distance in all directions around the primary defect and laterally outward around the island pedicle utilizing iris scissors.  There was minimal undermining beneath the pedicle flap. Following this, the designed flap was carried over into the primary defect and sutured into place. Double Island Pedicle Flap Text: The defect edges were debeveled with a #15 scalpel blade. Given the location of the defect, shape of the defect and the proximity to free margins a double island pedicle advancement flap was deemed most appropriate. Using a sterile surgical marker, an appropriate advancement flap was drawn incorporating the defect, outlining the appropriate donor tissue and placing the expected incisions within the relaxed skin tension lines where possible. The area thus outlined was incised deep to adipose tissue with a #15 scalpel blade. The skin margins were undermined to an appropriate distance in all directions around the primary defect and laterally outward around the island pedicle utilizing iris scissors.  There was minimal undermining beneath the pedicle flap. Following this, the flap was carried over into the primary defect and sutured into place. Island Pedicle Flap-Requiring Vessel Identification Text: The defect edges were debeveled with a #15 scalpel blade. Given the location of the defect, shape of the defect and the proximity to free margins an island pedicle advancement flap was deemed most appropriate. Using a sterile surgical marker, an appropriate advancement flap was drawn, based on the axial vessel mentioned above, incorporating the defect, outlining the appropriate donor tissue and placing the expected incisions within the relaxed skin tension lines where possible. The area thus outlined was incised deep to adipose tissue with a #15 scalpel blade. The skin margins were undermined to an appropriate distance in all directions around the primary defect and laterally outward around the island pedicle utilizing iris scissors.  There was minimal undermining beneath the pedicle flap. Following this, the designed flap was carried over into the primary defect and sutured into place. Keystone Flap Text: The defect edges were debeveled with a #15 scalpel blade. Given the location of the defect, shape of the defect a keystone flap was deemed most appropriate. Using a sterile surgical marker, an appropriate keystone flap was drawn incorporating the defect, outlining the appropriate donor tissue and placing the expected incisions within the relaxed skin tension lines where possible. The area thus outlined was incised deep to adipose tissue with a #15 scalpel blade. The skin margins were undermined to an appropriate distance in all directions around the primary defect and laterally outward around the flap utilizing iris scissors. Following this, the designed flap was carried into the primary defect and sutured into place. O-T Plasty Text: The defect edges were debeveled with a #15 scalpel blade. Given the location of the defect, shape of the defect and the proximity to free margins an O-T plasty was deemed most appropriate. Using a sterile surgical marker, an appropriate O-T plasty was drawn incorporating the defect and placing the expected incisions within the relaxed skin tension lines where possible. The area thus outlined was incised deep to adipose tissue with a #15 scalpel blade. The skin margins were undermined to an appropriate distance in all directions utilizing iris scissors. Following this, the designed flap was carried over into the primary defect and sutured into place. O-Z Plasty Text: The defect edges were debeveled with a #15 scalpel blade. Given the location of the defect, shape of the defect and the proximity to free margins an O-Z plasty (double transposition flap) was deemed most appropriate. Using a sterile surgical marker, the appropriate transposition flaps were drawn incorporating the defect and placing the expected incisions within the relaxed skin tension lines where possible. The area thus outlined was incised deep to adipose tissue with a #15 scalpel blade. The skin margins were undermined to an appropriate distance in all directions utilizing iris scissors. Hemostasis was achieved with electrocautery. The flaps were then transposed and carried over into place, one clockwise and the other counterclockwise, and anchored with interrupted buried subcutaneous sutures. Double O-Z Plasty Text: The defect edges were debeveled with a #15 scalpel blade. Given the location of the defect, shape of the defect and the proximity to free margins a Double O-Z plasty (double transposition flap) was deemed most appropriate. Using a sterile surgical marker, the appropriate transposition flaps were drawn incorporating the defect and placing the expected incisions within the relaxed skin tension lines where possible. The area thus outlined was incised deep to adipose tissue with a #15 scalpel blade. The skin margins were undermined to an appropriate distance in all directions utilizing iris scissors. Hemostasis was achieved with electrocautery. The flaps were then transposed and carried over into place, one clockwise and the other counterclockwise, and anchored with interrupted buried subcutaneous sutures. V-Y Plasty Text: The defect edges were debeveled with a #15 scalpel blade. Given the location of the defect, shape of the defect and the proximity to free margins an V-Y advancement flap was deemed most appropriate. Using a sterile surgical marker, an appropriate advancement flap was drawn incorporating the defect and placing the expected incisions within the relaxed skin tension lines where possible. The area thus outlined was incised deep to adipose tissue with a #15 scalpel blade. The skin margins were undermined to an appropriate distance in all directions utilizing iris scissors. Following this, the designed flap was advanced and carried over into the primary defect and sutured into place. H Plasty Text: Given the location of the defect, shape of the defect and the proximity to free margins a H-plasty was deemed most appropriate for repair. Using a sterile surgical marker, the appropriate advancement arms of the H-plasty were drawn incorporating the defect and placing the expected incisions within the relaxed skin tension lines where possible. The area thus outlined was incised deep to adipose tissue with a #15 scalpel blade. The skin margins were undermined to an appropriate distance in all directions utilizing iris scissors.  The opposing advancement arms were then advanced and carried over into place in opposite direction and anchored with interrupted buried subcutaneous sutures. W Plasty Text: The lesion was extirpated to the level of the fat with a #15 scalpel blade. Given the location of the defect, shape of the defect and the proximity to free margins a W-plasty was deemed most appropriate for repair. Using a sterile surgical marker, the appropriate transposition arms of the W-plasty were drawn incorporating the defect and placing the expected incisions within the relaxed skin tension lines where possible. The area thus outlined was incised deep to adipose tissue with a #15 scalpel blade. The skin margins were undermined to an appropriate distance in all directions utilizing iris scissors. The opposing transposition arms were then transposed and carried over into place in opposite direction and anchored with interrupted buried subcutaneous sutures. Z Plasty Text: The lesion was extirpated to the level of the fat with a #15 scalpel blade. Given the location of the defect, shape of the defect and the proximity to free margins a Z-plasty was deemed most appropriate for repair. Using a sterile surgical marker, the appropriate transposition arms of the Z-plasty were drawn incorporating the defect and placing the expected incisions within the relaxed skin tension lines where possible. The area thus outlined was incised deep to adipose tissue with a #15 scalpel blade. The skin margins were undermined to an appropriate distance in all directions utilizing iris scissors. The opposing transposition arms were then transposed and carried over into place in opposite direction and anchored with interrupted buried subcutaneous sutures. Double Z Plasty Text: The lesion was extirpated to the level of the fat with a #15 scalpel blade. Given the location of the defect, shape of the defect and the proximity to free margins a double Z-plasty was deemed most appropriate for repair. Using a sterile surgical marker, the appropriate transposition arms of the double Z-plasty were drawn incorporating the defect and placing the expected incisions within the relaxed skin tension lines where possible. The area thus outlined was incised deep to adipose tissue with a #15 scalpel blade. The skin margins were undermined to an appropriate distance in all directions utilizing iris scissors. The opposing transposition arms were then transposed and carried over into place in opposite direction and anchored with interrupted buried subcutaneous sutures. Zygomaticofacial Flap Text: Given the location of the defect, shape of the defect and the proximity to free margins a zygomaticofacial flap was deemed most appropriate for repair. Using a sterile surgical marker, the appropriate flap was drawn incorporating the defect and placing the expected incisions within the relaxed skin tension lines where possible. The area thus outlined was incised deep to adipose tissue with a #15 scalpel blade with preservation of a vascular pedicle.  The skin margins were undermined to an appropriate distance in all directions utilizing iris scissors. The flap was then carried over into the defect and anchored with interrupted buried subcutaneous sutures. Cheek Interpolation Flap Text: A decision was made to reconstruct the defect utilizing an interpolation axial flap and a staged reconstruction.  A telfa template was made of the defect.  This telfa template was then used to outline the Cheek Interpolation flap.  The donor area for the pedicle flap was then injected with anesthesia.  The flap was excised through the skin and subcutaneous tissue down to the layer of the underlying musculature.  The interpolation flap was carefully excised within this deep plane to maintain its blood supply.  The edges of the donor site were undermined.   The donor site was closed in a primary fashion.  The pedicle was then rotated into position and sutured.  Once the tube was sutured into place, adequate blood supply was confirmed with blanching and refill.  The pedicle was then wrapped with xeroform gauze and dressed appropriately with a telfa and gauze bandage to ensure continued blood supply and protect the attached pedicle. Cheek-To-Nose Interpolation Flap Text: A decision was made to reconstruct the defect utilizing an interpolation axial flap and a staged reconstruction.  A telfa template was made of the defect.  This telfa template was then used to outline the Cheek-To-Nose Interpolation flap.  The donor area for the pedicle flap was then injected with anesthesia.  The flap was excised through the skin and subcutaneous tissue down to the layer of the underlying musculature.  The interpolation flap was carefully excised within this deep plane to maintain its blood supply.  The edges of the donor site were undermined.   The donor site was closed in a primary fashion.  The pedicle was then rotated into position and sutured.  Once the tube was sutured into place, adequate blood supply was confirmed with blanching and refill.  The pedicle was then wrapped with xeroform gauze and dressed appropriately with a telfa and gauze bandage to ensure continued blood supply and protect the attached pedicle. Interpolation Flap Text: A decision was made to reconstruct the defect utilizing an interpolation axial flap and a staged reconstruction.  A telfa template was made of the defect.  This telfa template was then used to outline the interpolation flap.  The donor area for the pedicle flap was then injected with anesthesia.  The flap was excised through the skin and subcutaneous tissue down to the layer of the underlying musculature.  The interpolation flap was carefully excised within this deep plane to maintain its blood supply.  The edges of the donor site were undermined.   The donor site was closed in a primary fashion.  The pedicle was then rotated into position and sutured.  Once the tube was sutured into place, adequate blood supply was confirmed with blanching and refill.  The pedicle was then wrapped with xeroform gauze and dressed appropriately with a telfa and gauze bandage to ensure continued blood supply and protect the attached pedicle. Melolabial Interpolation Flap Text: A decision was made to reconstruct the defect utilizing an interpolation axial flap and a staged reconstruction.  A telfa template was made of the defect.  This telfa template was then used to outline the melolabial interpolation flap.  The donor area for the pedicle flap was then injected with anesthesia.  The flap was excised through the skin and subcutaneous tissue down to the layer of the underlying musculature.  The pedicle flap was carefully excised within this deep plane to maintain its blood supply.  The edges of the donor site were undermined.   The donor site was closed in a primary fashion.  The pedicle was then rotated into position and sutured.  Once the tube was sutured into place, adequate blood supply was confirmed with blanching and refill.  The pedicle was then wrapped with xeroform gauze and dressed appropriately with a telfa and gauze bandage to ensure continued blood supply and protect the attached pedicle. Mastoid Interpolation Flap Text: A decision was made to reconstruct the defect utilizing an interpolation axial flap and a staged reconstruction.  A telfa template was made of the defect.  This telfa template was then used to outline the mastoid interpolation flap.  The donor area for the pedicle flap was then injected with anesthesia.  The flap was excised through the skin and subcutaneous tissue down to the layer of the underlying musculature.  The pedicle flap was carefully excised within this deep plane to maintain its blood supply.  The edges of the donor site were undermined.   The donor site was closed in a primary fashion.  The pedicle was then rotated into position and sutured.  Once the tube was sutured into place, adequate blood supply was confirmed with blanching and refill.  The pedicle was then wrapped with xeroform gauze and dressed appropriately with a telfa and gauze bandage to ensure continued blood supply and protect the attached pedicle. Posterior Auricular Interpolation Flap Text: A decision was made to reconstruct the defect utilizing an interpolation axial flap and a staged reconstruction.  A telfa template was made of the defect.  This telfa template was then used to outline the posterior auricular interpolation flap.  The donor area for the pedicle flap was then injected with anesthesia.  The flap was excised through the skin and subcutaneous tissue down to the layer of the underlying musculature.  The pedicle flap was carefully excised within this deep plane to maintain its blood supply.  The edges of the donor site were undermined.   The donor site was closed in a primary fashion.  The pedicle was then rotated into position and sutured.  Once the tube was sutured into place, adequate blood supply was confirmed with blanching and refill.  The pedicle was then wrapped with xeroform gauze and dressed appropriately with a telfa and gauze bandage to ensure continued blood supply and protect the attached pedicle. Paramedian Forehead Flap Text: A decision was made to reconstruct the defect utilizing an interpolation axial flap and a staged reconstruction.  A telfa template was made of the defect.  This telfa template was then used to outline the paramedian forehead pedicle flap.  The donor area for the pedicle flap was then injected with anesthesia.  The flap was excised through the skin and subcutaneous tissue down to the layer of the underlying musculature.  The pedicle flap was carefully excised within this deep plane to maintain its blood supply.  The edges of the donor site were undermined.   The donor site was closed in a primary fashion.  The pedicle was then rotated into position and sutured.  Once the tube was sutured into place, adequate blood supply was confirmed with blanching and refill.  The pedicle was then wrapped with xeroform gauze and dressed appropriately with a telfa and gauze bandage to ensure continued blood supply and protect the attached pedicle. Abbe Flap (Upper To Lower Lip) Text: The defect of the lower lip was assessed and measured.  Given the location and size of the defect, an Abbe flap was deemed most appropriate. Using a sterile surgical marker, an appropriate Abbe flap was measured and drawn on the upper lip. Local anesthesia was then infiltrated.  A scalpel was then used to incise the upper lip through and through the skin, vermilion, muscle and mucosa, leaving the flap pedicled on the opposite side.  The flap was then rotated and transferred to the lower lip defect.  The flap was then sutured into place with a three layer technique, closing the orbicularis oris muscle layer with subcutaneous buried sutures, followed by a mucosal layer and an epidermal layer. Abbe Flap (Lower To Upper Lip) Text: The defect of the upper lip was assessed and measured.  Given the location and size of the defect, an Abbe flap was deemed most appropriate. Using a sterile surgical marker, an appropriate Abbe flap was measured and drawn on the lower lip. Local anesthesia was then infiltrated. A scalpel was then used to incise the upper lip through and through the skin, vermilion, muscle and mucosa, leaving the flap pedicled on the opposite side.  The flap was then rotated and transferred to the lower lip defect.  The flap was then sutured into place with a three layer technique, closing the orbicularis oris muscle layer with subcutaneous buried sutures, followed by a mucosal layer and an epidermal layer. Estlander Flap (Upper To Lower Lip) Text: The defect of the lower lip was assessed and measured.  Given the location and size of the defect, an Estlander flap was deemed most appropriate. Using a sterile surgical marker, an appropriate Estlander flap was measured and drawn on the upper lip. Local anesthesia was then infiltrated. A scalpel was then used to incise the lateral aspect of the flap, through skin, muscle and mucosa, leaving the flap pedicled medially.  The flap was then rotated and positioned to fill the lower lip defect.  The flap was then sutured into place with a three layer technique, closing the orbicularis oris muscle layer with subcutaneous buried sutures, followed by a mucosal layer and an epidermal layer. Cheiloplasty (Less Than 50%) Text: A decision was made to reconstruct the defect with a  cheiloplasty.  The defect was undermined extensively.  Additional orbicularis oris muscle was excised with a 15 blade scalpel.  The defect was converted into a full thickness wedge, of less than 50% of the vertical height of the lip, to facilite a better cosmetic result.  Small vessels were then tied off with 5-0 monocyrl. The orbicularis oris, superficial fascia, adipose and dermis were then reapproximated.  After the deeper layers were approximated the epidermis was reapproximated with particular care given to realign the vermilion border. Cheiloplasty (Complex) Text: A decision was made to reconstruct the defect with a  cheiloplasty.  The defect was undermined extensively.  Additional orbicularis oris muscle was excised with a 15 blade scalpel.  The defect was converted into a full thickness wedge to facilite a better cosmetic result.  Small vessels were then tied off with 5-0 monocyrl. The orbicularis oris, superficial fascia, adipose and dermis were then reapproximated.  After the deeper layers were approximated the epidermis was reapproximated with particular care given to realign the vermilion border. Ear Wedge Repair Text: A wedge excision was completed by carrying down an excision through the full thickness of the ear and cartilage with an inward facing Burow's triangle. The wound was then closed in a layered fashion. Full Thickness Lip Wedge Repair (Flap) Text: Given the location of the defect and the proximity to free margins a full thickness wedge repair was deemed most appropriate. Using a sterile surgical marker, the appropriate repair was drawn incorporating the defect and placing the expected incisions perpendicular to the vermilion border.  The vermilion border was also meticulously outlined to ensure appropriate reapproximation during the repair.  The area thus outlined was incised through and through with a #15 scalpel blade.  The muscularis and dermis were reaproximated with deep sutures following hemostasis. Care was taken to realign the vermilion border before proceeding with the superficial closure.  Once the vermilion was realigned the superfical and mucosal closure was finished. Ftsg Text: The defect edges were debeveled with a #15 scalpel blade. Given the location of the defect, shape of the defect and the proximity to free margins a full thickness skin graft was deemed most appropriate. Using a sterile surgical marker, the primary defect shape was transferred to the donor site. The area thus outlined was incised deep to adipose tissue with a #15 scalpel blade.  The harvested graft was then trimmed of adipose tissue until only dermis and epidermis was left.  The skin margins of the secondary defect were undermined to an appropriate distance in all directions utilizing iris scissors.  The secondary defect was closed with interrupted buried subcutaneous sutures.  The skin edges were then re-apposed with running  sutures.  The skin graft was then placed in the primary defect and oriented appropriately. Split-Thickness Skin Graft Text: The defect edges were debeveled with a #15 scalpel blade. Given the location of the defect, shape of the defect and the proximity to free margins a split thickness skin graft was deemed most appropriate. Using a sterile surgical marker, the primary defect shape was transferred to the donor site. The split thickness graft was then harvested.  The skin graft was then placed in the primary defect and oriented appropriately. Pinch Graft Text: The defect edges were debeveled with a #15 scalpel blade. Given the location of the defect, shape of the defect and the proximity to free margins a pinch graft was deemed most appropriate. Using a sterile surgical marker, the primary defect shape was transferred to the donor site. The area thus outlined was incised deep to adipose tissue with a #15 scalpel blade.  The harvested graft was then trimmed of adipose tissue until only dermis and epidermis was left. The skin margins of the secondary defect were undermined to an appropriate distance in all directions utilizing iris scissors.  The secondary defect was closed with interrupted buried subcutaneous sutures.  The skin edges were then re-apposed with running  sutures.  The skin graft was then placed in the primary defect and oriented appropriately. Burow's Graft Text: The defect edges were debeveled with a #15 scalpel blade. Given the location of the defect, shape of the defect, the proximity to free margins and the presence of a standing cone deformity a Burow's skin graft was deemed most appropriate. The standing cone was removed and this tissue was then trimmed to the shape of the primary defect. The adipose tissue was also removed until only dermis and epidermis were left.  The skin margins of the secondary defect were undermined to an appropriate distance in all directions utilizing iris scissors.  The secondary defect was closed with interrupted buried subcutaneous sutures.  The skin edges were then re-apposed with running  sutures.  The skin graft was then placed in the primary defect and oriented appropriately. Cartilage Graft Text: The defect edges were debeveled with a #15 scalpel blade. Given the location of the defect, shape of the defect, the fact the defect involved a full thickness cartilage defect a cartilage graft was deemed most appropriate.  An appropriate donor site was identified, cleansed, and anesthetized. The cartilage graft was then harvested and transferred to the recipient site, oriented appropriately and then sutured into place.  The secondary defect was then repaired using a primary closure. Composite Graft Text: The defect edges were debeveled with a #15 scalpel blade. Given the location of the defect, shape of the defect, the proximity to free margins and the fact the defect was full thickness a composite graft was deemed most appropriate.  The defect was outline and then transferred to the donor site.  A full thickness graft was then excised from the donor site. The graft was then placed in the primary defect, oriented appropriately and then sutured into place.  The secondary defect was then repaired using a primary closure. Epidermal Autograft Text: The defect edges were debeveled with a #15 scalpel blade. Given the location of the defect, shape of the defect and the proximity to free margins an epidermal autograft was deemed most appropriate. Using a sterile surgical marker, the primary defect shape was transferred to the donor site. The epidermal graft was then harvested.  The skin graft was then placed in the primary defect and oriented appropriately. Dermal Autograft Text: The defect edges were debeveled with a #15 scalpel blade. Given the location of the defect, shape of the defect and the proximity to free margins a dermal autograft was deemed most appropriate. Using a sterile surgical marker, the primary defect shape was transferred to the donor site. The area thus outlined was incised deep to adipose tissue with a #15 scalpel blade.  The harvested graft was then trimmed of adipose and epidermal tissue until only dermis was left.  The skin graft was then placed in the primary defect and oriented appropriately. Skin Substitute Text: The defect edges were debeveled with a #15 scalpel blade. Given the location of the defect, shape of the defect and the proximity to free margins a skin substitute graft was deemed most appropriate.  The graft material was trimmed to fit the size of the defect. The graft was then placed in the primary defect and oriented appropriately. Tissue Cultured Epidermal Autograft Text: The defect edges were debeveled with a #15 scalpel blade. Given the location of the defect, shape of the defect and the proximity to free margins a tissue cultured epidermal autograft was deemed most appropriate.  The graft was then trimmed to fit the size of the defect.  The graft was then placed in the primary defect and oriented appropriately. Xenograft Text: The defect edges were debeveled with a #15 scalpel blade. Given the location of the defect, shape of the defect and the proximity to free margins a xenograft was deemed most appropriate.  The graft was then trimmed to fit the size of the defect.  The graft was then placed in the primary defect and oriented appropriately. Purse String (Simple) Text: Given the location of the defect and the characteristics of the surrounding skin a purse string closure was deemed most appropriate.  Undermining was performed circumferentially around the surgical defect.  A purse string suture was then placed and tightened. Purse String (Intermediate) Text: Given the location of the defect and the characteristics of the surrounding skin a purse string intermediate closure was deemed most appropriate.  Undermining was performed circumferentially around the surgical defect.  A purse string suture was then placed and tightened. Partial Purse String (Simple) Text: Given the location of the defect and the characteristics of the surrounding skin a simple purse string closure was deemed most appropriate.  Undermining was performed circumferentially around the surgical defect.  A purse string suture was then placed and tightened. Wound tension only allowed a partial closure of the circular defect. Partial Purse String (Intermediate) Text: Given the location of the defect and the characteristics of the surrounding skin an intermediate purse string closure was deemed most appropriate.  Undermining was performed circumferentially around the surgical defect.  A purse string suture was then placed and tightened. Wound tension only allowed a partial closure of the circular defect. Localized Dermabrasion With Wire Brush Text: The patient was draped in routine manner.  Localized dermabrasion using 3 x 17 mm wire brush was performed in routine manner to papillary dermis. This spot dermabrasion is being performed to complete skin cancer reconstruction. It also will eliminate the other sun damaged precancerous cells that are known to be part of the regional effect of a lifetime's worth of sun exposure. This localized dermabrasion is therapeutic and should not be considered cosmetic in any regard. Localized Dermabrasion With Sand Papertext: The patient was draped in routine manner.  Localized dermabrasion using sterile sand paper was performed in routine manner to papillary dermis. This spot dermabrasion is being performed to complete skin cancer reconstruction. It also will eliminate the other sun damaged precancerous cells that are known to be part of the regional effect of a lifetime's worth of sun exposure. This localized dermabrasion is therapeutic and should not be considered cosmetic in any regard. Localized Dermabrasion With 15 Blade Text: The patient was draped in routine manner.  Localized dermabrasion using a 15 blade was performed in routine manner to papillary dermis. This spot dermabrasion is being performed to complete skin cancer reconstruction. It also will eliminate the other sun damaged precancerous cells that are known to be part of the regional effect of a lifetime's worth of sun exposure. This localized dermabrasion is therapeutic and should not be considered cosmetic in any regard. Tarsorrhaphy Text: A tarsorrhaphy was performed using Frost sutures. Intermediate Repair And Flap Additional Text (Will Appearing After The Standard Complex Repair Text): The intermediate repair was not sufficient to completely close the primary defect. The remaining additional defect was repaired with the flap mentioned below. Intermediate Repair And Graft Additional Text (Will Appearing After The Standard Complex Repair Text): The intermediate repair was not sufficient to completely close the primary defect. The remaining additional defect was repaired with the graft mentioned below. Complex Repair And Flap Additional Text (Will Appearing After The Standard Complex Repair Text): The complex repair was not sufficient to completely close the primary defect. The remaining additional defect was repaired with the flap mentioned below. Complex Repair And Graft Additional Text (Will Appearing After The Standard Complex Repair Text): The complex repair was not sufficient to completely close the primary defect. The remaining additional defect was repaired with the graft mentioned below. Eyelid Full Thickness Repair - 63680: The eyelid defect was full thickness which required a wedge repair of the eyelid. Special care was taken to ensure that the eyelid margin was realligned when placing sutures. Eyelid Partial Thickness Repair - 10612: The eyelid defect was partial thickness which required a wedge repair of the eyelid. Special care was taken to ensure that the eyelid margin was realligned when placing sutures. Manual Repair Warning Statement: We plan on removing the manually selected variable below in favor of our much easier automatic structured text blocks found in the previous tab. We decided to do this to help make the flow better and give you the full power of structured data. Manual selection is never going to be ideal in our platform and I would encourage you to avoid using manual selection from this point on, especially since I will be sunsetting this feature. It is important that you do one of two things with the customized text below. First, you can save all of the text in a word file so you can have it for future reference. Second, transfer the text to the appropriate area in the Library tab. Lastly, if there is a flap or graft type which we do not have you need to let us know right away so I can add it in before the variable is hidden. No need to panic, we plan to give you roughly 6 months to make the change. Same Histology In Subsequent Stages Text: The pattern and morphology of the tumor is as described in the first stage. No Residual Tumor Seen Histology Text: There were no malignant cells seen in the sections examined. Inflammation Suggestive Of Cancer Camouflage Histology Text: There was a dense lymphocytic infiltrate which prevented adequate histologic evaluation of adjacent structures. Bcc Histology Text: There were numerous aggregates of basaloid cells. Bcc Infiltrative Histology Text: There were numerous aggregates of basaloid cells demonstrating an infiltrative pattern. Mart-1 - Positive Histology Text: MART-1 staining demonstrates areas of higher density and clustering of melanocytes with Pagetoid spread upwards within the epidermis. The surgical margins are positive for tumor cells. Mart-1 - Negative Histology Text: MART-1 staining demonstrates a normal density and pattern of melanocytes along the dermal-epidermal junction. The surgical margins are negative for tumor cells. Information: Selecting Yes will display possible errors in your note based on the variables you have selected. This validation is only offered as a suggestion for you. PLEASE NOTE THAT THE VALIDATION TEXT WILL BE REMOVED WHEN YOU FINALIZE YOUR NOTE. IF YOU WANT TO FAX A PRELIMINARY NOTE YOU WILL NEED TO TOGGLE THIS TO 'NO' IF YOU DO NOT WANT IT IN YOUR FAXED NOTE. Bill 59 Modifier?: No - Continue to Bill 79 Modifier

## 2024-07-26 ENCOUNTER — APPOINTMENT (OUTPATIENT)
Dept: CARDIOLOGY | Facility: CLINIC | Age: 49
End: 2024-07-26
Payer: COMMERCIAL

## 2024-07-26 VITALS
BODY MASS INDEX: 40.3 KG/M2 | HEART RATE: 66 BPM | HEIGHT: 62 IN | WEIGHT: 219 LBS | SYSTOLIC BLOOD PRESSURE: 142 MMHG | OXYGEN SATURATION: 98 % | DIASTOLIC BLOOD PRESSURE: 76 MMHG

## 2024-07-26 PROCEDURE — 99214 OFFICE O/P EST MOD 30 MIN: CPT | Mod: 25

## 2024-07-26 PROCEDURE — 93000 ELECTROCARDIOGRAM COMPLETE: CPT

## 2024-07-26 RX ORDER — HYDROCHLOROTHIAZIDE 50 MG/1
50 TABLET ORAL
Qty: 90 | Refills: 3 | Status: ACTIVE | COMMUNITY
Start: 2024-07-26 | End: 1900-01-01

## 2024-07-29 NOTE — PHYSICAL EXAM
[Well Developed] : well developed [No Acute Distress] : no acute distress [Normal Conjunctiva] : normal conjunctiva [Normal S1, S2] : normal S1, S2 [No Murmur] : no murmur [Clear Lung Fields] : clear lung fields [No Respiratory Distress] : no respiratory distress  [Non Tender] : non-tender [Normal Gait] : normal gait [No Cyanosis] : no cyanosis [No Clubbing] : no clubbing [Moves all extremities] : moves all extremities [Alert and Oriented] : alert and oriented [de-identified] : JVP 8 cm. [de-identified] : distened and firm + BS [de-identified] : 1+ lower extremity edema. [de-identified] : warm

## 2024-07-29 NOTE — HISTORY OF PRESENT ILLNESS
[FreeTextEntry1] : 48 yo Tajik speaking [place], [place], with uncontrolled HTN, ILD, CKD and HFrEF diagnosed in the setting of hypertensive urgency (now recovered) and type B aortic dissection s/p bypass graft (iliac artery to superior mesenteric and celiac arteries 3/9/23), left carotid to subclavian artery bypass on 5/30/23 and TEVAR on 9/7/2023, who presents for follow-up. HE c/o PND, MADRID, feeling full, Pedal edema and dizziness. Abdomen is distended He has gained 22 lbs in 2 months. He admits to eating salt daily  past visits On 2/28/23 the patient initially presented to [facility] for routine physical exam and medication refills. Of note, he had not seen a doctor for over three years and [place] He was last treated at [facility] in 2018 for hypertensive urgency.  At [facility] he was found to be in hypertensive urgency with a systolic blood pressure of 200 and then presented to [place] ED.  A CTA of the chest, abdomen and pelvis showed a Type B dissection and he was transferred to [facility] CTICU for further management. TTE showed newly reduced systolic function LVEF 20-25%. His inpatient course was significant for RHC/LHC, initially cancelled on 3/2 due to worsening renal function, but later resolved. 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 The vascular surgery team recommended no intervention.   He underwent bypass grafting with Dr. Heaton on 3/9. He developed ileus postoperatively, which since resolved. Repeat TTE 3/10 showed improvement in his LV systolic function to 40-45%. He was discharged home 3/18 on Coreg 25mg BID, Entresto 24-26mg BID, Nifedipine 60mg daily and Lasix 40mg daily.  He subsequently underwent coil embolization of celiac artery on 8/3 followed by TEAVR on 9/7/2023.  He was admitted from 9/7 - 9/12/23.  He was discharged home off of Entresto, spironolactone as well as Farxifa.  Discharge labs showed Cr of 1.19 with K 3.9.  Post discharge, we had been reintroducing Entresto.  10/6/2023 -  BP was 132/82.  Entresto was increased to 49 to 51 mg twice daily dose.  He was recently seen by vascular team on 10/20/2023.  He was cleared to return to work with a lifting restriction of 30 pounds.  11/6/23 - /102.  He is having dizziness as well as vision changes.  [place] [facility].  He was hospitalized for hypertensive urgency with BP in hospital 220/100.  He was treated with nicardipine drip and discharged home on Entresto high-dose, spironolactone 25 mg daily, carvedilol 25 mg twice daily, and nifedipine 30 mg XL daily.  12/4/23 - BP  142/92.  Echo ordered.  No changes to medications made.   2/5/24 - /88.  Presented with a lower Entresto dose of 24-26mg BID.  I am not clear who made the dose change.

## 2024-07-29 NOTE — END OF VISIT
[FreeTextEntry3] : Patient discussed with NP.  I agree with the above.  Will need repeat labs.  Difficult to control BP.  Increase Entresto to max dose and add on HTZ.  Will ideally stay away from [facility] given elevated volume status on exam.

## 2024-07-29 NOTE — HISTORY OF PRESENT ILLNESS
[FreeTextEntry1] : 48 yo Danish speaking male, [place], with uncontrolled HTN, ILD, CKD and HFrEF diagnosed in the setting of hypertensive urgency (now recovered) and type B aortic dissection s/p bypass graft (iliac artery to superior mesenteric and celiac arteries 3/9/23), left carotid to subclavian artery bypass on 5/30/23 and TEVAR on 9/7/2023, who presents for follow-up. HE c/o PND, MADRID, feeling full, Pedal edema and dizziness. Abdomen is distended He has gained 22 lbs in 2 months. He admits to eating salt daily  past visits On 2/28/23 the patient initially presented to [facility] for routine physical exam and medication refills. Of note, he had not seen a doctor for over three years and [place] He was last treated at [facility] in 2018 for hypertensive urgency.  At [facility] he was found to be in hypertensive urgency with a systolic blood pressure of 200 and then presented to [place] ED.  A CTA of the chest, abdomen and pelvis showed a Type B dissection and he was transferred to [facility] CTICU for further management. TTE showed newly reduced systolic function LVEF 20-25%. His inpatient course was significant for RHC/LHC, initially cancelled on 3/2 due to worsening renal function, but later resolved. 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 The vascular surgery team recommended no intervention.   He underwent bypass grafting with Dr. Heaton on 3/9. He developed ileus postoperatively, which since resolved. Repeat TTE 3/10 showed improvement in his LV systolic function to 40-45%. He was discharged home 3/18 on Coreg 25mg BID, Entresto 24-26mg BID, Nifedipine 60mg daily and Lasix 40mg daily.  He subsequently underwent coil embolization of celiac artery on 8/3 followed by TEAVR on 9/7/2023.  He was admitted from 9/7 - 9/12/23.  He was discharged home off of Entresto, spironolactone as well as Farxifa.  Discharge labs showed Cr of 1.19 with K 3.9.  Post discharge, we had been reintroducing Entresto.  10/6/2023 -  BP was 132/82.  Entresto was increased to 49 to 51 mg twice daily dose.  He was recently seen by vascular team on 10/20/2023.  He was cleared to return to work with a lifting restriction of 30 pounds.  11/6/23 - /102.  He is having dizziness as well as vision changes.  Sent to [facility] [place] [facility].  He was hospitalized for hypertensive urgency with BP in hospital 220/100.  He was treated with nicardipine drip and discharged home on Entresto high-dose, spironolactone 25 mg daily, carvedilol 25 mg twice daily, and nifedipine 30 mg XL daily.  12/4/23 - BP  142/92.  Echo ordered.  No changes to medications made.   2/5/24 - /88.  Presented with a lower Entresto dose of 24-26mg BID.  I am not clear who made the dose change.

## 2024-07-29 NOTE — PHYSICAL EXAM
[Well Developed] : well developed [No Acute Distress] : no acute distress [Normal Conjunctiva] : normal conjunctiva [Normal S1, S2] : normal S1, S2 [No Murmur] : no murmur [Clear Lung Fields] : clear lung fields [No Respiratory Distress] : no respiratory distress  [Non Tender] : non-tender [Normal Gait] : normal gait [No Cyanosis] : no cyanosis [No Clubbing] : no clubbing [Moves all extremities] : moves all extremities [Alert and Oriented] : alert and oriented [de-identified] : JVP 8 cm. [de-identified] : distened and firm + BS [de-identified] : 1+ lower extremity edema. [de-identified] : warm

## 2024-07-29 NOTE — HISTORY OF PRESENT ILLNESS
[FreeTextEntry1] : 48 yo Slovak speaking [place], [place], with uncontrolled HTN, ILD, CKD and HFrEF diagnosed in the setting of hypertensive urgency (now recovered) and type B aortic dissection s/p bypass graft (iliac artery to superior mesenteric and celiac arteries 3/9/23), left carotid to subclavian artery bypass on 5/30/23 and TEVAR on 9/7/2023, who presents for follow-up. HE c/o PND, MADRID, feeling full, Pedal edema and dizziness. Abdomen is distended He has gained 22 lbs in 2 months. He admits to eating salt daily  past visits On 2/28/23 the patient initially presented to [facility] for routine physical exam and medication refills. Of note, he had not seen a doctor for over three years and [place] He was last treated at [facility] in 2018 for hypertensive urgency.  At [facility] he was found to be in hypertensive urgency with a systolic blood pressure of 200 and then presented to [place] ED.  A CTA of the chest, abdomen and pelvis showed a Type B dissection and he was transferred to [facility] for further management. TTE showed newly reduced systolic function LVEF 20-25%. His inpatient course was significant for RHC/LHC, initially cancelled on 3/2 due to worsening renal function, but later resolved. 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 The vascular surgery team recommended no intervention.   He underwent bypass grafting with Dr. Heaton on 3/9. He developed ileus postoperatively, which since resolved. Repeat TTE 3/10 showed improvement in his LV systolic function to 40-45%. He was discharged home 3/18 on Coreg 25mg BID, Entresto 24-26mg BID, Nifedipine 60mg daily and Lasix 40mg daily.  He subsequently underwent coil embolization of celiac artery on 8/3 followed by TEAVR on 9/7/2023.  He was admitted from 9/7 - 9/12/23.  He was discharged home off of Entresto, spironolactone as well as Farxifa.  Discharge labs showed Cr of 1.19 with K 3.9.  Post discharge, we had been reintroducing Entresto.  10/6/2023 -  BP was 132/82.  Entresto was increased to 49 to 51 mg twice daily dose.  He was recently seen by vascular team on 10/20/2023.  He was cleared to return to work with a lifting restriction of 30 pounds.  11/6/23 - /102.  He is having dizziness as well as vision changes.  [place] [facility] [place]  He was hospitalized for hypertensive urgency with BP in hospital 220/100.  He was treated with nicardipine drip and discharged home on Entresto high-dose, spironolactone 25 mg daily, carvedilol 25 mg twice daily, and nifedipine 30 mg XL daily.  12/4/23 - BP  142/92.  Echo ordered.  No changes to medications made.   2/5/24 - /88.  Presented with a lower Entresto dose of 24-26mg BID.  I am not clear who made the dose change.

## 2024-07-29 NOTE — PHYSICAL EXAM
[Well Developed] : well developed [No Acute Distress] : no acute distress [Normal Conjunctiva] : normal conjunctiva [Normal S1, S2] : normal S1, S2 [No Murmur] : no murmur [Clear Lung Fields] : clear lung fields [No Respiratory Distress] : no respiratory distress  [Non Tender] : non-tender [Normal Gait] : normal gait [No Cyanosis] : no cyanosis [No Clubbing] : no clubbing [Moves all extremities] : moves all extremities [Alert and Oriented] : alert and oriented [de-identified] : JVP 8 cm. [de-identified] : distened and firm + BS [de-identified] : 1+ lower extremity edema. [de-identified] : warm

## 2024-07-29 NOTE — DISCUSSION/SUMMARY
[FreeTextEntry1] : Increase Entresto 97/102mg BID Start HCTZ 50 mg QAM Stop Nifedipine Educated patient on low salt diet, alcohol intake in moderation, regular cardiovascular exercise, and weight reduction for improved BP control. Continue to monitor BP at home and call for persistently elevated readings (>150/90). Echocardiogram and RTC in 2 weeks May consider RHC +/- Cardiomems Discussed red flag symptoms, which would warrant sooner or emergent medical evaluation. Any questions and concerns were addressed and resolved. I spent 30 minutes with the patient: 5 minutes in preparation of the visit. 15 minutes face to face and 10 minutes on documentation and coordination of care. 5 mins reviewing H&P and plan with attending physician  Patients history, testing and plan reviewed with supervising MD: Dr. Alpa Chaudhari

## 2024-09-06 ENCOUNTER — APPOINTMENT (OUTPATIENT)
Dept: CARDIOLOGY | Facility: CLINIC | Age: 49
End: 2024-09-06
Payer: COMMERCIAL

## 2024-09-06 ENCOUNTER — NON-APPOINTMENT (OUTPATIENT)
Age: 49
End: 2024-09-06

## 2024-09-06 VITALS
HEART RATE: 58 BPM | SYSTOLIC BLOOD PRESSURE: 138 MMHG | HEIGHT: 62 IN | WEIGHT: 214 LBS | OXYGEN SATURATION: 98 % | DIASTOLIC BLOOD PRESSURE: 84 MMHG | BODY MASS INDEX: 39.38 KG/M2

## 2024-09-06 DIAGNOSIS — I10 ESSENTIAL (PRIMARY) HYPERTENSION: ICD-10-CM

## 2024-09-06 DIAGNOSIS — I50.9 HEART FAILURE, UNSPECIFIED: ICD-10-CM

## 2024-09-06 DIAGNOSIS — I42.8 OTHER CARDIOMYOPATHIES: ICD-10-CM

## 2024-09-06 PROCEDURE — 99215 OFFICE O/P EST HI 40 MIN: CPT | Mod: 25

## 2024-09-06 PROCEDURE — 93000 ELECTROCARDIOGRAM COMPLETE: CPT

## 2024-09-06 RX ORDER — HYDROCHLOROTHIAZIDE 25 MG/1
25 TABLET ORAL
Qty: 90 | Refills: 1 | Status: ACTIVE | COMMUNITY
Start: 1900-01-01 | End: 1900-01-01

## 2024-09-06 NOTE — CARDIOLOGY SUMMARY
[de-identified] : 5/15/2024: NSR, left axis deviation possible left atrial abnormality. 12/4/2023: NSR, LVH, left axis deviation. 9/10/23: NSR, HR 67, TWI lead III, doesn't meet LVH criteria. 5/5/23: SR, LAD, LVH, PAC [de-identified] : TTE 2/5/24: LVEF 53%, LVEDD, LVEDD 6.0cm, IVSd 0.8cm, RV normal, ind DD, mild LAE, mild MR, RA =3. TTE 9/9/23: LVEF 55-60%, LVEDD 4.79cm, IVSd 1.07cm, TASPE 1.88cm, no DD, trace MR TTE 8/22/23: LVEF 45-50%, LVEDD 4.95cm, IVSd 0.9cm, PWd 1.14cm, TASPE 1.8cm, trace MR, mild TR. TTE 3/10/23: LVEF 40-45%, LVIDD 6.06, IVSD 1.29, relative wall thickness 0.46, mild LVH, normal RV size/function, mild TR. TTE 2/28/23: LVEF 20-25%, LVEDD 6.23cm, LVIDs 5.59cm, TAPSE 2.54cm, mild to mod MR, trivial TR, RV normal function. [de-identified] : C 3/3/23: no CAD. RHC 3/3/23: RA 22, RV 42/14, PA 40/24 (31), PCWP 17, CO 5.9/CI 3.09, PA sat 72.1%, Hb 13.4.  [de-identified] : Genetic testing 7/11/23: no significant variants

## 2024-09-06 NOTE — PHYSICAL EXAM
[Well Developed] : well developed [No Acute Distress] : no acute distress [Normal Conjunctiva] : normal conjunctiva [Normal S1, S2] : normal S1, S2 [No Murmur] : no murmur [Clear Lung Fields] : clear lung fields [No Respiratory Distress] : no respiratory distress  [Soft] : abdomen soft [Non Tender] : non-tender [Normal Gait] : normal gait [No Cyanosis] : no cyanosis [No Clubbing] : no clubbing [Moves all extremities] : moves all extremities [Alert and Oriented] : alert and oriented [de-identified] : JVP at clavicle. [de-identified] : Trace lower extremity edema. [de-identified] : warm

## 2024-09-06 NOTE — ASSESSMENT
[FreeTextEntry1] : 50 yo Ethiopian speaking M, [place], with uncontrolled HTN, ILD, CKD and HFrEF diagnosed in the setting of hypertensive urgency (now recovered) and type B aortic dissection s/p bypass graft (iliac artery to superior mesenteric and celiac arteries 3/9/23), left carotid to subclavian artery bypass on 5/30/23 and TEVAR on 9/7/2023, who presents for follow-up.  He has ACC/AHA stage C CM with NYHA class I symptoms.

## 2024-09-06 NOTE — REASON FOR VISIT
[Cardiac Failure] : cardiac failure [Hypertension] : hypertension [Other: ______] : provided by [facility] [Interpreters_IDNumber] : [id number] [Interpreters_FullName] : Eloy [[id number]] : [place] [FreeTextEntry1] : HF: Dr. Chaudhari  CTS: Dr. Heaton

## 2024-09-06 NOTE — ASSESSMENT
[FreeTextEntry1] : 48 yo Belarusian speaking M, [place], with uncontrolled HTN, ILD, CKD and HFrEF diagnosed in the setting of hypertensive urgency (now recovered) and type B aortic dissection s/p bypass graft (iliac artery to superior mesenteric and celiac arteries 3/9/23), left carotid to subclavian artery bypass on 5/30/23 and TEVAR on 9/7/2023, who presents for follow-up.  He has ACC/AHA stage C CM with NYHA class I symptoms.

## 2024-09-06 NOTE — REASON FOR VISIT
[Cardiac Failure] : cardiac failure [Hypertension] : hypertension [Other: ______] : provided by [facility] [Interpreters_IDNumber] : [id number] [Interpreters_FullName] : Eloy [TWNoteComboBox1] : [place] [FreeTextEntry1] : HF: Dr. Chaudhari  CTS: Dr. Heaton

## 2024-09-06 NOTE — HISTORY OF PRESENT ILLNESS
[FreeTextEntry1] : 50 yo [place] speaking [place], [place], with uncontrolled HTN, ILD, CKD and HFrEF diagnosed in the setting of hypertensive urgency (now recovered) and type B aortic dissection s/p bypass graft (iliac artery to superior mesenteric and celiac arteries 3/9/23), left carotid to subclavian artery bypass on 5/30/23 and TEVAR on 9/7/2023, who presents for follow-up.  On 2/28/23 the patient initially presented to [facility] for routine physical exam and medication refills. Of note, he had not seen a doctor for over three years and [place] He was last treated at [facility] in 2018 for hypertensive urgency.  At [facility] he was found to be in hypertensive urgency with a systolic blood pressure of 200 and then presented to [place] ED.  A CTA of the chest, abdomen and pelvis showed a Type B dissection and he was transferred to [facility] CTICU for further management. TTE showed newly reduced systolic function LVEF 20-25%. His inpatient course was significant for RHC/LHC, initially cancelled on 3/2 due to worsening renal function, but later resolved. 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 The vascular surgery team recommended no intervention.   He underwent bypass grafting with Dr. Heaton on 3/9. He developed ileus postoperatively, which since resolved. Repeat TTE 3/10 showed improvement in his LV systolic function to 40-45%. He was discharged home 3/18 on Coreg 25mg BID, Entresto 24-26mg BID, Nifedipine 60mg daily and Lasix 40mg daily.  He subsequently underwent coil embolization of celiac artery on 8/3 followed by TEAVR on 9/7/2023.  He was admitted from 9/7 - 9/12/23.  He was discharged home off of Entresto, spironolactone as well as Farxifa.  Discharge labs showed Cr of 1.19 with K 3.9.  Post discharge, we had been reintroducing Entresto.  10/6/2023 -  BP was 132/82.  Entresto was increased to 49 to 51 mg twice daily dose.  He was recently seen by vascular team on 10/20/2023.  He was cleared to return to work with a lifting restriction of 30 pounds.  11/6/23 - /102.  He is having dizziness as well as vision changes.  Sent to [facility].  He was hospitalized for hypertensive urgency with BP in hospital 220/100.  He was treated with nicardipine drip and discharged home on Entresto high-dose, spironolactone 25 mg daily, carvedilol 25 mg twice daily, and nifedipine 30 mg XL daily.  12/4/23 - BP  142/92.  Echo ordered.  No changes to medications made.   2/5/24 - /88.  Presented with a lower Entresto dose of 24-26mg BID.  I am not clear who made the dose change.  5/24/24 - /78. Entresto increased back to mid dose.  7/26/24 - Entresto increased to high dose and HCTZ 50mg daily was added on.  Today, home -130/60-70s.  He notes that with HCTZ 50 mg daily, he had very low BP with systolic reading of 79.  He decreased this to 25 mg daily. He no longer has lower extremity edema however, does have some occasional abdominal bloating throughout the day.  He denies chest pain, palpitations, dyspnea at rest or exertion, orthopnea, changes in appetite, changes in weight, bendopnea, and syncope/pre-syncope.  He is compliant with his medications and brings them in today.  He is no longer taking spironolactone.

## 2024-09-06 NOTE — CARDIOLOGY SUMMARY
[de-identified] : 5/15/2024: NSR, left axis deviation possible left atrial abnormality. 12/4/2023: NSR, LVH, left axis deviation. 9/10/23: NSR, HR 67, TWI lead III, doesn't meet LVH criteria. 5/5/23: SR, LAD, LVH, PAC [de-identified] : TTE 2/5/24: LVEF 53%, LVEDD, LVEDD 6.0cm, IVSd 0.8cm, RV normal, ind DD, mild LAE, mild MR, RA =3. TTE 9/9/23: LVEF 55-60%, LVEDD 4.79cm, IVSd 1.07cm, TASPE 1.88cm, no DD, trace MR TTE 8/22/23: LVEF 45-50%, LVEDD 4.95cm, IVSd 0.9cm, PWd 1.14cm, TASPE 1.8cm, trace MR, mild TR. TTE 3/10/23: LVEF 40-45%, LVIDD 6.06, IVSD 1.29, relative wall thickness 0.46, mild LVH, normal RV size/function, mild TR. TTE 2/28/23: LVEF 20-25%, LVEDD 6.23cm, LVIDs 5.59cm, TAPSE 2.54cm, mild to mod MR, trivial TR, RV normal function. [de-identified] : C 3/3/23: no CAD. RHC 3/3/23: RA 22, RV 42/14, PA 40/24 (31), PCWP 17, CO 5.9/CI 3.09, PA sat 72.1%, Hb 13.4.  [de-identified] : Genetic testing 7/11/23: no significant variants

## 2024-09-06 NOTE — DISCUSSION/SUMMARY
[FreeTextEntry1] : # Refractory Hypertension - BP target 130/80.   - Additional BP meds: HCTZ 25mg daily.  Continue on this.  - MRA was stopped at some point.  Will need to check labs and resume.   - Work up for secondary hypertension in past was unremarkable labs and US renal Doppler negative for MICHELLE -Per patient sleep study negative at [place] in 2023.   -Would be a good candidate to consider renal denervation.  New program should be opening up at [facility] soon.  Discussed with patient again today.  Patient agreeable to be evaluated.    # Dilated cardiomyopathy  - HF recovered LVEDD from 20-25% in 2/28/23 to now 53% on 2/5/24 however, LVEDD remains at 6.0cm. - repeat echo ordered.   -Etiology: NICMP ([facility] 3/3/23 revealed normal coronaries and no prior stents). Likely due to hypertensive heart disease with increased wall thickness on echo. Genetic testing 7/11/23 - negative for significant variants.  -GDMT: - continue on coreg 25mg BID, and Entresto 97-103mg BID. - he was on spironolactone 25mg daily however, this has fallen off his list for some reason.  Will check labs and possibly resume. - Not on farxiga 10mg daily.  He was on this in the past.    -Diuretics: euvolemic on exam.  JVP at clavicle.  Can try moving HCTZ to evening dose to see if this helps with his abdominal bloating.   # Type B Dissection of Aorta  -S/p iliac to celiac SMA bypass 3/9/23 by CTS and vascular surgery teams.  -S/p left carotid to subclavian artery bypass on 5/30/23 with Dr. Jones -S/p aortogram with SMA embolization on 8/3/23 - S/p TEVAR on 9/7/23  # CKD -Baseline sCr 1.3-1.6 -Continue close monitoring.   # Radial artery thrombosis -F/u with vascular team  F/U in 3 months with echo.

## 2024-09-06 NOTE — HISTORY OF PRESENT ILLNESS
[FreeTextEntry1] : 50 yo [place] speaking M, [place], with uncontrolled HTN, ILD, CKD and HFrEF diagnosed in the setting of hypertensive urgency (now recovered) and type B aortic dissection s/p bypass graft (iliac artery to superior mesenteric and celiac arteries 3/9/23), left carotid to subclavian artery bypass on 5/30/23 and TEVAR on 9/7/2023, who presents for follow-up.  On 2/28/23 the patient initially presented to [facility] for routine physical exam and medication refills. Of note, he had not seen a doctor for over three years and was obtaining [place] medications previously [place]. He was last treated at [facility] in 2018 for hypertensive urgency.  At [facility] he was found to be in hypertensive urgency with a systolic blood pressure of 200 and then presented to [place] ED.  A CTA of the chest, abdomen and pelvis showed a Type B dissection and he was transferred to [facility] CTICU for further management. TTE showed newly reduced systolic function LVEF 20-25%. His inpatient course was significant for RHC/LHC, initially cancelled on 3/2 due to worsening renal function, but later resolved. 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 The vascular surgery team recommended no intervention.   He underwent bypass grafting with Dr. Heaton on 3/9. He developed ileus postoperatively, which since resolved. Repeat TTE 3/10 showed improvement in his LV systolic function to 40-45%. He was discharged home 3/18 on Coreg 25mg BID, Entresto 24-26mg BID, Nifedipine 60mg daily and Lasix 40mg daily.  He subsequently underwent coil embolization of celiac artery on 8/3 followed by TEAVR on 9/7/2023.  He was admitted from 9/7 - 9/12/23.  He was discharged home off of Entresto, spironolactone as well as Farxifa.  Discharge labs showed Cr of 1.19 with K 3.9.  Post discharge, we had been reintroducing Entresto.  10/6/2023 -  BP was 132/82.  Entresto was increased to 49 to 51 mg twice daily dose.  He was recently seen by vascular team on 10/20/2023.  He was cleared to return to work with a lifting restriction of 30 pounds.  11/6/23 - /102.  He is having dizziness as well as vision changes.  Sent to [facility].  He was hospitalized for hypertensive urgency with BP in hospital 220/100.  He was treated with nicardipine drip and discharged home on Entresto high-dose, spironolactone 25 mg daily, carvedilol 25 mg twice daily, and nifedipine 30 mg XL daily.  12/4/23 - BP  142/92.  Echo ordered.  No changes to medications made.   2/5/24 - /88.  Presented with a lower Entresto dose of 24-26mg BID.  I am not clear who made the dose change.  5/24/24 - /78. Entresto increased back to mid dose.  7/26/24 - Entresto increased to high dose and HCTZ 50mg daily was added on.  Today, home -130/60-70s.  He notes that with HCTZ 50 mg daily, he had very low BP with systolic reading of 79.  He decreased this to 25 mg daily. He no longer has lower extremity edema however, does have some occasional abdominal bloating throughout the day.  He denies chest pain, palpitations, dyspnea at rest or exertion, orthopnea, changes in appetite, changes in weight, bendopnea, and syncope/pre-syncope.  He is compliant with his medications and brings them in today.  He is no longer taking spironolactone.

## 2024-09-06 NOTE — DISCUSSION/SUMMARY
[FreeTextEntry1] : # Refractory Hypertension - BP target 130/80.   - Additional BP meds: HCTZ 25mg daily.  Continue on this.  - MRA was stopped at some point.  Will need to check labs and resume.   - Work up for secondary hypertension in past was unremarkable labs and US renal Doppler negative for MICHELLE -Per patient sleep study negative at [place] in 2023.   -Would be a good candidate to consider renal denervation.  New program should be opening up at [facility] soon.  Discussed with patient again today.  Patient agreeable to be evaluated.    # Dilated cardiomyopathy  - HF recovered LVEDD from 20-25% in 2/28/23 to now 53% on 2/5/24 however, LVEDD remains at 6.0cm. - repeat echo ordered.   -Etiology: NICMP ([facility] 3/3/23 revealed normal coronaries and no prior stents). Likely due to hypertensive heart disease with increased wall thickness on echo. Genetic testing 7/11/23 - negative for significant variants.  -GDMT: - continue on coreg 25mg BID, and Entresto 97-103mg BID. - he was on spironolactone 25mg daily however, this has fallen off his list for some reason.  Will check labs and possibly resume. - Not on farxiga 10mg daily.  He was on this in the past.    -Diuretics: euvolemic on exam.  JVP at clavicle.  Can try moving HCTZ to evening dose to see if this helps with his abdominal bloating.   # Type B Dissection of Aorta  -S/p iliac to celiac SMA bypass 3/9/23 by CTS and vascular surgery teams.  -S/p left carotid to subclavian artery bypass on 5/30/23 with Dr. Jones -S/p aortogram with SMA embolization on 8/3/23 - S/p TEVAR on 9/7/23  # CKD -Baseline sCr 1.3-1.6 -Continue close monitoring.   # Radial artery thrombosis -F/u with vascular team  F/U in 3 months with echo.

## 2024-10-25 NOTE — ASU PREOP CHECKLIST - BP NONINVASIVE SYSTOLIC (MM HG)
I spoke to pt and she will call back to schedule a recall colon . Last colon 2017-hx of hyperplastic polyp. Pt of Dr Roberts . Sb    140

## 2024-11-06 ENCOUNTER — APPOINTMENT (OUTPATIENT)
Dept: CARDIOLOGY | Facility: CLINIC | Age: 49
End: 2024-11-06
Payer: COMMERCIAL

## 2024-11-06 ENCOUNTER — APPOINTMENT (OUTPATIENT)
Dept: HEART FAILURE | Facility: CLINIC | Age: 49
End: 2024-11-06
Payer: COMMERCIAL

## 2024-11-06 VITALS
HEIGHT: 62 IN | OXYGEN SATURATION: 96 % | BODY MASS INDEX: 40.85 KG/M2 | HEART RATE: 70 BPM | SYSTOLIC BLOOD PRESSURE: 120 MMHG | DIASTOLIC BLOOD PRESSURE: 76 MMHG | WEIGHT: 222 LBS

## 2024-11-06 DIAGNOSIS — I10 ESSENTIAL (PRIMARY) HYPERTENSION: ICD-10-CM

## 2024-11-06 DIAGNOSIS — I42.8 OTHER CARDIOMYOPATHIES: ICD-10-CM

## 2024-11-06 DIAGNOSIS — I71.012 DISSECTION OF DESCENDING THORACIC AORTA: ICD-10-CM

## 2024-11-06 DIAGNOSIS — I50.9 HEART FAILURE, UNSPECIFIED: ICD-10-CM

## 2024-11-06 PROCEDURE — G2211 COMPLEX E/M VISIT ADD ON: CPT

## 2024-11-06 PROCEDURE — 93306 TTE W/DOPPLER COMPLETE: CPT

## 2024-11-06 PROCEDURE — 99215 OFFICE O/P EST HI 40 MIN: CPT

## 2024-11-06 RX ORDER — SEMAGLUTIDE 0.68 MG/ML
2 INJECTION, SOLUTION SUBCUTANEOUS
Qty: 1 | Refills: 1 | Status: ACTIVE | COMMUNITY
Start: 2024-11-06 | End: 1900-01-01

## 2024-11-06 RX ORDER — ATORVASTATIN CALCIUM 40 MG/1
40 TABLET, FILM COATED ORAL
Qty: 90 | Refills: 1 | Status: ACTIVE | COMMUNITY
Start: 2024-11-06 | End: 1900-01-01

## 2025-02-03 ENCOUNTER — APPOINTMENT (OUTPATIENT)
Dept: CARDIOLOGY | Facility: CLINIC | Age: 50
End: 2025-02-03
Payer: COMMERCIAL

## 2025-02-03 ENCOUNTER — LABORATORY RESULT (OUTPATIENT)
Age: 50
End: 2025-02-03

## 2025-02-03 ENCOUNTER — NON-APPOINTMENT (OUTPATIENT)
Age: 50
End: 2025-02-03

## 2025-02-03 VITALS
DIASTOLIC BLOOD PRESSURE: 82 MMHG | OXYGEN SATURATION: 97 % | BODY MASS INDEX: 40.12 KG/M2 | HEIGHT: 62 IN | WEIGHT: 218 LBS | HEART RATE: 79 BPM | SYSTOLIC BLOOD PRESSURE: 114 MMHG

## 2025-02-03 DIAGNOSIS — I71.012 DISSECTION OF DESCENDING THORACIC AORTA: ICD-10-CM

## 2025-02-03 DIAGNOSIS — I71.20 THORACIC AORTIC ANEURYSM, WITHOUT RUPTURE, UNSPECIFIED: ICD-10-CM

## 2025-02-03 PROCEDURE — 99215 OFFICE O/P EST HI 40 MIN: CPT | Mod: 25

## 2025-02-03 PROCEDURE — 93000 ELECTROCARDIOGRAM COMPLETE: CPT

## 2025-02-03 RX ORDER — SPIRONOLACTONE 25 MG/1
25 TABLET ORAL DAILY
Qty: 90 | Refills: 1 | Status: ACTIVE | COMMUNITY
Start: 2025-02-03 | End: 1900-01-01

## 2025-02-04 ENCOUNTER — APPOINTMENT (OUTPATIENT)
Dept: CARDIOLOGY | Facility: CLINIC | Age: 50
End: 2025-02-04
Payer: COMMERCIAL

## 2025-02-04 VITALS
HEART RATE: 69 BPM | WEIGHT: 219 LBS | DIASTOLIC BLOOD PRESSURE: 88 MMHG | BODY MASS INDEX: 40.06 KG/M2 | SYSTOLIC BLOOD PRESSURE: 130 MMHG | OXYGEN SATURATION: 97 %

## 2025-02-04 DIAGNOSIS — I50.9 HEART FAILURE, UNSPECIFIED: ICD-10-CM

## 2025-02-04 DIAGNOSIS — N18.9 CHRONIC KIDNEY DISEASE, UNSPECIFIED: ICD-10-CM

## 2025-02-04 DIAGNOSIS — I71.9 AORTIC ANEURYSM OF UNSPECIFIED SITE, W/OUT RUPTURE: ICD-10-CM

## 2025-02-04 DIAGNOSIS — Z95.5 PRESENCE OF CORONARY ANGIOPLASTY IMPLANT AND GRAFT: ICD-10-CM

## 2025-02-04 DIAGNOSIS — I42.8 OTHER CARDIOMYOPATHIES: ICD-10-CM

## 2025-02-04 DIAGNOSIS — I10 ESSENTIAL (PRIMARY) HYPERTENSION: ICD-10-CM

## 2025-02-04 PROCEDURE — 99205 OFFICE O/P NEW HI 60 MIN: CPT

## 2025-02-11 ENCOUNTER — NON-APPOINTMENT (OUTPATIENT)
Age: 50
End: 2025-02-11

## 2025-02-13 ENCOUNTER — NON-APPOINTMENT (OUTPATIENT)
Age: 50
End: 2025-02-13

## 2025-02-20 ENCOUNTER — NON-APPOINTMENT (OUTPATIENT)
Age: 50
End: 2025-02-20

## 2025-02-21 ENCOUNTER — NON-APPOINTMENT (OUTPATIENT)
Age: 50
End: 2025-02-21

## 2025-02-24 LAB
ANION GAP SERPL CALC-SCNC: 11 MMOL/L
BUN SERPL-MCNC: 33 MG/DL
CALCIUM SERPL-MCNC: 9.8 MG/DL
CHLORIDE SERPL-SCNC: 100 MMOL/L
CHOLEST SERPL-MCNC: 130 MG/DL
CO2 SERPL-SCNC: 27 MMOL/L
CREAT SERPL-MCNC: 1.71 MG/DL
EGFR: 49 ML/MIN/1.73M2
GLUCOSE SERPL-MCNC: 131 MG/DL
HCT VFR BLD CALC: 48.3 %
HDLC SERPL-MCNC: 41 MG/DL
HGB BLD-MCNC: 15.1 G/DL
LDLC SERPL CALC-MCNC: 64 MG/DL
MCHC RBC-ENTMCNC: 28 PG
MCHC RBC-ENTMCNC: 31.3 G/DL
MCV RBC AUTO: 89.6 FL
NONHDLC SERPL-MCNC: 89 MG/DL
PLATELET # BLD AUTO: 180 K/UL
POTASSIUM SERPL-SCNC: 4.3 MMOL/L
RBC # BLD: 5.39 M/UL
RBC # FLD: 13.2 %
SODIUM SERPL-SCNC: 138 MMOL/L
TRIGL SERPL-MCNC: 144 MG/DL
WBC # FLD AUTO: 5.74 K/UL

## 2025-02-26 RX ORDER — SEMAGLUTIDE 0.25 MG/.5ML
0.25 INJECTION, SOLUTION SUBCUTANEOUS
Qty: 1 | Refills: 1 | Status: ACTIVE | COMMUNITY
Start: 2025-02-18

## 2025-02-27 ENCOUNTER — TRANSCRIPTION ENCOUNTER (OUTPATIENT)
Age: 50
End: 2025-02-27

## 2025-02-27 ENCOUNTER — NON-APPOINTMENT (OUTPATIENT)
Age: 50
End: 2025-02-27

## 2025-02-27 ENCOUNTER — INPATIENT (INPATIENT)
Facility: HOSPITAL | Age: 50
LOS: 0 days | Discharge: HOME | DRG: 264 | End: 2025-02-28
Attending: INTERNAL MEDICINE | Admitting: INTERNAL MEDICINE
Payer: COMMERCIAL

## 2025-02-27 VITALS
SYSTOLIC BLOOD PRESSURE: 141 MMHG | TEMPERATURE: 98 F | HEART RATE: 67 BPM | DIASTOLIC BLOOD PRESSURE: 93 MMHG | RESPIRATION RATE: 16 BRPM | OXYGEN SATURATION: 97 %

## 2025-02-27 DIAGNOSIS — Z95.1 PRESENCE OF AORTOCORONARY BYPASS GRAFT: Chronic | ICD-10-CM

## 2025-02-27 DIAGNOSIS — I42.8 OTHER CARDIOMYOPATHIES: ICD-10-CM

## 2025-02-27 DIAGNOSIS — Z95.828 PRESENCE OF OTHER VASCULAR IMPLANTS AND GRAFTS: Chronic | ICD-10-CM

## 2025-02-27 DIAGNOSIS — N18.9 CHRONIC KIDNEY DISEASE, UNSPECIFIED: ICD-10-CM

## 2025-02-27 DIAGNOSIS — Z98.890 OTHER SPECIFIED POSTPROCEDURAL STATES: Chronic | ICD-10-CM

## 2025-02-27 DIAGNOSIS — Z95.5 PRESENCE OF CORONARY ANGIOPLASTY IMPLANT AND GRAFT: Chronic | ICD-10-CM

## 2025-02-27 LAB
ALBUMIN SERPL ELPH-MCNC: 4.5 G/DL — SIGNIFICANT CHANGE UP (ref 3.3–5.2)
ALP SERPL-CCNC: 110 U/L — SIGNIFICANT CHANGE UP (ref 40–120)
ALT FLD-CCNC: 38 U/L — SIGNIFICANT CHANGE UP
ANION GAP SERPL CALC-SCNC: 13 MMOL/L — SIGNIFICANT CHANGE UP (ref 5–17)
AST SERPL-CCNC: 28 U/L — SIGNIFICANT CHANGE UP
BILIRUB SERPL-MCNC: 0.7 MG/DL — SIGNIFICANT CHANGE UP (ref 0.4–2)
BLD GP AB SCN SERPL QL: SIGNIFICANT CHANGE UP
BUN SERPL-MCNC: 31.8 MG/DL — HIGH (ref 8–20)
CALCIUM SERPL-MCNC: 9.6 MG/DL — SIGNIFICANT CHANGE UP (ref 8.4–10.5)
CHLORIDE SERPL-SCNC: 98 MMOL/L — SIGNIFICANT CHANGE UP (ref 96–108)
CO2 SERPL-SCNC: 26 MMOL/L — SIGNIFICANT CHANGE UP (ref 22–29)
CREAT SERPL-MCNC: 1.75 MG/DL — HIGH (ref 0.5–1.3)
EGFR: 48 ML/MIN/1.73M2 — LOW
GLUCOSE SERPL-MCNC: 140 MG/DL — HIGH (ref 70–99)
HCT VFR BLD CALC: 47.3 % — SIGNIFICANT CHANGE UP (ref 39–50)
HGB BLD-MCNC: 14.8 G/DL — SIGNIFICANT CHANGE UP (ref 13–17)
MAGNESIUM SERPL-MCNC: 1.6 MG/DL — LOW (ref 1.8–2.6)
MCHC RBC-ENTMCNC: 28.1 PG — SIGNIFICANT CHANGE UP (ref 27–34)
MCHC RBC-ENTMCNC: 31.3 G/DL — LOW (ref 32–36)
MCV RBC AUTO: 89.9 FL — SIGNIFICANT CHANGE UP (ref 80–100)
NRBC # BLD AUTO: 0 K/UL — SIGNIFICANT CHANGE UP (ref 0–0)
NRBC # FLD: 0 K/UL — SIGNIFICANT CHANGE UP (ref 0–0)
NRBC BLD AUTO-RTO: 0 /100 WBCS — SIGNIFICANT CHANGE UP (ref 0–0)
PLATELET # BLD AUTO: 180 K/UL — SIGNIFICANT CHANGE UP (ref 150–400)
PMV BLD: 12 FL — SIGNIFICANT CHANGE UP (ref 7–13)
POTASSIUM SERPL-MCNC: 4.2 MMOL/L — SIGNIFICANT CHANGE UP (ref 3.5–5.3)
POTASSIUM SERPL-SCNC: 4.2 MMOL/L — SIGNIFICANT CHANGE UP (ref 3.5–5.3)
PROT SERPL-MCNC: 8.1 G/DL — SIGNIFICANT CHANGE UP (ref 6.6–8.7)
RBC # BLD: 5.26 M/UL — SIGNIFICANT CHANGE UP (ref 4.2–5.8)
RBC # FLD: 13 % — SIGNIFICANT CHANGE UP (ref 10.3–14.5)
SODIUM SERPL-SCNC: 137 MMOL/L — SIGNIFICANT CHANGE UP (ref 135–145)
WBC # BLD: 5.54 K/UL — SIGNIFICANT CHANGE UP (ref 3.8–10.5)
WBC # FLD AUTO: 5.54 K/UL — SIGNIFICANT CHANGE UP (ref 3.8–10.5)

## 2025-02-27 PROCEDURE — 99152 MOD SED SAME PHYS/QHP 5/>YRS: CPT

## 2025-02-27 PROCEDURE — 0339T TRNSCTH RENAL SYMP DENRV BIL: CPT

## 2025-02-27 RX ORDER — SPIRONOLACTONE 25 MG
25 TABLET ORAL DAILY
Refills: 0 | Status: DISCONTINUED | OUTPATIENT
Start: 2025-02-27 | End: 2025-02-27

## 2025-02-27 RX ORDER — ASPIRIN 325 MG
81 TABLET ORAL DAILY
Refills: 0 | Status: DISCONTINUED | OUTPATIENT
Start: 2025-02-27 | End: 2025-02-27

## 2025-02-27 RX ORDER — MAGNESIUM SULFATE 500 MG/ML
2 SYRINGE (ML) INJECTION ONCE
Refills: 0 | Status: COMPLETED | OUTPATIENT
Start: 2025-02-27 | End: 2025-02-27

## 2025-02-27 RX ORDER — ASPIRIN 325 MG
81 TABLET ORAL DAILY
Refills: 0 | Status: DISCONTINUED | OUTPATIENT
Start: 2025-02-27 | End: 2025-02-28

## 2025-02-27 RX ORDER — SACUBITRIL AND VALSARTAN 49; 51 MG/1; MG/1
1 TABLET, FILM COATED ORAL
Refills: 0 | Status: DISCONTINUED | OUTPATIENT
Start: 2025-02-27 | End: 2025-02-28

## 2025-02-27 RX ORDER — ACETAMINOPHEN 500 MG/5ML
1000 LIQUID (ML) ORAL ONCE
Refills: 0 | Status: COMPLETED | OUTPATIENT
Start: 2025-02-27 | End: 2025-02-27

## 2025-02-27 RX ORDER — ACETAMINOPHEN 500 MG/5ML
650 LIQUID (ML) ORAL EVERY 6 HOURS
Refills: 0 | Status: DISCONTINUED | OUTPATIENT
Start: 2025-02-27 | End: 2025-02-28

## 2025-02-27 RX ORDER — CARVEDILOL 3.12 MG/1
25 TABLET, FILM COATED ORAL EVERY 12 HOURS
Refills: 0 | Status: DISCONTINUED | OUTPATIENT
Start: 2025-02-27 | End: 2025-02-28

## 2025-02-27 RX ORDER — ATORVASTATIN CALCIUM 80 MG/1
40 TABLET, FILM COATED ORAL AT BEDTIME
Refills: 0 | Status: DISCONTINUED | OUTPATIENT
Start: 2025-02-27 | End: 2025-02-28

## 2025-02-27 RX ORDER — ATORVASTATIN CALCIUM 80 MG/1
1 TABLET, FILM COATED ORAL
Refills: 0 | DISCHARGE

## 2025-02-27 RX ORDER — SACUBITRIL AND VALSARTAN 49; 51 MG/1; MG/1
1 TABLET, FILM COATED ORAL
Refills: 0 | Status: DISCONTINUED | OUTPATIENT
Start: 2025-02-27 | End: 2025-02-27

## 2025-02-27 RX ORDER — CARVEDILOL 3.12 MG/1
25 TABLET, FILM COATED ORAL EVERY 12 HOURS
Refills: 0 | Status: DISCONTINUED | OUTPATIENT
Start: 2025-02-27 | End: 2025-02-27

## 2025-02-27 RX ADMIN — ATORVASTATIN CALCIUM 40 MILLIGRAM(S): 80 TABLET, FILM COATED ORAL at 21:13

## 2025-02-27 RX ADMIN — Medication 25 GRAM(S): at 14:58

## 2025-02-27 RX ADMIN — Medication 650 MILLIGRAM(S): at 21:12

## 2025-02-27 RX ADMIN — Medication 1000 MILLIGRAM(S): at 17:00

## 2025-02-27 RX ADMIN — CARVEDILOL 25 MILLIGRAM(S): 3.12 TABLET, FILM COATED ORAL at 18:26

## 2025-02-27 RX ADMIN — Medication 500 MILLILITER(S): at 10:00

## 2025-02-27 RX ADMIN — Medication 400 MILLIGRAM(S): at 16:31

## 2025-02-27 RX ADMIN — Medication 650 MILLIGRAM(S): at 22:12

## 2025-02-27 RX ADMIN — Medication 500 MILLILITER(S): at 14:58

## 2025-02-27 RX ADMIN — SACUBITRIL AND VALSARTAN 1 TABLET(S): 49; 51 TABLET, FILM COATED ORAL at 18:26

## 2025-02-27 NOTE — DISCHARGE NOTE PROVIDER - NSDCHC_MEDRECSTATUS_GEN_ALL_CORE
Admission Reconciliation is Not Complete  Discharge Reconciliation is Not Complete Previously negative Admission Reconciliation is Not Complete  Discharge Reconciliation is Completed

## 2025-02-27 NOTE — DISCHARGE NOTE PROVIDER - NSDCCPTREATMENT_GEN_ALL_CORE_FT
PRINCIPAL PROCEDURE  Procedure: Endovascular renal denervation  Findings and Treatment:      PRINCIPAL PROCEDURE  Procedure: Endovascular renal denervation  Findings and Treatment: No heavy lifting, driving, sex, tub baths, swimming, or any activity that submerges the lower half of the body in water for 48 hours.  Limited walking and stairs for 48 hours.    Change the bandaid after 24 hours and every 24 hours after that.  Keep the puncture site dry and covered with a bandaid until a scab forms.    Observe the site frequently.  If bleeding or a large lump (the size of a golf ball or bigger) occurs lie flat, apply continuous direct pressure just above the puncture site for at least 10 minutes, and notify your physician immediately.  If the bleeding cannot be controlled, call 911 immediately for assistance.  Notify your physician of pain, swelling or any drainage.    Notify your physician immediately if coldness, numbness, discoloration or pain in your foot occurs.

## 2025-02-27 NOTE — DISCHARGE NOTE PROVIDER - PROVIDER TOKENS
PROVIDER:[TOKEN:[[id number]],FOLLOWUP:[1 week]] PROVIDER:[TOKEN:[[id number]],SCHEDULEDAPPT:[03/03/2025]]

## 2025-02-27 NOTE — DISCHARGE NOTE PROVIDER - NSDCFUSCHEDAPPT_GEN_ALL_CORE_FT
Chevy Linn  [facility]  CARDIOLOGY [address]  Scheduled Appointment: 03/03/2025    Alpa Chaudhari  [facility]  CARDIOLOGY [facility] [address]  Scheduled Appointment: 05/05/2025

## 2025-02-27 NOTE — H&P PST ADULT - HISTORY OF PRESENT ILLNESS
Narrative: 49 year old male Czech speaking with PMHx, HTN (uncontrolled managed by cardiologist),CAD w/ stents (2021 in [place]), CKD presents to cath lab today for renal denervation with Dr Linn.. Pt. reports hx of aortic aneurysm diagnosed in 2/2023. He explains how he went to PCP for routine physical and was found to be hypertensive with 's and sent to ED. 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 Pt is s/p bypass graft from iliac artery to SMA and celiac artery via midline laparotomy 3/2023 and a left carotid to subclavian bypass with PTFE graft 5/2023.      Symptoms: NA       Angina (Class):        Ischemic Symptoms:     Heart Failure:        Systolic/Diastolic/Combined:        NYHA Class (within 2 weeks):     Assessment of LVEF (Must be within 6 months):       EF:        Assessed by:        Date:     Prior Cardiac Interventions (LHC, stents, CABG):       PCI's (Date, Stents, Vessels):        CABG (Date, Grafts):   Cardiac Catheterization (03.03.23 @ 16:10) >  Diagnostic Conclusions:   Elevated right and mildly elevated left heart pressures.    Normal cardiac output and index.    Normal angiographic appearance of coronary arteries.    Recommendations:   Medical management per primary team.   < end of copied text >    EKG:    Stress Test (Date, Findings):     Echo (Date, Findings):   < from: TTE Echo Complete w/ Contrast w/ Doppler (09.09.23 @ 09:59) >  Summary:   1. Normal left ventricular internal cavity size.   2. Normal global left ventricular systolic function.   3. Left ventricular ejection fraction, by visual estimation, is 55 to   60%.   4. Normal right ventricular size and function.   5. The left atrium is normal in size.   6. The right atrium is normal in size.   7. Normal trileaflet aortic valve with normal opening.   8. Mild thickening of the anterior and posterior mitral valve leaflets.   9. Trace mitral valve regurgitation.  10. There is no evidence of pericardial effusion.  < end of copied text >    Antianginal Therapies:        Beta Blockers:         Calcium Channel Blockers:        Long Acting Nitrates:        Ranexa:     Associated Risk Factors:        Frailty Score: (N/A, mild, moderate, severe)       Cerebrovascular Disease: N/A       Chronic Lung Disease: N/A       Peripheral Arterial Disease: N/A       Chronic Kidney Disease (if yes, what is GFR): N/A       Uncontrolled Diabetes (if yes, what is HgbA1C or FBS): N/A       Poorly Controlled Hypertension (if yes, what is SBP): N/A       Morbid Obesity (if yes, what is BMI): N/A       History of Recent Ventricular Arrhythmia: N/A       Inability to Ambulate Safely: N/A       Need for Therapeutic Anticoagulation: N/A       Antiplatelet or Contrast Allergy: N/A    VITAL SIGNS:    PHYSICAL EXAM:  Constitutional: A & O x 3, NAD  HEENT:  Normal oral mucosa, PERRL, EOMI	  Cardiovascular: S1 S2, III/VI *** murmur, No JVD  Respiratory: Lungs clear to auscultation	  Gastrointestinal:  Soft, Non-tender, + BS	  Skin: No rashes or cyanosis  Neurologic: No deficit appreciated  Extremities: Normal range of motion, *** edema  Vascular: distal pulses +     LABS:     Narrative: 49 year old male [place] speaking with PMHx, morbid obesity ,  HTN (uncontrolled managed by cardiologist),CAD w/ stents (2021 in [place]), CKD presents to cath lab today for renal denervation with Dr Linn.. Pt. reports hx of aortic aneurysm diagnosed in 2/2023 due to uncontrolled HTN. He explains how he went to PCP for routine physical and was found to be hypertensive with 's and sent to ED. 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    Symptoms: NA       Angina (Class):        Ischemic Symptoms:     Heart Failure: NA       Systolic/Diastolic/Combined:        NYHA Class (within 2 weeks):     Assessment of LVEF        EF: 55 to 60%.       Assessed by: Echo       Date: 03/03/2023    Prior Cardiac Interventions   Cardiac Catheterization (03.03.23 @ 16:10) >  Diagnostic Conclusions:   Elevated right and mildly elevated left heart pressures.    Normal cardiac output and index.    Normal angiographic appearance of coronary arteries.    Recommendations:   Medical management per primary team.   < end of copied text >    Stress Test (Date, Findings): NA    TTE Echo Complete w/ Contrast w/ Doppler (09.09.23 @ 09:59) >  Summary:   1. Normal left ventricular internal cavity size.   2. Normal global left ventricular systolic function.   3. Left ventricular ejection fraction, by visual estimation, is 55 to 60%.   4. Normal right ventricular size and function.   5. The left atrium is normal in size.   6. The right atrium is normal in size.   7. Normal trileaflet aortic valve with normal opening.   8. Mild thickening of the anterior and posterior mitral valve leaflets.   9. Trace mitral valve regurgitation.  10. There is no evidence of pericardial effusion.  < end of copied text >    Antianginal Therapies:        Beta Blockers:  Carvedilol        Calcium Channel Blockers:        Long Acting Nitrates:        Ranexa:     Associated Risk Factors:        Frailty Score: (N/A, mild, moderate, severe) NA       Cerebrovascular Disease: N/A       Chronic Lung Disease: N/A       Peripheral Arterial Disease: N/A       Chronic Kidney Disease (if yes, what is GFR): N/A********************       Uncontrolled Diabetes (if yes, what is HgbA1C or FBS): N/A       Poorly Controlled Hypertension (if yes, what is SBP): yes        Morbid Obesity (if yes, what is BMI): 38.6       History of Recent Ventricular Arrhythmia: N/A       Inability to Ambulate Safely: N/A       Need for Therapeutic Anticoagulation: N/A       Antiplatelet or Contrast Allergy: N/A    VITAL SIGNS:  Vital Signs Last 24 Hrs  T(C): 36.5 (27 Feb 2025 10:00), Max: 36.5 (27 Feb 2025 10:00)  T(F): 97.7 (27 Feb 2025 10:00), Max: 97.7 (27 Feb 2025 10:00)  HR: 67 (27 Feb 2025 10:00) (67 - 67)  BP: 141/93 (27 Feb 2025 10:00) (141/93 - 141/93)  BP(mean): --  RR: 16 (27 Feb 2025 10:00) (16 - 16)  SpO2: 97% (27 Feb 2025 10:00) (97% - 97%)    Parameters below as of 27 Feb 2025 10:00  Patient On (Oxygen Delivery Method): room air    LABS:                          14.8   5.54  )-----------( 180      ( 27 Feb 2025 09:30 )             47.3     02-27    137  |  98  |  31.8[H]  ----------------------------<  140[H]  4.2   |  26.0  |  1.75[H]    Ca    9.6      27 Feb 2025 09:30  Mg     1.6     02-27    TPro  8.1  /  Alb  4.5  /  TBili  0.7  /  DBili  x   /  AST  28  /  ALT  38  /  AlkPhos  110  02-27   Narrative: 49 year old male [place] speaking with PMHx, morbid obesity ,  HTN (uncontrolled managed by cardiologist),CAD w/ stents (2021 in [place]), CKD presents to cath lab today for renal denervation with Dr Linn.. Pt. reports hx of aortic aneurysm diagnosed in 2/2023 due to uncontrolled HTN. He explains how he went to PCP for routine physical and was found to be hypertensive with 's and sent to ED. 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    Symptoms: NA       Angina (Class):        Ischemic Symptoms:     Heart Failure: NA       Systolic/Diastolic/Combined:        NYHA Class (within 2 weeks):     Assessment of LVEF        EF: 55 to 60%.       Assessed by: Echo       Date: 03/03/2023    Prior Cardiac Interventions   Cardiac Catheterization (03.03.23 @ 16:10) >  Diagnostic Conclusions:   Elevated right and mildly elevated left heart pressures.    Normal cardiac output and index.    Normal angiographic appearance of coronary arteries.    Recommendations:   Medical management per primary team.   < end of copied text >    Stress Test (Date, Findings): NA    TTE Echo Complete w/ Contrast w/ Doppler (09.09.23 @ 09:59) >  Summary:   1. Normal left ventricular internal cavity size.   2. Normal global left ventricular systolic function.   3. Left ventricular ejection fraction, by visual estimation, is 55 to 60%.   4. Normal right ventricular size and function.   5. The left atrium is normal in size.   6. The right atrium is normal in size.   7. Normal trileaflet aortic valve with normal opening.   8. Mild thickening of the anterior and posterior mitral valve leaflets.   9. Trace mitral valve regurgitation.  10. There is no evidence of pericardial effusion.  < end of copied text >    Antianginal Therapies:        Beta Blockers:  Carvedilol        Calcium Channel Blockers:        Long Acting Nitrates:        Ranexa:     Associated Risk Factors:        Frailty Score: (N/A, mild, moderate, severe) NA       Cerebrovascular Disease: N/A       Chronic Lung Disease: N/A       Peripheral Arterial Disease: N/A       Chronic Kidney Disease (if yes, what is GFR): 48       Uncontrolled Diabetes (if yes, what is HgbA1C or FBS): N/A       Poorly Controlled Hypertension (if yes, what is SBP): yes        Morbid Obesity (if yes, what is BMI): 38.6       History of Recent Ventricular Arrhythmia: N/A       Inability to Ambulate Safely: N/A       Need for Therapeutic Anticoagulation: N/A       Antiplatelet or Contrast Allergy: N/A    VITAL SIGNS:  Vital Signs Last 24 Hrs  T(C): 36.5 (27 Feb 2025 10:00), Max: 36.5 (27 Feb 2025 10:00)  T(F): 97.7 (27 Feb 2025 10:00), Max: 97.7 (27 Feb 2025 10:00)  HR: 67 (27 Feb 2025 10:00) (67 - 67)  BP: 141/93 (27 Feb 2025 10:00) (141/93 - 141/93)  BP(mean): --  RR: 16 (27 Feb 2025 10:00) (16 - 16)  SpO2: 97% (27 Feb 2025 10:00) (97% - 97%)    Parameters below as of 27 Feb 2025 10:00  Patient On (Oxygen Delivery Method): room air    LABS:                          14.8   5.54  )-----------( 180      ( 27 Feb 2025 09:30 )             47.3     02-27    137  |  98  |  31.8[H]  ----------------------------<  140[H]  4.2   |  26.0  |  1.75[H]    Ca    9.6      27 Feb 2025 09:30  Mg     1.6     02-27    TPro  8.1  /  Alb  4.5  /  TBili  0.7  /  DBili  x   /  AST  28  /  ALT  38  /  AlkPhos  110  02-27

## 2025-02-27 NOTE — DISCHARGE NOTE PROVIDER - NSDCPNSUBOBJ_GEN_ALL_CORE
2/28/2025                                                           [facility]                                                         CARDIOLOGY AT [facility]                                                                  [address] [facility], [place] [place] [address]                                                         Telephone: [phone]. Fax:[phone]                                                          INTERVENTIONAL CARDIOLOGY PROGRESS NOTE    Reason for follow up: post renal denervation   Update: stable for discharge     Procedure: renal denervation via RFA/LFA both closed with angioseal   Procedure Date: 2/27/2025  Procedural Interventionalist: Dr. Linn   Mechanical Support Device, Settings:n/a     Review of symptoms:   Cardiac:  No chest pain. No dyspnea. No palpitations.  Respiratory: no cough. No dyspnea  Gastrointestinal: No diarrhea. No abdominal pain. No bleeding.   Neuro: No focal neuro complaints.    Vitals:  T(C): 36.7 (02-28-25 @ 12:13), Max: 36.7 (02-28-25 @ 12:13)  HR: 58 (02-28-25 @ 12:13) (53 - 70)  BP: 125/82 (02-28-25 @ 12:13) (115/82 - 157/80)  RR: 18 (02-28-25 @ 12:13) (15 - 18)  SpO2: 97% (02-28-25 @ 12:13) (94% - 99%)  I&O's Summary    Weight (kg): 98.883 (02-27 @ 10:11)    PHYSICAL EXAM:  Appearance: Comfortable. No acute distress  HEENT:  Atraumatic. Normocephalic.  Normal oral mucosa  Neurologic: A & O x 3, no gross focal deficits.  Cardiovascular: RRR S1 S2, No murmur, no rubs/gallops. No JVD  Respiratory: Lungs clear to auscultation, unlabored   Gastrointestinal:  Soft, Non-tender, + BS  Lower Extremities: 2+ Peripheral Pulses, No clubbing, cyanosis, or edema  Psychiatry: Patient is calm. No agitation.   Skin: warm and dry.    CURRENT CARDIAC MEDICATIONS:  carvedilol 25 milliGRAM(s) Oral every 12 hours  sacubitril 24 mG/valsartan 26 mG 1 Tablet(s) Oral two times a day      CURRENT OTHER MEDICATIONS:  acetaminophen     Tablet .. 650 milliGRAM(s) Oral every 6 hours PRN Temp greater or equal to 38C (100.4F), Mild Pain (1 - 3)  atorvastatin 40 milliGRAM(s) Oral at bedtime  aspirin enteric coated 81 milliGRAM(s) Oral daily  chlorhexidine 4% Liquid 1 Application(s) Topical Once, Stop order after: 1 Doses      LABS:	 	                            14.5   6.21  )-----------( 139      ( 28 Feb 2025 05:05 )             46.5     02-28    136  |  101  |  24.5[H]  ----------------------------<  97  4.0   |  23.0  |  1.47[H]    Ca    9.0      28 Feb 2025 05:05  Mg     2.1     02-28    TPro  7.3  /  Alb  3.9  /  TBili  0.9  /  DBili  x   /  AST  22  /  ALT  30  /  AlkPhos  95  02-28      Plan:   -d/c home   -hold aldactone, HCTZ and entresto for now  -was planning on lower dose entresto however may not be covered by insurance for 7 days supply. Will send losartan 25mg daily in lieu of entresto until follow up on monday with Dr. Linn   -continue aspirin and coreg   -groin precautions reviewed    [id number]       2/28/2025                                                           [facility]YS[facility]                                                         CARDIOLOGY AT [facility]                                                                  [address] [facility] [place] [place] [address]                                                         Telephone: [phone]. Fax:[phone]                                                          INTERVENTIONAL CARDIOLOGY PROGRESS NOTE    Reason for follow up: post renal denervation   Update: stable for discharge     Procedure: renal denervation via RFA/LFA both closed with angioseal   Procedure Date: 2/27/2025  Procedural Interventionalist: Dr. Linn   Mechanical Support Device, Settings:n/a     Review of symptoms:   Cardiac:  No chest pain. No dyspnea. No palpitations.  Respiratory: no cough. No dyspnea  Gastrointestinal: No diarrhea. No abdominal pain. No bleeding.   Neuro: No focal neuro complaints.    Vitals:  T(C): 36.7 (02-28-25 @ 12:13), Max: 36.7 (02-28-25 @ 12:13)  HR: 58 (02-28-25 @ 12:13) (53 - 70)  BP: 125/82 (02-28-25 @ 12:13) (115/82 - 157/80)  RR: 18 (02-28-25 @ 12:13) (15 - 18)  SpO2: 97% (02-28-25 @ 12:13) (94% - 99%)  I&O's Summary    Weight (kg): 98.883 (02-27 @ 10:11)    PHYSICAL EXAM:  Appearance: Comfortable. No acute distress  HEENT:  Atraumatic. Normocephalic.  Normal oral mucosa  Neurologic: A & O x 3, no gross focal deficits.  Cardiovascular: RRR S1 S2, No murmur, no rubs/gallops. No JVD  Respiratory: Lungs clear to auscultation, unlabored   Gastrointestinal:  Soft, Non-tender, + BS  Lower Extremities: 2+ Peripheral Pulses, No clubbing, cyanosis, or edema  Psychiatry: Patient is calm. No agitation.   Skin: warm and dry.    CURRENT CARDIAC MEDICATIONS:  carvedilol 25 milliGRAM(s) Oral every 12 hours  sacubitril 24 mG/valsartan 26 mG 1 Tablet(s) Oral two times a day      CURRENT OTHER MEDICATIONS:  acetaminophen     Tablet .. 650 milliGRAM(s) Oral every 6 hours PRN Temp greater or equal to 38C (100.4F), Mild Pain (1 - 3)  atorvastatin 40 milliGRAM(s) Oral at bedtime  aspirin enteric coated 81 milliGRAM(s) Oral daily  chlorhexidine 4% Liquid 1 Application(s) Topical Once, Stop order after: 1 Doses      LABS:	 	                            14.5   6.21  )-----------( 139      ( 28 Feb 2025 05:05 )             46.5     02-28    136  |  101  |  24.5[H]  ----------------------------<  97  4.0   |  23.0  |  1.47[H]    Ca    9.0      28 Feb 2025 05:05  Mg     2.1     02-28    TPro  7.3  /  Alb  3.9  /  TBili  0.9  /  DBili  x   /  AST  22  /  ALT  30  /  AlkPhos  95  02-28      Plan:   -d/c home   -hold aldactone, HCTZ and entresto for now  -was planning on lower dose entresto however may not be covered by insurance for 7 days supply. Will send losartan 25mg daily in lieu of entresto until follow up on monday with Dr. Linn   -continue aspirin, statin, and coreg   -groin precautions reviewed    [id number]

## 2025-02-27 NOTE — DISCHARGE NOTE PROVIDER - NSDCCPCAREPLAN_GEN_ALL_CORE_FT
PRINCIPAL DISCHARGE DIAGNOSIS  Diagnosis: Uncontrolled hypertension  Assessment and Plan of Treatment:      PRINCIPAL DISCHARGE DIAGNOSIS  Diagnosis: Uncontrolled hypertension  Assessment and Plan of Treatment: s/p renal denervation: tolerated well, please hold entresto, aldactone, and hydrochlorothiazide until follow up with Dr. Linn next week.  -losartan 25mg daily (7 pills) sent to pharmacy in place of entrest for now  -continue coreg 25mg BID  -follow up with Dr. Linn on Monday 3/3/2025

## 2025-02-27 NOTE — DISCHARGE NOTE PROVIDER - CARE PROVIDER_API CALL
Chevy Linn)  Cardiovascular Disease  [address] [place] [id number]  Phone: [phone]  Fax: [phone]  Follow Up Time: 1 week   Chevy Linn)  Cardiovascular Disease  [address] [place] [id number]  Phone: [phone]  Fax: [phone]  Scheduled Appointment: 03/03/2025

## 2025-02-27 NOTE — ASU PATIENT PROFILE, ADULT - FALL HARM RISK - UNIVERSAL INTERVENTIONS
Bed in lowest position, wheels locked, appropriate side rails in place/Call bell, personal items and telephone in reach/Instruct patient to call for assistance before getting out of bed or chair/Non-slip footwear when patient is out of bed/[place] to call system/Physically safe environment - no spills, clutter or unnecessary equipment/Purposeful Proactive Rounding/Room/bathroom lighting operational, light cord in reach

## 2025-02-27 NOTE — DISCHARGE NOTE PROVIDER - HOSPITAL COURSE
49 year old male Icelandic speaking with PMHx, morbid obesity ,  HTN (uncontrolled managed by cardiologist),CAD w/ stents (2021 in [place]), CKD presents to cath lab today for renal denervation with Dr Linn.. Pt. reports hx of aortic aneurysm diagnosed in 2/2023 due to uncontrolled HTN. He explains how he went to PCP for routine physical and was found to be hypertensive with 's and sent to ED. 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     49 year old male Chinese speaking with PMHx, morbid obesity ,  HTN (uncontrolled managed by cardiologist),CAD w/ stents (2021 in [place]), CKD presents to cath lab today for renal denervation with Dr Linn.. Pt. reports hx of aortic aneurysm diagnosed in 2/2023 due to uncontrolled HTN. He explains how he went to PCP for routine physical and was found to be hypertensive with 's and sent to ED. 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    Now s/p renal denervation via  RFA (failed) then we had successful access in the LFA  with Dr. Linn.     Plan:  - Avoid nephrotoxic meds. D/C Spironolactone and HTZ for now. We will reevaluated kidney function and BP tomorrow and determine if pt should restart them and at what dose (reach out Dr Hood to make decision in the AM)  - Decreased Entresto from 97/103 to 24/26 mg Hold if SBP is < 110   - Continue Carvedilol 25 BID with strict parameters. Hold if SBP is < 110   -Formal cath report pending  -Groin precautions discussed with patient  -Educated regarding post procedure management and care  -Discussed the importance of RF modification  -F/U outpt in 1 weeks with Cardiologist Dr. Linn   - Plan for D/C patient tomorrow  if remains HDS and labs in am stable and without complications    Case discussed with Dr Linn 49 year old male Romansh speaking with PMHx, morbid obesity ,  HTN (uncontrolled managed by cardiologist),CAD w/ stents (2021 in [place]), CKD presents to cath lab today for renal denervation with Dr Meyer.. Pt. reports hx of aortic aneurysm diagnosed in 2/2023 due to uncontrolled HTN. He explains how he went to PCP for routine physical and was found to be hypertensive with 's and sent to ED. 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    Now s/p renal denervation via  RFA (failed) then we had successful access in the LFA  with Dr. Meyer.     Plan:  - Avoid nephrotoxic meds. D/C Spironolactone and HTZ for now. We will reevaluated kidney function and BP tomorrow and determine if pt should restart them and at what dose (reach out Dr Hood to make decision in the AM)  -HOLD ENTRESTO UNTIL SEES DR. MEYER (WILL SEND LOSARTAN 25MG PO DAILY X 7 DAYS, SENDING LOW DOSE ENTRESTO MAY NOT BE COVERED)  - Continue Carvedilol 25 BID with strict parameters. Hold if SBP is < 110   -Formal cath report pending  -Groin precautions discussed with patient  -Educated regarding post procedure management and care  -Discussed the importance of RF modification  -F/U outpt in 1 weeks with Cardiologist Dr. Meyer   - Plan for D/C patient tomorrow  if remains HDS and labs in am stable and without complications    Case discussed with Dr Meyer

## 2025-02-27 NOTE — DISCHARGE NOTE PROVIDER - NSDCMRMEDTOKEN_GEN_ALL_CORE_FT
aspirin 81 mg oral delayed release tablet: 1 tab(s) orally once a day  atorvastatin 40 mg oral tablet: 1 tab(s) orally once a day  carvedilol 25 mg oral tablet: 1 tab(s) orally every 12 hours  Entresto 97 mg-103 mg oral tablet: 1 tab(s) orally 2 times a day  hydroCHLOROthiazide 50 mg oral tablet: 1 tab(s) orally once a day  spironolactone 25 mg oral tablet: 1 tab(s) orally once a day   aspirin 81 mg oral delayed release tablet: 1 tab(s) orally once a day  atorvastatin 40 mg oral tablet: 1 tab(s) orally once a day  carvedilol 25 mg oral tablet: 1 tab(s) orally every 12 hours  losartan 25 mg oral tablet: 1 tab(s) orally once a day PT WILL TAKE UNTIL HE SEES DR. MEYER, (ENTRESTO ON HOLD)

## 2025-02-27 NOTE — CHART NOTE - NSCHARTNOTEFT_GEN_A_CORE
Now s/p renal denervation via  RFA (failed) then we had successful access in the LFA  with Dr. Linn. Pt tolerated procedure well. Pt arrived to recovery in NAD and HDS.  Access sites stable, no bleed/hematoma, distal pulse +.  Denies complaints of chest pain, SOB, dizziness, or palpitations    Medications  Versed:  Fentanyl:  Heparin:  Plavix/Brillinta:   Omnipaque:    Closure Device: angioseal b/l    ALLERGIES:   No Known Allergies    PHYSICAL EXAM:  Constitutional: Comfortable . No acute distress.   CNS: A&Ox3. No focal deficits.   Respiratory: CTAB, unlabored   Cardiovascular: RRR normal s1 s2. No murmur. No gallop.  Gastrointestinal: Soft, non-tender. +Bowel sounds.   Extremities: 2+ Peripheral Pulses, No  edema  Groin: R and L + Benign, no hematoma, no bleeding  Psychiatric: Calm . no agitation.   Skin: Warm and dry.    VITAL SIGNS:   T(C): 36.5 (02-27-25 @ 10:00), Max: 36.5 (02-27-25 @ 10:00)  T(F): 97.7 (02-27-25 @ 10:00), Max: 97.7 (02-27-25 @ 10:00)  HR: 67 (02-27-25 @ 10:00) (67 - 67)  BP: 141/93 (02-27-25 @ 10:00) (141/93 - 141/93)  RR: 16 (02-27-25 @ 10:00) (16 - 16)  SpO2: 97% (02-27-25 @ 10:00) (97% - 97%)    Now s/p renal denervation via  RFA (failed) then we had successful access in the LFA  with Dr. Linn.     Plan:  - Avoid nephrotoxic meds. D/C Spironolactone and HTZ for now. We will reevaluated kidney function and BP tomorrow and determine if pt should restart them and at what dose (reach out Dr Hood to make decision in the AM)  - Decreased Entresto from 97/103 to 24/26 mg   - Continue Carvedilol 25 BID with strict parameters. Hold if SBP is < 110   -Formal cath report pending  -Post procedure management/monitoring per protocol  -Bedrest x 4 hours post procedure  -Groin precautions discussed with patient  -Labs in am  -NS 0.9% 1000 ml x 1 bolus: post procedure STEVAN ppx   -Repeat ECG if any clinical indication or change on tele  -Educated regarding post procedure management and care  -Discussed the importance of RF modification  -F/U outpt in 1 weeks with Cardiologist Dr. Linn   - Plan for D/C patient tomorrow  if remains HDS and labs in am stable and without complications    Case discussed with Dr Linn Now s/p renal denervation via  RFA (failed) then we had successful access in the LFA  with Dr. Linn. Pt tolerated procedure well. Pt arrived to recovery in NAD and HDS.  Access sites stable, no bleed/hematoma, distal pulse +.  Denies complaints of chest pain, SOB, dizziness, or palpitations    Medications  Versed: 2.5 mcg  Fentanyl: 175 mcg  Heparin: 3000 units  Visipaque: 145 ml    Closure Device: angioseal b/l    ALLERGIES:   No Known Allergies    PHYSICAL EXAM:  Constitutional: Comfortable . No acute distress.   CNS: A&Ox3. No focal deficits.   Respiratory: CTAB, unlabored   Cardiovascular: RRR normal s1 s2. No murmur. No gallop.  Gastrointestinal: Soft, non-tender. +Bowel sounds.   Extremities: 2+ Peripheral Pulses, No  edema  Groin: R and L + Benign, no hematoma, no bleeding  Psychiatric: Calm . no agitation.   Skin: Warm and dry.    VITAL SIGNS:   T(C): 36.5 (02-27-25 @ 10:00), Max: 36.5 (02-27-25 @ 10:00)  T(F): 97.7 (02-27-25 @ 10:00), Max: 97.7 (02-27-25 @ 10:00)  HR: 67 (02-27-25 @ 10:00) (67 - 67)  BP: 141/93 (02-27-25 @ 10:00) (141/93 - 141/93)  RR: 16 (02-27-25 @ 10:00) (16 - 16)  SpO2: 97% (02-27-25 @ 10:00) (97% - 97%)    Now s/p renal denervation via  RFA (failed) then we had successful access in the LFA  with Dr. Linn.     Plan:  - Avoid nephrotoxic meds. D/C Spironolactone and HTZ for now. We will reevaluated kidney function and BP tomorrow and determine if pt should restart them and at what dose (reach out Dr Hood to make decision in the AM)  - Decreased Entresto from 97/103 to 24/26 mg Hold if SBP is < 110   - Continue Carvedilol 25 BID with strict parameters. Hold if SBP is < 110   -Formal cath report pending  -Post procedure management/monitoring per protocol  -Bedrest x 4 hours post procedure  -Groin precautions discussed with patient  -Labs in am  -NS 0.9% 1000 ml x 1 bolus: post procedure STEVAN ppx   -Repeat ECG if any clinical indication or change on tele  -Educated regarding post procedure management and care  -Discussed the importance of RF modification  -F/U outpt in 1 weeks with Cardiologist Dr. Linn   - Plan for D/C patient tomorrow  if remains HDS and labs in am stable and without complications    Case discussed with Dr Linn

## 2025-02-27 NOTE — H&P PST ADULT - ASSESSMENT
Risk Stratification:  ASA:   Mallampati:   Bleeding Risk:   Creatinine:   GFR:   Pt assessed, appropriate for sedation, pt educated regarding the plan for Versed/Fentanyl as needed.    Plan/Recommendations:   -plan for ***  -preferred access: RRA ***  -patient seen and examined  -confirmed appropriate NPO duration  -ECG and Labs reviewed  -Aspirin 81mg po pre-cath ***  -NS 250mL IV bolus pre-cath ***  -procedure discussed with patient; risks and benefits explained, questions answered  -consent obtained by attending IC    Risks, benefits, and alternatives reviewed.  Risks including but not limited to MI, death, stroke, bleeding, infection, vessel injury, hematoma, renal failure, allergic reaction, urgent open heart surgery, restenosis and stent thrombosis were reviewed.  All questions answered.  Patient is agreeable to proceed. Risk Stratification:  ASA: 3  Mallampati: 2  Bleeding Risk:   Creatinine: 1.75  GFR: 48  Pt assessed, appropriate for sedation, pt educated regarding the plan for Versed/Fentanyl as needed.    Plan/Recommendations:   -plan for Renal denervation  -preferred access: RFA and RFV  -patient seen and examined  -confirmed appropriate NPO duration  -ECG and Labs reviewed  -Aspirin 81mg po pre-cath ordered   - mL IV bolus pre-cath ordered   -procedure discussed with patient; risks and benefits explained, questions answered  -consent obtained by attending IC    Risks, benefits, and alternatives reviewed.  Risks including but not limited to MI, death, stroke, bleeding, infection, vessel injury, hematoma, renal failure, allergic reaction, urgent open heart surgery, restenosis and stent thrombosis were reviewed.  All questions answered.  Patient is agreeable to proceed.

## 2025-02-28 ENCOUNTER — TRANSCRIPTION ENCOUNTER (OUTPATIENT)
Age: 50
End: 2025-02-28

## 2025-02-28 VITALS
OXYGEN SATURATION: 94 % | RESPIRATION RATE: 16 BRPM | HEART RATE: 64 BPM | SYSTOLIC BLOOD PRESSURE: 160 MMHG | TEMPERATURE: 98 F | DIASTOLIC BLOOD PRESSURE: 82 MMHG

## 2025-02-28 LAB
ALBUMIN SERPL ELPH-MCNC: 3.9 G/DL — SIGNIFICANT CHANGE UP (ref 3.3–5.2)
ALP SERPL-CCNC: 95 U/L — SIGNIFICANT CHANGE UP (ref 40–120)
ALT FLD-CCNC: 30 U/L — SIGNIFICANT CHANGE UP
ANION GAP SERPL CALC-SCNC: 13 MMOL/L — SIGNIFICANT CHANGE UP (ref 5–17)
AST SERPL-CCNC: 22 U/L — SIGNIFICANT CHANGE UP
BILIRUB SERPL-MCNC: 0.9 MG/DL — SIGNIFICANT CHANGE UP (ref 0.4–2)
BUN SERPL-MCNC: 24.5 MG/DL — HIGH (ref 8–20)
CALCIUM SERPL-MCNC: 9 MG/DL — SIGNIFICANT CHANGE UP (ref 8.4–10.5)
CHLORIDE SERPL-SCNC: 101 MMOL/L — SIGNIFICANT CHANGE UP (ref 96–108)
CO2 SERPL-SCNC: 23 MMOL/L — SIGNIFICANT CHANGE UP (ref 22–29)
CREAT SERPL-MCNC: 1.47 MG/DL — HIGH (ref 0.5–1.3)
EGFR: 59 ML/MIN/1.73M2 — LOW
GLUCOSE SERPL-MCNC: 97 MG/DL — SIGNIFICANT CHANGE UP (ref 70–99)
HCT VFR BLD CALC: 46.5 % — SIGNIFICANT CHANGE UP (ref 39–50)
HGB BLD-MCNC: 14.5 G/DL — SIGNIFICANT CHANGE UP (ref 13–17)
MAGNESIUM SERPL-MCNC: 2.1 MG/DL — SIGNIFICANT CHANGE UP (ref 1.6–2.6)
MCHC RBC-ENTMCNC: 28.2 PG — SIGNIFICANT CHANGE UP (ref 27–34)
MCHC RBC-ENTMCNC: 31.2 G/DL — LOW (ref 32–36)
MCV RBC AUTO: 90.5 FL — SIGNIFICANT CHANGE UP (ref 80–100)
NRBC # BLD AUTO: 0 K/UL — SIGNIFICANT CHANGE UP (ref 0–0)
NRBC # FLD: 0 K/UL — SIGNIFICANT CHANGE UP (ref 0–0)
NRBC BLD AUTO-RTO: 0 /100 WBCS — SIGNIFICANT CHANGE UP (ref 0–0)
PLATELET # BLD AUTO: 139 K/UL — LOW (ref 150–400)
PMV BLD: 12.5 FL — SIGNIFICANT CHANGE UP (ref 7–13)
POTASSIUM SERPL-MCNC: 4 MMOL/L — SIGNIFICANT CHANGE UP (ref 3.5–5.3)
POTASSIUM SERPL-SCNC: 4 MMOL/L — SIGNIFICANT CHANGE UP (ref 3.5–5.3)
PROT SERPL-MCNC: 7.3 G/DL — SIGNIFICANT CHANGE UP (ref 6.6–8.7)
RBC # BLD: 5.14 M/UL — SIGNIFICANT CHANGE UP (ref 4.2–5.8)
RBC # FLD: 13.3 % — SIGNIFICANT CHANGE UP (ref 10.3–14.5)
SODIUM SERPL-SCNC: 136 MMOL/L — SIGNIFICANT CHANGE UP (ref 135–145)
WBC # BLD: 6.21 K/UL — SIGNIFICANT CHANGE UP (ref 3.8–10.5)
WBC # FLD AUTO: 6.21 K/UL — SIGNIFICANT CHANGE UP (ref 3.8–10.5)

## 2025-02-28 PROCEDURE — C1735: CPT

## 2025-02-28 PROCEDURE — C1887: CPT

## 2025-02-28 PROCEDURE — C1894: CPT

## 2025-02-28 PROCEDURE — 80053 COMPREHEN METABOLIC PANEL: CPT

## 2025-02-28 PROCEDURE — 93005 ELECTROCARDIOGRAM TRACING: CPT

## 2025-02-28 PROCEDURE — 86901 BLOOD TYPING SEROLOGIC RH(D): CPT

## 2025-02-28 PROCEDURE — 86850 RBC ANTIBODY SCREEN: CPT

## 2025-02-28 PROCEDURE — 85027 COMPLETE CBC AUTOMATED: CPT

## 2025-02-28 PROCEDURE — 36415 COLL VENOUS BLD VENIPUNCTURE: CPT

## 2025-02-28 PROCEDURE — C1760: CPT

## 2025-02-28 PROCEDURE — 86900 BLOOD TYPING SEROLOGIC ABO: CPT

## 2025-02-28 PROCEDURE — C1769: CPT

## 2025-02-28 PROCEDURE — 83735 ASSAY OF MAGNESIUM: CPT

## 2025-02-28 RX ORDER — SPIRONOLACTONE 25 MG
1 TABLET ORAL
Refills: 0 | DISCHARGE

## 2025-02-28 RX ORDER — HYDROCHLOROTHIAZIDE 50 MG/1
1 TABLET ORAL
Refills: 0 | DISCHARGE

## 2025-02-28 RX ORDER — SACUBITRIL AND VALSARTAN 49; 51 MG/1; MG/1
1 TABLET, FILM COATED ORAL
Refills: 0 | DISCHARGE

## 2025-02-28 RX ORDER — LOSARTAN POTASSIUM 100 MG/1
1 TABLET, FILM COATED ORAL
Qty: 7 | Refills: 0
Start: 2025-02-28

## 2025-02-28 RX ADMIN — CARVEDILOL 25 MILLIGRAM(S): 3.12 TABLET, FILM COATED ORAL at 06:30

## 2025-02-28 RX ADMIN — Medication 81 MILLIGRAM(S): at 11:38

## 2025-02-28 RX ADMIN — Medication 650 MILLIGRAM(S): at 06:30

## 2025-02-28 RX ADMIN — SACUBITRIL AND VALSARTAN 1 TABLET(S): 49; 51 TABLET, FILM COATED ORAL at 06:30

## 2025-02-28 NOTE — DISCHARGE NOTE NURSING/CASE MANAGEMENT/SOCIAL WORK - PATIENT PORTAL LINK FT
You can access the FollowMyHealth Patient Portal offered by [facility] by registering at the following website: [email or web address]. By joining [facility]’s FollowMyHealth portal, you will also be able to view your health information using other applications (apps) compatible with our system.

## 2025-02-28 NOTE — DISCHARGE NOTE NURSING/CASE MANAGEMENT/SOCIAL WORK - FINANCIAL ASSISTANCE
[facility] provides services at a reduced cost to those who are determined to be eligible through [facility]’s financial assistance program. Information regarding [facility]’s financial assistance program can be found by going to [email or web address] or by calling [phone].

## 2025-03-03 ENCOUNTER — APPOINTMENT (OUTPATIENT)
Dept: CARDIOLOGY | Facility: CLINIC | Age: 50
End: 2025-03-03
Payer: COMMERCIAL

## 2025-03-03 VITALS
OXYGEN SATURATION: 97 % | WEIGHT: 224 LBS | SYSTOLIC BLOOD PRESSURE: 142 MMHG | HEART RATE: 74 BPM | BODY MASS INDEX: 40.97 KG/M2 | DIASTOLIC BLOOD PRESSURE: 80 MMHG

## 2025-03-03 DIAGNOSIS — I42.8 OTHER CARDIOMYOPATHIES: ICD-10-CM

## 2025-03-03 DIAGNOSIS — Z78.9 OTHER SPECIFIED HEALTH STATUS: ICD-10-CM

## 2025-03-03 DIAGNOSIS — I10 ESSENTIAL (PRIMARY) HYPERTENSION: ICD-10-CM

## 2025-03-03 DIAGNOSIS — I71.012 DISSECTION OF DESCENDING THORACIC AORTA: ICD-10-CM

## 2025-03-03 DIAGNOSIS — N18.9 CHRONIC KIDNEY DISEASE, UNSPECIFIED: ICD-10-CM

## 2025-03-03 PROCEDURE — 99215 OFFICE O/P EST HI 40 MIN: CPT

## 2025-03-03 RX ORDER — LOSARTAN POTASSIUM 25 MG/1
25 TABLET, FILM COATED ORAL DAILY
Qty: 1 | Refills: 0 | Status: ACTIVE | COMMUNITY
Start: 1900-01-01 | End: 1900-01-01

## 2025-03-07 ENCOUNTER — LABORATORY RESULT (OUTPATIENT)
Age: 50
End: 2025-03-07

## 2025-03-08 ENCOUNTER — NON-APPOINTMENT (OUTPATIENT)
Age: 50
End: 2025-03-08

## 2025-04-03 NOTE — DISCHARGE NOTE PROVIDER - NSDCCPGOAL_GEN_ALL_CORE_FT
Consider referral to urogynecology for shoulder eval  Orders:    Follow Up In [facility] - PCP - Medicare Annual     To get better and follow your care plan as instructed.

## 2025-04-08 ENCOUNTER — APPOINTMENT (OUTPATIENT)
Dept: CARDIOLOGY | Facility: CLINIC | Age: 50
End: 2025-04-08
Payer: COMMERCIAL

## 2025-04-08 VITALS
SYSTOLIC BLOOD PRESSURE: 140 MMHG | WEIGHT: 223 LBS | DIASTOLIC BLOOD PRESSURE: 82 MMHG | HEIGHT: 62 IN | OXYGEN SATURATION: 96 % | HEART RATE: 69 BPM | BODY MASS INDEX: 41.04 KG/M2

## 2025-04-08 DIAGNOSIS — I25.10 ATHEROSCLEROTIC HEART DISEASE OF NATIVE CORONARY ARTERY W/OUT ANGINA PECTORIS: ICD-10-CM

## 2025-04-08 PROCEDURE — 99214 OFFICE O/P EST MOD 30 MIN: CPT

## 2025-04-08 RX ORDER — OLMESARTAN MEDOXOMIL 40 MG/1
40 TABLET, FILM COATED ORAL
Qty: 90 | Refills: 1 | Status: ACTIVE | COMMUNITY
Start: 1900-01-01 | End: 1900-01-01

## 2025-04-08 RX ORDER — SPIRONOLACTONE 25 MG/1
25 TABLET ORAL DAILY
Qty: 90 | Refills: 3 | Status: ACTIVE | COMMUNITY
Start: 2025-04-08 | End: 1900-01-01

## 2025-04-09 NOTE — ASU PATIENT PROFILE, ADULT - PROVIDER NOTIFICATION
2/21/15 OV DB s/p assisted sigmoid colectomy for recurrent diverticulitis 6 month FS    8/15/25 AND SP FS DB instructions mailed to patient  
Declines

## 2025-04-13 ENCOUNTER — NON-APPOINTMENT (OUTPATIENT)
Age: 50
End: 2025-04-13

## 2025-04-17 ENCOUNTER — LABORATORY RESULT (OUTPATIENT)
Age: 50
End: 2025-04-17

## 2025-04-21 ENCOUNTER — APPOINTMENT (OUTPATIENT)
Dept: CARDIOLOGY | Facility: CLINIC | Age: 50
End: 2025-04-21
Payer: COMMERCIAL

## 2025-04-21 VITALS
SYSTOLIC BLOOD PRESSURE: 142 MMHG | OXYGEN SATURATION: 97 % | WEIGHT: 222 LBS | DIASTOLIC BLOOD PRESSURE: 90 MMHG | BODY MASS INDEX: 40.6 KG/M2 | HEART RATE: 70 BPM

## 2025-04-21 DIAGNOSIS — I50.9 HEART FAILURE, UNSPECIFIED: ICD-10-CM

## 2025-04-21 DIAGNOSIS — N18.9 CHRONIC KIDNEY DISEASE, UNSPECIFIED: ICD-10-CM

## 2025-04-21 DIAGNOSIS — I10 ESSENTIAL (PRIMARY) HYPERTENSION: ICD-10-CM

## 2025-04-21 DIAGNOSIS — I71.9 AORTIC ANEURYSM OF UNSPECIFIED SITE, W/OUT RUPTURE: ICD-10-CM

## 2025-04-21 PROCEDURE — 99214 OFFICE O/P EST MOD 30 MIN: CPT

## 2025-05-05 ENCOUNTER — APPOINTMENT (OUTPATIENT)
Dept: CARDIOLOGY | Facility: CLINIC | Age: 50
End: 2025-05-05
Payer: COMMERCIAL

## 2025-05-05 ENCOUNTER — NON-APPOINTMENT (OUTPATIENT)
Age: 50
End: 2025-05-05

## 2025-05-05 VITALS
OXYGEN SATURATION: 98 % | SYSTOLIC BLOOD PRESSURE: 104 MMHG | DIASTOLIC BLOOD PRESSURE: 60 MMHG | HEIGHT: 62 IN | HEART RATE: 77 BPM | BODY MASS INDEX: 39.75 KG/M2 | WEIGHT: 216 LBS

## 2025-05-05 DIAGNOSIS — I50.9 HEART FAILURE, UNSPECIFIED: ICD-10-CM

## 2025-05-05 DIAGNOSIS — I71.012 DISSECTION OF DESCENDING THORACIC AORTA: ICD-10-CM

## 2025-05-05 DIAGNOSIS — I71.9 AORTIC ANEURYSM OF UNSPECIFIED SITE, W/OUT RUPTURE: ICD-10-CM

## 2025-05-05 DIAGNOSIS — I10 ESSENTIAL (PRIMARY) HYPERTENSION: ICD-10-CM

## 2025-05-05 DIAGNOSIS — N18.9 CHRONIC KIDNEY DISEASE, UNSPECIFIED: ICD-10-CM

## 2025-05-05 DIAGNOSIS — I42.8 OTHER CARDIOMYOPATHIES: ICD-10-CM

## 2025-05-05 PROCEDURE — 93000 ELECTROCARDIOGRAM COMPLETE: CPT

## 2025-05-05 PROCEDURE — 99215 OFFICE O/P EST HI 40 MIN: CPT | Mod: 25

## 2025-05-09 RX ORDER — DAPAGLIFLOZIN 10 MG/1
10 TABLET, FILM COATED ORAL DAILY
Qty: 90 | Refills: 3 | Status: ACTIVE | COMMUNITY
Start: 2025-05-05

## 2025-05-19 ENCOUNTER — APPOINTMENT (OUTPATIENT)
Facility: CLINIC | Age: 50
End: 2025-05-19
Payer: COMMERCIAL

## 2025-05-19 VITALS
SYSTOLIC BLOOD PRESSURE: 159 MMHG | HEART RATE: 72 BPM | HEIGHT: 62 IN | DIASTOLIC BLOOD PRESSURE: 97 MMHG | BODY MASS INDEX: 39.38 KG/M2 | OXYGEN SATURATION: 95 % | WEIGHT: 214 LBS

## 2025-05-19 DIAGNOSIS — N17.9 ACUTE KIDNEY FAILURE, UNSPECIFIED: ICD-10-CM

## 2025-05-19 DIAGNOSIS — N18.30 CHRONIC KIDNEY DISEASE, STAGE 3 UNSPECIFIED: ICD-10-CM

## 2025-05-19 PROBLEM — N20.0 KIDNEY STONE ON LEFT SIDE: Status: ACTIVE | Noted: 2025-05-19

## 2025-05-19 PROCEDURE — 99215 OFFICE O/P EST HI 40 MIN: CPT

## 2025-05-19 RX ORDER — DAPAGLIFLOZIN 10 MG/1
10 TABLET, FILM COATED ORAL DAILY
Qty: 30 | Refills: 2 | Status: ACTIVE | COMMUNITY
Start: 2025-05-19 | End: 1900-01-01

## 2025-05-21 LAB
ALBUMIN SERPL ELPH-MCNC: 4.5 G/DL
ALP BLD-CCNC: 122 U/L
ALT SERPL-CCNC: 63 U/L
ANION GAP SERPL CALC-SCNC: 14 MMOL/L
APPEARANCE: CLEAR
AST SERPL-CCNC: 34 U/L
BACTERIA: NEGATIVE /HPF
BILIRUB SERPL-MCNC: 0.4 MG/DL
BILIRUBIN URINE: NEGATIVE
BLOOD URINE: NEGATIVE
BUN SERPL-MCNC: 30 MG/DL
CALCIUM SERPL-MCNC: 9.4 MG/DL
CAST: 0 /LPF
CHLORIDE SERPL-SCNC: 104 MMOL/L
CK SERPL-CCNC: 139 U/L
CO2 SERPL-SCNC: 23 MMOL/L
COLOR: YELLOW
CREAT SERPL-MCNC: 1.74 MG/DL
CREAT SPEC-SCNC: 211 MG/DL
CREAT/PROT UR: 0.1 RATIO
EGFRCR SERPLBLD CKD-EPI 2021: 48 ML/MIN/1.73M2
EPITHELIAL CELLS: 0 /HPF
GLUCOSE QUALITATIVE U: NEGATIVE MG/DL
HCT VFR BLD CALC: 45.3 %
HGB BLD-MCNC: 14 G/DL
KETONES URINE: NEGATIVE MG/DL
LEUKOCYTE ESTERASE URINE: NEGATIVE
MCHC RBC-ENTMCNC: 28.5 PG
MCHC RBC-ENTMCNC: 30.9 G/DL
MCV RBC AUTO: 92.1 FL
MICROSCOPIC-UA: NORMAL
NITRITE URINE: NEGATIVE
PH URINE: 6
PLATELET # BLD AUTO: 168 K/UL
POTASSIUM SERPL-SCNC: 4.5 MMOL/L
POTASSIUM UR-SCNC: 78.5 MMOL/L
PROT SERPL-MCNC: 7.8 G/DL
PROT UR-MCNC: 23 MG/DL
PROTEIN URINE: 30 MG/DL
RBC # BLD: 4.92 M/UL
RBC # FLD: 12.8 %
RED BLOOD CELLS URINE: 0 /HPF
SODIUM ?TM SUB UR QN: 137 MMOL/L
SODIUM SERPL-SCNC: 141 MMOL/L
SPECIFIC GRAVITY URINE: 1.02
UROBILINOGEN URINE: 0.2 MG/DL
WBC # FLD AUTO: 4.84 K/UL
WHITE BLOOD CELLS URINE: 0 /HPF

## 2025-05-29 ENCOUNTER — APPOINTMENT (OUTPATIENT)
Dept: UROLOGY | Facility: CLINIC | Age: 50
End: 2025-05-29
Payer: COMMERCIAL

## 2025-05-29 VITALS
SYSTOLIC BLOOD PRESSURE: 157 MMHG | BODY MASS INDEX: 39.38 KG/M2 | DIASTOLIC BLOOD PRESSURE: 89 MMHG | HEART RATE: 63 BPM | WEIGHT: 214 LBS | HEIGHT: 62 IN

## 2025-05-29 DIAGNOSIS — N20.0 CALCULUS OF KIDNEY: ICD-10-CM

## 2025-05-29 PROCEDURE — 99203 OFFICE O/P NEW LOW 30 MIN: CPT

## 2025-05-30 LAB
APPEARANCE: CLEAR
BACTERIA: NEGATIVE /HPF
BILIRUBIN URINE: NEGATIVE
BLOOD URINE: NEGATIVE
CAST: 1 /LPF
COLOR: YELLOW
EPITHELIAL CELLS: 0 /HPF
GLUCOSE QUALITATIVE U: >=1000 MG/DL
KETONES URINE: NEGATIVE MG/DL
LEUKOCYTE ESTERASE URINE: NEGATIVE
MICROSCOPIC-UA: NORMAL
NITRITE URINE: NEGATIVE
PH URINE: 5.5
PROTEIN URINE: NORMAL MG/DL
RED BLOOD CELLS URINE: 0 /HPF
SPECIFIC GRAVITY URINE: 1.03
UROBILINOGEN URINE: 0.2 MG/DL
WHITE BLOOD CELLS URINE: 0 /HPF

## 2025-06-01 ENCOUNTER — NON-APPOINTMENT (OUTPATIENT)
Age: 50
End: 2025-06-01

## 2025-06-02 ENCOUNTER — RX RENEWAL (OUTPATIENT)
Age: 50
End: 2025-06-02

## 2025-06-03 ENCOUNTER — APPOINTMENT (OUTPATIENT)
Facility: CLINIC | Age: 50
End: 2025-06-03
Payer: COMMERCIAL

## 2025-06-03 VITALS
HEIGHT: 62 IN | DIASTOLIC BLOOD PRESSURE: 89 MMHG | OXYGEN SATURATION: 97 % | HEART RATE: 63 BPM | WEIGHT: 214 LBS | BODY MASS INDEX: 39.38 KG/M2 | SYSTOLIC BLOOD PRESSURE: 144 MMHG

## 2025-06-03 PROCEDURE — 99214 OFFICE O/P EST MOD 30 MIN: CPT

## 2025-06-16 ENCOUNTER — APPOINTMENT (OUTPATIENT)
Dept: CARDIOLOGY | Facility: CLINIC | Age: 50
End: 2025-06-16

## 2025-07-07 NOTE — ASU PATIENT PROFILE, ADULT - NS PRO MODE OF ARRIVAL
A catheter was exchanged for a (CATHETER OD6 FR L100 CM RADOPQ BRAID SLCT JL4 CRV COR FEM PU) catheter. ambulatory

## 2025-07-31 ENCOUNTER — APPOINTMENT (OUTPATIENT)
Dept: CARDIOLOGY | Facility: CLINIC | Age: 50
End: 2025-07-31

## 2025-07-31 VITALS
OXYGEN SATURATION: 95 % | WEIGHT: 218 LBS | BODY MASS INDEX: 40.12 KG/M2 | HEART RATE: 58 BPM | DIASTOLIC BLOOD PRESSURE: 70 MMHG | SYSTOLIC BLOOD PRESSURE: 114 MMHG | HEIGHT: 62 IN

## 2025-07-31 DIAGNOSIS — N18.9 CHRONIC KIDNEY DISEASE, UNSPECIFIED: ICD-10-CM

## 2025-07-31 DIAGNOSIS — I42.8 OTHER CARDIOMYOPATHIES: ICD-10-CM

## 2025-07-31 DIAGNOSIS — I10 ESSENTIAL (PRIMARY) HYPERTENSION: ICD-10-CM

## 2025-07-31 DIAGNOSIS — I25.10 ATHEROSCLEROTIC HEART DISEASE OF NATIVE CORONARY ARTERY W/OUT ANGINA PECTORIS: ICD-10-CM

## 2025-07-31 DIAGNOSIS — I50.9 HEART FAILURE, UNSPECIFIED: ICD-10-CM

## 2025-07-31 PROCEDURE — G2211 COMPLEX E/M VISIT ADD ON: CPT | Mod: NC

## 2025-07-31 PROCEDURE — 99215 OFFICE O/P EST HI 40 MIN: CPT

## 2025-09-03 ENCOUNTER — APPOINTMENT (OUTPATIENT)
Facility: CLINIC | Age: 50
End: 2025-09-03

## (undated) DEVICE — SOL IRR POUR H2O 1000ML

## (undated) DEVICE — TUBING HIGH POWER CONTRAST INJ

## (undated) DEVICE — DRAPE CV 106" X 135"

## (undated) DEVICE — SOL INJ NS 0.9% 500ML 1-PORT

## (undated) DEVICE — SUT VICRYL 3-0 27" SH

## (undated) DEVICE — TUBING HI PRESURE NONBRAID M/F 72"

## (undated) DEVICE — SUT VICRYL 2-0 27" CT-2 UNDYED

## (undated) DEVICE — SUT PROLENE 6-0 30" C-1

## (undated) DEVICE — DRAPE C ARM UNIVERSAL

## (undated) DEVICE — SUT NYLON 3-0 18" PS-2

## (undated) DEVICE — RETAINER VICERA FISH LG

## (undated) DEVICE — GLV 7.5 PROTEXIS (WHITE)

## (undated) DEVICE — SPONGE PEANUT AUTO COUNT

## (undated) DEVICE — LIGASURE IMPACT

## (undated) DEVICE — DRAPE XL SHEET 77X98"

## (undated) DEVICE — NDL PERC BASEPLT 18GX7CM

## (undated) DEVICE — TORQUE DEVICE FOR GUIDEWIRE 0.0100.038"

## (undated) DEVICE — DRAPE TOWEL BLUE 17" X 24"

## (undated) DEVICE — DRSG MASTISOL

## (undated) DEVICE — SYR CONTROL LUER LOK 10CC

## (undated) DEVICE — MARKING PEN W RULER

## (undated) DEVICE — DRSG STERISTRIPS 0.5 X 4"

## (undated) DEVICE — DRAPE DOME BAG 22"

## (undated) DEVICE — VENODYNE/SCD SLEEVE CALF MEDIUM

## (undated) DEVICE — WARMING BLANKET LOWER ADULT

## (undated) DEVICE — DRAPE SPLIT SHEET 77" X 108"

## (undated) DEVICE — WARMING BLANKET FULL UNDERBODY

## (undated) DEVICE — FLOW SWITCH HP CONTROL DEVICE

## (undated) DEVICE — SOL BAG NS 0.9% 1000ML

## (undated) DEVICE — SUT VICRYL 0 36" CTX UNDYED

## (undated) DEVICE — SOL INJ NS 0.9% 1000ML

## (undated) DEVICE — SUT PROLENE 6-0 30" BV-1

## (undated) DEVICE — SUT SILK 2-0 30" TIES

## (undated) DEVICE — SUT SILK 0 30" SH

## (undated) DEVICE — POSITIONER FOAM EGG CRATE ULNAR 2PCS (PINK)

## (undated) DEVICE — PACK VASCULAR

## (undated) DEVICE — FOLEY TRAY 16FR LF URINE METER SURESTEP

## (undated) DEVICE — SOL IRR POUR NS 0.9% 500ML

## (undated) DEVICE — SUT MONOCRYL 4-0 27" PS-2 UNDYED

## (undated) DEVICE — EPIDURAL KIT DURAFLEX CONTINUOUS FIXED 17G

## (undated) DEVICE — SOL IRR POUR NS 0.9% 1000ML

## (undated) DEVICE — DRSG MEPILEX 10 X 30CM (4 X 12") WHITE

## (undated) DEVICE — SUT SILK 4-0 30" TIES

## (undated) DEVICE — SUT SILK 3-0 30" TIES

## (undated) DEVICE — SUT PLEDGET 9MM X 4MM X 1.5MM

## (undated) DEVICE — DRAPE 3/4 SHEET 52X76"

## (undated) DEVICE — ELCTR BOVIE TIP BLADE MEGADYNE E-Z CLEAN 6.5" (LONG)

## (undated) DEVICE — SUT VICRYL 2-0 27" SH UNDYED

## (undated) DEVICE — STAPLER SKIN PROXIMATE

## (undated) DEVICE — VESSEL LOOP MINI-BLUE 0.075" X 16"

## (undated) DEVICE — PREP SCRUB BRUSH W CHG 4%

## (undated) DEVICE — LAP PAD 18 X 18"

## (undated) DEVICE — DRSG TAPE UMBILICAL COTTON 2" X 30 X 1/8"